# Patient Record
Sex: MALE | Race: WHITE | NOT HISPANIC OR LATINO | Employment: OTHER | URBAN - METROPOLITAN AREA
[De-identification: names, ages, dates, MRNs, and addresses within clinical notes are randomized per-mention and may not be internally consistent; named-entity substitution may affect disease eponyms.]

---

## 2017-02-14 ENCOUNTER — GENERIC CONVERSION - ENCOUNTER (OUTPATIENT)
Dept: OTHER | Facility: OTHER | Age: 62
End: 2017-02-14

## 2017-03-07 ENCOUNTER — GENERIC CONVERSION - ENCOUNTER (OUTPATIENT)
Dept: OTHER | Facility: OTHER | Age: 62
End: 2017-03-07

## 2017-04-07 ENCOUNTER — GENERIC CONVERSION - ENCOUNTER (OUTPATIENT)
Dept: OTHER | Facility: OTHER | Age: 62
End: 2017-04-07

## 2017-05-05 ENCOUNTER — GENERIC CONVERSION - ENCOUNTER (OUTPATIENT)
Dept: OTHER | Facility: OTHER | Age: 62
End: 2017-05-05

## 2017-06-20 ENCOUNTER — ALLSCRIPTS OFFICE VISIT (OUTPATIENT)
Dept: OTHER | Facility: OTHER | Age: 62
End: 2017-06-20

## 2017-06-30 ENCOUNTER — GENERIC CONVERSION - ENCOUNTER (OUTPATIENT)
Dept: OTHER | Facility: OTHER | Age: 62
End: 2017-06-30

## 2017-07-03 ENCOUNTER — GENERIC CONVERSION - ENCOUNTER (OUTPATIENT)
Dept: OTHER | Facility: OTHER | Age: 62
End: 2017-07-03

## 2017-08-21 ENCOUNTER — ALLSCRIPTS OFFICE VISIT (OUTPATIENT)
Dept: OTHER | Facility: OTHER | Age: 62
End: 2017-08-21

## 2017-08-21 ENCOUNTER — LAB REQUISITION (OUTPATIENT)
Dept: LAB | Facility: HOSPITAL | Age: 62
End: 2017-08-21
Payer: MEDICARE

## 2017-08-21 DIAGNOSIS — M79.10 MYALGIA: ICD-10-CM

## 2017-08-21 DIAGNOSIS — Z12.5 ENCOUNTER FOR SCREENING FOR MALIGNANT NEOPLASM OF PROSTATE: ICD-10-CM

## 2017-08-21 DIAGNOSIS — M54.81 OCCIPITAL NEURALGIA: ICD-10-CM

## 2017-08-21 DIAGNOSIS — E03.9 HYPOTHYROIDISM: ICD-10-CM

## 2017-08-21 DIAGNOSIS — M48.02 SPINAL STENOSIS OF CERVICAL REGION: ICD-10-CM

## 2017-08-21 DIAGNOSIS — Z00.00 ENCOUNTER FOR GENERAL ADULT MEDICAL EXAMINATION WITHOUT ABNORMAL FINDINGS: ICD-10-CM

## 2017-08-21 DIAGNOSIS — J30.9 ALLERGIC RHINITIS: ICD-10-CM

## 2017-08-21 LAB
25(OH)D3 SERPL-MCNC: 33.8 NG/ML (ref 30–100)
ALBUMIN SERPL BCP-MCNC: 4 G/DL (ref 3.5–5)
ALP SERPL-CCNC: 52 U/L (ref 46–116)
ALT SERPL W P-5'-P-CCNC: 27 U/L (ref 12–78)
ANION GAP SERPL CALCULATED.3IONS-SCNC: 5 MMOL/L (ref 4–13)
AST SERPL W P-5'-P-CCNC: 22 U/L (ref 5–45)
BASOPHILS # BLD AUTO: 0.02 THOUSANDS/ΜL (ref 0–0.1)
BASOPHILS NFR BLD AUTO: 0 % (ref 0–1)
BILIRUB SERPL-MCNC: 0.38 MG/DL (ref 0.2–1)
BILIRUB UR QL STRIP: NEGATIVE
BUN SERPL-MCNC: 18 MG/DL (ref 5–25)
CALCIUM SERPL-MCNC: 9.1 MG/DL (ref 8.3–10.1)
CHLORIDE SERPL-SCNC: 106 MMOL/L (ref 100–108)
CHOLEST SERPL-MCNC: 181 MG/DL (ref 50–200)
CLARITY UR: CLEAR
CO2 SERPL-SCNC: 28 MMOL/L (ref 21–32)
COLOR UR: YELLOW
CREAT SERPL-MCNC: 0.6 MG/DL (ref 0.6–1.3)
EOSINOPHIL # BLD AUTO: 0.16 THOUSAND/ΜL (ref 0–0.61)
EOSINOPHIL NFR BLD AUTO: 2 % (ref 0–6)
ERYTHROCYTE [DISTWIDTH] IN BLOOD BY AUTOMATED COUNT: 14 % (ref 11.6–15.1)
GFR SERPL CREATININE-BSD FRML MDRD: 107 ML/MIN/1.73SQ M
GLUCOSE SERPL-MCNC: 97 MG/DL (ref 65–140)
GLUCOSE UR STRIP-MCNC: NEGATIVE MG/DL
HCT VFR BLD AUTO: 42.2 % (ref 36.5–49.3)
HDLC SERPL-MCNC: 50 MG/DL (ref 40–60)
HGB BLD-MCNC: 14.2 G/DL (ref 12–17)
HGB UR QL STRIP.AUTO: NEGATIVE
KETONES UR STRIP-MCNC: NEGATIVE MG/DL
LDLC SERPL CALC-MCNC: 117 MG/DL (ref 0–100)
LEUKOCYTE ESTERASE UR QL STRIP: NEGATIVE
LYMPHOCYTES # BLD AUTO: 2.27 THOUSANDS/ΜL (ref 0.6–4.47)
LYMPHOCYTES NFR BLD AUTO: 28 % (ref 14–44)
MCH RBC QN AUTO: 32.6 PG (ref 26.8–34.3)
MCHC RBC AUTO-ENTMCNC: 33.6 G/DL (ref 31.4–37.4)
MCV RBC AUTO: 97 FL (ref 82–98)
MONOCYTES # BLD AUTO: 0.86 THOUSAND/ΜL (ref 0.17–1.22)
MONOCYTES NFR BLD AUTO: 11 % (ref 4–12)
NEUTROPHILS # BLD AUTO: 4.75 THOUSANDS/ΜL (ref 1.85–7.62)
NEUTS SEG NFR BLD AUTO: 59 % (ref 43–75)
NITRITE UR QL STRIP: NEGATIVE
NRBC BLD AUTO-RTO: 0 /100 WBCS
PH UR STRIP.AUTO: 5.5 [PH] (ref 4.5–8)
PLATELET # BLD AUTO: 183 THOUSANDS/UL (ref 149–390)
PMV BLD AUTO: 10.8 FL (ref 8.9–12.7)
POTASSIUM SERPL-SCNC: 4.3 MMOL/L (ref 3.5–5.3)
PROT SERPL-MCNC: 7.2 G/DL (ref 6.4–8.2)
PROT UR STRIP-MCNC: NEGATIVE MG/DL
PSA SERPL-MCNC: 1.2 NG/ML (ref 0–4)
RBC # BLD AUTO: 4.35 MILLION/UL (ref 3.88–5.62)
SODIUM SERPL-SCNC: 139 MMOL/L (ref 136–145)
SP GR UR STRIP.AUTO: 1.02 (ref 1–1.03)
TRIGL SERPL-MCNC: 70 MG/DL
TSH SERPL DL<=0.05 MIU/L-ACNC: 1.06 UIU/ML (ref 0.36–3.74)
UROBILINOGEN UR QL STRIP.AUTO: 0.2 E.U./DL
WBC # BLD AUTO: 8.08 THOUSAND/UL (ref 4.31–10.16)

## 2017-08-21 PROCEDURE — 82306 VITAMIN D 25 HYDROXY: CPT | Performed by: FAMILY MEDICINE

## 2017-08-21 PROCEDURE — 80053 COMPREHEN METABOLIC PANEL: CPT | Performed by: FAMILY MEDICINE

## 2017-08-21 PROCEDURE — 81003 URINALYSIS AUTO W/O SCOPE: CPT | Performed by: FAMILY MEDICINE

## 2017-08-21 PROCEDURE — 85025 COMPLETE CBC W/AUTO DIFF WBC: CPT | Performed by: FAMILY MEDICINE

## 2017-08-21 PROCEDURE — G0103 PSA SCREENING: HCPCS | Performed by: FAMILY MEDICINE

## 2017-08-21 PROCEDURE — 80061 LIPID PANEL: CPT | Performed by: FAMILY MEDICINE

## 2017-08-21 PROCEDURE — 84443 ASSAY THYROID STIM HORMONE: CPT | Performed by: FAMILY MEDICINE

## 2017-08-22 ENCOUNTER — GENERIC CONVERSION - ENCOUNTER (OUTPATIENT)
Dept: OTHER | Facility: OTHER | Age: 62
End: 2017-08-22

## 2017-10-31 ENCOUNTER — GENERIC CONVERSION - ENCOUNTER (OUTPATIENT)
Dept: OTHER | Facility: OTHER | Age: 62
End: 2017-10-31

## 2017-12-11 ENCOUNTER — GENERIC CONVERSION - ENCOUNTER (OUTPATIENT)
Dept: FAMILY MEDICINE CLINIC | Facility: CLINIC | Age: 62
End: 2017-12-11

## 2018-01-08 ENCOUNTER — GENERIC CONVERSION - ENCOUNTER (OUTPATIENT)
Dept: FAMILY MEDICINE CLINIC | Facility: CLINIC | Age: 63
End: 2018-01-08

## 2018-01-10 NOTE — RESULT NOTES
Discussion/Summary   Labs okay  Continue present treatment  Verified Results  (1) CBC/PLT/DIFF 16Hiv1599 10:30AM Zaire Muro Order Number: [id number]     Test Name Result Flag Reference   WBC COUNT 8 08 Thousand/uL  4 31-10 16   RBC COUNT 4 35 Million/uL  3 88-5 62   HEMOGLOBIN 14 2 g/dL  12 0-17 0   HEMATOCRIT 42 2 %  36 5-49 3   MCV 97 fL  82-98   MCH 32 6 pg  26 8-34 3   MCHC 33 6 g/dL  31 4-37 4   RDW 14 0 %  11 6-15 1   MPV 10 8 fL  8 9-12 7   PLATELET COUNT 725 Thousands/uL  149-390   nRBC AUTOMATED 0 /100 WBCs     NEUTROPHILS RELATIVE PERCENT 59 %  43-75   LYMPHOCYTES RELATIVE PERCENT 28 %  14-44   MONOCYTES RELATIVE PERCENT 11 %  4-12   EOSINOPHILS RELATIVE PERCENT 2 %  0-6   BASOPHILS RELATIVE PERCENT 0 %  0-1   NEUTROPHILS ABSOLUTE COUNT 4 75 Thousands/? ??L  1 85-7 62   LYMPHOCYTES ABSOLUTE COUNT 2 27 Thousands/? ??L  0 60-4 47   MONOCYTES ABSOLUTE COUNT 0 86 Thousand/? ??L  0 17-1 22   EOSINOPHILS ABSOLUTE COUNT 0 16 Thousand/? ??L  0 00-0 61   BASOPHILS ABSOLUTE COUNT 0 02 Thousands/? ??L  0 00-0 10     (1) COMPREHENSIVE METABOLIC PANEL 22LMX7165 30:06IF Zaire Muro Order Number: [id number]     Test Name Result Flag Reference   GLUCOSE,RANDM 97 mg/dL     If the patient is fasting, the ADA then defines impaired fasting glucose as > 100 mg/dL and diabetes as > or equal to 123 mg/dL  Specimen collection should occur prior to Sulfasalazine administration due to the potential for falsely depressed results  Specimen collection should occur prior to Sulfapyridine administration due to the potential for falsely elevated results     SODIUM 139 mmol/L  136-145   POTASSIUM 4 3 mmol/L  3 5-5 3   CHLORIDE 106 mmol/L  100-108   CARBON DIOXIDE 28 mmol/L  21-32   ANION GAP (CALC) 5 mmol/L  4-13   BLOOD UREA NITROGEN 18 mg/dL  5-25   CREATININE 0 60 mg/dL  0 60-1 30   Standardized to IDMS reference method   CALCIUM 9 1 mg/dL  8 3-10 1   BILI, TOTAL 0 38 mg/dL  0 20-1 00 ALK PHOSPHATAS 52 U/L     ALT (SGPT) 27 U/L  12-78   Specimen collection should occur prior to Sulfasalazine and/or Sulfapyridine administration due to the potential for falsely depressed results  AST(SGOT) 22 U/L  5-45   Specimen collection should occur prior to Sulfasalazine administration due to the potential for falsely depressed results  ALBUMIN 4 0 g/dL  3 5-5 0   TOTAL PROTEIN 7 2 g/dL  6 4-8 2   eGFR 107 ml/min/1 73sq [facility] Disease Education Program recommendations are as follows:  GFR calculation is accurate only with a steady state creatinine  Chronic Kidney disease less than 60 ml/min/1 73 sq  meters  Kidney failure less than 15 ml/min/1 73 sq  meters  (1) LIPID PANEL, FASTING 49Xng1213 10:30AM [facility] Order Number: [id number]     Test Name Result Flag Reference   CHOLESTEROL 181 mg/dL     HDL,DIRECT 50 mg/dL  40-60   Specimen collection should occur prior to Metamizole administration due to the potential for falsley depressed results  LDL CHOLESTEROL CALCULATED 117 mg/dL H 0-100   Triglyceride:        Normal ??? ??? ??? ??? ??? ??? ??? <150 mg/dl   ??? ??? ???Borderline High ??? ??? 150-199 mg/dl   ??? ??? ? ?? High ??? ??? ??? ??? ??? ??? ??? 200-499 mg/dl   ??? ??? ? ??Very High ??? ??? ??? ??? ??? >499 mg/dl      Cholesterol:       Desirable ??? ??? ??? ??? <200 mg/dl   ??? ??? Borderline High ??? 200-239 mg/dl   ??? ??? High ??? ??? ??? ??? ??? ??? >239 mg/dl      HDL Cholesterol:       High ??? ???>59 mg/dL   ??? ??? Low ??? ??? <41 mg/dL      This screening LDL is a calculated result  It does not have the accuracy of the Direct Measured LDL in the monitoring of patients with hyperlipidemia and/or statin therapy  Direct Measure LDL (BZG213) must be ordered separately in these patients     TRIGLYCERIDES 70 mg/dL  <=150   Specimen collection should occur prior to N-Acetylcysteine or Metamizole administration due to the potential for falsely depressed results  (1) TSH WITH FT4 REFLEX 21Aug2017 10:30AM Enma Caruso Order Number: [id number]     Test Name Result Flag Reference   TSH 1 060 uIU/mL  0 358-3 740   Patients undergoing fluorescein dye angiography may retain small amounts of fluorescein in the body for 48-72 hours post procedure  Samples containing fluorescein can produce falsely depressed TSH values  If the patient had this procedure,a specimen should be resubmitted post fluorescein clearance  (1) VITAMIN D 25-HYDROXY 21Aug2017 10:30AM Enma Caruso Order Number: [id number]     Test Name Result Flag Reference   VIT D 25-HYDROX 33 8 ng/mL  30 0-100 0   This assay is a certified procedure of the CDC Vitamin D Standardization Certification Program (VDSCP)     Deficiency <20ng/ml   Insufficiency 20-30ng/ml   Sufficient  ng/ml     *Patients undergoing fluorescein dye angiography may retain small amounts of fluorescein in the body for 48-72 hours post procedure  Samples containing fluorescein can produce falsely elevated Vitamin D values  If the patient had this procedure, a specimen should be resubmitted post fluorescein clearance       (1) URINALYSIS w URINE C/S REFLEX (will reflex a microscopy if leukocytes, occult blood, or nitrites are not within normal limits) 21Aug2017 10:30AM Enma Caruso Order Number: [id number]     Test Name Result Flag Reference   COLOR Yellow     CLARITY Clear     PH UA 5 5  4 5-8 0   LEUKOCYTE ESTERASE UA Negative  Negative   NITRITE UA Negative  Negative   PROTEIN UA Negative mg/dl  Negative   GLUCOSE UA Negative mg/dl  Negative   KETONES UA Negative mg/dl  Negative   UROBILINOGEN UA 0 2 E U /dl  0 2, 1 0 E U /dl   BILIRUBIN UA Negative  Negative   BLOOD UA Negative  Negative   SPECIFIC GRAVITY UA 1 018  1 003-1 030     (1) PSA (SCREEN) (Dx V76 44 Screen for Prostate Cancer) 21Aug2017 10:30AM Ross SORENSEN Order Number: [id number]     Test Name Result Flag Reference   PROSTATE SPECIFIC ANTIGEN 1 2 ng/mL  0 0-4 0   American Urological Association Guidelines define biochemical recurrence of prostate cancer as a detectable or rising PSA value post-radical prostatectomy that is greater than or equal to 0 2 ng/mL with a second confirmatory level of greater than or equal to 0 2 ng/mL

## 2018-01-10 NOTE — MISCELLANEOUS
Message   Recorded as Task   Date: 09/22/2016 11:41 AM, Created By: Petar Moreno   Task Name: Follow Up   Assigned To: Daryn manjarrez,Team   Regarding Patient: Hao Ernst, Status: Active   Comment:    Kala Ramirez - 22 Sep 2016 11:41 AM     TASK CREATED  Caller: Self; General Medical Question; [phone] (Home)  Pt lmom stating that he was seen by Chacorta Garrido and was started on amrix  Pt was also told that a new compounding cream with a muscle relaxant was going to be ordered at [facility]  Pt called the pharmacy and their is nothing on file  Alphonso Wyman - 22 Sep 2016 12:24 PM     TASK REPLIED TO: Previously Assigned To CLAUDIA manjarrez,Team  please call in the [facility] compounded medication   i had sent electronically, but I guess it didn't go through   Petar Moreno - 22 Sep 2016 1:06 PM     TASK EDITED    Called in as ordered  Pt aware  Active Problems    1  Acute bronchitis (466 0) (J20 9)   2  Acute sinusitis (461 9) (J01 90)   3  Acute upper respiratory infection (465 9) (J06 9)   4  Aftercare following surgery of the musculoskeletal system (V58 78) (Z47 89)   5  Allergic rhinitis (477 9) (J30 9)   6  Blood tests for routine general physical examination (V72 62) (Z00 00)   7  C2 cervical fracture (805 02) (S12 100A)   8  Cervical disc herniation (722 0) (M50 20)   9  Cervical spinal stenosis (723 0) (M48 02)   10  Cervical spondylosis (721 0) (M47 812)   11  Cervicalgia (723 1) (M54 2)   12  Disc disorder of cervical region (722 91) (M50 90)   13  Encounter for prostate cancer screening (V76 44) (Z12 5)   14  Fracture, cervical vertebra (805 00) (S12 9XXA)   15  Hypothyroidism (244 9) (E03 9)   16  Left arm pain (729 5) (M79 602)   17  Muscle pain, myofacial (729 1) (M79 1)   18  Neck pain (723 1) (M54 2)   19  Neuralgia (729 2) (M79 2)   20  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)   21   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)   22  Occipital neuralgia (723 8) (M54 81)   23  Postoperative examination (V67 00) (Z09)   24  Post-operative state (V45 89) (Z98 89)   25  Post-traumatic osteoarthritis of one knee (715 26) (M17 30)   26  Preoperative examination (V72 84) (Z01 818)   27  Pruritus (698 9) (L29 9)   28  Seizures (780 39) (R56 9)   29  Unequal leg length (acquired) (736 81) (M21 70)   30  Venous insufficiency (459 81) (I87 2)    Current Meds   1  Amrix 15 MG Oral Capsule Extended Release 24 Hour (Cyclobenzaprine HCl); TAKE 1   CAPSULE after dinner around 7 pm;   Therapy: 89Kuy1136 to (Evaluate:13Nov2016)  Requested for: 36Dfv6909; Last   Rx:44Nzt9271 Ordered   2  Aspirin 81 MG TABS; TAKE 1 TABLET DAILY; Therapy: (Recorded:05Gdz3434) to Recorded   3  Calcium 600 + D 600-200 MG-UNIT Oral Tablet; TAKE 1 TABLET DAILY; Therapy: (Recorded:23Spd0329) to Recorded   4  Fish Oil CAPS; take 1 capsule daily; Therapy: (Recorded:78Dxf3243) to Recorded   5  Fluticasone Propionate 50 MCG/ACT Nasal Suspension; USE 2 SPRAYS IN EACH   NOSTRIL ONCE DAILY; Therapy: 85QXY6678 to (Last Rx:43Wwb6374)  Requested for: 00Srx3877 Ordered   6  Multivitamins CAPS; TAKE 1 CAPSULE DAILY; Therapy: (Recorded:49Wfo6100) to Recorded   7  Proventil  (90 Base) MCG/ACT Inhalation Aerosol Solution; INHALE 2 PUFFS   EVERY 4-6 HOURS AS NEEDED; Therapy: 79CNE1165 to (Last Rx:71Khb6655)  Requested for: 77Drs8701 Ordered   8  [employer] Compound Medication; gabapentin 10%, cyclobenzaprine 2%, diclofenac 3% QID   PRN; Therapy: 66DZB5291 to (Evaluate:14Oct2016); Last Rx:17Goo8936 Ordered    Allergies    1   Augmentin TABS    Signatures   Electronically signed by : Susie Montgomery RN; Sep 22 2016  1:06PM EST                       (Author)

## 2018-01-11 NOTE — MISCELLANEOUS
Message   Recorded as Task   Date: 01/11/2016 01:28 PM, Created By: Josie Morales   Task Name: Follow Up   Assigned To: Alannah Amato   Regarding Patient: Linnea Bass, Status: Active   Comment:    Alannah Amato - 11 Jan 2016 1:28 PM     TASK CREATED  Caller: Self; Other; [phone] (Home)  Pt left vm on 1/11/16 at 1240 that he was calling to give authorization for his wife to  a RX tomorrow that is here for him  S/W pt and confirmed his message was received, explained to pt that we have a paper that his wife will be asked to sign tomorrow when she comes to p/u his RX  Pt appreciated the c/b and confirmed our office is open until 4pm         Active Problems    1  Acute bronchitis (466 0) (J20 9)   2  Acute sinusitis (461 9) (J01 90)   3  Acute upper respiratory infection (465 9) (J06 9)   4  Aftercare following surgery of the musculoskeletal system (V58 78) (Z47 89)   5  Allergic rhinitis (477 9) (J30 9)   6  C2 cervical fracture (805 02) (S12 100A)   7  Cervical disc herniation (722 0) (M50 20)   8  Cervical spinal stenosis (723 0) (M48 02)   9  Cervical spondylosis (721 0) (M47 812)   10  Cervicalgia (723 1) (M54 2)   11  Disc disorder of cervical region (722 91) (M50 90)   12  Fracture, cervical vertebra (805 00) (S12 9XXA)   13  Hypothyroidism (244 9) (E03 9)   14  Left arm pain (729 5) (M79 602)   15  Muscle pain, myofacial (729 1) (M79 7)   16  Neck pain (723 1) (M54 2)   17  Neuralgia (729 2) (M79 2)   18  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)   19  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)   20  Occipital neuralgia (723 8) (M54 81)   21  Postoperative examination (V67 00) (Z09)   22  Post-operative state (V45 89) (Z98 89)   23  Post-traumatic osteoarthritis of one knee (715 26) (M17 30)   24  Preoperative examination (V72 84) (Z01 818)   25  Pruritus (698 9) (L29 9)   26  Seizures (780 39) (R56 9)   27  Unequal leg length (acquired) (736 81) (M21 70)   28   Venous insufficiency (459 81) (I87 2)    Current Meds   1  Aspirin 81 MG Oral Tablet; TAKE 1 TABLET DAILY; Therapy: (Recorded:02Oct2012) to Recorded   2  Calcium 600 + D 600-200 MG-UNIT Oral Tablet; TAKE 1 TABLET DAILY; Therapy: (Recorded:34Jfu6401) to Recorded   3  Fish Oil CAPS; take 1 capsule daily; Therapy: (Recorded:51Wnp7883) to Recorded   4  Levothyroxine Sodium 100 MCG Oral Tablet; TAKE 1 TABLET DAILY  Requested for:   80TZZ3790; Last FR:11SEB6669 Ordered   5  Multivitamins CAPS; TAKE 1 CAPSULE DAILY; Therapy: (Recorded:82Hnj4314) to Recorded   6  Nucynta 75 MG Oral Tablet; TAKE 1 TABLET 3 TIMES DAILY AS NEEDED FOR PAIN;   Therapy: 18ASE4724 to (Evaluate:15Jan2016); Last BL:28WMK4129 Ordered   7  Proventil  (90 Base) MCG/ACT Inhalation Aerosol Solution; INHALE 2 PUFFS   EVERY 4-6 HOURS AS NEEDED; Therapy: 19KIL3518 to (Last Rx:52Adm9537)  Requested for: 32Uci1430 Ordered   8  Proventil  (90 Base) MCG/ACT Inhalation Aerosol Solution; INHALE 2 PUFFS   EVERY 4-6 HOURS AS NEEDED; Therapy: 27ZNR7719 to (Last Rx:55Wwl3406)  Requested for: 55Xxg8663 Ordered   9  Triamcinolone Acetonide 0 1 % External Cream; APPLY 2-3 TIMES DAILY TO   AFFECTED AREA(S); Therapy: 86OQB8559 to (Last Rx:85Ikr0745)  Requested for: 68Sle2115 Ordered   10  Zithromax Z-Nicolás 250 MG Oral Tablet (Azithromycin); TAKE 2 TABLETS ON DAY 1 THEN    TAKE 1 TABLET A DAY FOR 4 DAYS; Therapy: 16ODV8902 to (Last Rx:30Oct2015)  Requested for: 30Oct2015 Ordered    Allergies    1   Augmentin TABS    Signatures   Electronically signed by : Patric Alarcon, ; Jan 11 2016  1:29PM EST                       (Author)

## 2018-01-12 NOTE — MISCELLANEOUS
Message   Recorded as Task   Date: 04/04/2016 11:43 AM, Created By: Josi Oneal   Task Name: Follow Up   Assigned To: Holli Suraez   Regarding Patient: Boubacar Drew, Status: Active   CommentSjoshua Guthrie - 04 Apr 2016 11:43 AM     TASK CREATED  please let pt know that i checked with dr Evan Cheatham and the wires in his neck are titanium (same as the screws) and that since he's not having an allergic reaction to the screws, unlikely to have it to the wires   Marychuy Whitney - 04 Apr 2016 1:16 PM     TASK EDITED  S/w pt  today and explained previous task  He is wondering if he could be reacting to the titanium florencio that is in his leg, since you made the statement about his watch  He is just throwing it out there to you  He wants to see what you think  He is also inquirying as to see if we received the [facility] codes that he faxed on friday? FQ to advise  Thanks   Alannah Amato - 04 Apr 2016 1:17 PM     TASK EDITED  Left detailed vm of the same on pt's cell  Pt to c/b if he has any further questions  Alannah Amato - 04 Apr 2016 1:17 PM     TASK REASSIGNED: Previously Assigned To Venkata Lynne - 04 Apr 2016 1:17 PM     TASK IN PROGRESS   Marychuy Whitney - 04 Apr 2016 2:13 PM     TASK EDITED  Please read my task  Thanks   Tonya Veliz - 07 Apr 2016 7:45 AM     TASK EDITED  ***FYI*** Compunding cream has been denied stating the compounded meds are not FDA approved   Josi Oneal - 07 Apr 2016 9:37 AM     TASK REPLIED TO: Previously Assigned To Josi Oneal md aware   then what cream has he been using? Alannah Amato - 07 Apr 2016 4:16 PM     TASK EDITED   Amy Chandra - 08 Apr 2016 1:55 PM     TASK EDITED  Pt informed that the cpd cream was denied even after Debbie Patel provided the [facility] codes  Pt said he wasn't using any other cream   Pt said to say thank you to everyone at our office for trying to get it approved     Latrell Johnson - 08 Apr 2016 2:55 PM     TASK REPLIED TO: Previously Assigned To Sulma Bach md aware        Active Problems    1  Acute bronchitis (466 0) (J20 9)   2  Acute sinusitis (461 9) (J01 90)   3  Acute upper respiratory infection (465 9) (J06 9)   4  Aftercare following surgery of the musculoskeletal system (V58 78) (Z47 89)   5  Allergic rhinitis (477 9) (J30 9)   6  C2 cervical fracture (805 02) (S12 100A)   7  Cervical disc herniation (722 0) (M50 20)   8  Cervical spinal stenosis (723 0) (M48 02)   9  Cervical spondylosis (721 0) (M47 812)   10  Cervicalgia (723 1) (M54 2)   11  Disc disorder of cervical region (722 91) (M50 90)   12  Fracture, cervical vertebra (805 00) (S12 9XXA)   13  Hypothyroidism (244 9) (E03 9)   14  Left arm pain (729 5) (M79 602)   15  Muscle pain, myofacial (729 1) (M79 1)   16  Neck pain (723 1) (M54 2)   17  Neuralgia (729 2) (M79 2)   18  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)   19  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)   20  Occipital neuralgia (723 8) (M54 81)   21  Postoperative examination (V67 00) (Z09)   22  Post-operative state (V45 89) (Z98 89)   23  Post-traumatic osteoarthritis of one knee (715 26) (M17 30)   24  Preoperative examination (V72 84) (Z01 818)   25  Pruritus (698 9) (L29 9)   26  Seizures (780 39) (R56 9)   27  Unequal leg length (acquired) (736 81) (M21 70)   28  Venous insufficiency (459 81) (I87 2)    Current Meds   1  Aspirin 81 MG Oral Tablet; TAKE 1 TABLET DAILY; Therapy: (Recorded:02Oct2012) to Recorded   2  Calcium 600 + D 600-200 MG-UNIT Oral Tablet; TAKE 1 TABLET DAILY; Therapy: (Recorded:00Fuq3460) to Recorded   3  Fish Oil CAPS; take 1 capsule daily; Therapy: (Recorded:88Xlo6027) to Recorded   4  HydrOXYzine HCl - 25 MG Oral Tablet; TAKE 1 TABLET 3 TO 4 TIMES DAILY AS   NEEDED FOR ITCHING; Therapy: 68UMH0501 to (Evaluate:27Mar2016)  Requested for: 47CNO3392; Last   Rx:09Doc5533 Ordered   5   Levothyroxine Sodium 100 MCG Oral Tablet; TAKE 1 TABLET DAILY Requested for:   03Jun2015; Last IW:13OHT3961 Ordered   6  Multivitamins CAPS; TAKE 1 CAPSULE DAILY; Therapy: (Recorded:87Uym0868) to Recorded   7  Proventil  (90 Base) MCG/ACT Inhalation Aerosol Solution; INHALE 2 PUFFS   EVERY 4-6 HOURS AS NEEDED; Therapy: 72RUT7490 to (Last Rx:37Htn1030)  Requested for: 26Soe6219 Ordered   8  [facility] Compound Medication; 5% amitriptyline, 5% gabapentin, 10%lidocaine, 5%   Phenytoin, lipoderm-60 grams  apply 2-3 times a day to   back of neck as needed for pain; Therapy: 44HKI5121 to (Evaluate:21Jan2016); Last Rx:18Jan2016 Ordered   9  Triamcinolone Acetonide 0 1 % External Cream; APPLY 2-3 TIMES DAILY TO   AFFECTED AREA(S); Therapy: 84WBF5334 to (Last Rx:31Akk6650)  Requested for: 51Zbz0732 Ordered    Allergies    1  Augmentin TABS    Signatures   Electronically signed by :  Ysabel Oliver, ; Apr 8 2016  4:06PM EST                       (Author)

## 2018-01-12 NOTE — MISCELLANEOUS
Message     Recorded as Task   Date: 05/05/2017 08:44 AM, Created By: Shayne Nath   Task Name: Follow Up   Assigned To: Ronaldo Mireles   Regarding Patient: Liz Terry, Status: Active   CommentLormaria teresa Crowell - 05 May 2017 8:44 AM     TASK CREATED  Form sent for doctor to fill out regarding pt's SSDI  The hearing is in a few weeks  Please call [phone] for status  Alannah Amato - 05 May 2017 8:51 AM     TASK EDITED  Please advise  Saurabh Pickard - 05 May 2017 10:24 AM     TASK REPLIED TO: Previously Assigned To Saurabh Neeraj  we don't complete disability paperwork   Alannah Amato - 05 May 2017 10:59 AM     TASK EDITED  S/W pt and informed of the same  Advised pt he may want to d/w his PCP about completing form  Pt stated "OK "        Active Problems    1  Acute bronchitis (466 0) (J20 9)   2  Acute sinusitis (461 9) (J01 90)   3  Acute upper respiratory infection (465 9) (J06 9)   4  Aftercare following surgery of the musculoskeletal system (V58 78) (Z47 89)   5  Allergic rhinitis (477 9) (J30 9)   6  Blood tests for routine general physical examination (V72 62) (Z00 00)   7  C2 cervical fracture (805 02) (S12 100A)   8  Cervical disc herniation (722 0) (M50 20)   9  Cervical spinal stenosis (723 0) (M48 02)   10  Cervical spondylosis (721 0) (M47 812)   11  Cervicalgia (723 1) (M54 2)   12  Disc disorder of cervical region (722 91) (M50 90)   13  Encounter for prostate cancer screening (V76 44) (Z12 5)   14  Flu vaccine need (V04 81) (Z23)   15  Fracture, cervical vertebra (805 00) (S12 9XXA)   16  Hypothyroidism (244 9) (E03 9)   17  Left arm pain (729 5) (M79 602)   18  Muscle pain, myofacial (729 1) (M79 1)   19  Neck pain (723 1) (M54 2)   20  Neuralgia (729 2) (M79 2)   21  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)   22  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)   23  Occipital neuralgia (723 8) (M54 81)   24  Postoperative examination (V67 00) (Z09)   25  Post-operative state (V45 89) (Z98 890)   26  Post-traumatic osteoarthritis of one knee (715 26) (M17 30)   27  Preoperative examination (V72 84) (Z01 818)   28  Pruritus (698 9) (L29 9)   29  Seizures (780 39) (R56 9)   30  Unequal leg length (acquired) (736 81) (M21 70)   31  Venous insufficiency (459 81) (I87 2)    Current Meds   1  Aspirin 81 MG TABS; TAKE 1 TABLET DAILY; Therapy: (Recorded:02Oct2012) to Recorded   2  Azithromycin 250 MG Oral Tablet; TAKE 2 TABLETS ON DAY 1 THEN TAKE 1 TABLET A   DAY FOR 4 DAYS; Therapy: 37GZQ5175 to (Last Rx:21Nov2016)  Requested for: 21Nov2016 Ordered   3  Calcium 600 + D 600-200 MG-UNIT Oral Tablet; TAKE 1 TABLET DAILY; Therapy: (Recorded:68Dbe3538) to Recorded   4  Fish Oil CAPS; take 1 capsule daily; Therapy: (Recorded:20Edg6122) to Recorded   5  Fluticasone Propionate 50 MCG/ACT Nasal Suspension; 2 sprays to each nostril daily; Therapy: 64HJZ1254 to (Evaluate:17Gmy5183)  Requested for: 72UXJ4957; Last   Rx:77Lnu5715 Ordered   6  Levothyroxine Sodium 100 MCG Oral Tablet; Therapy: (Recorded:21Nov2016) to Recorded   7  Multivitamins CAPS; TAKE 1 CAPSULE DAILY; Therapy: (Recorded:18Kds3978) to Recorded   8  Proventil  (90 Base) MCG/ACT Inhalation Aerosol Solution; INHALE 2 PUFFS   EVERY 4-6 HOURS AS NEEDED; Therapy: 56PWJ6592 to (Last Rx:19Tmz2152)  Requested for: 45Mnc5542 Ordered   9  [employer] Compound Medication; gabapentin 10%, cyclobenzaprine 2%, diclofenac 3% QID   PRN; Therapy: 46ZDD0778 to (Evaluate:14Oct2016); Last Rx:84Bal9827 Ordered    Allergies    1   Augmentin TABS    Signatures   Electronically signed by : Tony Hernandez, ; May  5 2017 11:00AM EST                       (Author)

## 2018-01-12 NOTE — PROGRESS NOTES
Assessment    1  Occipital neuralgia (723 8) (B19 03)    Plan  Occipital neuralgia    · [facility] Compound Medication; 5% amitriptyline, 5% gabapentin, 10%lidocaine, 5%  Phenytoin, lipoderm-60 grams  apply 2-3 times a day to back  of neck as needed for pain   Rx By: Coral Tabares; Dispense: 0 Days ; #:60; Refill: 0; For: Occipital neuralgia; NEY = N; Call Rx; Msg to Pharmacy: called into [facility] [phone]    Discussion/Summary    Patient is a 41-year-old male with a history of C2 fracture s/p C1-C2 fusion in February 2015, and occipital neuralgia  Unfortunately, the patient had no relief from the bilateral occipital nerve blocks or from medications Hydromorphone ER or Nucynta 75 mg  He has used a compounding cream in the past, which was very helpful  I will prescribe the cream for the patient  A prescription for a compounding cream that contains 5% Amitriptyline, 5% gabapentin, 10%lidocaine, 5%phenytoin, and lipoderm was called into [address] [place] [address] today  He will follow up after his appointment with Dr Mar Loud  Possible side effects of new medications were reviewed with the patient/guardian today  The treatment plan was reviewed with the patient/guardian  The patient/guardian understands and agrees with the treatment plan      Chief Complaint    1  Neck Pain    History of Present Illness  Patient is a 41-year-old male with a history of C2 fracture s/p C1-C2 fusion in February 2015, and occipital neuralgia  He was last seen in the office on December 18, 2015 in which he was started on hydromorphone ER  He presents today for a followup appointment  At this time, the patient continues to experience numbness, pins and needles, and an itching sensation over the occiput area  The pain is constant, and he rates it a 7/10 on the visual analog scale  He had bilateral occipital nerve blocks in November, which provided no relief       Since the last office visit, the patient contacted the office stating the hydromorphone ER was causing neck rigidity  He was then placed on Nucynta 75 mg 3 times a day  The patient states that he took 2 doses of this medication and it provided no relief of his itching sensation  He is questioning if he has a allergy to the hardware that was used for the cervical fusion  He has an CT scan scheduled and an upcoming appointment with Dr Denisa Cavanaugh to discuss further  He used a friends compounding cream, which helped with the itching sensation  He is requesting we prescribe this for him  Lucas Lama presents with complaints of gradual onset of moderate bilateral posterior, bilateral anterior and bilateral lateral neck pain, non-radiating  On a scale of 1 to 10, the patient rates the pain as 7  Symptoms are unchanged  Review of Systems    Constitutional: no fever, no recent weight gain and no recent weight loss  Eyes: no double vision and no blurry vision  Cardiovascular: no chest pain, no palpitations and no lower extremity edema  Respiratory: no complaints of shortness of breath and no wheezing  Musculoskeletal: no difficulty walking, no muscle weakness, no joint stiffness, no joint swelling, no limb swelling`, no pain in extremity and no decreased range of motion  Neurological: no dizziness, no difficulty swallowing, no memory loss, no loss of consciousness and no seizures  Gastrointestinal: no nausea, no vomiting, no constipation and no diarrhea  Genitourinary: no difficulty initiating urine stream, no genital pain and no frequent urination  Integumentary: no complaints of skin rash  Psychiatric: no depression  Endocrine: no excessive thirst, no adrenal disease, no hypothyroidism and no hyperthyroidism  Hematologic/Lymphatic: no tendency for easy bruising and no tendency for easy bleeding  Active Problems    1  Acute bronchitis (466 0) (J20 9)   2  Acute sinusitis (461 9) (J01 90)   3  Acute upper respiratory infection (465 9) (J06 9)   4  Aftercare following surgery of the musculoskeletal system (V58 78) (Z47 89)   5  Allergic rhinitis (477 9) (J30 9)   6  C2 cervical fracture (805 02) (S12 100A)   7  Cervical disc herniation (722 0) (M50 20)   8  Cervical spinal stenosis (723 0) (M48 02)   9  Cervical spondylosis (721 0) (M47 812)   10  Cervicalgia (723 1) (M54 2)   11  Disc disorder of cervical region (722 91) (M50 90)   12  Fracture, cervical vertebra (805 00) (S12 9XXA)   13  Hypothyroidism (244 9) (E03 9)   14  Left arm pain (729 5) (M79 602)   15  Muscle pain, myofacial (729 1) (M79 7)   16  Neck pain (723 1) (M54 2)   17  Neuralgia (729 2) (M79 2)   18  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)   19  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)   20  Occipital neuralgia (723 8) (M54 81)   21  Postoperative examination (V67 00) (Z09)   22  Post-operative state (V45 89) (Z98 89)   23  Post-traumatic osteoarthritis of one knee (715 26) (M17 30)   24  Preoperative examination (V72 84) (Z01 818)   25  Pruritus (698 9) (L29 9)   26  Seizures (780 39) (R56 9)   27  Unequal leg length (acquired) (736 81) (M21 70)   28  Venous insufficiency (459 81) (I87 2)    Past Medical History    1  History of Anxiety (300 00) (F41 9)   2  History of Arthritis (716 90) (M19 90)   3  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)   4  History of Broken bones (829 0) (T14 8)   5  History of Cataract (366 9) (H26 9)   6  History of Epilepsy (345 90) (G40 909)   7  History of thyroid disease (V12 29) (Z86 39)   8  History of Joint pain (719 40) (M25 50)   9  History of Joint swelling (719 00) (M25 40)   10  History of Osteomyelitis (730 20) (M86 9)   11  History of Skin cancer (173 90) (C44 90)   12  History of Squamous carcinoma (199 1) (C80 1)   13  History of Thyroid condition (246 9) (E07 9)   14  History of Thyroid disease (246 9) (E07 9)    The active problems and past medical history were reviewed and updated today  Surgical History    1  History of Arthrodesis Cervical To C2   2  History of Leg Repair   3  History of Neck Surgery   4  History of Thyroid Surgery Sourav-Thyroidectomy    The surgical history was reviewed and updated today  Family History    1  Family history of Myeloma    2  Family history of Dementia    3  Family history of malignant neoplasm (V16 9) (Z80 9)    The family history was reviewed and updated today  Social History    · Being A Social Drinker   · Currently sexually active   · Daily Coffee Consumption (3  Cups/Day)   · Former smoker (V15 82) (Y41 228)   ·    · No drug use   · No known STD risk factors   · Retired  The social history was reviewed and updated today  The social history was reviewed and is unchanged  Current Meds   1  Aspirin 81 MG Oral Tablet; TAKE 1 TABLET DAILY; Therapy: (Recorded:02Oct2012) to Recorded   2  Calcium 600 + D 600-200 MG-UNIT Oral Tablet; TAKE 1 TABLET DAILY; Therapy: (Recorded:17Vjh7493) to Recorded   3  Fish Oil CAPS; take 1 capsule daily; Therapy: (Recorded:94Vll9526) to Recorded   4  Levothyroxine Sodium 100 MCG Oral Tablet; TAKE 1 TABLET DAILY  Requested for:   11DAS6846; Last UY:20UFX3767 Ordered   5  Multivitamins CAPS; TAKE 1 CAPSULE DAILY; Therapy: (Recorded:03Xmx3594) to Recorded   6  Nucynta 75 MG Oral Tablet; TAKE 1 TABLET 3 TIMES DAILY AS NEEDED FOR PAIN;   Therapy: 69PCX3575 to (Evaluate:15Jan2016); Last UH:26UZH7265 Ordered   7  Proventil  (90 Base) MCG/ACT Inhalation Aerosol Solution; INHALE 2 PUFFS   EVERY 4-6 HOURS AS NEEDED; Therapy: 91MVV7139 to (Last Rx:87Gmr1216)  Requested for: 90Tpv6967 Ordered   8  Proventil  (90 Base) MCG/ACT Inhalation Aerosol Solution; INHALE 2 PUFFS   EVERY 4-6 HOURS AS NEEDED; Therapy: 92OEA3100 to (Last Rx:46Uzs4683)  Requested for: 52Bux5061 Ordered   9  Triamcinolone Acetonide 0 1 % External Cream; APPLY 2-3 TIMES DAILY TO AFFECTED   AREA(S);    Therapy: 38JAB0590 to (Last Rx:23Xfz8088)  Requested for: 27Iut5873 Ordered   10  Zithromax Z-Nicolás 250 MG Oral Tablet; TAKE 2 TABLETS ON DAY 1 THEN TAKE 1 TABLET    A DAY FOR 4 DAYS; Therapy: 26NTA5458 to (Last Rx:30Oct2015)  Requested for: 30Oct2015 Ordered    The medication list was reviewed and updated today  Allergies    1  Augmentin TABS    Vitals  Vital Signs [Data Includes: Current Encounter]    Recorded: 22UNL4105 10:31AM   Heart Rate 68   Systolic 150   Diastolic 66   Height 6 ft 1 in   Weight 192 lb    BMI Calculated 25 33   BSA Calculated 2 11   Pain Scale 7     Physical Exam    Constitutional   General appearance: Well developed, well nourished, alert, in no distress, non-toxic and no overt pain behavior  Eyes   Sclera: anicteric   HEENT   Hearing grossly intact  Neck   Neck: Supple, symmetric, trachea midline, no masses  Pulmonary   Respiratory effort: Even and unlabored  Cardiovascular   Examination of extremities: No edema or pitting edema present  Skin   Skin and subcutaneous tissue: Normal without rashes or lesions, well hydrated  Psychiatric   Mood and affect: Mood and affect appropriate  Neurologic   Cranial nerves: Cranial nerves II-XII grossly intact  Musculoskeletal   Gait and station: Normal     Cervical Spine examination demonstrates Cervical Spine:   Appearance: Normal normal lordosis  Tenderness: None  no tenderness over the occiput  Flexion was restricted and was painless  Extension was restricted and was painless  Rotation to the left was restricted and was painless  Rotation to the right was restricted and was painless    hand strength was normal bilaterally  wrist strength was normal bilaterally  elbow strength was normal bilaterally  shoulder strength was normal bilaterally  Results/Data  Results Free Text Form [facility]:   Results     CT Scan  7/9/15 Cervical CT: OSSEOUS STRUCTURES- Posterior fusion changes C1-2 are stable  Nonunion  of the base of dens fracture   There is improved alignment of the dens  relative to the base of C2 following surgery  DEGENERATIVE CHANGES-    Multilevel degenerative changes again identified  Mild stenosis of the  spinal canal  Multilevel foraminal narrowing  UPPER THORACIC DISC SPACES- No significant degenerative change  PREVERTEBRAL AND PARASPINAL SOFT TISSUES- Normal     LUNG APICES- Clear  Post right thyroidectomy changes and small left lobe nodules again  noted  IMPRESSION-     There is improved alignment of the dens relative to the base of C2  following surgery  Otherwise stable post post C1-2 fusion  Other  Pt reports last dose of Nucynta was 1/17/16  Attending Note  Collaborating Physician: I discussed the case with the Advanced Practitioner and reviewed the note and I agree with the Advanced Practitioner note  Future Appointments    Date/Time Provider Specialty Site   02/11/2016 10:00 AM JOAN Das   Neurosurgery [facility] NEUROSURGICAL   02/26/2016 01:00 PM Thai Don MD Pain Management [address]     Signatures   Electronically signed by : Bo Calderón [facility]; Jan 18 2016  3:10PM EST                       (Author)    Electronically signed by : Ila Rose MD; Jan 18 2016  9:39PM EST                       (Author)

## 2018-01-12 NOTE — PROGRESS NOTES
Assessment    1  Hypothyroidism (244 9) (E03 9)   2  Blood tests for routine general physical examination (V72 62) (Z00 00)   3  Encounter for preventive health examination (V70 0) (Z00 00)    Plan  Allergic rhinitis, Blood tests for routine general physical examination, Cervical spinal  stenosis, Health Maintenance, Encounter for prostate cancer screening, Hypothyroidism,  Occipital neuralgia    · (1) PSA (SCREEN) (Dx V76 44 Screen for Prostate Cancer); Status:Hold For - Exact  Date; Requested for: In [employer]; Allergic rhinitis, Blood tests for routine general physical examination, Cervical spinal  stenosis, Health Maintenance, Hypothyroidism, Muscle pain, myofacial, Occipital  neuralgia    · (1) VITAMIN D 25-HYDROXY; Status:Hold For - Exact Date; Requested for: In [employer]; Allergic rhinitis, Blood tests for routine general physical examination, Cervical spinal  stenosis, Health Maintenance, Hypothyroidism, Occipital neuralgia    · (1) CBC/PLT/DIFF; Status:Hold For - Exact Date; Requested for: In Office Collection;    · (1) COMPREHENSIVE METABOLIC PANEL; Status:Hold For - Exact Date; Requested  for: In Office Collection;    · (1) LIPID PANEL, FASTING; Status:Hold For - Exact Date; Requested for: In Office  Collection;    · (1) TSH WITH FT4 REFLEX; Status:Hold For - Exact Date; Requested for: In Office  Collection;    · (1) URINALYSIS w URINE C/S REFLEX (will reflex a microscopy if leukocytes, occult  blood, or nitrites are not within normal limits); Status:Hold For - Exact Date; Requested  for: In [employer]; Health Maintenance    · Proventil  (90 Base) MCG/ACT Inhalation Aerosol Solution; INHALE 2  PUFFS EVERY 4-6 HOURS AS NEEDED    Discussion/Summary  health maintenance visit Impression: healthy adult male  eats an adequate diet Currently, he has an adequate exercise regimen   Prostate cancer screening: the risks and benefits of prostate cancer screening were discussed, prostate cancer screening is current and PSA was ordered  Colorectal cancer screening: the risks and benefits of colorectal cancer screening were discussed, colonoscopy has been ordered and colorectal cancer screening is current  Screening lab work includes glucose, 25-hydroxyvitamin D, urinalysis and lipid profile  The risks and benefits of immunizations were discussed and immunizations are up to date  Advice and education were given regarding nutrition and aerobic exercise  Fasting labs drawn as above  Patient to continue present treatment  Patient to continue regular exercise walking 5 days a week for 30 minutes  Patient follow-up with specialist as scheduled and return to the office in 1 year  Possible side effects of new medications were reviewed with the patient/guardian today  The treatment plan was reviewed with the patient/guardian  The patient/guardian understands and agrees with the treatment plan      Chief Complaint  Physical      History of Present Illness  HM, Adult Male: The patient is being seen for a health maintenance evaluation  The last health maintenance visit was 1 year(s) ago  General Health: The patient's health since the last visit is described as good  He has regular dental visits  He denies vision problems  He denies hearing loss  Immunizations status: up to date  Lifestyle:  He consumes a diverse and healthy diet  He does not have any weight concerns  He exercises regularly  He exercises 3 or more times per week for 30 or more minutes per session  Exercise includes walking  He does not use tobacco  He consumes alcohol  He reports occasional alcohol use  He denies drug use  Screening: cancer screening reviewed and current  metabolic screening reviewed and updated  HPI: 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 and requests fasting labs  Patient has been feeling fairly well overall except for ongoing chronic posterior neck discomfort   Patient has been exercising walking regularly  Patient states he is due for follow-up colonoscopy with Dr Dotty Garrido, colorectal surgeon and plans to schedule  Hypothyroidism (Follow-Up): The patient reports doing well  He has had no significant interval events  Interval symptoms:  denies weight gain, denies cold intolerance, denies fatigue, denies weakness, denies constipation, denies dyspnea on exertion, denies trouble concentrating, denies hair loss and denies dry skin  Associated symptoms: arthralgias, but no myalgias, no paresthesias, no depression, no leg swelling and no weight loss    The patient presents with complaints of occasional episodes of palpitations  Medications:  the patient is adherent to his medication regimen, but he denies medication side effects  Disease management:  the patient is doing well with his goals  Due for: thyroid stimulating hormone  Active Problems    1  Acute bronchitis (466 0) (J20 9)   2  Acute sinusitis (461 9) (J01 90)   3  Acute upper respiratory infection (465 9) (J06 9)   4  Aftercare following surgery of the musculoskeletal system (V58 78) (Z47 89)   5  Allergic rhinitis (477 9) (J30 9)   6  Blood tests for routine general physical examination (V72 62) (Z00 00)   7  C2 cervical fracture (805 02) (S12 100A)   8  Cervical disc herniation (722 0) (M50 20)   9  Cervical spinal stenosis (723 0) (M48 02)   10  Cervical spondylosis (721 0) (M47 812)   11  Cervicalgia (723 1) (M54 2)   12  Disc disorder of cervical region (722 91) (M50 90)   13  Encounter for prostate cancer screening (V76 44) (Z12 5)   14  Flu vaccine need (V04 81) (Z23)   15  Fracture, cervical vertebra (805 00) (S12 9XXA)   16  Hypothyroidism (244 9) (E03 9)   17  Left arm pain (729 5) (M79 602)   18  Muscle pain, myofacial (729 1) (M79 1)   19  Neck pain (723 1) (M54 2)   20  Neuralgia (729 2) (M79 2)   21  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)   22   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)   23  Occipital neuralgia (723 8) (M54 81)   24  Postoperative examination (V67 00) (Z09)   25  Post-operative state (V45 89) (Z98 890)   26  Post-traumatic osteoarthritis of one knee (715 26) (M17 30)   27  Preoperative examination (V72 84) (Z01 818)   28  Pruritus (698 9) (L29 9)   29  Seizures (780 39) (R56 9)   30  Unequal leg length (acquired) (736 81) (M21 70)   31  Venous insufficiency (459 81) (I87 2)    Past Medical History    · History of Anxiety (300 00) (F41 9)   · History of Arthritis (716 90) (M19 90)   · 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)   · History of Broken bones (829 0) (T14 8)   · History of Cataract (366 9) (H26 9)   · History of Epilepsy (345 90) (G40 909)   · History of thyroid disease (V12 29) (Z86 39)   · History of Joint pain (719 40) (M25 50)   · History of Joint swelling (719 00) (M25 40)   · History of Osteomyelitis (730 20) (M86 9)   · History of Skin cancer (173 90) (C44 90)   · History of Squamous carcinoma (173 92) (C44 92)   · History of Thyroid condition (246 9) (E07 9)   · History of Thyroid disease (246 9) (E07 9)    Surgical History    · History of Arthrodesis Cervical To C2   · History of Leg Repair   · History of Neck Surgery   · History of Thyroid Surgery Sourav-Thyroidectomy    Family History  Mother    · Family history of Myeloma  Sibling    · Family history of Dementia  Other    · Family history of malignant neoplasm (V16 9) (Z80 9)    Social History    · Being A Social Drinker   · Currently sexually active   · Daily Coffee Consumption (3  Cups/Day)   · Former smoker (V15 82) (A78 236)   ·    · No drug use   · No known STD risk factors   · Retired    Current Meds   1  Aspirin 81 MG TABS; TAKE 1 TABLET DAILY; Therapy: (Recorded:02Oct2012) to Recorded   2  Calcium 600 + D 600-200 MG-UNIT Oral Tablet; TAKE 1 TABLET DAILY; Therapy: (Recorded:79Jug7234) to Recorded   3  Fish Oil CAPS; take 1 capsule daily;    Therapy: (Recorded:73Hqc2454) to Recorded   4  Fluticasone Propionate 50 MCG/ACT Nasal Suspension; 2 sprays to each nostril daily; Therapy: 20XJX3092 to (Evaluate:99Oit7655)  Requested for: 91VLE0393; Last   Rx:09Phn3732 Ordered   5  Levothyroxine Sodium 100 MCG Oral Tablet; Therapy: (Recorded:21Nov2016) to Recorded   6  Multivitamins CAPS; TAKE 1 CAPSULE DAILY; Therapy: (Recorded:48Pth9268) to Recorded   7  Proventil  (90 Base) MCG/ACT Inhalation Aerosol Solution; INHALE 2 PUFFS   EVERY 4-6 HOURS AS NEEDED; Therapy: 96JPH1848 to (Last Rx:84Bfs9222)  Requested for: 20Jun2017 Ordered   8  SLPG Compound Medication; AMIT5%GABA5%LIDO10%PHENYTOIN5% LIDODERM; Therapy: (Recorded:13Khy5767) to Recorded    Allergies    1  Augmentin TABS    Vitals   Recorded: 21Aug2017 10:08AM Recorded: 21Aug2017 09:46AM   Temperature  97 7 F   Heart Rate 56    Respiration 16    Systolic 884    Diastolic 72    Height  6 ft    Weight  197 lb    BMI Calculated  26 72   BSA Calculated  2 12     Physical Exam    Constitutional   General appearance: No acute distress, well appearing and well nourished  Eyes   Conjunctiva and lids: No swelling, erythema, or discharge  Ears, Nose, Mouth, and Throat   External inspection of ears and nose: Normal     Otoscopic examination: Tympanic membrance translucent with normal light reflex  Canals patent without erythema  Oropharynx: Normal with no erythema, edema, exudate or lesions  Pulmonary   Respiratory effort: No increased work of breathing or signs of respiratory distress  Auscultation of lungs: Clear to auscultation  Cardiovascular   Auscultation of heart: Normal rate and rhythm, normal S1 and S2, without murmurs  Examination of extremities for edema and/or varicosities: Normal     Abdomen   Abdomen: Non-tender, no masses  Lymphatic   Palpation of lymph nodes in neck: No lymphadenopathy      Musculoskeletal   Gait and station: Normal     Inspection/palpation of joints, bones, and muscles: Abnormal  Neck reveals restricted range of motion  Skin   Skin and subcutaneous tissue: Normal without rashes or lesions  Psychiatric   Orientation to person, place and time: Normal     Mood and affect: Normal        Results/Data  PHQ-2 Adult Depression Screening 93Lwn0384 09:52AM User, Ahs     Test Name Result Flag Reference   PHQ-2 Adult Depression Score 2     Over the last two weeks, how often have you been bothered by any of the following problems? Little interest or pleasure in doing things: Several days - 1  Feeling down, depressed, or hopeless: Several days - 1   PHQ-2 Adult Depression Screening Negative         Health Management  Health Maintenance   COLONOSCOPY; every 3 years; Last 52FAU3355; Next Due: 37FSF8481;  Active    Signatures   Electronically signed by : Leighton Garnica DO; Aug 21 2017 10:21AM EST                       (Author)

## 2018-01-12 NOTE — MISCELLANEOUS
Message   Recorded as Task   Date: 09/20/2016 10:12 AM, Created By: Babita Vazquez   Task Name: Financial Authorization   Assigned To: [facility] sched,Team   Regarding Patient: Rimma Roa, Status: Active   Comment:    Tonya Veliz - 20 Sep 2016 10:12 AM     TASK CREATED  PA for Amrix has been approved and is valid 7/16/16 through 12/31/2099  Active Problems    1  Acute bronchitis (466 0) (J20 9)   2  Acute sinusitis (461 9) (J01 90)   3  Acute upper respiratory infection (465 9) (J06 9)   4  Aftercare following surgery of the musculoskeletal system (V58 78) (Z47 89)   5  Allergic rhinitis (477 9) (J30 9)   6  Blood tests for routine general physical examination (V72 62) (Z00 00)   7  C2 cervical fracture (805 02) (S12 100A)   8  Cervical disc herniation (722 0) (M50 20)   9  Cervical spinal stenosis (723 0) (M48 02)   10  Cervical spondylosis (721 0) (M47 812)   11  Cervicalgia (723 1) (M54 2)   12  Disc disorder of cervical region (722 91) (M50 90)   13  Encounter for prostate cancer screening (V76 44) (Z12 5)   14  Fracture, cervical vertebra (805 00) (S12 9XXA)   15  Hypothyroidism (244 9) (E03 9)   16  Left arm pain (729 5) (M79 602)   17  Muscle pain, myofacial (729 1) (M79 1)   18  Neck pain (723 1) (M54 2)   19  Neuralgia (729 2) (M79 2)   20  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)   21  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)   22  Occipital neuralgia (723 8) (M54 81)   23  Postoperative examination (V67 00) (Z09)   24  Post-operative state (V45 89) (Z98 89)   25  Post-traumatic osteoarthritis of one knee (715 26) (M17 30)   26  Preoperative examination (V72 84) (Z01 818)   27  Pruritus (698 9) (L29 9)   28  Seizures (780 39) (R56 9)   29  Unequal leg length (acquired) (736 81) (M21 70)   30  Venous insufficiency (376 81) (I87 2)    Current Meds   1   Amrix 15 MG Oral Capsule Extended Release 24 Hour (Cyclobenzaprine HCl); TAKE 1   CAPSULE after dinner around 7 pm; Therapy: 18Ggc4558 to (Evaluate:13Nov2016)  Requested for: 75Koq0112; Last   Rx:67Igp0950 Ordered   2  Aspirin 81 MG TABS; TAKE 1 TABLET DAILY; Therapy: (Recorded:02Oct2012) to Recorded   3  Calcium 600 + D 600-200 MG-UNIT Oral Tablet; TAKE 1 TABLET DAILY; Therapy: (Recorded:14Zyt5974) to Recorded   4  Fish Oil CAPS; take 1 capsule daily; Therapy: (Recorded:80Xhm0136) to Recorded   5  Fluticasone Propionate 50 MCG/ACT Nasal Suspension; USE 2 SPRAYS IN EACH   NOSTRIL ONCE DAILY; Therapy: 58WXH9232 to (Last Rx:27Ags5817)  Requested for: 51Ptp8962 Ordered   6  Multivitamins CAPS; TAKE 1 CAPSULE DAILY; Therapy: (Recorded:79Cko4279) to Recorded   7  Proventil  (90 Base) MCG/ACT Inhalation Aerosol Solution; INHALE 2 PUFFS   EVERY 4-6 HOURS AS NEEDED; Therapy: 44AHN1998 to (Last Rx:89Ydz6239)  Requested for: 65Edb3047 Ordered   8  SLPG Compound Medication; gabapentin 10%, cyclobenzaprine 2%, diclofenac 3% QID   PRN; Therapy: 03TXM0469 to (Evaluate:14Oct2016); Last Rx:88Dld1942 Ordered    Allergies    1   Augmentin TABS    Signatures   Electronically signed by : Naveed Singh, ; Sep 20 2016 10:12AM EST                       (Author)

## 2018-01-13 VITALS
SYSTOLIC BLOOD PRESSURE: 110 MMHG | TEMPERATURE: 97.7 F | HEART RATE: 56 BPM | DIASTOLIC BLOOD PRESSURE: 72 MMHG | HEIGHT: 72 IN | RESPIRATION RATE: 16 BRPM | BODY MASS INDEX: 26.68 KG/M2 | WEIGHT: 197 LBS

## 2018-01-14 VITALS
WEIGHT: 194.25 LBS | BODY MASS INDEX: 25.74 KG/M2 | HEIGHT: 73 IN | DIASTOLIC BLOOD PRESSURE: 68 MMHG | RESPIRATION RATE: 16 BRPM | HEART RATE: 63 BPM | SYSTOLIC BLOOD PRESSURE: 147 MMHG

## 2018-01-14 NOTE — MISCELLANEOUS
Message   Recorded as Task   Date: 03/14/2016 12:04 PM, Created By: Keshawn Brush   Task Name: Follow Up   Assigned To: [facility],Team   Regarding Patient: Andreia Albright, Status: In Progress   Comment:    Dafne Andrew - 14 Mar 2016 12:04 PM     TASK CREATED  Caller: Self; General Medical Question; [phone] (Home)    Pt LM on [place] [facility] triage line today at [phone], states he was seen by Dr Love Mello and put on atarax, and has developed restlessness and mood changes, thinking it is not agreeing with him  Also he was wondering if Dr Love Mello was able to speak with Dr Dea Abbott about poss the wires in the back of his head, pt is allergic to? Please advise  ************   Marlyn Edmund - 17 Mar 2016 8:43 AM     TASK REPLIED TO: Previously Assigned To [facility],Team  still waiting to hear from Dr Dea Abbott, but ok with stopping the atarax   Marychuy Whitney - 17 Mar 2016 1:33 PM     TASK EDITED  S/w pt  and he is aware but he is also inquirying to see if we can try again to get the priorauth done on the compounding cream that works really well for him  He states he gave [facility] a script with the names of the compounding ingrediants and the rejection letter stated they needed the ingrediants and ndc codes  Tonya Veliz - 17 Mar 2016 3:26 PM     TASK REASSIGNED: Previously Assigned To [address] surgery sched,Team  Gissell Jamison, Did you appeal letter have this info? Liat Carpio - 17 Mar 2016 4:14 PM     TASK REASSIGNED: Previously Assigned To Liat Carpio  have you heard anything back regarding this appeal? I have not seen anything   Tonya Veliz - 18 Mar 2016 7:48 AM     TASK EDITED  Its scanned under medications   denied  Liat Carpio - 21 Mar 2016 7:38 AM     TASK REPLIED TO: Previously Assigned To CLAUDIA manjarrez,Team  please see task below     Kala Ramirez - 21 Mar 2016 10:37 AM     TASK EDITED  Spoke to the pt, he stated that it was denied due to the medication list & NDC# not being on the authorization request  Pt is requesting for that information to be sent to the insurance company  Tonya Veliz - 22 Mar 2016 8:56 AM     TASK REASSIGNED: Previously Assigned To Micah Barbosa, Not sure what to do with this? iLat Carpio - 24 Mar 2016 7:46 AM     TASK REPLIED TO: Previously Assigned To CLAUDIA manjarrez,Team  read task below: im not sure what patients wants either, as I included all the meds in medical necessity letter  Can you please find out what he is looking for us to do? Alannah Amato - 24 Mar 2016 9:55 AM     TASK EDITED  Left vm on home and cell for pt to c/b and s/w the nurse  Alannah Amato - 24 Mar 2016 9:55 AM     TASK IN PROGRESS   Marychuy Whitney - 24 Mar 2016 12:04 PM     TASK EDITED  Received a Suni Chappell from pt  at 1106 returning Jocelyne's call  Alannah Amato - 25 Mar 2016 1:16 PM     TASK EDITED  Pt left vm at 12;18 on 3/25 that he was returning our call from thurs about cpd cream     *He will be out from 12:30-2:30p today, requesting c/b today after 2:30 or Monday 3/28/16  *   Alannah Amato - 25 Mar 2016 3:41 PM     TASK EDITED  S/W pt and informed that Helen Gomez isn't sure further he wants her to do as she included all the meds in the letter of medical necessity  Pt asked about the NDC# not being included  I asked pt what that stood for and he stated every medication is assigned a National Drug Control #  He believes those number could be gotten from [address] [place] [address]  I asked pt to assist with this process by contacting [address] [place] [address] and obtaining the NDC# for each of the meds in the cpd cream and calling our office back on Monday with that info and then maybe Helen Gomez could do an addendum to the letter of medical necessity and resubmit it  Pt asked if I knew if a formulation exception was done yet, he said Cachorro Patel was familar with it  S/W Cachorro Patel who believes we've done PA, formulary exception and letter of medical necessity    Pt said if obtaining the [facility] # and submitting as part of the Letter of medical necessity doesn't work then he might be asking us to provide him with a copy of the PA, formulary exception and letter of medical necessity for him to provide to [address] [place] [address] so he can advocate further for himself  Pt will contact [address] [place] [address] and c/b next wk with update  Liat Carpio - 28 Mar 2016 8:01 AM     TASK REPLIED TO: Previously Assigned To Liat Carpio  I do not thinkg adding the [facility] # will make a difference but I would add it to appeal/med necess letter if he wants   Alannah Amato - 28 Mar 2016 8:24 AM     TASK EDITED  *Await pt's c/b with NDC #'s for the meds in the cpd cream, then forward to Kristine Barrera  *   Alannah Amato - 28 Mar 2016 8:24 AM     TASK IN PROGRESS   Marychuy Whitney - 31 Mar 2016 8:52 AM     TASK EDITED  S/w pt  this am and his wife is going to fax a script over with the name and [facility] codes on it  Thanks   Liat Carpio - 31 Mar 2016 4:36 PM     TASK EDITED  nothing received by fax today  will continue to look for and will resend to insurance once i obtain that info   Liat Carpio - 04 Apr 2016 7:54 AM     TASK REPLIED TO: Previously Assigned To Liat Carpio  refaxed letter of medical necess along with universal clain form for compound medication, which includes NDC numbers on it  Active Problems    1  Acute bronchitis (466 0) (J20 9)   2  Acute sinusitis (461 9) (J01 90)   3  Acute upper respiratory infection (465 9) (J06 9)   4  Aftercare following surgery of the musculoskeletal system (V58 78) (Z47 89)   5  Allergic rhinitis (477 9) (J30 9)   6  C2 cervical fracture (805 02) (S12 100A)   7  Cervical disc herniation (722 0) (M50 20)   8  Cervical spinal stenosis (723 0) (M48 02)   9  Cervical spondylosis (721 0) (M47 812)   10  Cervicalgia (723 1) (M54 2)   11  Disc disorder of cervical region (722 91) (M50 90)   12  Fracture, cervical vertebra (805 00) (S12 9XXA)   13   Hypothyroidism (244  9) (E03 9)   14  Left arm pain (729 5) (M79 602)   15  Muscle pain, myofacial (729 1) (M79 1)   16  Neck pain (723 1) (M54 2)   17  Neuralgia (729 2) (M79 2)   18  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)   19  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)   20  Occipital neuralgia (723 8) (M54 81)   21  Postoperative examination (V67 00) (Z09)   22  Post-operative state (V45 89) (Z98 89)   23  Post-traumatic osteoarthritis of one knee (715 26) (M17 30)   24  Preoperative examination (V72 84) (Z01 818)   25  Pruritus (698 9) (L29 9)   26  Seizures (780 39) (R56 9)   27  Unequal leg length (acquired) (736 81) (M21 70)   28  Venous insufficiency (459 81) (I87 2)    Current Meds   1  Aspirin 81 MG Oral Tablet; TAKE 1 TABLET DAILY; Therapy: (Recorded:02Oct2012) to Recorded   2  Calcium 600 + D 600-200 MG-UNIT Oral Tablet; TAKE 1 TABLET DAILY; Therapy: (Recorded:36Rji6037) to Recorded   3  Fish Oil CAPS; take 1 capsule daily; Therapy: (Recorded:21Orw3610) to Recorded   4  HydrOXYzine HCl - 25 MG Oral Tablet; TAKE 1 TABLET 3 TO 4 TIMES DAILY AS   NEEDED FOR ITCHING; Therapy: 45WQZ7285 to (Evaluate:27Mar2016)  Requested for: 43DEP9014; Last   Rx:08Hmo5397 Ordered   5  Levothyroxine Sodium 100 MCG Oral Tablet; TAKE 1 TABLET DAILY  Requested for:   21KHD7573; Last UW:62UCE9017 Ordered   6  Multivitamins CAPS; TAKE 1 CAPSULE DAILY; Therapy: (Recorded:82Utp0023) to Recorded   7  Proventil  (90 Base) MCG/ACT Inhalation Aerosol Solution; INHALE 2 PUFFS   EVERY 4-6 HOURS AS NEEDED; Therapy: 67EDE4006 to (Last Rx:56Qmt6978)  Requested for: 98Zlp3258 Ordered   8  SLPG Compound Medication; 5% amitriptyline, 5% gabapentin, 10%lidocaine, 5%   Phenytoin, lipoderm-60 grams  apply 2-3 times a day to   back of neck as needed for pain; Therapy: 55IMZ8420 to (Evaluate:21Jan2016); Last Rx:18Jan2016 Ordered   9   Triamcinolone Acetonide 0 1 % External Cream; APPLY 2-3 TIMES DAILY TO   AFFECTED AREA(S); Therapy: 62NKV7103 to (Last Rx:78Ety8608)  Requested for: 70Ozm6433 Ordered    Allergies    1   Augmentin TABS    Signatures   Electronically signed by : Lyndsay Simental, ; Apr 4 2016 10:43AM EST                       (Author)

## 2018-01-14 NOTE — PROGRESS NOTES
Assessment    1  Hypothyroidism (244 9) (E03 9)   2  Encounter for preventive health examination (V70 0) (Z00 00)   3  Blood tests for routine general physical examination (V72 62) (Z00 00)    Plan  Acute sinusitis, Allergic rhinitis    · Fluticasone Propionate 50 MCG/ACT Nasal Suspension; USE 2 SPRAYS IN  EACH NOSTRIL ONCE DAILY  Blood tests for routine general physical examination, Health Maintenance, Encounter for  prostate cancer screening, Hypothyroidism    · (1) PSA (SCREEN) (Dx V76 44 Screen for Prostate Cancer); Status:Hold For - Exact  Date; Requested for: In Office Collection;   Blood tests for routine general physical examination, Health Maintenance,  Hypothyroidism    · (1) CBC/PLT/DIFF; Status:Hold For - Exact Date; Requested for: In Office Collection;    · (1) COMPREHENSIVE METABOLIC PANEL; Status:Hold For - Exact Date; Requested  for: In Office Collection;    · (1) LIPID PANEL, FASTING; Status:Hold For - Exact Date; Requested for: In Office  Collection;    · (1) TSH WITH FT4 REFLEX; Status:Hold For - Exact Date; Requested for: In Office  Collection;    · (1) URINALYSIS (will reflex a microscopy if leukocytes, occult blood, protein or nitrites  are not within normal limits); Status:Hold For - Exact Date; Requested for: In Office  Collection;   Blood tests for routine general physical examination, Health Maintenance,  Hypothyroidism, Muscle pain, myofacial    · (1) VITAMIN D 25-HYDROXY; Status:Hold For - Exact Date; Requested for: In Office  Collection;   Cervicalgia    · Cyclobenzaprine HCl - 10 MG Oral Tablet; TAKE 1 TABLET 3 TIMES DAILY AS  NEEDED    Discussion/Summary  Impression: health maintenance visit, healthy adult male  Currently, he eats a healthy diet and has an adequate exercise regimen  Prostate cancer screening: the risks and benefits of prostate cancer screening were discussed, prostate cancer screening is current and PSA was ordered   Colorectal cancer screening: the risks and benefits of colorectal cancer screening were discussed and colorectal cancer screening is current  Screening lab work includes glucose, lipid profile, 25-hydroxyvitamin D and urinalysis  The risks and benefits of immunizations were discussed and immunizations are up to date  Advice and education were given regarding nutrition, aerobic exercise, weight bearing exercise, calcium supplements, vitamin D supplements, sunscreen use, self skin examination and fall risk reduction  Fasting labs drawn for CBC, CMP, lipid profile, TSH with reflex to free T4, PSA, vitamin D and UA  Patient to continue present treatment and will try Flexeril when necessary  Recommend patient follow a low fat diet and get regular exercise walking 4-5 times a week for 30 minutes  Patient follow-up with the specialist as scheduled and return to the office in 1 year  Possible side effects of new medications were reviewed with the patient/guardian today  Chief Complaint  annual PE, fasting      History of Present Illness  , Adult Male: The patient is being seen for a health maintenance evaluation  The last health maintenance visit was 3 year(s) ago  General Health: The patient's health since the last visit is described as fair  He has regular dental visits  He denies vision problems  He denies hearing loss  Immunizations status: up to date  Lifestyle:  He consumes a diverse and healthy diet  He does not have any weight concerns  He does not exercise regularly  He does not use tobacco  He consumes alcohol  He reports occasional alcohol use  He denies drug use  Screening: cancer screening reviewed and current  metabolic screening reviewed and updated  HPI: Patient is here for yearly physical wellness exam and fasting labs  Patient admits to chronic neck pain status post cervical fusion and has been seeing pain management without significant improvement  Patient requests prescription for Flexeril   Patient has been trying to remain active walking 2-3 times a week  Patient recently saw Dr Mami Brunson, urologist for routine exam and he requests PSA testing  Patient is up-to-date on colonoscopy  Hypothyroidism (Follow-Up): The patient reports doing well  He has had no significant interval events  Interval symptoms:  denies weight gain, denies cold intolerance, stable fatigue, denies weakness, denies constipation, denies dyspnea on exertion, stable trouble concentrating, denies hair loss and denies dry skin  Associated symptoms: depression, but no paresthesias, no leg swelling, no weight loss and no palpitations    The patient presents with complaints of bilateral neck myalgias  The patient presents with complaints of neck arthralgias  Medications:  the patient is adherent to his medication regimen, but he denies medication side effects  Disease management:  the patient is doing well with his goals  Due for: thyroid stimulating hormone and free T4  Active Problems    1  Acute bronchitis (466 0) (J20 9)   2  Acute sinusitis (461 9) (J01 90)   3  Acute upper respiratory infection (465 9) (J06 9)   4  Aftercare following surgery of the musculoskeletal system (V58 78) (Z47 89)   5  Allergic rhinitis (477 9) (J30 9)   6  C2 cervical fracture (805 02) (S12 100A)   7  Cervical disc herniation (722 0) (M50 20)   8  Cervical spinal stenosis (723 0) (M48 02)   9  Cervical spondylosis (721 0) (M47 812)   10  Cervicalgia (723 1) (M54 2)   11  Disc disorder of cervical region (722 91) (M50 90)   12  Fracture, cervical vertebra (805 00) (S12 9XXA)   13  Hypothyroidism (244 9) (E03 9)   14  Left arm pain (729 5) (M79 602)   15  Muscle pain, myofacial (729 1) (M79 1)   16  Neck pain (723 1) (M54 2)   17  Neuralgia (729 2) (M79 2)   18  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)   19  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)   20  Occipital neuralgia (723 8) (M54 81)   21  Postoperative examination (V67 00) (Z09)   22   Post-operative state (V45 89) (Z98 89)   23  Post-traumatic osteoarthritis of one knee (715 26) (M17 30)   24  Preoperative examination (V72 84) (Z01 818)   25  Pruritus (698 9) (L29 9)   26  Seizures (780 39) (R56 9)   27  Unequal leg length (acquired) (736 81) (M21 70)   28  Venous insufficiency (459 81) (I87 2)    Past Medical History    · History of Anxiety (300 00) (F41 9)   · History of Arthritis (716 90) (M19 90)   · 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)   · History of Broken bones (829 0) (T14 8)   · History of Cataract (366 9) (H26 9)   · History of Epilepsy (345 90) (G40 909)   · History of thyroid disease (V12 29) (Z86 39)   · History of Joint pain (719 40) (M25 50)   · History of Joint swelling (719 00) (M25 40)   · History of Osteomyelitis (730 20) (M86 9)   · History of Skin cancer (173 90) (C44 90)   · History of Squamous carcinoma (199 1) (C80 1)   · History of Thyroid condition (246 9) (E07 9)   · History of Thyroid disease (246 9) (E07 9)    Surgical History    · History of Arthrodesis Cervical To C2   · History of Leg Repair   · History of Neck Surgery   · History of Thyroid Surgery Sourav-Thyroidectomy    Family History  Mother    · Family history of Myeloma  Sibling    · Family history of Dementia  Other    · Family history of malignant neoplasm (V16 9) (Z80 9)    Social History    · Being A Social Drinker   · Currently sexually active   · Daily Coffee Consumption (3  Cups/Day)   · Former smoker (V15 82) (U36 476)   ·    · No drug use   · No known STD risk factors   · Retired    Current Meds   1  Aspirin 81 MG TABS; TAKE 1 TABLET DAILY; Therapy: (Recorded:02Oct2012) to Recorded   2  Calcium 600 + D 600-200 MG-UNIT Oral Tablet; TAKE 1 TABLET DAILY; Therapy: (Recorded:73Fza0307) to Recorded   3  Fish Oil CAPS; take 1 capsule daily; Therapy: (Recorded:75Nrt3089) to Recorded   4  Levothyroxine Sodium 100 MCG Oral Tablet; Take 1 tablet daily;    Therapy: 56BRR2980 to (Last Rx:06Jun2016)  Requested for: 14XRQ6902 Ordered   5  Multivitamins CAPS; TAKE 1 CAPSULE DAILY; Therapy: (Recorded:64Eta3659) to Recorded   6  Proventil  (90 Base) MCG/ACT Inhalation Aerosol Solution; INHALE 2 PUFFS   EVERY 4-6 HOURS AS NEEDED; Therapy: 89WBK1828 to (Last Rx:42Lqq2318)  Requested for: 69Fnu1006 Ordered   7  SLPG Compound Medication; 5% amitriptyline, 5% gabapentin, 10%lidocaine, 5%   Phenytoin, lipoderm-60 grams  apply 2-3 times a day to   back of neck as needed for pain; Therapy: 12UHU8613 to (Last Rx:02Jun2016) Ordered    Allergies    1  Augmentin TABS    Vitals   Recorded: 68Wqk9688 09:30AM Recorded: 44DPL8944 34:89TC   Systolic 090    Diastolic 70    Heart Rate 72    Respiration 16    Temperature  98 1 F   Height  6 ft 0 5 in   Weight  193 lb    BMI Calculated  25 82   BSA Calculated  2 11     Physical Exam    Constitutional   General appearance: No acute distress, well appearing and well nourished  Eyes   Conjunctiva and lids: No swelling, erythema, or discharge  Ears, Nose, Mouth, and Throat   External inspection of ears and nose: Normal     Otoscopic examination: Tympanic membrance translucent with normal light reflex  Canals patent without erythema  Oropharynx: Normal with no erythema, edema, exudate or lesions  Pulmonary   Respiratory effort: No increased work of breathing or signs of respiratory distress  Auscultation of lungs: Clear to auscultation  Cardiovascular   Auscultation of heart: Normal rate and rhythm, normal S1 and S2, without murmurs  Examination of extremities for edema and/or varicosities: Normal     Abdomen   Abdomen: Non-tender, no masses  Lymphatic   Palpation of lymph nodes in neck: No lymphadenopathy  Musculoskeletal   Gait and station: Normal     Inspection/palpation of joints, bones, and muscles: Abnormal   Neck reveals restricted range of motion and tenderness  Skin   Skin and subcutaneous tissue: Normal without rashes or lesions      Psychiatric Orientation to person, place and time: Normal     Mood and affect: Normal        Health Management  Health Maintenance   COLONOSCOPY; every 3 years; Last 94WQM2257; Next Due: 19UUN6367;  Active    Future Appointments    Date/Time Provider Specialty Site   09/14/2016 01:00 PM Romel Michael MD Pain Management [place] [address]   Electronically signed by : Sowmya Perkins DO; Aug 19 2016  9:38AM EST                       (Author)

## 2018-01-15 NOTE — MISCELLANEOUS
Message   Recorded as Task   Date: 03/03/2016 12:57 PM, Created By: Kaela Fuchs   Task Name: Care Coordination   Assigned To: SPA stim,Team   Regarding Patient: Alan Montenegro, Status: Active   CommentRegenia Deer - 06 Mar 2016 12:57 PM     TASK CREATED  Appeal letter written by Kat Glass was mailed to [facility] to appeal decision to not covered compounding cream         Active Problems    1  Acute bronchitis (466 0) (J20 9)   2  Acute sinusitis (461 9) (J01 90)   3  Acute upper respiratory infection (465 9) (J06 9)   4  Aftercare following surgery of the musculoskeletal system (V58 78) (Z47 89)   5  Allergic rhinitis (477 9) (J30 9)   6  C2 cervical fracture (805 02) (S12 100A)   7  Cervical disc herniation (722 0) (M50 20)   8  Cervical spinal stenosis (723 0) (M48 02)   9  Cervical spondylosis (721 0) (M47 812)   10  Cervicalgia (723 1) (M54 2)   11  Disc disorder of cervical region (722 91) (M50 90)   12  Fracture, cervical vertebra (805 00) (S12 9XXA)   13  Hypothyroidism (244 9) (E03 9)   14  Left arm pain (729 5) (M79 602)   15  Muscle pain, myofacial (729 1) (M79 1)   16  Neck pain (723 1) (M54 2)   17  Neuralgia (729 2) (M79 2)   18  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)   19  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)   20  Occipital neuralgia (723 8) (M54 81)   21  Postoperative examination (V67 00) (Z09)   22  Post-operative state (V45 89) (Z98 89)   23  Post-traumatic osteoarthritis of one knee (715 26) (M17 30)   24  Preoperative examination (V72 84) (Z01 818)   25  Pruritus (698 9) (L29 9)   26  Seizures (780 39) (R56 9)   27  Unequal leg length (acquired) (736 81) (M21 70)   28  Venous insufficiency (459 81) (I87 2)    Current Meds   1  Aspirin 81 MG Oral Tablet; TAKE 1 TABLET DAILY; Therapy: (Recorded:02Oct2012) to Recorded   2  Calcium 600 + D 600-200 MG-UNIT Oral Tablet; TAKE 1 TABLET DAILY; Therapy: (Recorded:14Ejc5339) to Recorded   3   Fish Oil CAPS; take 1 capsule daily; Therapy: (Recorded:05Gik7888) to Recorded   4  HydrOXYzine HCl - 25 MG Oral Tablet; TAKE 1 TABLET 3 TO 4 TIMES DAILY AS   NEEDED FOR ITCHING; Therapy: 33FYJ8993 to (Evaluate:27Mar2016)  Requested for: 88LZU6679; Last   Rx:77Kwh7614 Ordered   5  Levothyroxine Sodium 100 MCG Oral Tablet; TAKE 1 TABLET DAILY  Requested for:   76HNS4027; Last HS:18WYP4437 Ordered   6  Multivitamins CAPS; TAKE 1 CAPSULE DAILY; Therapy: (Recorded:64Ykq3357) to Recorded   7  Proventil  (90 Base) MCG/ACT Inhalation Aerosol Solution; INHALE 2 PUFFS   EVERY 4-6 HOURS AS NEEDED; Therapy: 55UPJ2075 to (Last Rx:51Rtr3154)  Requested for: 30Gty8184 Ordered   8  SLPG Compound Medication; 5% amitriptyline, 5% gabapentin, 10%lidocaine, 5%   Phenytoin, lipoderm-60 grams  apply 2-3 times a day to   back of neck as needed for pain; Therapy: 92GXX5792 to (Evaluate:21Jan2016); Last Rx:18Jan2016 Ordered   9  Triamcinolone Acetonide 0 1 % External Cream; APPLY 2-3 TIMES DAILY TO   AFFECTED AREA(S); Therapy: 25KTT1898 to (Last Rx:84Ffe8365)  Requested for: 97Vvi1993 Ordered    Allergies    1   Augmentin TABS    Signatures   Electronically signed by : Douglas Diallo, ; Mar  3 2016 12:57PM EST                       (Author)

## 2018-01-16 NOTE — MISCELLANEOUS
Message     Recorded as Task   Date: 12/23/2016 03:30 PM, Created By: Kanchan Rosado   Task Name: Med Renewal Request   Assigned To: Riaz Felder clinical,Team   Regarding Patient: Jaquan Marshall, Status: Active   Comment:    Kanchan Rosado - 23 Dec 2016 3:30 PM     TASK CREATED  Renew Medication  Per Dr Jaz Bass was called with 5 refills for the following (2) cpd creams:  ALICE 5%/ROEBRTA 5%/LIDO 10%/Phenytoin 5% LIPODERM, 60GM tube  CYCLOBENZ 2%/DICLOFEN 3%/GABAPENTIN 10% LIPODERM, 60 GM tube  [id number] [facility] # [phone]  Kassandra Katheryn - 23 Dec 2016 3:43 PM     TASK REPLIED TO: Previously Assigned To SPA eunice clinical,Team  thanks! ! Active Problems    1  Acute bronchitis (466 0) (J20 9)   2  Acute sinusitis (461 9) (J01 90)   3  Acute upper respiratory infection (465 9) (J06 9)   4  Aftercare following surgery of the musculoskeletal system (V58 78) (Z47 89)   5  Allergic rhinitis (477 9) (J30 9)   6  Blood tests for routine general physical examination (V72 62) (Z00 00)   7  C2 cervical fracture (805 02) (S12 100A)   8  Cervical disc herniation (722 0) (M50 20)   9  Cervical spinal stenosis (723 0) (M48 02)   10  Cervical spondylosis (721 0) (M47 812)   11  Cervicalgia (723 1) (M54 2)   12  Disc disorder of cervical region (722 91) (M50 90)   13  Encounter for prostate cancer screening (V76 44) (Z12 5)   14  Flu vaccine need (V04 81) (Z23)   15  Fracture, cervical vertebra (805 00) (S12 9XXA)   16  Hypothyroidism (244 9) (E03 9)   17  Left arm pain (729 5) (M79 602)   18  Muscle pain, myofacial (729 1) (M79 1)   19  Neck pain (723 1) (M54 2)   20  Neuralgia (729 2) (M79 2)   21  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)   22  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)   23  Occipital neuralgia (723 8) (M54 81)   24  Postoperative examination (V67 00) (Z09)   25  Post-operative state (V45 89) (Z98 890)   26   Post-traumatic osteoarthritis of one knee (715 26) (M17 30)   27  Preoperative examination (V72 84) (Z01 818)   28  Pruritus (698 9) (L29 9)   29  Seizures (780 39) (R56 9)   30  Unequal leg length (acquired) (736 81) (M21 70)   31  Venous insufficiency (459 81) (I87 2)    Current Meds   1  Aspirin 81 MG TABS; TAKE 1 TABLET DAILY; Therapy: (Recorded:02Oct2012) to Recorded   2  Azithromycin 250 MG Oral Tablet; TAKE 2 TABLETS ON DAY 1 THEN TAKE 1 TABLET A   DAY FOR 4 DAYS; Therapy: 08KLA6535 to (Last Rx:21Nov2016)  Requested for: 21Nov2016 Ordered   3  Calcium 600 + D 600-200 MG-UNIT Oral Tablet; TAKE 1 TABLET DAILY; Therapy: (Recorded:45Zve7252) to Recorded   4  Fish Oil CAPS; take 1 capsule daily; Therapy: (Recorded:77Ghf6317) to Recorded   5  Fluticasone Propionate 50 MCG/ACT Nasal Suspension; 2 sprays to each nostril daily; Therapy: 47GMF0857 to (Evaluate:06Ztp8618)  Requested for: 25UHA7585; Last   Rx:06Qys2570 Ordered   6  Levothyroxine Sodium 100 MCG Oral Tablet; Therapy: (Recorded:21Nov2016) to Recorded   7  Multivitamins CAPS; TAKE 1 CAPSULE DAILY; Therapy: (Recorded:46Rxg1473) to Recorded   8  Proventil  (90 Base) MCG/ACT Inhalation Aerosol Solution; INHALE 2 PUFFS   EVERY 4-6 HOURS AS NEEDED; Therapy: 85TJC4033 to (Last Rx:50Gwp1355)  Requested for: 72Mhn1257 Ordered   9  SLPG Compound Medication; gabapentin 10%, cyclobenzaprine 2%, diclofenac 3% QID   PRN; Therapy: 31BMR8061 to (Evaluate:14Oct2016); Last Rx:70Hcn7677 Ordered    Allergies    1   Augmentin TABS    Signatures   Electronically signed by : Isatu Seymour, ; Dec 23 2016  3:46PM EST                       (Author)

## 2018-01-17 ENCOUNTER — GENERIC CONVERSION - ENCOUNTER (OUTPATIENT)
Dept: OTHER | Facility: OTHER | Age: 63
End: 2018-01-17

## 2018-01-17 ENCOUNTER — GENERIC CONVERSION - ENCOUNTER (OUTPATIENT)
Dept: FAMILY MEDICINE CLINIC | Facility: CLINIC | Age: 63
End: 2018-01-17

## 2018-01-17 NOTE — MISCELLANEOUS
Message   Recorded as Task   Date: 07/13/2016 04:00 PM, Created By: Opal Preston   Task Name: Miscellaneous   Assigned To: Daryn Pruett clinical,Team   Regarding Patient: Hao Ernst, Status: In Progress   Comment:    DevonteMarychuy - 13 Jul 2016 4:00 PM     TASK CREATED  Caller: Self; General Medical Question; [phone] (Home); [phone] (Work)  S/w the pt  today in response to a vmlom from  [id number]  Pt  states he just wanted to make sure you wrote that "third script" for the creme with the muscle relaxant in it  He just wants to know if he has it in his chart just in case this one does not work  FQ to advise  thanks   Alphonso Wyman - 14 Jul 2016 10:59 AM     TASK REPLIED TO: Previously Assigned To SPA eunice clinical,Team  yes noted   he should call back with update in 2 weeks and if not better will prescribe another cream with muscle relaxant in it   Dafne Andrew - 14 Jul 2016 12:20 PM     TASK EDITED    I spoke with pt, advised above  Pt thankful and will call with update  **********        Active Problems    1  Acute bronchitis (466 0) (J20 9)   2  Acute sinusitis (461 9) (J01 90)   3  Acute upper respiratory infection (465 9) (J06 9)   4  Aftercare following surgery of the musculoskeletal system (V58 78) (Z47 89)   5  Allergic rhinitis (477 9) (J30 9)   6  C2 cervical fracture (805 02) (S12 100A)   7  Cervical disc herniation (722 0) (M50 20)   8  Cervical spinal stenosis (723 0) (M48 02)   9  Cervical spondylosis (721 0) (M47 812)   10  Cervicalgia (723 1) (M54 2)   11  Disc disorder of cervical region (722 91) (M50 90)   12  Fracture, cervical vertebra (805 00) (S12 9XXA)   13  Hypothyroidism (244 9) (E03 9)   14  Left arm pain (729 5) (M79 602)   15  Muscle pain, myofacial (729 1) (M79 1)   16  Neck pain (723 1) (M54 2)   17  Neuralgia (729 2) (M79 2)   18  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)   19  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)   20   Occipital neuralgia (723 8) (M54 81)   21  Postoperative examination (V67 00) (Z09)   22  Post-operative state (V45 89) (Z98 89)   23  Post-traumatic osteoarthritis of one knee (715 26) (M17 30)   24  Preoperative examination (V72 84) (Z01 818)   25  Pruritus (698 9) (L29 9)   26  Seizures (780 39) (R56 9)   27  Unequal leg length (acquired) (736 81) (M21 70)   28  Venous insufficiency (459 81) (I87 2)    Current Meds   1  Aspirin 81 MG TABS; TAKE 1 TABLET DAILY; Therapy: (Recorded:02Oct2012) to Recorded   2  Calcium 600 + D 600-200 MG-UNIT Oral Tablet; TAKE 1 TABLET DAILY; Therapy: (Recorded:81Dsd4652) to Recorded   3  Diclofenac Sodium 3 % Transdermal Gel; APPLY TO THE AFFECTED AREA(S)  FOUR   TIMES DAILY A NEEDED; Therapy: 98DMB2865 to (Evaluate:39Gxd8420); Last Rx:50Lst5564 Ordered   4  Fish Oil CAPS; take 1 capsule daily; Therapy: (Recorded:91Oxn1461) to Recorded   5  HydrOXYzine HCl - 25 MG Oral Tablet; TAKE 1 TABLET 3 TO 4 TIMES DAILY AS   NEEDED FOR ITCHING; Therapy: 18AWY0067 to (Evaluate:27Mar2016)  Requested for: 23OHA6594; Last   Rx:18Ncs1476 Ordered   6  Levothyroxine Sodium 100 MCG Oral Tablet; Take 1 tablet daily; Therapy: 99FBX8149 to (Last Rx:06Jun2016)  Requested for: 06Jun2016 Ordered   7  Multivitamins CAPS; TAKE 1 CAPSULE DAILY; Therapy: (Recorded:88Ptj1246) to Recorded   8  Proventil  (90 Base) MCG/ACT Inhalation Aerosol Solution; INHALE 2 PUFFS   EVERY 4-6 HOURS AS NEEDED; Therapy: 32DYP3100 to (Last Rx:54Kjz2045)  Requested for: 28Lzk8768 Ordered   9  SLPG Compound Medication; 5% amitriptyline, 5% gabapentin, 10%lidocaine, 5%   Phenytoin, lipoderm-60 grams  apply 2-3 times a day to   back of neck as needed for pain; Therapy: 99LYD2441 to (Last Rx:02Jun2016) Ordered   10  Triamcinolone Acetonide 0 1 % External Cream; APPLY 2-3 TIMES DAILY TO    AFFECTED AREA(S); Therapy: 26HRZ0772 to (Last Rx:62Msk4540)  Requested for: 60Rcq2014 Ordered    Allergies    1   Augmentin TABS    Signatures   Electronically signed by :  Celia Penn, ; Jul 14 2016 12:21PM EST                       (Author)

## 2018-01-18 NOTE — RESULT NOTES
Verified Results  (1) CBC/PLT/DIFF 07BIO1067 09:47AM Tea Gregorio Order Number: [id number]     Test Name Result Flag Reference   WBC COUNT 9 11 Thousand/uL  4 31-10 16   RBC COUNT 4 40 Million/uL  3 88-5 62   HEMOGLOBIN 14 0 g/dL  12 0-17 0   HEMATOCRIT 43 7 %  36 5-49 3   MCV 99 fL H 82-98   MCH 31 8 pg  26 8-34 3   MCHC 32 0 g/dL  31 4-37 4   RDW 13 7 %  11 6-15 1   MPV 11 4 fL  8 9-12 7   PLATELET COUNT 870 Thousands/uL  149-390   nRBC AUTOMATED 0 /100 WBCs     NEUTROPHILS RELATIVE PERCENT 66 %  43-75   LYMPHOCYTES RELATIVE PERCENT 26 %  14-44   MONOCYTES RELATIVE PERCENT 6 %  4-12   EOSINOPHILS RELATIVE PERCENT 2 %  0-6   BASOPHILS RELATIVE PERCENT 0 %  0-1   NEUTROPHILS ABSOLUTE COUNT 6 03 Thousands/?L  1 85-7 62   LYMPHOCYTES ABSOLUTE COUNT 2 34 Thousands/?L  0 60-4 47   MONOCYTES ABSOLUTE COUNT 0 53 Thousand/?L  0 17-1 22   EOSINOPHILS ABSOLUTE COUNT 0 15 Thousand/?L  0 00-0 61   BASOPHILS ABSOLUTE COUNT 0 04 Thousands/?L  0 00-0 10   - Patient Instructions: This bloodwork is non-fasting  Please drink two glasses of water morning of bloodwork  (1) COMPREHENSIVE METABOLIC PANEL 17UWN3238 27:99PG Tea Gregorio Order Number: [id number]     Test Name Result Flag Reference   GLUCOSE,RANDM 99 mg/dL     If the patient is fasting, the ADA then defines impaired fasting glucose as > 100 mg/dL and diabetes as > or equal to 123 mg/dL     SODIUM 141 mmol/L  136-145   POTASSIUM 4 0 mmol/L  3 5-5 3   CHLORIDE 108 mmol/L  100-108   CARBON DIOXIDE 29 mmol/L  21-32   ANION GAP (CALC) 4 mmol/L  4-13   BLOOD UREA NITROGEN 22 mg/dL  5-25   CREATININE 0 68 mg/dL  0 60-1 30   Standardized to IDMS reference method   CALCIUM 9 2 mg/dL  8 3-10 1   BILI, TOTAL 0 47 mg/dL  0 20-1 00   ALK PHOSPHATAS 50 U/L     ALT (SGPT) 27 U/L  12-78   AST(SGOT) 21 U/L  5-45   ALBUMIN 4 2 g/dL  3 5-5 0   TOTAL PROTEIN 7 0 g/dL  6 4-8 2   eGFR Non-African American      >60 0 ml/min/1 73sq m National Kidney Disease Education Program recommendations are as follows:  GFR calculation is accurate only with a steady state creatinine  Chronic Kidney disease less than 60 ml/min/1 73 sq  meters  Kidney failure less than 15 ml/min/1 73 sq  meters  (1) LIPID PANEL, FASTING 38Jzc8285 09:47AM Memory Bathe Order Number: [id number]     Test Name Result Flag Reference   CHOLESTEROL 201 mg/dL H    HDL,DIRECT 47 mg/dL  40-60   Specimen collection should occur prior to Metamizole administration due to the potential for falsely depressed results  LDL CHOLESTEROL CALCULATED 134 mg/dL H 0-100   - Patient Instructions: This is a fasting blood test  Water,black tea or black  coffee only after 9:00pm the night before test   Drink 2 glasses of water the morning of test       Triglyceride:         Normal              <150 mg/dl       Borderline High    150-199 mg/dl       High               200-499 mg/dl       Very High          >499 mg/dl  Cholesterol:         Desirable        <200 mg/dl      Borderline High  200-239 mg/dl      High             >239 mg/dl  HDL Cholesterol:        High    >59 mg/dL      Low     <41 mg/dL  LDL CALCULATED:    This screening LDL is a calculated result  It does not have the accuracy of the Direct Measured LDL in the monitoring of patients with hyperlipidemia and/or statin therapy  Direct Measure LDL (ICR338) must be ordered separately in these patients  TRIGLYCERIDES 100 mg/dL  <=150   Specimen collection should occur prior to N-Acetylcysteine or Metamizole administration due to the potential for falsely depressed results  (1) TSH WITH FT4 REFLEX 17ODL8575 09:47AM Memory Bathe Order Number: [id number]     Test Name Result Flag Reference   TSH 1 250 uIU/mL  0 358-3 740   Patients undergoing fluorescein dye angiography may retain small amounts of fluorescein in the body for 48-72 hours post procedure   Samples containing fluorescein can produce falsely depressed TSH values  If the patient had this procedure,a specimen should be resubmitted post fluorescein clearance  (1) VITAMIN D 25-HYDROXY 85PJJ6947 09:47AM [facility] Order Number: [id number]     Test Name Result Flag Reference   VIT D 25-HYDROX 35 5 ng/mL  30 0-100 0   This assay is a certified procedure of the CDC Vitamin D Standardization Certification Program (VDSCP)     Deficiency <20ng/ml   Insufficiency 20-30ng/ml   Sufficient  ng/ml     *Patients undergoing fluorescein dye angiography may retain small amounts of fluorescein in the body for 48-72 hours post procedure  Samples containing fluorescein can produce falsely elevated Vitamin D values  If the patient had this procedure, a specimen should be resubmitted post fluorescein clearance  (1) PSA (SCREEN) (Dx V76 44 Screen for Prostate Cancer) 36Rvz4775 09:47AM [facility] Order Number: [id number]     Test Name Result Flag Reference   PROSTATE SPECIFIC ANTIGEN 1 8 ng/mL  0 0-4 0   - Patient Instructions: This test is non-fasting  Please drink two glasses of water morning of bloodwork  Discussion/Summary   Labs ok, cont present Tx

## 2018-04-23 ENCOUNTER — OFFICE VISIT (OUTPATIENT)
Dept: UROLOGY | Facility: MEDICAL CENTER | Age: 63
End: 2018-04-23
Payer: COMMERCIAL

## 2018-04-23 VITALS
HEIGHT: 73 IN | BODY MASS INDEX: 26.08 KG/M2 | DIASTOLIC BLOOD PRESSURE: 64 MMHG | SYSTOLIC BLOOD PRESSURE: 106 MMHG | WEIGHT: 196.8 LBS

## 2018-04-23 DIAGNOSIS — N40.0 BPH WITHOUT OBSTRUCTION/LOWER URINARY TRACT SYMPTOMS: Primary | ICD-10-CM

## 2018-04-23 LAB
SL AMB  POCT GLUCOSE, UA: NEGATIVE
SL AMB LEUKOCYTE ESTERASE,UA: NEGATIVE
SL AMB POCT BILIRUBIN,UA: NEGATIVE
SL AMB POCT BLOOD,UA: NEGATIVE
SL AMB POCT CLARITY,UA: CLEAR
SL AMB POCT COLOR,UA: YELLOW
SL AMB POCT KETONES,UA: NEGATIVE
SL AMB POCT NITRITE,UA: NEGATIVE
SL AMB POCT PH,UA: 6.5
SL AMB POCT SPECIFIC GRAVITY,UA: 1.01
SL AMB POCT URINE PROTEIN: NEGATIVE
SL AMB POCT UROBILINOGEN: 0.2

## 2018-04-23 PROCEDURE — 99214 OFFICE O/P EST MOD 30 MIN: CPT | Performed by: UROLOGY

## 2018-04-23 PROCEDURE — 81003 URINALYSIS AUTO W/O SCOPE: CPT | Performed by: UROLOGY

## 2018-04-23 RX ORDER — LEVOTHYROXINE SODIUM 0.1 MG/1
TABLET ORAL DAILY
COMMUNITY
Start: 2018-02-09

## 2018-04-23 RX ORDER — ACETAMINOPHEN 500 MG
1000 TABLET ORAL AS NEEDED
COMMUNITY

## 2018-04-23 RX ORDER — FLUTICASONE PROPIONATE 50 MCG
2 SPRAY, SUSPENSION (ML) NASAL AS NEEDED
COMMUNITY
Start: 2016-01-14 | End: 2019-02-14 | Stop reason: SDUPTHER

## 2018-04-23 NOTE — PROGRESS NOTES
Assessment/Plan:    Assessment:  1  Benign prostatic hyperplasia without obstruction    Plan:    1  Return in 1 year    No problem-specific Assessment & Plan notes found for this encounter  Diagnoses and all orders for this visit:    BPH without obstruction/lower urinary tract symptoms  -     POCT urine dip auto non-scope    Other orders  -     levothyroxine 100 mcg tablet; daily  -     acetaminophen (TYLENOL) 500 mg tablet; Take 1,000 mg by mouth as needed  -     ASPIRIN 81 PO; Take 1 tablet by mouth as needed  -     Calcium Carbonate-Vit D-Min (CALCIUM 1200 PO); Take 1 tablet by mouth daily  -     Omega-3 Fatty Acids (FISH OIL) 645 MG CAPS; Take 1 capsule by mouth daily  -     Multiple Vitamin (MULTIVITAMINS PO); Take 1 capsule by mouth daily  -     fluticasone (FLONASE) 50 mcg/act nasal spray; 2 sprays into each nostril as needed          Subjective:      Patient ID: Xi Hdz is a 58 y o  male  HPI    BPH:  He notes urinary frequency, urinary urgency, nocturia x 1  He denies other significant urinary symptoms  He denies gross hematuria, urinary tract infections or incontinence  He is taking no meds or supplements for his symptoms  Always makes sure to empty  The following portions of the patient's history were reviewed and updated as appropriate: allergies, current medications, past family history, past medical history, past social history, past surgical history and problem list     Review of Systems   Constitutional: Negative for activity change and fatigue  Respiratory: Negative for shortness of breath and wheezing  Cardiovascular: Negative for chest pain  Gastrointestinal: Negative for abdominal pain  Genitourinary: Negative for difficulty urinating, dysuria, frequency, hematuria and urgency  Musculoskeletal: Negative for back pain and gait problem  Skin: Negative  Allergic/Immunologic: Negative  Neurological: Negative  Psychiatric/Behavioral: Negative  Objective:      /64 (BP Location: Left arm, Patient Position: Sitting, Cuff Size: Standard)   Ht 6' 1" (1 854 m)   Wt 89 3 kg (196 lb 12 8 oz)   BMI 25 96 kg/m²          Physical Exam   Constitutional: He is oriented to person, place, and time  He appears well-developed and well-nourished  HENT:   Head: Normocephalic and atraumatic  Pulmonary/Chest: Effort normal    Abdominal: Soft  Bowel sounds are normal    Genitourinary:   Genitourinary Comments: Prostate is approximately 40 g, firm, smooth and nontender  Anal sphincter tone is normal    Musculoskeletal: Normal range of motion  Neurological: He is alert and oriented to person, place, and time  Skin: Skin is warm and dry  Psychiatric: He has a normal mood and affect   His behavior is normal  Judgment and thought content normal

## 2018-04-23 NOTE — LETTER
April 23, 2018     Tracie Chavez, [address] [facility],[address]  [address] [place] [address]    Patient: Marysol Worthy   YOB: 1955   Date of Visit: 4/23/2018       Dear Dr Nima Strange: Thank you for referring Javi Juan to me for evaluation  Below are my notes for this consultation  If you have questions, please do not hesitate to call me  I look forward to following your patient along with you  Sincerely,        Clyde Butt MD        CC: No Recipients  Clyde Butt MD  4/23/2018  4:39 PM  Sign at close encounter  Assessment/Plan:    Assessment:  1  Benign prostatic hyperplasia without obstruction    Plan:    1  Return in 1 year    No problem-specific Assessment & Plan notes found for this encounter  Diagnoses and all orders for this visit:    BPH without obstruction/lower urinary tract symptoms  -     POCT urine dip auto non-scope    Other orders  -     levothyroxine 100 mcg tablet; daily  -     acetaminophen (TYLENOL) 500 mg tablet; Take 1,000 mg by mouth as needed  -     ASPIRIN 81 PO; Take 1 tablet by mouth as needed  -     Calcium Carbonate-Vit D-Min (CALCIUM 1200 PO); Take 1 tablet by mouth daily  -     Omega-3 Fatty Acids (FISH OIL) 645 MG CAPS; Take 1 capsule by mouth daily  -     Multiple Vitamin (MULTIVITAMINS PO); Take 1 capsule by mouth daily  -     fluticasone (FLONASE) 50 mcg/act nasal spray; 2 sprays into each nostril as needed          Subjective:      Patient ID: Marysol Worthy is a 58 y o  male  HPI    BPH:  He notes urinary frequency, urinary urgency, nocturia x 1  He denies other significant urinary symptoms  He denies gross hematuria, urinary tract infections or incontinence  He is taking no meds or supplements for his symptoms  Always makes sure to empty          The following portions of the patient's history were reviewed and updated as appropriate: allergies, current medications, past family history, past medical history, past social history, past surgical history and problem list     Review of Systems   Constitutional: Negative for activity change and fatigue  Respiratory: Negative for shortness of breath and wheezing  Cardiovascular: Negative for chest pain  Gastrointestinal: Negative for abdominal pain  Genitourinary: Negative for difficulty urinating, dysuria, frequency, hematuria and urgency  Musculoskeletal: Negative for back pain and gait problem  Skin: Negative  Allergic/Immunologic: Negative  Neurological: Negative  Psychiatric/Behavioral: Negative  Objective:      /64 (BP Location: Left arm, Patient Position: Sitting, Cuff Size: Standard)   Ht 6' 1" (1 854 m)   Wt 89 3 kg (196 lb 12 8 oz)   BMI 25 96 kg/m²           Physical Exam   Constitutional: He is oriented to person, place, and time  He appears well-developed and well-nourished  HENT:   Head: Normocephalic and atraumatic  Pulmonary/Chest: Effort normal    Abdominal: Soft  Bowel sounds are normal    Genitourinary:   Genitourinary Comments: Prostate is approximately 40 g, firm, smooth and nontender  Anal sphincter tone is normal    Musculoskeletal: Normal range of motion  Neurological: He is alert and oriented to person, place, and time  Skin: Skin is warm and dry  Psychiatric: He has a normal mood and affect   His behavior is normal  Judgment and thought content normal

## 2018-04-30 ENCOUNTER — TELEPHONE (OUTPATIENT)
Dept: FAMILY MEDICINE CLINIC | Facility: CLINIC | Age: 63
End: 2018-04-30

## 2018-04-30 DIAGNOSIS — I87.2 VENOUS INSUFFICIENCY: Primary | ICD-10-CM

## 2018-07-11 ENCOUNTER — TELEPHONE (OUTPATIENT)
Dept: FAMILY MEDICINE CLINIC | Facility: CLINIC | Age: 63
End: 2018-07-11

## 2018-07-11 NOTE — TELEPHONE ENCOUNTER
Pt is requesting a compound cream that he states you took over prescribing Last August from [facility]  He states its a compound of Amitriptyline, gabapentin, lidocaine and phenytoin  I tried creating it in Epic however the correct %'s were not showing  Per [employer], it's listed as "Amit5%Gaba5%Lido10%Phenytoin5%" applying to back of neck 2-3 times a day PRN for pain      [facility]   [phone]

## 2018-08-19 PROBLEM — M17.11 PRIMARY OSTEOARTHRITIS OF RIGHT KNEE: Status: ACTIVE | Noted: 2017-12-12

## 2018-08-19 PROBLEM — M23.41 LOOSE BODY IN KNEE, RIGHT: Status: ACTIVE | Noted: 2017-12-12

## 2018-08-24 ENCOUNTER — OFFICE VISIT (OUTPATIENT)
Dept: FAMILY MEDICINE CLINIC | Facility: CLINIC | Age: 63
End: 2018-08-24
Payer: COMMERCIAL

## 2018-08-24 VITALS
RESPIRATION RATE: 16 BRPM | TEMPERATURE: 98.5 F | SYSTOLIC BLOOD PRESSURE: 122 MMHG | WEIGHT: 191 LBS | HEART RATE: 68 BPM | DIASTOLIC BLOOD PRESSURE: 74 MMHG | BODY MASS INDEX: 24.51 KG/M2 | HEIGHT: 74 IN

## 2018-08-24 DIAGNOSIS — Z00.00 ANNUAL PHYSICAL EXAM: Primary | ICD-10-CM

## 2018-08-24 DIAGNOSIS — Z12.5 SCREENING PSA (PROSTATE SPECIFIC ANTIGEN): ICD-10-CM

## 2018-08-24 DIAGNOSIS — E03.9 HYPOTHYROIDISM, UNSPECIFIED TYPE: ICD-10-CM

## 2018-08-24 PROBLEM — M15.9 GENERALIZED OSTEOARTHRITIS: Status: ACTIVE | Noted: 2018-08-24

## 2018-08-24 PROBLEM — M19.039 LOCALIZED PRIMARY OSTEOARTHRITIS OF WRIST: Status: ACTIVE | Noted: 2018-08-24

## 2018-08-24 PROBLEM — M25.569 KNEE PAIN: Status: ACTIVE | Noted: 2018-08-24

## 2018-08-24 PROBLEM — M17.9 OSTEOARTHRITIS OF KNEE: Status: ACTIVE | Noted: 2017-12-12

## 2018-08-24 PROBLEM — M25.469 KNEE JOINT EFFUSION: Status: ACTIVE | Noted: 2018-08-24

## 2018-08-24 PROBLEM — M19.91 LOCALIZED, PRIMARY OSTEOARTHRITIS: Status: ACTIVE | Noted: 2018-08-24

## 2018-08-24 PROBLEM — M22.40 CHONDROMALACIA PATELLAE: Status: ACTIVE | Noted: 2018-08-24

## 2018-08-24 PROBLEM — M86.669 CHRONIC OSTEOMYELITIS OF LOWER LEG (HCC): Status: ACTIVE | Noted: 2018-08-24

## 2018-08-24 PROBLEM — M17.10 OSTEOARTHRITIS OF KNEE: Status: ACTIVE | Noted: 2017-12-12

## 2018-08-24 PROBLEM — M65.30 ACQUIRED TRIGGER FINGER: Status: ACTIVE | Noted: 2018-08-24

## 2018-08-24 PROBLEM — R26.9 ABNORMAL GAIT: Status: ACTIVE | Noted: 2018-08-24

## 2018-08-24 LAB
25(OH)D3 SERPL-MCNC: 29.6 NG/ML (ref 30–100)
ALBUMIN SERPL BCP-MCNC: 4 G/DL (ref 3.5–5)
ALP SERPL-CCNC: 47 U/L (ref 46–116)
ALT SERPL W P-5'-P-CCNC: 26 U/L (ref 12–78)
ANION GAP SERPL CALCULATED.3IONS-SCNC: 5 MMOL/L (ref 4–13)
AST SERPL W P-5'-P-CCNC: 18 U/L (ref 5–45)
BILIRUB SERPL-MCNC: 0.5 MG/DL (ref 0.2–1)
BUN SERPL-MCNC: 17 MG/DL (ref 5–25)
CALCIUM SERPL-MCNC: 9.2 MG/DL (ref 8.3–10.1)
CHLORIDE SERPL-SCNC: 108 MMOL/L (ref 100–108)
CHOLEST SERPL-MCNC: 168 MG/DL (ref 50–200)
CO2 SERPL-SCNC: 28 MMOL/L (ref 21–32)
CREAT SERPL-MCNC: 0.58 MG/DL (ref 0.6–1.3)
ERYTHROCYTE [DISTWIDTH] IN BLOOD BY AUTOMATED COUNT: 13.7 % (ref 11.6–15.1)
GFR SERPL CREATININE-BSD FRML MDRD: 108 ML/MIN/1.73SQ M
GLUCOSE P FAST SERPL-MCNC: 102 MG/DL (ref 65–99)
HCT VFR BLD AUTO: 43.9 % (ref 36.5–49.3)
HDLC SERPL-MCNC: 47 MG/DL (ref 40–60)
HGB BLD-MCNC: 13.7 G/DL (ref 12–17)
LDLC SERPL CALC-MCNC: 104 MG/DL (ref 0–100)
MCH RBC QN AUTO: 31.4 PG (ref 26.8–34.3)
MCHC RBC AUTO-ENTMCNC: 31.2 G/DL (ref 31.4–37.4)
MCV RBC AUTO: 101 FL (ref 82–98)
NONHDLC SERPL-MCNC: 121 MG/DL
PLATELET # BLD AUTO: 190 THOUSANDS/UL (ref 149–390)
PMV BLD AUTO: 11.1 FL (ref 8.9–12.7)
POTASSIUM SERPL-SCNC: 3.9 MMOL/L (ref 3.5–5.3)
PROT SERPL-MCNC: 6.9 G/DL (ref 6.4–8.2)
PSA SERPL-MCNC: 1.6 NG/ML (ref 0–4)
RBC # BLD AUTO: 4.37 MILLION/UL (ref 3.88–5.62)
SODIUM SERPL-SCNC: 141 MMOL/L (ref 136–145)
TRIGL SERPL-MCNC: 84 MG/DL
TSH SERPL DL<=0.05 MIU/L-ACNC: 1.14 UIU/ML (ref 0.36–3.74)
WBC # BLD AUTO: 7.47 THOUSAND/UL (ref 4.31–10.16)

## 2018-08-24 PROCEDURE — 80053 COMPREHEN METABOLIC PANEL: CPT | Performed by: FAMILY MEDICINE

## 2018-08-24 PROCEDURE — 36415 COLL VENOUS BLD VENIPUNCTURE: CPT | Performed by: FAMILY MEDICINE

## 2018-08-24 PROCEDURE — 80061 LIPID PANEL: CPT | Performed by: FAMILY MEDICINE

## 2018-08-24 PROCEDURE — 82306 VITAMIN D 25 HYDROXY: CPT | Performed by: FAMILY MEDICINE

## 2018-08-24 PROCEDURE — G0103 PSA SCREENING: HCPCS | Performed by: FAMILY MEDICINE

## 2018-08-24 PROCEDURE — 85027 COMPLETE CBC AUTOMATED: CPT | Performed by: FAMILY MEDICINE

## 2018-08-24 PROCEDURE — 99396 PREV VISIT EST AGE 40-64: CPT | Performed by: FAMILY MEDICINE

## 2018-08-24 PROCEDURE — 84443 ASSAY THYROID STIM HORMONE: CPT | Performed by: FAMILY MEDICINE

## 2018-08-24 RX ORDER — ALBUTEROL SULFATE 90 UG/1
AEROSOL, METERED RESPIRATORY (INHALATION)
COMMUNITY
Start: 2015-07-31 | End: 2021-04-20 | Stop reason: ALTCHOICE

## 2018-08-24 NOTE — PROGRESS NOTES
Assessment/Plan:    Fasting labs drawn as below  Patient to continue present treatment  Recommend regular aerobic exercise walking 150 minutes per week  Diagnoses and all orders for this visit:    Annual physical exam  -     CBC and Platelet  -     Comprehensive metabolic panel  -     Lipid panel  -     TSH, 3rd generation with Free T4 reflex  -     Vitamin D 25 hydroxy  -     UA (URINE) with reflex to Microscopic    Hypothyroidism, unspecified type  -     TSH, 3rd generation with Free T4 reflex    Screening PSA (prostate specific antigen)  -     PSA, Total Screen    Other orders  -     albuterol (PROVENTIL HFA) 90 mcg/act inhaler;           Subjective:      Patient ID: Jose Francisco Rivera is a 58 y o  male  Patient is a 28-year-old male who is retired  who is here for an annual physical exam and requests full fasting labs  Patient has been feeling fairly well overall although admits to chronic neck pain  No regular exercise program although patient remains active working around the house  Patient is up-to-date on colonoscopy  Thyroid Problem   Presents for follow-up visit  Symptoms include anxiety and fatigue  Patient reports no cold intolerance, constipation, depressed mood, diaphoresis, diarrhea, dry skin, hair loss, heat intolerance, hoarse voice, leg swelling, palpitations, tremors, visual change, weight gain or weight loss  The symptoms have been stable  The following portions of the patient's history were reviewed and updated as appropriate: allergies, current medications, past family history, past medical history, past social history, past surgical history and problem list     Review of Systems   Constitutional: Positive for fatigue  Negative for diaphoresis, weight gain and weight loss  HENT: Negative for hoarse voice  Cardiovascular: Negative for palpitations  Gastrointestinal: Negative for constipation and diarrhea     Endocrine: Negative for cold intolerance and heat intolerance  Neurological: Negative for tremors  Psychiatric/Behavioral: The patient is nervous/anxious  Objective:      /74 (BP Location: Left arm, Patient Position: Sitting, Cuff Size: Adult)   Pulse 68   Temp 98 5 °F (36 9 °C) (Tympanic)   Resp 16   Ht 6' 1 5" (1 867 m)   Wt 86 6 kg (191 lb)   BMI 24 86 kg/m²          Physical Exam   Constitutional: He is oriented to person, place, and time  He appears well-developed and well-nourished  HENT:   Head: Normocephalic  Right Ear: External ear normal    Left Ear: External ear normal    Nose: Nose normal    Mouth/Throat: Oropharynx is clear and moist    Eyes: Conjunctivae are normal  No scleral icterus  Neck: Neck supple  No thyromegaly present  Cardiovascular: Normal rate and regular rhythm  Pulmonary/Chest: Effort normal and breath sounds normal    Abdominal: Soft  There is no tenderness  Musculoskeletal: He exhibits no edema  Lymphadenopathy:     He has no cervical adenopathy  Neurological: He is alert and oriented to person, place, and time  Skin: Skin is warm and dry  Psychiatric: He has a normal mood and affect

## 2018-11-23 ENCOUNTER — CLINICAL SUPPORT (OUTPATIENT)
Dept: FAMILY MEDICINE CLINIC | Facility: CLINIC | Age: 63
End: 2018-11-23
Payer: COMMERCIAL

## 2018-11-23 DIAGNOSIS — Z23 NEED FOR INFLUENZA VACCINATION: Primary | ICD-10-CM

## 2018-11-23 PROCEDURE — 90471 IMMUNIZATION ADMIN: CPT

## 2018-11-23 PROCEDURE — 90682 RIV4 VACC RECOMBINANT DNA IM: CPT

## 2018-12-18 ENCOUNTER — OFFICE VISIT (OUTPATIENT)
Dept: FAMILY MEDICINE CLINIC | Facility: CLINIC | Age: 63
End: 2018-12-18
Payer: COMMERCIAL

## 2018-12-18 VITALS
RESPIRATION RATE: 16 BRPM | HEIGHT: 74 IN | TEMPERATURE: 97 F | DIASTOLIC BLOOD PRESSURE: 78 MMHG | BODY MASS INDEX: 24.13 KG/M2 | HEART RATE: 68 BPM | SYSTOLIC BLOOD PRESSURE: 134 MMHG | WEIGHT: 188 LBS

## 2018-12-18 DIAGNOSIS — F32.9 REACTIVE DEPRESSION: ICD-10-CM

## 2018-12-18 DIAGNOSIS — F41.9 ANXIETY: Primary | ICD-10-CM

## 2018-12-18 PROCEDURE — 99214 OFFICE O/P EST MOD 30 MIN: CPT | Performed by: FAMILY MEDICINE

## 2018-12-18 PROCEDURE — 3008F BODY MASS INDEX DOCD: CPT | Performed by: FAMILY MEDICINE

## 2018-12-18 PROCEDURE — 1036F TOBACCO NON-USER: CPT | Performed by: FAMILY MEDICINE

## 2018-12-18 RX ORDER — BUSPIRONE HYDROCHLORIDE 5 MG/1
5 TABLET ORAL 2 TIMES DAILY
Qty: 30 TABLET | Refills: 2 | Status: SHIPPED | OUTPATIENT
Start: 2018-12-18 | End: 2019-01-30

## 2018-12-18 NOTE — PROGRESS NOTES
Assessment/Plan:  Discussed treatment options with patient  Patient previously was on Valium, Klonopin and antidepressants in the past and does not want to go on any of those medications at this time  Therefore will try BuSpar 5 mg 1 b i d , caution regarding lightheadedness and discussed potential side effects  Recommend patient continue seeing a psychologist for counseling  Return to the office in 3-4 weeks or sooner kip Bernstein Diagnoses and all orders for this visit:    Anxiety  -     busPIRone (BUSPAR) 5 mg tablet; Take 1 tablet (5 mg total) by mouth 2 (two) times a day    Reactive depression  -     busPIRone (BUSPAR) 5 mg tablet; Take 1 tablet (5 mg total) by mouth 2 (two) times a day    Other orders  -     RESTASIS MULTIDOSE 0 05 % ophthalmic emulsion; INSTILL 1 DROP INTO BOTH EYES TWICE A DAY          Subjective:      Patient ID: Maxine Officer is a 58 y o  male  Patient complains of worsening anxiety recently  Patient's wife left him on 11/30/2018  They had been in marriage counseling for the past year  Patient has been  for 17 years and has no children  Patient has been seeing a psychologist regularly and they recommended patient be seen here  Appetite is fair and patient is sleeping fairly well overall  He admits to crying episodes and feeling depressed  He admits to occasional panic attacks which he controls with deep breathing  Patient is concerned about his blood pressure being elevated recently in the range of 140/80  Anxiety   Presents for initial visit  Onset was 1 to 4 weeks ago  The problem has been rapidly worsening  Symptoms include decreased concentration, depressed mood, excessive worry, hyperventilation, muscle tension, nervous/anxious behavior, obsessions, palpitations, panic and restlessness  Patient reports no chest pain, confusion, insomnia, irritability, nausea, shortness of breath or suicidal ideas  Symptoms occur constantly   The severity of symptoms is interfering with daily activities and causing significant distress  The symptoms are aggravated by family issues  The quality of sleep is fair  Nighttime awakenings: occasional      Risk factors include marital problems  Past treatments include counseling (CBT)  The treatment provided mild relief  Compliance with prior treatments has been good  The following portions of the patient's history were reviewed and updated as appropriate: allergies, current medications, past family history, past medical history, past social history, past surgical history and problem list     Review of Systems   Constitutional: Negative for irritability  Respiratory: Negative for shortness of breath  Cardiovascular: Positive for palpitations  Negative for chest pain  Gastrointestinal: Negative for nausea  Psychiatric/Behavioral: Positive for decreased concentration  Negative for confusion and suicidal ideas  The patient is nervous/anxious  The patient does not have insomnia  Objective:      /78   Pulse 68   Temp (!) 97 °F (36 1 °C) (Tympanic)   Resp 16   Ht 6' 1 5" (1 867 m)   Wt 85 3 kg (188 lb)   BMI 24 47 kg/m²          Physical Exam   Constitutional: He is oriented to person, place, and time  He appears well-developed and well-nourished  He appears distressed  HENT:   Head: Normocephalic  Mouth/Throat: Oropharynx is clear and moist    Eyes: Conjunctivae are normal  No scleral icterus  Neck: Neck supple  No thyromegaly present  Cardiovascular: Normal rate and regular rhythm  Pulmonary/Chest: Effort normal and breath sounds normal    Abdominal: Soft  There is no tenderness  Musculoskeletal: He exhibits no edema  Lymphadenopathy:     He has no cervical adenopathy  Neurological: He is alert and oriented to person, place, and time  Skin: Skin is warm and dry  Psychiatric:   Patient appears anxious and depressed and tearful

## 2019-01-30 ENCOUNTER — OFFICE VISIT (OUTPATIENT)
Dept: FAMILY MEDICINE CLINIC | Facility: CLINIC | Age: 64
End: 2019-01-30
Payer: MEDICARE

## 2019-01-30 ENCOUNTER — TELEPHONE (OUTPATIENT)
Dept: NEUROSURGERY | Facility: CLINIC | Age: 64
End: 2019-01-30

## 2019-01-30 VITALS
SYSTOLIC BLOOD PRESSURE: 132 MMHG | WEIGHT: 190 LBS | TEMPERATURE: 98.3 F | BODY MASS INDEX: 24.38 KG/M2 | RESPIRATION RATE: 16 BRPM | HEIGHT: 74 IN | DIASTOLIC BLOOD PRESSURE: 80 MMHG | HEART RATE: 60 BPM

## 2019-01-30 DIAGNOSIS — R42 VERTIGO: ICD-10-CM

## 2019-01-30 DIAGNOSIS — M48.02 CERVICAL SPINAL STENOSIS: ICD-10-CM

## 2019-01-30 DIAGNOSIS — M54.2 NECK PAIN: Primary | ICD-10-CM

## 2019-01-30 LAB
ALBUMIN SERPL BCP-MCNC: 4.4 G/DL (ref 3.5–5)
ALP SERPL-CCNC: 57 U/L (ref 46–116)
ALT SERPL W P-5'-P-CCNC: 30 U/L (ref 12–78)
ANION GAP SERPL CALCULATED.3IONS-SCNC: 6 MMOL/L (ref 4–13)
AST SERPL W P-5'-P-CCNC: 20 U/L (ref 5–45)
BILIRUB SERPL-MCNC: 0.35 MG/DL (ref 0.2–1)
BUN SERPL-MCNC: 15 MG/DL (ref 5–25)
CALCIUM SERPL-MCNC: 9.4 MG/DL (ref 8.3–10.1)
CHLORIDE SERPL-SCNC: 105 MMOL/L (ref 100–108)
CO2 SERPL-SCNC: 31 MMOL/L (ref 21–32)
CREAT SERPL-MCNC: 0.64 MG/DL (ref 0.6–1.3)
ERYTHROCYTE [DISTWIDTH] IN BLOOD BY AUTOMATED COUNT: 13.3 % (ref 11.6–15.1)
GFR SERPL CREATININE-BSD FRML MDRD: 103 ML/MIN/1.73SQ M
GLUCOSE SERPL-MCNC: 104 MG/DL (ref 65–140)
HCT VFR BLD AUTO: 44.1 % (ref 36.5–49.3)
HGB BLD-MCNC: 14.2 G/DL (ref 12–17)
MCH RBC QN AUTO: 32.6 PG (ref 26.8–34.3)
MCHC RBC AUTO-ENTMCNC: 32.2 G/DL (ref 31.4–37.4)
MCV RBC AUTO: 101 FL (ref 82–98)
PLATELET # BLD AUTO: 196 THOUSANDS/UL (ref 149–390)
PMV BLD AUTO: 10.6 FL (ref 8.9–12.7)
POTASSIUM SERPL-SCNC: 4.1 MMOL/L (ref 3.5–5.3)
PROT SERPL-MCNC: 7.3 G/DL (ref 6.4–8.2)
RBC # BLD AUTO: 4.35 MILLION/UL (ref 3.88–5.62)
SODIUM SERPL-SCNC: 142 MMOL/L (ref 136–145)
TSH SERPL DL<=0.05 MIU/L-ACNC: 1.35 UIU/ML (ref 0.36–3.74)
WBC # BLD AUTO: 10.28 THOUSAND/UL (ref 4.31–10.16)

## 2019-01-30 PROCEDURE — 80053 COMPREHEN METABOLIC PANEL: CPT | Performed by: FAMILY MEDICINE

## 2019-01-30 PROCEDURE — 99214 OFFICE O/P EST MOD 30 MIN: CPT | Performed by: FAMILY MEDICINE

## 2019-01-30 PROCEDURE — 85027 COMPLETE CBC AUTOMATED: CPT | Performed by: FAMILY MEDICINE

## 2019-01-30 PROCEDURE — 36415 COLL VENOUS BLD VENIPUNCTURE: CPT | Performed by: FAMILY MEDICINE

## 2019-01-30 PROCEDURE — 93000 ELECTROCARDIOGRAM COMPLETE: CPT | Performed by: FAMILY MEDICINE

## 2019-01-30 PROCEDURE — 84443 ASSAY THYROID STIM HORMONE: CPT | Performed by: FAMILY MEDICINE

## 2019-01-30 RX ORDER — MECLIZINE HYDROCHLORIDE 25 MG/1
25 TABLET ORAL EVERY 8 HOURS PRN
Qty: 30 TABLET | Refills: 0 | Status: SHIPPED | OUTPATIENT
Start: 2019-01-30 | End: 2019-06-18

## 2019-01-30 RX ORDER — TRETINOIN 0.05 G/100G
GEL TOPICAL
Refills: 1 | COMMUNITY
Start: 2019-01-17

## 2019-01-30 NOTE — PROGRESS NOTES
Assessment/Plan:  Discussed diagnostic and treatment options with patient  EKG is normal   Labs drawn for CBC, CMP and TSH  Patient is being sent for x-ray of cervical spine  Will heed results  Patient be started on meclizine 25 mg 1 q 8 hours p r n , caution regarding drowsiness  Patient may take Tylenol or Motrin p r n  He is encouraged to drink plenty of fluids and rest   Return to the office in 1 week or sooner p r n  Or report to the emergency room for worsening symptoms  Diagnoses and all orders for this visit:    Neck pain  Comments:  X-ray cervical spine  Tylenol or Motrin p r n   Ice alternating with heat for 20 minutes each p r n     Rest   Orders:  -     XR spine cervical complete 4 or 5 vw non injury; Future  -     CBC and Platelet  -     TSH, 3rd generation with Free T4 reflex  -     Comprehensive metabolic panel    Vertigo  Comments:  EKG normal   Labs for CBC, CMP and TSH  Meclizine 25 mg 1 q 8 hours p r n   Increase fluids and rest   Orders:  -     POCT ECG  -     Cancel: CBC and Platelet; Future  -     Cancel: Comprehensive metabolic panel; Future  -     Cancel: TSH, 3rd generation with Free T4 reflex; Future  -     Cancel: CBC and Platelet  -     Cancel: Comprehensive metabolic panel  -     Cancel: TSH, 3rd generation with Free T4 reflex  -     meclizine (ANTIVERT) 25 mg tablet; Take 1 tablet (25 mg total) by mouth every 8 (eight) hours as needed for dizziness    Cervical spinal stenosis  -     XR spine cervical complete 4 or 5 vw non injury; Future    Other orders  -     tretinoin (ALTRALIN) 0 05 %; APPLY TO FACE AT NIGHT AS DIRECTED          Subjective:      Patient ID: Ilan Rodriguez is a 71 y o  male  Patient complains of increased neck pain over the past 4 days  He denies any specific injury or fall recently  Patient admits to walking 1 mi daily for 3 days prior to the onset of his symptoms and had not been exercising for the past couple years    Patient admits to pain radiating up into his head  He denies any pain, numbness, tingling or weakness into his arms  Past 4 days patient also complains of dizziness and feeling off balance  Patient states he had similar symptoms years ago when he originally injured his neck  He admits to spinning sensation with changing positions for example getting up out of bed or standing from a seated position  He admits to nausea but denies vomiting  Patient denies chest pain or heart palpitations  Appetite remains good  Patient has treated this with Tylenol, ibuprofen and ice with some relief  Patient called his neurosurgeon earlier today and they referred him here  Neck Pain    This is a chronic problem  The current episode started more than 1 year ago  The problem has been gradually worsening  The pain is associated with a remote injury  The pain is present in the midline  The quality of the pain is described as aching  The pain is same all the time  Stiffness is present all day and at night  Associated symptoms include headaches, numbness and tingling  Pertinent negatives include no chest pain, fever, photophobia, syncope, visual change or weakness  He has tried acetaminophen, NSAIDs and ice for the symptoms  The treatment provided mild relief  Dizziness   This is a recurrent problem  The current episode started in the past 7 days  The problem occurs intermittently  The problem has been waxing and waning  Associated symptoms include fatigue, headaches, nausea, neck pain, numbness and vertigo  Pertinent negatives include no abdominal pain, anorexia, change in bowel habit, chest pain, chills, congestion, coughing, diaphoresis, fever, rash, sore throat, visual change, vomiting or weakness  The symptoms are aggravated by exertion, standing and bending  He has tried rest for the symptoms  The treatment provided mild relief         The following portions of the patient's history were reviewed and updated as appropriate: allergies, current medications, past family history, past medical history, past social history, past surgical history and problem list     Review of Systems   Constitutional: Positive for fatigue  Negative for chills, diaphoresis and fever  HENT: Negative for congestion and sore throat  Eyes: Negative for photophobia  Respiratory: Negative for cough  Cardiovascular: Negative for chest pain and syncope  Gastrointestinal: Positive for nausea  Negative for abdominal pain, anorexia, change in bowel habit and vomiting  Musculoskeletal: Positive for neck pain  Skin: Negative for rash  Neurological: Positive for dizziness, vertigo, tingling, numbness and headaches  Negative for weakness  Objective:      /80   Pulse 60   Temp 98 3 °F (36 8 °C) (Tympanic)   Resp 16   Ht 6' 1 5" (1 867 m)   Wt 86 2 kg (190 lb)   BMI 24 73 kg/m²          Physical Exam   Constitutional: He is oriented to person, place, and time  He appears well-developed and well-nourished  No distress  HENT:   Head: Normocephalic  Right Ear: External ear normal    Left Ear: External ear normal    Nose: Nose normal    Mouth/Throat: Oropharynx is clear and moist    Eyes: Pupils are equal, round, and reactive to light  Conjunctivae and EOM are normal  No scleral icterus  Neck: Neck supple  No thyromegaly present  Cardiovascular: Normal rate and regular rhythm  Pulmonary/Chest: Effort normal and breath sounds normal    Abdominal: Soft  There is no tenderness  Musculoskeletal: He exhibits no edema  Lymphadenopathy:     He has no cervical adenopathy  Neurological: He is alert and oriented to person, place, and time  He has normal reflexes  No cranial nerve deficit  Skin: Skin is warm and dry  Psychiatric: He has a normal mood and affect

## 2019-01-30 NOTE — TELEPHONE ENCOUNTER
Pt calls reporting he had an increase in exercise on Sunday  He laid down and lt nausea and dizzyiess when rising  He reports similar symptoms today  He questions if an MRI should be ordered to check his cervical surgery  It was discussed that he has not been seen for a while and an MRI could not just be ordered, and that these symptoms should be addressed by his PCP  He was in agreement

## 2019-01-31 ENCOUNTER — APPOINTMENT (OUTPATIENT)
Dept: RADIOLOGY | Facility: MEDICAL CENTER | Age: 64
End: 2019-01-31
Payer: MEDICARE

## 2019-01-31 DIAGNOSIS — M54.2 NECK PAIN: ICD-10-CM

## 2019-01-31 DIAGNOSIS — M48.02 CERVICAL SPINAL STENOSIS: ICD-10-CM

## 2019-01-31 PROCEDURE — 72050 X-RAY EXAM NECK SPINE 4/5VWS: CPT

## 2019-02-02 ENCOUNTER — TELEPHONE (OUTPATIENT)
Dept: FAMILY MEDICINE CLINIC | Facility: CLINIC | Age: 64
End: 2019-02-02

## 2019-02-14 DIAGNOSIS — J30.1 ALLERGIC RHINITIS DUE TO POLLEN, UNSPECIFIED SEASONALITY: Primary | ICD-10-CM

## 2019-02-15 RX ORDER — FLUTICASONE PROPIONATE 50 MCG
2 SPRAY, SUSPENSION (ML) NASAL DAILY
Qty: 1 BOTTLE | Refills: 2 | Status: SHIPPED | OUTPATIENT
Start: 2019-02-15 | End: 2020-04-15 | Stop reason: SDUPTHER

## 2019-02-18 ENCOUNTER — TELEPHONE (OUTPATIENT)
Dept: FAMILY MEDICINE CLINIC | Facility: CLINIC | Age: 64
End: 2019-02-18

## 2019-02-18 NOTE — TELEPHONE ENCOUNTER
Pt called stating that he wanted the fluticasone nasal spray sent to [facility]  I called [facility] on [address] and cancelled that script and phoned it in to [address]

## 2019-05-03 ENCOUNTER — OFFICE VISIT (OUTPATIENT)
Dept: UROLOGY | Facility: MEDICAL CENTER | Age: 64
End: 2019-05-03
Payer: MEDICARE

## 2019-05-03 VITALS
SYSTOLIC BLOOD PRESSURE: 110 MMHG | HEART RATE: 60 BPM | WEIGHT: 188 LBS | DIASTOLIC BLOOD PRESSURE: 60 MMHG | BODY MASS INDEX: 24.47 KG/M2

## 2019-05-03 DIAGNOSIS — N40.0 BPH WITHOUT OBSTRUCTION/LOWER URINARY TRACT SYMPTOMS: Primary | ICD-10-CM

## 2019-05-03 PROCEDURE — 99214 OFFICE O/P EST MOD 30 MIN: CPT | Performed by: UROLOGY

## 2019-05-03 PROCEDURE — 81003 URINALYSIS AUTO W/O SCOPE: CPT | Performed by: UROLOGY

## 2019-06-18 ENCOUNTER — OFFICE VISIT (OUTPATIENT)
Dept: FAMILY MEDICINE CLINIC | Facility: CLINIC | Age: 64
End: 2019-06-18
Payer: MEDICARE

## 2019-06-18 VITALS
SYSTOLIC BLOOD PRESSURE: 122 MMHG | WEIGHT: 194 LBS | BODY MASS INDEX: 25.25 KG/M2 | TEMPERATURE: 98.5 F | HEART RATE: 72 BPM | DIASTOLIC BLOOD PRESSURE: 76 MMHG | RESPIRATION RATE: 16 BRPM

## 2019-06-18 DIAGNOSIS — M54.2 NECK PAIN: ICD-10-CM

## 2019-06-18 DIAGNOSIS — M47.812 SPONDYLOSIS OF CERVICAL REGION WITHOUT MYELOPATHY OR RADICULOPATHY: Primary | ICD-10-CM

## 2019-06-18 PROCEDURE — 99213 OFFICE O/P EST LOW 20 MIN: CPT | Performed by: FAMILY MEDICINE

## 2019-06-24 ENCOUNTER — OFFICE VISIT (OUTPATIENT)
Dept: FAMILY MEDICINE CLINIC | Facility: CLINIC | Age: 64
End: 2019-06-24
Payer: MEDICARE

## 2019-06-24 VITALS
HEIGHT: 73 IN | WEIGHT: 192 LBS | HEART RATE: 60 BPM | DIASTOLIC BLOOD PRESSURE: 82 MMHG | BODY MASS INDEX: 25.45 KG/M2 | TEMPERATURE: 98.8 F | RESPIRATION RATE: 16 BRPM | SYSTOLIC BLOOD PRESSURE: 130 MMHG

## 2019-06-24 DIAGNOSIS — M54.2 NECK PAIN: ICD-10-CM

## 2019-06-24 DIAGNOSIS — R42 VERTIGO: Primary | ICD-10-CM

## 2019-06-24 DIAGNOSIS — M47.812 SPONDYLOSIS OF CERVICAL REGION WITHOUT MYELOPATHY OR RADICULOPATHY: ICD-10-CM

## 2019-06-24 PROCEDURE — 99214 OFFICE O/P EST MOD 30 MIN: CPT | Performed by: FAMILY MEDICINE

## 2019-07-11 ENCOUNTER — OFFICE VISIT (OUTPATIENT)
Dept: PAIN MEDICINE | Facility: MEDICAL CENTER | Age: 64
End: 2019-07-11
Payer: MEDICARE

## 2019-07-11 VITALS
HEIGHT: 73 IN | RESPIRATION RATE: 16 BRPM | WEIGHT: 189.4 LBS | HEART RATE: 65 BPM | DIASTOLIC BLOOD PRESSURE: 74 MMHG | BODY MASS INDEX: 25.1 KG/M2 | SYSTOLIC BLOOD PRESSURE: 155 MMHG

## 2019-07-11 DIAGNOSIS — Z98.1 HISTORY OF FUSION OF CERVICAL SPINE: ICD-10-CM

## 2019-07-11 DIAGNOSIS — F43.10 POST TRAUMATIC STRESS DISORDER (PTSD): Primary | ICD-10-CM

## 2019-07-11 PROCEDURE — 99204 OFFICE O/P NEW MOD 45 MIN: CPT | Performed by: PHYSICAL MEDICINE & REHABILITATION

## 2019-07-11 NOTE — PROGRESS NOTES
Assessment  1  Post traumatic stress disorder (PTSD)    2  History of fusion of cervical spine        Plan  1  I discussed the option of a stellate ganglion block with the patient and he is very interested in pursuing this with the hope for potentially improving some of his symptoms related to posttraumatic stress disorder  I reviewed with him that I cannot guarantee relief but there has been some literature to support this treatment  Complete risks and benefits including bleeding, infection, tissue reaction, allergic reaction were discussed  Verbal consent obtained  We will start with the right side and if he would like to have the left side done in the future we will perform that at a separate time  My impressions and treatment recommendations were discussed in detail with the patient who verbalized understanding and had no further questions  Discharge instructions were provided  I personally saw and examined the patient and I agree with the above discussed plan of care  Orders Placed This Encounter   Procedures    FL spine and pain procedure     Please give me 30 minutes for this procedure     Standing Status:   Future     Standing Expiration Date:   7/11/2020     Order Specific Question:   Reason for Exam:     Answer:   (R) stellate ganglion block with fluoro and ultrasound     Order Specific Question:   Anticoagulant hold needed? Answer:   no     No orders of the defined types were placed in this encounter  History of Present Illness    Martin Bell is a 71 y o  male who is seen in consultation to review the option of a stellate ganglion block for his posttraumatic stress disorder related to traumatic event that occurred about 20 years ago  He did undergo C1-2 fusion in 2015 with Dr Darrius Montano in the past and has some limitation in cervical range of motion as would be expected      He rates his pain as a 5/10 but is more concerned with PTSD symptoms and understands he will likely never be completely pain-free  He has tried occipital nerve blocks, physical therapy, exercise, acupuncture, and TENS unit all without relief  Currently uses Tylenol and ibuprofen as needed to assist with pain complaints  I have personally reviewed and/or updated the patient's past medical history, past surgical history, family history, social history, current medications, allergies, and vital signs today  Review of Systems   Constitutional: Negative for fever and unexpected weight change  HENT: Negative for trouble swallowing  Eyes: Positive for visual disturbance  Respiratory: Negative for shortness of breath and wheezing  Cardiovascular: Negative for chest pain and palpitations  Gastrointestinal: Positive for nausea  Negative for constipation, diarrhea and vomiting  Endocrine: Negative for cold intolerance, heat intolerance and polydipsia  Genitourinary: Negative for difficulty urinating and frequency  Musculoskeletal: Negative for arthralgias, gait problem, joint swelling and myalgias  Skin: Negative for rash  Neurological: Positive for dizziness and headaches  Negative for seizures, syncope and weakness  Hematological: Does not bruise/bleed easily  Psychiatric/Behavioral: Negative for dysphoric mood  All other systems reviewed and are negative        Patient Active Problem List   Diagnosis    Allergic rhinitis    Odontoid fracture (HCC)    Cervical disc herniation    Cervical spinal stenosis    Cervical spondylosis    Neck pain    Degeneration of intervertebral disc of cervical region    Hypothyroidism    Left arm pain    Loose body in knee, right    Muscle pain, myofacial    Neuralgia    Neuropathy    Nontoxic multinodular goiter    Nonunion of fracture    Occipital neuralgia    Post-traumatic osteoarthritis of one knee    Osteoarthritis of knee    Pruritus    Seizures (HCC)    Spider veins of both lower extremities    Acquired unequal leg length    Venous insufficiency    Abnormal gait    Acquired trigger finger    Chondromalacia patellae    Chronic osteomyelitis of lower leg (HCC)    Generalized osteoarthritis    Knee joint effusion    Knee pain    Localized, primary osteoarthritis    Localized primary osteoarthritis of wrist    Nontoxic single thyroid nodule    Anxiety    Reactive depression    BPH without obstruction/lower urinary tract symptoms    Hypothyroidism due to acquired atrophy of thyroid       Past Medical History:   Diagnosis Date    Anxiety     Arthritis     Bleeding tendency (HCC)     BPH (benign prostatic hyperplasia)     Broken bones     left leg    Cataract     Chronic neck pain     Epilepsy ([facility] [address] )     Hypothyroidism     Neoplasm of unspecified behavior of bone, soft tissue, and skin     Osteomyelitis ([facility] [address] )     Skin cancer     Squamous carcinoma 2014    Thyroid condition     Thyroid disease        Past Surgical History:   Procedure Laterality Date    ARTHRODESIS      H/o Arthrodesis Cervical to C2;  last assessed 2015    KNEE SURGERY Right 2017    LEG SURGERY Left     Hardware placed in lower leg bone(s)    LEG SURGERY Left 2006, 2012-Left leg vascular    NECK SURGERY  2013    C1-2 fusion    THYROID SURGERY Right        Family History   Problem Relation Age of Onset    Bone cancer Mother     Other Father         Accident    Dementia Other     Cancer Other        Social History     Occupational History    Occupation: Teacher    Occupation: retired   Tobacco Use    Smoking status: Former Smoker     Types: Cigarettes     Last attempt to quit:      Years since quittin 5    Smokeless tobacco: Never Used   Substance and Sexual Activity    Alcohol use: No     Comment: being a social drinker    Drug use: No    Sexual activity: Yes     Comment: no known std risk factors       Current Outpatient Medications on File Prior to Visit   Medication Sig  acetaminophen (TYLENOL) 500 mg tablet Take 1,000 mg by mouth as needed    albuterol (PROVENTIL HFA) 90 mcg/act inhaler     ASPIRIN 81 PO Take 1 tablet by mouth as needed    Calcium Carbonate-Vit D-Min (CALCIUM 1200 PO) Take 1 tablet by mouth daily    fluticasone (FLONASE) 50 mcg/act nasal spray 2 sprays into each nostril daily (Patient taking differently: 2 sprays into each nostril as needed )    levothyroxine 100 mcg tablet daily    Multiple Vitamin (MULTIVITAMINS PO) Take 1 capsule by mouth daily    Omega-3 Fatty Acids (FISH OIL) 645 MG CAPS Take 1 capsule by mouth daily    RESTASIS MULTIDOSE 0 05 % ophthalmic emulsion INSTILL 1 DROP INTO BOTH EYES TWICE A DAY    tretinoin (ALTRALIN) 0 05 % APPLY TO FACE AT NIGHT AS DIRECTED     No current facility-administered medications on file prior to visit  Allergies   Allergen Reactions    Augmentin  [Amoxicillin-Pot Clavulanate] Rash, Vomiting and Nausea Only       Physical Exam    /74   Pulse 65   Resp 16   Ht 6' 1" (1 854 m)   Wt 85 9 kg (189 lb 6 4 oz)   BMI 24 99 kg/m²     Constitutional: normal, well developed, well nourished, alert, in no distress and non-toxic and no overt pain behavior  Eyes: anicteric  HEENT: grossly intact  Neck: supple, symmetric, trachea midline and no masses   Pulmonary:even and unlabored  Cardiovascular:No edema or pitting edema present  Skin:Normal without rashes or lesions and well hydrated  Psychiatric:Mood and affect appropriate  Neurologic:Cranial Nerves II-XII grossly intact  Musculoskeletal:normal, except for limitation in cervical range of motion especially rotation to both sides    Imaging  Study Result     CERVICAL SPINE     INDICATION:   M54 2: Cervicalgia  M48 02: Spinal stenosis, cervical region      COMPARISON:  Cervical spine plain films from 4/4/2015      VIEWS:  XR SPINE CERVICAL COMPLETE 4 OR 5 VW NON INJURY         FINDINGS:     No evidence of fracture    Again seen is prior posterior fusion of C1 and C2  Hardware is intact      Mild anterolisthesis of C7 on T1      Moderate degenerative disc space narrowing at C5-C6 and C6-C7       Moderate right C3-C4, C4-C5, and C5-C6 neural foramen narrowing    Moderate left C3-C4, C4-C5, C5-C6, and C6-C7 neural foramen narrowing      The prevertebral soft tissues are within normal limits        The lung apices are clear      IMPRESSION:     No acute osseous abnormality      Degenerative changes as above

## 2019-07-19 ENCOUNTER — TELEPHONE (OUTPATIENT)
Dept: FAMILY MEDICINE CLINIC | Facility: CLINIC | Age: 64
End: 2019-07-19

## 2019-07-19 DIAGNOSIS — R42 VERTIGO: Primary | ICD-10-CM

## 2019-07-19 NOTE — TELEPHONE ENCOUNTER
Tauna Friendly called and stated he went to [facility] this morning for dizziness, nausea and when turning over in bed he became extremely dizzy and nauseated  He would like to know if possible, if you can proceed with the next step, possibly an MRI or whatever you would suggest  Ran Hirsch states he can not go on like this as he was very frightened this morning with the symptoms he was having  [facility] told him everything was alright    Please advise  Thank you

## 2019-07-19 NOTE — TELEPHONE ENCOUNTER
Patient is aware of order and is scheduling his Brain MRI   He is scheduled to follow up with Neurology

## 2019-07-19 NOTE — TELEPHONE ENCOUNTER
Order placed for MRI of the brain with and without contrast   Please notify patient and schedule MRI  Also recommend patient follow up with Neurology ASAP

## 2019-07-23 ENCOUNTER — OFFICE VISIT (OUTPATIENT)
Dept: FAMILY MEDICINE CLINIC | Facility: CLINIC | Age: 64
End: 2019-07-23
Payer: MEDICARE

## 2019-07-23 VITALS
RESPIRATION RATE: 16 BRPM | WEIGHT: 192 LBS | SYSTOLIC BLOOD PRESSURE: 138 MMHG | HEIGHT: 73 IN | DIASTOLIC BLOOD PRESSURE: 80 MMHG | TEMPERATURE: 98 F | HEART RATE: 64 BPM | BODY MASS INDEX: 25.45 KG/M2

## 2019-07-23 DIAGNOSIS — R42 VERTIGO: Primary | ICD-10-CM

## 2019-07-23 DIAGNOSIS — S12.100G CLOSED ODONTOID FRACTURE WITH DELAYED HEALING, SUBSEQUENT ENCOUNTER: ICD-10-CM

## 2019-07-23 DIAGNOSIS — M47.812 SPONDYLOSIS OF CERVICAL REGION WITHOUT MYELOPATHY OR RADICULOPATHY: ICD-10-CM

## 2019-07-23 DIAGNOSIS — M54.2 NECK PAIN: ICD-10-CM

## 2019-07-23 PROCEDURE — 99213 OFFICE O/P EST LOW 20 MIN: CPT | Performed by: FAMILY MEDICINE

## 2019-07-23 NOTE — PROGRESS NOTES
Assessment/Plan:  Discussed diagnostic and treatment options with patient  Patient is scheduled for MRI of the brain on 07/27/2019 and will be scheduled for CT of the cervical spine without contrast   Will heed results  Patient to follow up with [facility] as scheduled and recommend follow up with [facility]  Return the office p r n Georgette Ormond Diagnoses and all orders for this visit:    Vertigo    Closed odontoid fracture with delayed healing, subsequent encounter  -     CT spine cervical wo contrast; Future    Spondylosis of cervical region without myelopathy or radiculopathy  -     CT spine cervical wo contrast; Future    Neck pain          Subjective:      Patient ID: Gene Galvez is a 71 y o  male  Patient is being seen in follow-up for vertigo and discuss further testing  He is scheduled for MRI of the brain on 07/27/2019 and he is requesting further testing of his cervical spine  He admits to chronic neck pain and headaches  Patient has a follow-up appoint with Dr Rigo Garrido at [facility] on 08/30/2019 he does not have any follow-up appointments with Dr James nam at [facility]  Patient had x-ray of the cervical spine on 01/31/2019 which revealed no acute osseous changes  Dizziness   This is a recurrent problem  The current episode started more than 1 month ago  The problem occurs intermittently  The problem has been gradually worsening  Associated symptoms include diaphoresis, fatigue, headaches, nausea, neck pain, vertigo and a visual change  Pertinent negatives include no anorexia, change in bowel habit, chest pain, chills, fever, rash, vomiting or weakness  Associated symptoms comments: LH    The symptoms are aggravated by bending and standing  He has tried ice, acetaminophen, NSAIDs, rest and lying down (meclizine) for the symptoms  The treatment provided mild relief         The following portions of the patient's history were reviewed and updated as appropriate: allergies, current medications, past family history, past medical history, past social history, past surgical history and problem list     Review of Systems   Constitutional: Positive for diaphoresis and fatigue  Negative for chills and fever  Cardiovascular: Negative for chest pain  Gastrointestinal: Positive for nausea  Negative for anorexia, change in bowel habit and vomiting  Musculoskeletal: Positive for neck pain  Skin: Negative for rash  Neurological: Positive for dizziness, vertigo and headaches  Negative for weakness  Objective:      /80   Pulse 64   Temp 98 °F (36 7 °C) (Tympanic)   Resp 16   Ht 6' 1" (1 854 m)   Wt 87 1 kg (192 lb)   BMI 25 33 kg/m²          Physical Exam   Constitutional: He is oriented to person, place, and time  He appears well-developed and well-nourished  No distress  HENT:   Head: Normocephalic  Right Ear: External ear normal    Left Ear: External ear normal    Nose: Nose normal    Mouth/Throat: Oropharynx is clear and moist    Eyes: Pupils are equal, round, and reactive to light  Conjunctivae and EOM are normal  No scleral icterus  Neck: Neck supple  No thyromegaly present  Cardiovascular: Normal rate and regular rhythm  Pulmonary/Chest: Effort normal and breath sounds normal    Abdominal: Soft  There is no tenderness  Musculoskeletal: He exhibits no edema  Lymphadenopathy:     He has no cervical adenopathy  Neurological: He is alert and oriented to person, place, and time  No cranial nerve deficit  Skin: Skin is warm and dry  Psychiatric:   Patient appears quite anxious

## 2019-07-25 ENCOUNTER — HOSPITAL ENCOUNTER (OUTPATIENT)
Dept: CT IMAGING | Facility: HOSPITAL | Age: 64
Discharge: HOME | End: 2019-07-25
Payer: MEDICARE

## 2019-07-25 DIAGNOSIS — S12.100G CLOSED ODONTOID FRACTURE WITH DELAYED HEALING, SUBSEQUENT ENCOUNTER: ICD-10-CM

## 2019-07-25 DIAGNOSIS — M47.812 SPONDYLOSIS OF CERVICAL REGION WITHOUT MYELOPATHY OR RADICULOPATHY: ICD-10-CM

## 2019-07-25 PROCEDURE — 72125 CT NECK SPINE W/O DYE: CPT

## 2019-07-27 ENCOUNTER — HOSPITAL ENCOUNTER (OUTPATIENT)
Dept: MRI IMAGING | Facility: HOSPITAL | Age: 64
Discharge: HOME | End: 2019-07-27
Payer: MEDICARE

## 2019-07-27 DIAGNOSIS — R42 VERTIGO: ICD-10-CM

## 2019-07-27 PROCEDURE — 70553 MRI BRAIN STEM W/O & W/DYE: CPT

## 2019-07-27 PROCEDURE — A9585 GADOBUTROL INJECTION: HCPCS | Performed by: FAMILY MEDICINE

## 2019-07-27 RX ADMIN — GADOBUTROL 9 ML: 604.72 INJECTION INTRAVENOUS at 15:09

## 2019-07-29 DIAGNOSIS — S12.100G CLOSED ODONTOID FRACTURE WITH DELAYED HEALING, SUBSEQUENT ENCOUNTER: Primary | ICD-10-CM

## 2019-07-30 ENCOUNTER — TELEPHONE (OUTPATIENT)
Dept: FAMILY MEDICINE CLINIC | Facility: CLINIC | Age: 64
End: 2019-07-30

## 2019-07-30 ENCOUNTER — TELEPHONE (OUTPATIENT)
Dept: NEUROSURGERY | Facility: CLINIC | Age: 64
End: 2019-07-30

## 2019-07-30 NOTE — TELEPHONE ENCOUNTER
Pt called reporting he had a CT cervical completed at [facility] and would like it reviewed  This was ordered by PCP for c/o chronic neck pain without radiation into arms  Sid Ashley reviewed study and pt was informed study was stable  Before being able to offer other suggestions pt was insistent this is not correct and wanted an appt  Appt with PA offered and accepted

## 2019-07-30 NOTE — TELEPHONE ENCOUNTER
Just an FYI from Efrem Wilcox:    Efrem Wilcox called and stated he was very upset that his neuro surgeons office stated to him that the report was reviewed by a provider and have decided there is nothing wrong with him  He wanted this to go on record  He insisted on an appointment and will be going on 8/12/2019 with a PA and not a physician

## 2019-07-31 ENCOUNTER — OFFICE VISIT (OUTPATIENT)
Dept: FAMILY MEDICINE CLINIC | Facility: CLINIC | Age: 64
End: 2019-07-31
Payer: MEDICARE

## 2019-07-31 ENCOUNTER — HOSPITAL ENCOUNTER (OUTPATIENT)
Dept: RADIOLOGY | Facility: MEDICAL CENTER | Age: 64
Discharge: HOME | End: 2019-07-31
Admitting: PHYSICAL MEDICINE & REHABILITATION
Payer: MEDICARE

## 2019-07-31 VITALS
DIASTOLIC BLOOD PRESSURE: 84 MMHG | TEMPERATURE: 98.6 F | SYSTOLIC BLOOD PRESSURE: 128 MMHG | HEART RATE: 62 BPM | OXYGEN SATURATION: 96 % | RESPIRATION RATE: 20 BRPM

## 2019-07-31 VITALS
RESPIRATION RATE: 16 BRPM | TEMPERATURE: 99.4 F | HEART RATE: 76 BPM | BODY MASS INDEX: 25.31 KG/M2 | DIASTOLIC BLOOD PRESSURE: 76 MMHG | HEIGHT: 73 IN | WEIGHT: 191 LBS | SYSTOLIC BLOOD PRESSURE: 138 MMHG

## 2019-07-31 DIAGNOSIS — S12.100G CLOSED ODONTOID FRACTURE WITH DELAYED HEALING, SUBSEQUENT ENCOUNTER: Primary | ICD-10-CM

## 2019-07-31 DIAGNOSIS — M54.2 NECK PAIN: ICD-10-CM

## 2019-07-31 DIAGNOSIS — F43.10 POST TRAUMATIC STRESS DISORDER (PTSD): ICD-10-CM

## 2019-07-31 PROCEDURE — 64510 N BLOCK STELLATE GANGLION: CPT | Performed by: PHYSICAL MEDICINE & REHABILITATION

## 2019-07-31 PROCEDURE — 77003 FLUOROGUIDE FOR SPINE INJECT: CPT | Performed by: PHYSICAL MEDICINE & REHABILITATION

## 2019-07-31 PROCEDURE — 99213 OFFICE O/P EST LOW 20 MIN: CPT | Performed by: FAMILY MEDICINE

## 2019-07-31 PROCEDURE — 77003 FLUOROGUIDE FOR SPINE INJECT: CPT

## 2019-07-31 RX ORDER — LIDOCAINE HYDROCHLORIDE 10 MG/ML
5 INJECTION, SOLUTION EPIDURAL; INFILTRATION; INTRACAUDAL; PERINEURAL ONCE
Status: COMPLETED | OUTPATIENT
Start: 2019-07-31 | End: 2019-07-31

## 2019-07-31 RX ADMIN — LIDOCAINE HYDROCHLORIDE 1 ML: 10 INJECTION, SOLUTION EPIDURAL; INFILTRATION; INTRACAUDAL; PERINEURAL at 11:34

## 2019-07-31 RX ADMIN — IOHEXOL 1 ML: 300 INJECTION, SOLUTION INTRAVENOUS at 11:38

## 2019-07-31 RX ADMIN — Medication 4 ML: at 11:38
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
## 2019-07-31 NOTE — H&P
History of Present Illness:  The patient is a 71 y o  male who presents with complaints of post traumatic stress disorder    Patient Active Problem List   Diagnosis    Allergic rhinitis    [facility] fracture ([facility] [address] [facility]    Cervical disc herniation    Cervical spinal stenosis    Cervical spondylosis    Neck pain    Degeneration of intervertebral disc of cervical region    Hypothyroidism    Left arm pain    Loose body in knee, right    Muscle pain, myofacial    Neuralgia    Neuropathy    Nontoxic multinodular goiter    Nonunion of fracture    Occipital neuralgia    Post-traumatic osteoarthritis of one knee    Osteoarthritis of knee    Pruritus    Seizures (HCC)    Spider veins of both lower extremities    Acquired unequal leg length    Venous insufficiency    Abnormal gait    Acquired trigger finger    Chondromalacia patellae    Chronic osteomyelitis of lower leg (HCC)    Generalized osteoarthritis    Knee joint effusion    Knee pain    Localized, primary osteoarthritis    Localized primary osteoarthritis of wrist    Nontoxic single thyroid nodule    Anxiety    Reactive depression    BPH without obstruction/lower urinary tract symptoms    Hypothyroidism due to acquired atrophy of thyroid       Past Medical History:   Diagnosis Date    Anxiety     Arthritis     Bleeding tendency ([facility] [address] [facility]     BPH (benign prostatic hyperplasia)     Broken bones     left leg    Cataract     Chronic neck pain     Epilepsy ([facility] [address] )     Hypothyroidism     Neoplasm of unspecified behavior of bone, soft tissue, and skin     Osteomyelitis ([facility] [address] )     Skin cancer     Squamous carcinoma 12/01/2014 12/2014    Thyroid condition     Thyroid disease        Past Surgical History:   Procedure Laterality Date    ARTHRODESIS      H/o Arthrodesis Cervical to C2;  last assessed 13may2015    KNEE SURGERY Right 12/22/2017    LEG SURGERY Left 1999    Hardware placed in lower leg bone(s)    LEG SURGERY Left 01/01/2006 2006, 2012-Left leg vascular    NECK SURGERY  2013    C1-2 fusion    THYROID SURGERY Right 2013         Current Outpatient Medications:     acetaminophen (TYLENOL) 500 mg tablet, Take 1,000 mg by mouth as needed, Disp: , Rfl:     albuterol (PROVENTIL HFA) 90 mcg/act inhaler, , Disp: , Rfl:     ASPIRIN 81 PO, Take 1 tablet by mouth as needed, Disp: , Rfl:     Calcium Carbonate-Vit D-Min (CALCIUM 1200 PO), Take 1 tablet by mouth daily, Disp: , Rfl:     fluticasone (FLONASE) 50 mcg/act nasal spray, 2 sprays into each nostril daily (Patient taking differently: 2 sprays into each nostril as needed ), Disp: 1 Bottle, Rfl: 2    levothyroxine 100 mcg tablet, daily, Disp: , Rfl:     Multiple Vitamin (MULTIVITAMINS PO), Take 1 capsule by mouth daily, Disp: , Rfl:     Omega-3 Fatty Acids (FISH OIL) 645 MG CAPS, Take 1 capsule by mouth daily, Disp: , Rfl:     RESTASIS MULTIDOSE 0 05 % ophthalmic emulsion, INSTILL 1 DROP INTO BOTH EYES TWICE A DAY, Disp: , Rfl: 3    tretinoin (ALTRALIN) 0 05 %, APPLY TO FACE AT NIGHT AS DIRECTED, Disp: , Rfl: 1    Current Facility-Administered Medications:     iohexol (OMNIPAQUE) 300 mg/mL injection 50 mL, 50 mL, Perineural, Once, Janas Inks, DO    lidocaine (PF) (XYLOCAINE-MPF) 1 % injection 5 mL, 5 mL, Infiltration, Once, Janas Inks, DO    lidocaine (PF) (XYLOCAINE-MPF) 2 % injection 4 mL, 4 mL, Perineural, Once, Janas Inks, DO    Allergies   Allergen Reactions    Augmentin  [Amoxicillin-Pot Clavulanate] Rash, Vomiting and Nausea Only       Physical Exam:   Vitals:    07/31/19 1102   BP: 120/78   Pulse: 56   Resp: 20   Temp: 98 6 °F (37 °C)   SpO2: 96%     General: Awake, Alert, Oriented x 3, Mood and affect appropriate  Respiratory: Respirations even and unlabored  Cardiovascular: Peripheral pulses intact; no edema  Musculoskeletal Exam: wnl    ASA Score: 2    Patient/Chart Verification  Patient ID Verified: Verbal  Consents Confirmed:  To be obtained in the Pre-Procedure area  H&P( within 30 days) Verified: To be obtained in the Pre-Procedure area  Interval H&P(within 24 hr) Complete (required for Outpatients and Surgery Admit only): To be obtained in the Pre-Procedure area  Allergies Reviewed: Yes  Anticoag/NSAID held?: No(takes aspirin 81 mg)  Currently on antibiotics?: No    Assessment:   1   Post traumatic stress disorder (PTSD)        Plan: (R) stellate ganglion block with fluoro and ultrasound

## 2019-07-31 NOTE — DISCHARGE INSTRUCTIONS
1  Do not apply heat to any area that is numb  If you have discomfort or soreness at the injection site, you may apply ice today, 20 minutes on and 20 minutes off  Tomorrow you may use ice or warm, moist heat  Do not apply ice or heat directly to the skin  2  If you experience severe shortness of breath, go to the Emergency Room  3  You may have numbness for several hours from the local anesthetic  Please use caution and common sense, especially with weight-bearing activities  4  You may have an increase or change in the discomfort for 36-48 hours after your treatment  Apply ice and continue with any pain medicine you have been prescribed  5  Do not do anything strenuous today  You may shower, but no tub baths or hot tubs today  You may resume your normal activities tomorrow, but do not overdo it  Resume normal activities slowly when you are feeling better  6  If you experience redness, drainage or swelling at the injection site, or if you develop a fever above 100 degrees, please call [facility] at [phone] or go to the Emergency Room  7  Continue to take all routine medicines prescribed by your primary care physician unless otherwise instructed by our staff  Most blood thinners should be started again according to your regularly scheduled dosing  If you have any questions, please give our office a call  If you have a problem specifically related to your procedure, please call our office at [phone]  Problems not related to your procedure should be directed to your primary care physician

## 2019-08-07 ENCOUNTER — TELEPHONE (OUTPATIENT)
Dept: PAIN MEDICINE | Facility: CLINIC | Age: 64
End: 2019-08-07

## 2019-08-07 NOTE — TELEPHONE ENCOUNTER
Pt reports improvement post inj   He said he is unable to tell how much at this time  Pain level 3-4/10

## 2019-08-08 ENCOUNTER — TELEPHONE (OUTPATIENT)
Dept: FAMILY MEDICINE CLINIC | Facility: CLINIC | Age: 64
End: 2019-08-08

## 2019-08-08 NOTE — TELEPHONE ENCOUNTER
Took call from patient requesting a script for an MRI of Cervical Spine  Patient needs MRI prior to getting a new patient appt with [facility]  Patient does not want to wait until Monday for Rx would like to set up MRI appt as soon as possible  Dr Marilyn Echeverria please advise      CC  Dr Juan J Felix

## 2019-08-09 DIAGNOSIS — M54.81 OCCIPITAL NEURALGIA, UNSPECIFIED LATERALITY: ICD-10-CM

## 2019-08-09 DIAGNOSIS — S12.100G CLOSED ODONTOID FRACTURE WITH DELAYED HEALING, SUBSEQUENT ENCOUNTER: Primary | ICD-10-CM

## 2019-08-09 DIAGNOSIS — M50.30 DEGENERATION OF INTERVERTEBRAL DISC OF CERVICAL REGION: ICD-10-CM

## 2019-08-09 DIAGNOSIS — M54.2 NECK PAIN: ICD-10-CM

## 2019-08-09 DIAGNOSIS — M47.812 SPONDYLOSIS OF CERVICAL REGION WITHOUT MYELOPATHY OR RADICULOPATHY: ICD-10-CM

## 2019-08-09 DIAGNOSIS — M48.02 CERVICAL SPINAL STENOSIS: ICD-10-CM

## 2019-08-12 ENCOUNTER — TELEPHONE (OUTPATIENT)
Dept: FAMILY MEDICINE CLINIC | Facility: CLINIC | Age: 64
End: 2019-08-12

## 2019-08-12 ENCOUNTER — OFFICE VISIT (OUTPATIENT)
Dept: NEUROSURGERY | Facility: CLINIC | Age: 64
End: 2019-08-12
Payer: MEDICARE

## 2019-08-12 VITALS
BODY MASS INDEX: 25.18 KG/M2 | HEIGHT: 73 IN | HEART RATE: 68 BPM | WEIGHT: 190 LBS | RESPIRATION RATE: 16 BRPM | TEMPERATURE: 98.9 F | DIASTOLIC BLOOD PRESSURE: 76 MMHG | SYSTOLIC BLOOD PRESSURE: 154 MMHG

## 2019-08-12 DIAGNOSIS — M50.30 DEGENERATION OF INTERVERTEBRAL DISC OF CERVICAL REGION: ICD-10-CM

## 2019-08-12 DIAGNOSIS — H81.10 BENIGN PAROXYSMAL POSITIONAL VERTIGO, UNSPECIFIED LATERALITY: ICD-10-CM

## 2019-08-12 DIAGNOSIS — Z98.1 HISTORY OF FUSION OF CERVICAL SPINE: ICD-10-CM

## 2019-08-12 DIAGNOSIS — S12.100G CLOSED ODONTOID FRACTURE WITH DELAYED HEALING, SUBSEQUENT ENCOUNTER: ICD-10-CM

## 2019-08-12 DIAGNOSIS — Z48.89 AFTERCARE FOLLOWING SURGERY FOR INJURY AND TRAUMA: Primary | ICD-10-CM

## 2019-08-12 PROCEDURE — 99213 OFFICE O/P EST LOW 20 MIN: CPT | Performed by: PHYSICIAN ASSISTANT
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
## 2019-08-12 NOTE — PATIENT INSTRUCTIONS
Return after MRI cervical spine with without contrast for follow-up visit with Dr Perry Anthony  Note MRI is already scheduled by his primary care provider  May consider consultation with ENT relative to position dizziness/vertigo symptoms
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
## 2019-08-12 NOTE — TELEPHONE ENCOUNTER
Patient called stating that he followed up with his Neurosurgeon today and he recommend that his upcoming MRI should be "with dye" since he had previous surgery done  Please advise if you could switch this order or if this is something that his Neurosurgeon should order?

## 2019-08-14 NOTE — TELEPHONE ENCOUNTER
Call pt and inform him I am willing to order MRI with contrast if that is what Neurosurgery is requesting

## 2019-08-14 NOTE — TELEPHONE ENCOUNTER
Patient called back stating he respects Dr Prachi Urbano judgement and would like to just proceed with MRI wo contrast if that is what is originally ordered since it already scheduled and set in stone  Will start prior-authorization process with insurance company

## 2019-08-14 NOTE — TELEPHONE ENCOUNTER
Spoke with Kelly Tolentino with [employer] - pt has Medicare as primary insurance so no authorization is needed

## 2019-08-14 NOTE — TELEPHONE ENCOUNTER
LMOM for Jadon New to make him aware  I did however speak with someone at central scheduling and they did state that to switch it to MRI with contrast it will most likely be a different day and time and last time I spoke with patient he wanted to avoid that

## 2019-08-14 NOTE — TELEPHONE ENCOUNTER
Spoke with [employer] who stated that prior-auth was needed  Started process with Fidel   Case number [id number]

## 2019-08-14 NOTE — TELEPHONE ENCOUNTER
Patient made aware of recommendation and began to argue with me stating that both the neurosurgeon and MRI departments state that the ordering physician just need to change it  This test is scheduled for tomorrow, it also needs prior-authorization which can not be done until it is determined if it is to be done with or without contrast   He requested I ask of you to make the change again

## 2019-08-15 ENCOUNTER — HOSPITAL ENCOUNTER (OUTPATIENT)
Dept: MRI IMAGING | Facility: HOSPITAL | Age: 64
Discharge: HOME | End: 2019-08-15
Payer: MEDICARE

## 2019-08-15 DIAGNOSIS — S12.100G CLOSED ODONTOID FRACTURE WITH DELAYED HEALING, SUBSEQUENT ENCOUNTER: ICD-10-CM

## 2019-08-15 DIAGNOSIS — M54.2 NECK PAIN: ICD-10-CM

## 2019-08-15 DIAGNOSIS — M47.812 SPONDYLOSIS OF CERVICAL REGION WITHOUT MYELOPATHY OR RADICULOPATHY: ICD-10-CM

## 2019-08-15 DIAGNOSIS — M48.02 CERVICAL SPINAL STENOSIS: ICD-10-CM

## 2019-08-15 DIAGNOSIS — M50.30 DEGENERATION OF INTERVERTEBRAL DISC OF CERVICAL REGION: ICD-10-CM

## 2019-08-15 DIAGNOSIS — M54.81 OCCIPITAL NEURALGIA, UNSPECIFIED LATERALITY: ICD-10-CM

## 2019-08-15 PROCEDURE — 72141 MRI NECK SPINE W/O DYE: CPT

## 2019-08-16 ENCOUNTER — OFFICE VISIT (OUTPATIENT)
Dept: PAIN MEDICINE | Facility: MEDICAL CENTER | Age: 64
End: 2019-08-16
Payer: MEDICARE

## 2019-08-16 VITALS
HEIGHT: 73 IN | BODY MASS INDEX: 25.18 KG/M2 | SYSTOLIC BLOOD PRESSURE: 122 MMHG | WEIGHT: 190 LBS | DIASTOLIC BLOOD PRESSURE: 70 MMHG

## 2019-08-16 DIAGNOSIS — Z98.1 HISTORY OF FUSION OF CERVICAL SPINE: ICD-10-CM

## 2019-08-16 DIAGNOSIS — F43.10 POST TRAUMATIC STRESS DISORDER (PTSD): Primary | ICD-10-CM

## 2019-08-16 PROCEDURE — 99213 OFFICE O/P EST LOW 20 MIN: CPT | Performed by: NURSE PRACTITIONER

## 2019-08-16 NOTE — PROGRESS NOTES
Assessment:  1  Post traumatic stress disorder (PTSD)    2  History of fusion of cervical spine        Plan:  Patient is status post right stellate ganglion nerve block for posttraumatic stress disorder that the patient has been experiencing ever since his cervical fusion  He has noticed a significant decrease in his chronic neck pain from this procedure because it has adequately decreased his PTSD symptoms which he states usually increases his pain in his neck  We can repeat this injection as needed, but for now he will follow up on an as needed basis  M*Modal software was used to dictate this note  It may contain errors with dictating incorrect words or incorrect spelling  Please contact the provider directly with any questions  History of Present Illness: The patient is a 71 y o  male last seen on 4/15/19 who presents for a follow up office visit after receiving a stellate ganglion nerve block for posttraumatic stress disorder related to his cervical fusion in 2015 by Dr Galilea Guerrero  The patient states that since his fusion, he is unable to turn his head to assess his surroundings and especially loud noises will trigger his PTSD and cause pain  He did undergo a right stellate ganglion nerve block with Dr Rafael Munoz on July 31, 2019 and has noticed a significant improvement in his symptoms from this procedure  He currently rates his pain a 2/10 on the numeric pain rating scale  States his pain is constant nature and follows no particular pattern throughout the day  He characterizes the pain as sharp, throbbing and pressure-like  I have personally reviewed and/or updated the patient's past medical history, past surgical history, family history, social history, current medications, allergies, and vital signs today  Review of Systems:    Review of Systems   Respiratory: Negative for shortness of breath  Cardiovascular: Negative for chest pain     Gastrointestinal: Negative for constipation, diarrhea, nausea and vomiting  Musculoskeletal: Negative for arthralgias, gait problem, joint swelling and myalgias  Skin: Negative for rash  Neurological: Negative for dizziness, seizures and weakness  All other systems reviewed and are negative          Past Medical History:   Diagnosis Date    Anxiety     Arthritis     Bleeding tendency ([facility] [address] [facility]     BPH (benign prostatic hyperplasia)     Broken bones     left leg    Cataract     Chronic neck pain     Epilepsy ([facility] [address] [facility]     Hypothyroidism     Neoplasm of unspecified behavior of bone, soft tissue, and skin     Osteomyelitis ([facility] [address] [facility]     Skin cancer     Squamous carcinoma 2014    Thyroid condition     Thyroid disease        Past Surgical History:   Procedure Laterality Date    ARTHRODESIS      H/o Arthrodesis Cervical to C2;  last assessed [id number]    KNEE SURGERY Right 2017    LEG SURGERY Left     Hardware placed in lower leg bone(s)    LEG SURGERY Left 2006, -Left leg vascular    NECK SURGERY  2013    C1-2 fusion    THYROID SURGERY Right        Family History   Problem Relation Age of Onset    Bone cancer Mother     Other Father         Accident    Dementia Other     Cancer Other        Social History     Occupational History    Occupation: Teacher    Occupation: retired   Tobacco Use    Smoking status: Former Smoker     Types: Cigarettes     Last attempt to quit:      Years since quittin 6    Smokeless tobacco: Never Used   Substance and Sexual Activity    Alcohol use: No     Comment: being a social drinker    Drug use: No    Sexual activity: Yes     Comment: no known std risk factors         Current Outpatient Medications:     acetaminophen (TYLENOL) 500 mg tablet, Take 1,000 mg by mouth as needed, Disp: , Rfl:     albuterol (PROVENTIL HFA) 90 mcg/act inhaler, , Disp: , Rfl:     ASPIRIN 81 PO, Take 1 tablet by mouth as needed, Disp: , Rfl:     Calcium Carbonate-Vit D-Min (CALCIUM 1200 PO), Take 1 tablet by mouth daily, Disp: , Rfl:     fluticasone (FLONASE) 50 mcg/act nasal spray, 2 sprays into each nostril daily (Patient taking differently: 2 sprays into each nostril as needed ), Disp: 1 Bottle, Rfl: 2    levothyroxine 100 mcg tablet, daily, Disp: , Rfl:     Multiple Vitamin (MULTIVITAMINS PO), Take 1 capsule by mouth daily, Disp: , Rfl:     Omega-3 Fatty Acids (FISH OIL) 645 MG CAPS, Take 1 capsule by mouth daily, Disp: , Rfl:     RESTASIS MULTIDOSE 0 05 % ophthalmic emulsion, INSTILL 1 DROP INTO BOTH EYES TWICE A DAY, Disp: , Rfl: 3    tretinoin (ALTRALIN) 0 05 %, APPLY TO FACE AT NIGHT AS DIRECTED, Disp: , Rfl: 1    Allergies   Allergen Reactions    Clavulanic Acid     Augmentin  [Amoxicillin-Pot Clavulanate] Rash, Vomiting and Nausea Only       Physical Exam:    /70   Ht 6' 1" (1 854 m)   Wt 86 2 kg (190 lb)   BMI 25 07 kg/m²     Constitutional:normal, well developed, well nourished, alert, in no distress and non-toxic and no overt pain behavior  Eyes:anicteric  HEENT:grossly intact  Neck:supple, symmetric, trachea midline and no masses   Pulmonary:even and unlabored  Cardiovascular:No edema or pitting edema present  Skin:Normal without rashes or lesions and well hydrated  Psychiatric:Mood and affect appropriate  Neurologic:Cranial Nerves II-XII grossly intact  Musculoskeletal:normal gait  Decreased range of motion with bilateral rotation of the cervical spine      Imaging  No orders to display     CT CERVICAL SPINE - WITHOUT CONTRAST     INDICATION:   S12 100G: Unspecified displaced fracture of second cervical vertebra, subsequent encounter for fracture with delayed healing  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, cervical region      COMPARISON:  CT 3/20/2016     TECHNIQUE:  CT examination of the cervical spine was performed without intravenous contrast   Contiguous axial images were obtained    Sagittal and coronal reconstructions were performed        Radiation dose length product (DLP) for this visit:  869 mGy-cm   This examination, like all CT scans performed in the [facility], was performed utilizing techniques to minimize radiation dose exposure, including the use of iterative   reconstruction and automated exposure control        IMAGE QUALITY:  Diagnostic      FINDINGS:     ALIGNMENT:  Minor straightening of cervical lordosis  C1-2 is stable, appearance  Fused fracture reidentified  Solid bony fusion has not occurred in the 3 year interval since prior study  Lateral mass screws at both levels are reidentified  These are anchored to a short independent rods  C1 screws   extend the anterior of the anterior arch of C1  Lucency surrounding the C2 screws bilaterally suggesting a relatively loosening, this is new in the interval since prior study  Bilateral wire loops over the lamina at C1 and C2 appear intact  Alignment   is normal      Bilateral multilevel facet arthrosis, this is most significant at C3-C4, C4-C5 and to lesser degree left greater than right C5-C6  Bilateral C6-7 facet arthrosis, minor C7-T1 degenerative anterolisthesis  Ankylosis of the left C2-3 facet joint      VERTEBRAL BODIES:  No acute fracture      DEGENERATIVE CHANGES:  No significant cervical degenerative changes are noted      PREVERTEBRAL AND PARASPINAL SOFT TISSUES:  Unremarkable      THORACIC INLET:  Normal      IMPRESSION:     No cervical spine fracture or traumatic malalignment      Posterior fusion C1-C2 accomplished with bilateral laminar wires and lateral mass screws  No bony fusion of the type II odontoid fracture  Normal alignment  C2 screws have loosened bilaterally        No orders of the defined types were placed in this encounter

## 2019-08-20 ENCOUNTER — OFFICE VISIT (OUTPATIENT)
Dept: NEUROSURGERY | Facility: CLINIC | Age: 64
End: 2019-08-20
Payer: MEDICARE

## 2019-08-20 VITALS
HEART RATE: 57 BPM | WEIGHT: 190 LBS | HEIGHT: 73 IN | RESPIRATION RATE: 16 BRPM | TEMPERATURE: 98.5 F | DIASTOLIC BLOOD PRESSURE: 82 MMHG | SYSTOLIC BLOOD PRESSURE: 128 MMHG | BODY MASS INDEX: 25.18 KG/M2

## 2019-08-20 DIAGNOSIS — Z98.890 HISTORY OF CERVICAL SPINAL SURGERY: ICD-10-CM

## 2019-08-20 DIAGNOSIS — Z48.89 AFTERCARE FOLLOWING SURGERY FOR INJURY AND TRAUMA: Primary | ICD-10-CM

## 2019-08-20 DIAGNOSIS — S12.100K CLOSED ODONTOID FRACTURE WITH NONUNION, SUBSEQUENT ENCOUNTER: ICD-10-CM

## 2019-08-20 DIAGNOSIS — M54.2 CERVICALGIA: ICD-10-CM

## 2019-08-20 PROCEDURE — 99213 OFFICE O/P EST LOW 20 MIN: CPT | Performed by: PHYSICIAN ASSISTANT

## 2019-08-20 NOTE — PROGRESS NOTES
Patient ID: Amy Dailey is a 71 y o  male  Diagnoses and all orders for this visit:    Aftercare following surgery for injury and trauma    Cervicalgia    Closed odontoid fracture with nonunion, subsequent encounter    History of cervical spinal surgery          Assessment/Plan:    Very pleasant 56-year-old male, returns for follow-up to review CT cervical spine and MRI cervical spine  He has prior surgical history:  "C1-C2 lateral mass fixation and sublaminar Gallie cable fixation with demineralized bone matrix and cadaveric bone arthrodesis"  Performed by Dr Caryl Eric, 2/20/15  Patient has concerns about the CT report, specifically that the odontoid fracture remains with nonunion, and that there is some reported loosening of the C2 screws (lucency on CT)  The CT cervical spine 7/25/19 was carefully reviewed in detail by Dr Caryl Eric, the internal fixators remain ideally placed  There is some lucency about the C2 screws but there is evidence of purchase at interstices, superiorly indicating stability  Study was reviewed in detail by Dr Caryl Eric with the patient using models as well as the CT scan  Dr Caryl Eric reassured the patient that his C1-C2 fusion is stable  He advises he may continue with all usual activities without restriction  The patient does report stiffness of his neck towards the end the day, Dr Caryl Eric opines this is most likely related to core muscle imbalance in the cervical spine and advises Pilates and or yoga to attempt to improve his muscle balance in the cervical spine  MRI cervical spine 8/15/19 was also carefully reviewed in detail by Dr Caryl Eric and reveals chronic degenerative change at multiple levels there is some foraminal narrowing noted at C4-C5 and C5-C6 on the left, the patient reports no radicular symptoms in his upper arm      Relative to this complaint of dizzy of certain position changes Dr Caryl Eric reports this is in no way related to his cervical issues and does advocate proceed with vestibular therapy, and Epley maneuver  His examination remains unchanged today  Further follow-up with Neurosurgery will be on an as-needed basis  As indicated Dr Perry Anthony advises an exercise regimen including Pilates and or yoga  The patient has no specific repeat restrictions from a neurosurgical perspective  These findings, impressions and recommendations are reviewed in great detail with the patient, he expressed understanding and agreement, his questions were answered completely and to his satisfaction  Return if symptoms worsen or fail to improve  Chief Complaint  Follow-up to review CT cervical spine and MRI cervical spine, had complaints of abdominal pain, nausea, diaphoresis and vertigo  HPI       The following portions of the patient's history were reviewed and updated as appropriate: allergies, current medications, past family history, past medical history, past social history and past surgical history  Review of Systems   Constitutional: Negative  HENT: Negative  Eyes: Negative  Respiratory: Negative  Cardiovascular: Negative  Gastrointestinal: Negative  Endocrine: Negative  Genitourinary: Negative  Musculoskeletal: Positive for neck pain  Skin: Negative  Allergic/Immunologic: Negative  Neurological: Positive for dizziness, light-headedness and headaches  Hematological: Negative  Psychiatric/Behavioral: Positive for sleep disturbance (due to, pain)  All other systems reviewed and are negative  Objective:    Physical Exam   Constitutional: He is oriented to person, place, and time  He appears well-developed and well-nourished  HENT:   Head: Normocephalic and atraumatic  Eyes: Pupils are equal, round, and reactive to light  EOM are normal    Cardiovascular: Normal rate     Pulmonary/Chest: Effort normal and breath sounds normal    Neurological: He is alert and oriented to person, place, and time  Skin: Skin is warm and dry  Psychiatric: He has a normal mood and affect  Vitals reviewed  Neurologic Exam     Mental Status   Oriented to person, place, and time  Cranial Nerves     CN III, IV, VI   Pupils are equal, round, and reactive to light  Extraocular motions are normal             CT CERVICAL SPINE - WITHOUT CONTRAST   7/25/19     INDICATION:   S12 100G: Unspecified displaced fracture of second cervical vertebra, subsequent encounter for fracture with delayed healing  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, cervical region      COMPARISON:  CT 3/20/2016     TECHNIQUE:  CT examination of the cervical spine was performed without intravenous contrast   Contiguous axial images were obtained  Sagittal and coronal reconstructions were performed        Radiation dose length product (DLP) for this visit:  708 mGy-cm   This examination, like all CT scans performed in the [facility], was performed utilizing techniques to minimize radiation dose exposure, including the use of iterative   reconstruction and automated exposure control        IMAGE QUALITY:  Diagnostic      FINDINGS:     ALIGNMENT:  Minor straightening of cervical lordosis  C1-2 is stable, appearance  Fused fracture reidentified  Solid bony fusion has not occurred in the 3 year interval since prior study  Lateral mass screws at both levels are reidentified  These are anchored to a short independent rods  C1 screws   extend the anterior of the anterior arch of C1  Lucency surrounding the C2 screws bilaterally suggesting a relatively loosening, this is new in the interval since prior study  Bilateral wire loops over the lamina at C1 and C2 appear intact  Alignment   is normal      Bilateral multilevel facet arthrosis, this is most significant at C3-C4, C4-C5 and to lesser degree left greater than right C5-C6    Bilateral C6-7 facet arthrosis, minor C7-T1 degenerative anterolisthesis  Ankylosis of the left C2-3 facet joint      VERTEBRAL BODIES:  No acute fracture      DEGENERATIVE CHANGES:  No significant cervical degenerative changes are noted      PREVERTEBRAL AND PARASPINAL SOFT TISSUES:  Unremarkable      THORACIC INLET:  Normal      IMPRESSION:     No cervical spine fracture or traumatic malalignment      Posterior fusion C1-C2 accomplished with bilateral laminar wires and lateral mass screws  No bony fusion of the type II odontoid fracture  Normal alignment  C2 screws have loosened bilaterally  MRI CERVICAL SPINE WITHOUT CONTRAST   8/15/19     INDICATION: S12 100G: Unspecified displaced fracture of second cervical vertebra, subsequent encounter for fracture with delayed healing  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, cervical region  M50 30: Other cervical disc degeneration, unspecified cervical region  M48 02: Spinal stenosis, cervical region  M54 2: Cervicalgia  M54 81: Occipital neuralgia      COMPARISON:  MR 11/22/2014, CT 7/25/2019     TECHNIQUE:  Sagittal T1, sagittal T2, sagittal inversion recovery, axial T2, axial  2D merge     IMAGE QUALITY:  Diagnostic     FINDINGS:     ALIGNMENT:  Real alignment of the base of the odontoid fracture following posterior C1-C2 intralaminar wire fixation, in short independent rods and bilateral lateral mass screws  Minor degenerative anterolisthesis of C7 on T1, and T1 on T2, similar to   prior studies      MARROW SIGNAL:  Normal marrow signal is identified within the visualized bony structures    No discrete marrow lesion      CERVICAL AND VISUALIZED THORACIC CORD:  Normal signal within the visualized cord      PREVERTEBRAL AND PARASPINAL SOFT TISSUES:  Normal      VISUALIZED POSTERIOR FOSSA:  The visualized posterior fossa demonstrates no abnormal signal      CERVICAL DISC SPACES:     C2-C3:  Mild facet arthrosis     C3-C4:  Minor bulge, moderate bilateral facet arthrosis      C4-C5:  Bilateral facet arthrosis asymmetric to the left with the left uncinate arthrosis potentially significant foraminal stenosis, correlate for left C5 radiculitis      C5-C6:  Circumferential bulging of the disc, small marginal osteophytes, bilateral facet and uncinate arthrosis with left greater than right foraminal stenosis, correlate for left C6 radiculitis      C6-C7:  Circumferential bulge with small marginal osteophyte asymmetric to the left, no critical stenosis      C7-T1:  Degenerative grade 1 anterolisthesis without critical stenosis      UPPER THORACIC DISC SPACES:  Minor, noncompressive degenerative changes      IMPRESSION:     Interval stabilization and realignment of the type II odontoid fracture, accomplished with intralaminar wires and lateral mass screws and short independent rods      Stable, moderate asymmetric narrowing of the left foramen at C4-C5 and C5-C6  Correlate for left C5 and left C6 radiculitis respectively

## 2019-08-20 NOTE — LETTER
August 20, 2019     Cathy Justin, [address]  [address] [place] [address]    Patient: Salome Saldivar   YOB: 1955   Date of Visit: 8/20/2019       Dear Dr Roseanna Moe: Thank you for referring Sharona Doss to me for evaluation  Below are my notes for this consultation  If you have questions, please do not hesitate to call me  I look forward to following your patient along with you  Sincerely,        Tawanna Hernandes MD        CC: No Recipients  Saleem Oneal PA-C  8/20/2019 11:59 AM  Sign at close encounter  Patient ID: Salome Saldivar is a 71 y o  male  Diagnoses and all orders for this visit:    Aftercare following surgery for injury and trauma    Cervicalgia    Closed odontoid fracture with nonunion, subsequent encounter    History of cervical spinal surgery          Assessment/Plan:    Very pleasant 41-year-old male, returns for follow-up to review CT cervical spine and MRI cervical spine  He has prior surgical history:  "C1-C2 lateral mass fixation and sublaminar Gallie cable fixation with demineralized bone matrix and cadaveric bone arthrodesis"  Performed by Dr Ramirez Joiner, 2/20/15  Patient has concerns about the CT report, specifically that the odontoid fracture remains with nonunion, and that there is some reported loosening of the C2 screws (lucency on CT)  The CT cervical spine 7/25/19 was carefully reviewed in detail by Dr Ramirez Joiner, the internal fixators remain ideally placed  There is some lucency about the C2 screws but there is evidence of purchase at interstices, superiorly indicating stability  Study was reviewed in detail by Dr Ramirez Joiner with the patient using models as well as the CT scan  Dr Ramirez Joiner reassured the patient that his C1-C2 fusion is stable  He advises he may continue with all usual activities without restriction      The patient does report stiffness of his neck towards the end the day, Dr Ramirez Joiner opines this is most likely related to core muscle imbalance in the cervical spine and advises Pilates and or yoga to attempt to improve his muscle balance in the cervical spine  MRI cervical spine 8/15/19 was also carefully reviewed in detail by Dr Macario Granda and reveals chronic degenerative change at multiple levels there is some foraminal narrowing noted at C4-C5 and C5-C6 on the left, the patient reports no radicular symptoms in his upper arm  Relative to this complaint of dizzy of certain position changes Dr Macario Granda reports this is in no way related to his cervical issues and does advocate proceed with vestibular therapy, and Epley maneuver  His examination remains unchanged today  Further follow-up with Neurosurgery will be on an as-needed basis  As indicated Dr Macario Granda advises an exercise regimen including Pilates and or yoga  The patient has no specific repeat restrictions from a neurosurgical perspective  These findings, impressions and recommendations are reviewed in great detail with the patient, he expressed understanding and agreement, his questions were answered completely and to his satisfaction  Return if symptoms worsen or fail to improve  Chief Complaint  Follow-up to review CT cervical spine and MRI cervical spine, had complaints of abdominal pain, nausea, diaphoresis and vertigo  HPI       The following portions of the patient's history were reviewed and updated as appropriate: allergies, current medications, past family history, past medical history, past social history and past surgical history  Review of Systems   Constitutional: Negative  HENT: Negative  Eyes: Negative  Respiratory: Negative  Cardiovascular: Negative  Gastrointestinal: Negative  Endocrine: Negative  Genitourinary: Negative  Musculoskeletal: Positive for neck pain  Skin: Negative  Allergic/Immunologic: Negative  Neurological: Positive for dizziness, light-headedness and headaches  Hematological: Negative  Psychiatric/Behavioral: Positive for sleep disturbance (due to, pain)  All other systems reviewed and are negative  Objective:    Physical Exam   Constitutional: He is oriented to person, place, and time  He appears well-developed and well-nourished  HENT:   Head: Normocephalic and atraumatic  Eyes: Pupils are equal, round, and reactive to light  EOM are normal    Cardiovascular: Normal rate  Pulmonary/Chest: Effort normal and breath sounds normal    Neurological: He is alert and oriented to person, place, and time  Skin: Skin is warm and dry  Psychiatric: He has a normal mood and affect  Vitals reviewed  Neurologic Exam     Mental Status   Oriented to person, place, and time  Cranial Nerves     CN III, IV, VI   Pupils are equal, round, and reactive to light  Extraocular motions are normal             CT CERVICAL SPINE - WITHOUT CONTRAST   7/25/19     INDICATION:   S12 100G: Unspecified displaced fracture of second cervical vertebra, subsequent encounter for fracture with delayed healing  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, cervical region      COMPARISON:  CT 3/20/2016     TECHNIQUE:  CT examination of the cervical spine was performed without intravenous contrast   Contiguous axial images were obtained  Sagittal and coronal reconstructions were performed        Radiation dose length product (DLP) for this visit:  432 mGy-cm   This examination, like all CT scans performed in the [facility], was performed utilizing techniques to minimize radiation dose exposure, including the use of iterative   reconstruction and automated exposure control        IMAGE QUALITY:  Diagnostic      FINDINGS:     ALIGNMENT:  Minor straightening of cervical lordosis  C1-2 is stable, appearance  Fused fracture reidentified  Solid bony fusion has not occurred in the 3 year interval since prior study    Lateral mass screws at both levels are reidentified  These are anchored to a short independent rods  C1 screws   extend the anterior of the anterior arch of C1  Lucency surrounding the C2 screws bilaterally suggesting a relatively loosening, this is new in the interval since prior study  Bilateral wire loops over the lamina at C1 and C2 appear intact  Alignment   is normal      Bilateral multilevel facet arthrosis, this is most significant at C3-C4, C4-C5 and to lesser degree left greater than right C5-C6  Bilateral C6-7 facet arthrosis, minor C7-T1 degenerative anterolisthesis  Ankylosis of the left C2-3 facet joint      VERTEBRAL BODIES:  No acute fracture      DEGENERATIVE CHANGES:  No significant cervical degenerative changes are noted      PREVERTEBRAL AND PARASPINAL SOFT TISSUES:  Unremarkable      THORACIC INLET:  Normal      IMPRESSION:     No cervical spine fracture or traumatic malalignment      Posterior fusion C1-C2 accomplished with bilateral laminar wires and lateral mass screws  No bony fusion of the type II odontoid fracture  Normal alignment  C2 screws have loosened bilaterally  MRI CERVICAL SPINE WITHOUT CONTRAST   8/15/19     INDICATION: S12 100G: Unspecified displaced fracture of second cervical vertebra, subsequent encounter for fracture with delayed healing  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, cervical region  M50 30: Other cervical disc degeneration, unspecified cervical region  M48 02: Spinal stenosis, cervical region  M54 2: Cervicalgia  M54 81: Occipital neuralgia      COMPARISON:  MR 11/22/2014, CT 7/25/2019     TECHNIQUE:  Sagittal T1, sagittal T2, sagittal inversion recovery, axial T2, axial  2D merge     IMAGE QUALITY:  Diagnostic     FINDINGS:     ALIGNMENT:  Real alignment of the base of the odontoid fracture following posterior C1-C2 intralaminar wire fixation, in short independent rods and bilateral lateral mass screws    Minor degenerative anterolisthesis of C7 on T1, and T1 on T2, similar to   prior studies      MARROW SIGNAL:  Normal marrow signal is identified within the visualized bony structures  No discrete marrow lesion      CERVICAL AND VISUALIZED THORACIC CORD:  Normal signal within the visualized cord      PREVERTEBRAL AND PARASPINAL SOFT TISSUES:  Normal      VISUALIZED POSTERIOR FOSSA:  The visualized posterior fossa demonstrates no abnormal signal      CERVICAL DISC SPACES:     C2-C3:  Mild facet arthrosis     C3-C4:  Minor bulge, moderate bilateral facet arthrosis      C4-C5:  Bilateral facet arthrosis asymmetric to the left with the left uncinate arthrosis potentially significant foraminal stenosis, correlate for left C5 radiculitis      C5-C6:  Circumferential bulging of the disc, small marginal osteophytes, bilateral facet and uncinate arthrosis with left greater than right foraminal stenosis, correlate for left C6 radiculitis      C6-C7:  Circumferential bulge with small marginal osteophyte asymmetric to the left, no critical stenosis      C7-T1:  Degenerative grade 1 anterolisthesis without critical stenosis      UPPER THORACIC DISC SPACES:  Minor, noncompressive degenerative changes      IMPRESSION:     Interval stabilization and realignment of the type II odontoid fracture, accomplished with intralaminar wires and lateral mass screws and short independent rods      Stable, moderate asymmetric narrowing of the left foramen at C4-C5 and C5-C6  Correlate for left C5 and left C6 radiculitis respectively

## 2019-08-20 NOTE — LETTER
August 20, 2019     Rolando Soliz, [address] [place] [address]    Patient: Fide Navas   YOB: 1955   Date of Visit: 8/20/2019       Dear Dr Srinivas Carter: Thank you for referring Shelley Jeronimo to me for evaluation  Below are my notes for this consultation  If you have questions, please do not hesitate to call me  I look forward to following your patient along with you  Sincerely,        Dieudonne Vega MD        CC: No Recipients  Roberto Kathleen PA-C  8/20/2019 11:58 AM  Sign at close encounter  Patient ID: Fide Navas is a 71 y o  male  Diagnoses and all orders for this visit:    Aftercare following surgery for injury and trauma    Cervicalgia    Closed odontoid fracture with nonunion, subsequent encounter    History of cervical spinal surgery          Assessment/Plan:    Very pleasant 54-year-old male, returns for follow-up to review CT cervical spine and MRI cervical spine  He has prior surgical history:  "C1-C2 lateral mass fixation and sublaminar Gallie cable fixation with demineralized bone matrix and cadaveric bone arthrodesis"  Performed by Dr Pamela Mistry, 2/20/15  Patient has concerns about the CT report, specifically that the odontoid fracture remains with nonunion, and that there is some reported loosening of the C2 screws (lucency on CT)  The CT cervical spine 7/25/19 was carefully reviewed in detail by Dr Pamela Mistry, the internal fixators remain ideally placed  There is some lucency about the C2 screws but there is evidence of purchase at interstices, superiorly indicating stability  Study was reviewed in detail by Dr Pamela Mistry with the patient using models as well as the CT scan  Dr Pamela Mistry reassured the patient that his C1-C2 fusion is stable  He advises he may continue with all usual activities without restriction      The patient does report stiffness of his neck towards the end the day, Dr Pamela Mistry opines this is most likely related to core muscle imbalance in the cervical spine and advises Pilates and or yoga to attempt to improve his muscle balance in the cervical spine  MRI cervical spine 8/15/19 was also carefully reviewed in detail by Dr Eleno Galindo and reveals chronic degenerative change at multiple levels there is some foraminal narrowing noted at C4-C5 and C5-C6 on the left, the patient reports no radicular symptoms in his upper arm  Relative to this complaint of dizzy of certain position changes Dr Eleno Galindo reports this is in no way related to his cervical issues and does advocate proceed with vestibular therapy, and Epley maneuver  His examination remains unchanged today  Further follow-up with Neurosurgery will be on an as-needed basis  As indicated Dr Eleno Galindo advises an exercise regimen including Pilates and or yoga  The patient has no specific repeat restrictions from a neurosurgical perspective  These findings, impressions and recommendations are reviewed in great detail with the patient, he expressed understanding and agreement, his questions were answered completely and to his satisfaction  Return if symptoms worsen or fail to improve  Chief Complaint  Follow-up to review CT cervical spine and MRI cervical spine, had complaints of abdominal pain, nausea, diaphoresis and vertigo  HPI       The following portions of the patient's history were reviewed and updated as appropriate: {history reviewed:20406::"allergies","current medications","past family history","past medical history","past social history","past surgical history","problem list"}  Review of Systems   Constitutional: Negative  HENT: Negative  Eyes: Negative  Respiratory: Negative  Cardiovascular: Negative  Gastrointestinal: Negative  Endocrine: Negative  Genitourinary: Negative  Musculoskeletal: Positive for neck pain  Skin: Negative  Allergic/Immunologic: Negative      Neurological: Positive for dizziness, light-headedness and headaches  Hematological: Negative  Psychiatric/Behavioral: Positive for sleep disturbance (due to, pain)  All other systems reviewed and are negative  Objective:    Physical Exam   Constitutional: He is oriented to person, place, and time  He appears well-developed and well-nourished  HENT:   Head: Normocephalic and atraumatic  Eyes: Pupils are equal, round, and reactive to light  EOM are normal    Cardiovascular: Normal rate  Pulmonary/Chest: Effort normal and breath sounds normal    Neurological: He is alert and oriented to person, place, and time  Skin: Skin is warm and dry  Psychiatric: He has a normal mood and affect  Vitals reviewed  Neurologic Exam     Mental Status   Oriented to person, place, and time  Cranial Nerves     CN III, IV, VI   Pupils are equal, round, and reactive to light  Extraocular motions are normal             CT CERVICAL SPINE - WITHOUT CONTRAST   7/25/19     INDICATION:   S12 100G: Unspecified displaced fracture of second cervical vertebra, subsequent encounter for fracture with delayed healing  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, cervical region      COMPARISON:  CT 3/20/2016     TECHNIQUE:  CT examination of the cervical spine was performed without intravenous contrast   Contiguous axial images were obtained  Sagittal and coronal reconstructions were performed        Radiation dose length product (DLP) for this visit:  174 mGy-cm   This examination, like all CT scans performed in the [facility], was performed utilizing techniques to minimize radiation dose exposure, including the use of iterative   reconstruction and automated exposure control        IMAGE QUALITY:  Diagnostic      FINDINGS:     ALIGNMENT:  Minor straightening of cervical lordosis  C1-2 is stable, appearance  Fused fracture reidentified    Solid bony fusion has not occurred in the 3 year interval since prior study  Lateral mass screws at both levels are reidentified  These are anchored to a short independent rods  C1 screws   extend the anterior of the anterior arch of C1  Lucency surrounding the C2 screws bilaterally suggesting a relatively loosening, this is new in the interval since prior study  Bilateral wire loops over the lamina at C1 and C2 appear intact  Alignment   is normal      Bilateral multilevel facet arthrosis, this is most significant at C3-C4, C4-C5 and to lesser degree left greater than right C5-C6  Bilateral C6-7 facet arthrosis, minor C7-T1 degenerative anterolisthesis  Ankylosis of the left C2-3 facet joint      VERTEBRAL BODIES:  No acute fracture      DEGENERATIVE CHANGES:  No significant cervical degenerative changes are noted      PREVERTEBRAL AND PARASPINAL SOFT TISSUES:  Unremarkable      THORACIC INLET:  Normal      IMPRESSION:     No cervical spine fracture or traumatic malalignment      Posterior fusion C1-C2 accomplished with bilateral laminar wires and lateral mass screws  No bony fusion of the type II odontoid fracture  Normal alignment  C2 screws have loosened bilaterally  MRI CERVICAL SPINE WITHOUT CONTRAST   8/15/19     INDICATION: S12 100G: Unspecified displaced fracture of second cervical vertebra, subsequent encounter for fracture with delayed healing  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, cervical region  M50 30: Other cervical disc degeneration, unspecified cervical region  M48 02: Spinal stenosis, cervical region  M54 2: Cervicalgia  M54 81: Occipital neuralgia      COMPARISON:  MR 11/22/2014, CT 7/25/2019     TECHNIQUE:  Sagittal T1, sagittal T2, sagittal inversion recovery, axial T2, axial  2D merge     IMAGE QUALITY:  Diagnostic     FINDINGS:     ALIGNMENT:  Real alignment of the base of the odontoid fracture following posterior C1-C2 intralaminar wire fixation, in short independent rods and bilateral lateral mass screws    Minor degenerative anterolisthesis of C7 on T1, and T1 on T2, similar to   prior studies      MARROW SIGNAL:  Normal marrow signal is identified within the visualized bony structures  No discrete marrow lesion      CERVICAL AND VISUALIZED THORACIC CORD:  Normal signal within the visualized cord      PREVERTEBRAL AND PARASPINAL SOFT TISSUES:  Normal      VISUALIZED POSTERIOR FOSSA:  The visualized posterior fossa demonstrates no abnormal signal      CERVICAL DISC SPACES:     C2-C3:  Mild facet arthrosis     C3-C4:  Minor bulge, moderate bilateral facet arthrosis      C4-C5:  Bilateral facet arthrosis asymmetric to the left with the left uncinate arthrosis potentially significant foraminal stenosis, correlate for left C5 radiculitis      C5-C6:  Circumferential bulging of the disc, small marginal osteophytes, bilateral facet and uncinate arthrosis with left greater than right foraminal stenosis, correlate for left C6 radiculitis      C6-C7:  Circumferential bulge with small marginal osteophyte asymmetric to the left, no critical stenosis      C7-T1:  Degenerative grade 1 anterolisthesis without critical stenosis      UPPER THORACIC DISC SPACES:  Minor, noncompressive degenerative changes      IMPRESSION:     Interval stabilization and realignment of the type II odontoid fracture, accomplished with intralaminar wires and lateral mass screws and short independent rods      Stable, moderate asymmetric narrowing of the left foramen at C4-C5 and C5-C6  Correlate for left C5 and left C6 radiculitis respectively

## 2019-08-20 NOTE — PATIENT INSTRUCTIONS
As discussed by Dr Dea Abbott had Pilates and/or yoga to your exercise regimen  Further follow-up with Neurosurgery will be on an as needed basis

## 2019-08-26 ENCOUNTER — OFFICE VISIT (OUTPATIENT)
Dept: FAMILY MEDICINE CLINIC | Facility: CLINIC | Age: 64
End: 2019-08-26
Payer: MEDICARE

## 2019-08-26 VITALS
DIASTOLIC BLOOD PRESSURE: 74 MMHG | HEART RATE: 60 BPM | RESPIRATION RATE: 16 BRPM | WEIGHT: 191 LBS | TEMPERATURE: 98.2 F | HEIGHT: 72 IN | SYSTOLIC BLOOD PRESSURE: 110 MMHG | BODY MASS INDEX: 25.87 KG/M2

## 2019-08-26 DIAGNOSIS — M54.81 OCCIPITAL NEURALGIA, UNSPECIFIED LATERALITY: ICD-10-CM

## 2019-08-26 DIAGNOSIS — Z12.5 SCREENING PSA (PROSTATE SPECIFIC ANTIGEN): ICD-10-CM

## 2019-08-26 DIAGNOSIS — E03.9 HYPOTHYROIDISM, UNSPECIFIED TYPE: Primary | ICD-10-CM

## 2019-08-26 DIAGNOSIS — J30.9 ALLERGIC RHINITIS, UNSPECIFIED SEASONALITY, UNSPECIFIED TRIGGER: ICD-10-CM

## 2019-08-26 DIAGNOSIS — M48.02 CERVICAL SPINAL STENOSIS: ICD-10-CM

## 2019-08-26 DIAGNOSIS — R42 VERTIGO: ICD-10-CM

## 2019-08-26 LAB
25(OH)D3 SERPL-MCNC: 28.6 NG/ML (ref 30–100)
ALBUMIN SERPL BCP-MCNC: 4.3 G/DL (ref 3.5–5)
ALP SERPL-CCNC: 48 U/L (ref 46–116)
ALT SERPL W P-5'-P-CCNC: 24 U/L (ref 12–78)
ANION GAP SERPL CALCULATED.3IONS-SCNC: 8 MMOL/L (ref 4–13)
AST SERPL W P-5'-P-CCNC: 18 U/L (ref 5–45)
BILIRUB SERPL-MCNC: 0.56 MG/DL (ref 0.2–1)
BUN SERPL-MCNC: 20 MG/DL (ref 5–25)
CALCIUM SERPL-MCNC: 9.4 MG/DL (ref 8.3–10.1)
CHLORIDE SERPL-SCNC: 108 MMOL/L (ref 100–108)
CHOLEST SERPL-MCNC: 187 MG/DL (ref 50–200)
CO2 SERPL-SCNC: 27 MMOL/L (ref 21–32)
CREAT SERPL-MCNC: 0.63 MG/DL (ref 0.6–1.3)
ERYTHROCYTE [DISTWIDTH] IN BLOOD BY AUTOMATED COUNT: 13.6 % (ref 11.6–15.1)
GFR SERPL CREATININE-BSD FRML MDRD: 103 ML/MIN/1.73SQ M
GLUCOSE P FAST SERPL-MCNC: 98 MG/DL (ref 65–99)
HCT VFR BLD AUTO: 44.2 % (ref 36.5–49.3)
HDLC SERPL-MCNC: 46 MG/DL (ref 40–60)
HGB BLD-MCNC: 14.2 G/DL (ref 12–17)
LDLC SERPL CALC-MCNC: 125 MG/DL (ref 0–100)
MCH RBC QN AUTO: 32.4 PG (ref 26.8–34.3)
MCHC RBC AUTO-ENTMCNC: 32.1 G/DL (ref 31.4–37.4)
MCV RBC AUTO: 101 FL (ref 82–98)
NONHDLC SERPL-MCNC: 141 MG/DL
PLATELET # BLD AUTO: 181 THOUSANDS/UL (ref 149–390)
PMV BLD AUTO: 11.1 FL (ref 8.9–12.7)
POTASSIUM SERPL-SCNC: 4 MMOL/L (ref 3.5–5.3)
PROT SERPL-MCNC: 7.1 G/DL (ref 6.4–8.2)
PSA SERPL-MCNC: 1.6 NG/ML (ref 0–4)
RBC # BLD AUTO: 4.38 MILLION/UL (ref 3.88–5.62)
SODIUM SERPL-SCNC: 143 MMOL/L (ref 136–145)
TRIGL SERPL-MCNC: 80 MG/DL
TSH SERPL DL<=0.05 MIU/L-ACNC: 1.18 UIU/ML (ref 0.36–3.74)
WBC # BLD AUTO: 6.9 THOUSAND/UL (ref 4.31–10.16)

## 2019-08-26 PROCEDURE — G0103 PSA SCREENING: HCPCS | Performed by: FAMILY MEDICINE

## 2019-08-26 PROCEDURE — 85027 COMPLETE CBC AUTOMATED: CPT | Performed by: FAMILY MEDICINE

## 2019-08-26 PROCEDURE — 80061 LIPID PANEL: CPT | Performed by: FAMILY MEDICINE

## 2019-08-26 PROCEDURE — 80053 COMPREHEN METABOLIC PANEL: CPT | Performed by: FAMILY MEDICINE

## 2019-08-26 PROCEDURE — 36415 COLL VENOUS BLD VENIPUNCTURE: CPT | Performed by: FAMILY MEDICINE

## 2019-08-26 PROCEDURE — 82306 VITAMIN D 25 HYDROXY: CPT | Performed by: FAMILY MEDICINE

## 2019-08-26 PROCEDURE — 99214 OFFICE O/P EST MOD 30 MIN: CPT | Performed by: FAMILY MEDICINE

## 2019-08-26 PROCEDURE — 84443 ASSAY THYROID STIM HORMONE: CPT | Performed by: FAMILY MEDICINE

## 2019-08-26 NOTE — PROGRESS NOTES
Assessment/Plan:  Fasting labs drawn as below  Patient to continue present treatment  Patient to follow a low-fat diet and get regular aerobic exercise 150 minutes per week  Follow up with specialists as scheduled and return to the office kip Bryant Diagnoses and all orders for this visit:    Hypothyroidism, unspecified type  -     CBC and Platelet  -     Comprehensive metabolic panel  -     Lipid panel  -     TSH, 3rd generation with Free T4 reflex  -     Vitamin D 25 hydroxy  -     UA (URINE) with reflex to Microscopic    Allergic rhinitis, unspecified seasonality, unspecified trigger    Vertigo    Cervical spinal stenosis    Occipital neuralgia, unspecified laterality    Screening PSA (prostate specific antigen)  -     PSA, Total Screen          Subjective:      Patient ID: Jose Francisco Rivera is a 71 y o  male  Patient is here for follow-up for chronic conditions, physical exam and requests full fasting labs  Patient continues with chronic neck pain and dizziness intermittently  He has been following with Neurosurgery and ENT and is scheduled for SABRINA testing  Patient remains physically active walking and doing yd work  He is up-to-date on colonoscopy  Thyroid Problem   Presents for follow-up visit  Patient reports no anxiety, cold intolerance, constipation, depressed mood, diaphoresis, diarrhea, dry skin, fatigue, hair loss, heat intolerance, hoarse voice, leg swelling, palpitations, tremors, visual change, weight gain or weight loss  The symptoms have been stable  Dizziness   This is a new problem  The current episode started more than 1 month ago  The problem occurs intermittently  The problem has been waxing and waning  Associated symptoms include arthralgias, headaches, nausea and neck pain  Pertinent negatives include no abdominal pain, chest pain, chills, coughing, diaphoresis, fatigue, fever, joint swelling, myalgias, visual change, vomiting or weakness         The following portions of the patient's history were reviewed and updated as appropriate: allergies, current medications, past family history, past medical history, past social history, past surgical history and problem list     Review of Systems   Constitutional: Negative for activity change, chills, diaphoresis, fatigue, fever, unexpected weight change, weight gain and weight loss  HENT: Negative  Negative for hoarse voice  Eyes: Negative  Respiratory: Negative for cough, chest tightness, shortness of breath and wheezing  Cardiovascular: Negative for chest pain, palpitations and leg swelling  Gastrointestinal: Positive for nausea  Negative for abdominal pain, blood in stool, constipation, diarrhea and vomiting  Endocrine: Negative for cold intolerance and heat intolerance  Genitourinary: Negative for difficulty urinating, dysuria, frequency, hematuria and urgency  Musculoskeletal: Positive for arthralgias, neck pain and neck stiffness  Negative for back pain, gait problem, joint swelling and myalgias  Skin: Negative  Neurological: Positive for dizziness, light-headedness and headaches  Negative for tremors, syncope and weakness  Hematological: Negative for adenopathy  Does not bruise/bleed easily  Psychiatric/Behavioral: Positive for sleep disturbance  Negative for dysphoric mood  The patient is not nervous/anxious  Objective:      /74   Pulse 60   Temp 98 2 °F (36 8 °C) (Tympanic)   Resp 16   Ht 6' (1 829 m)   Wt 86 6 kg (191 lb)   BMI 25 90 kg/m²          Physical Exam   Constitutional: He is oriented to person, place, and time  He appears well-developed and well-nourished  No distress  HENT:   Head: Normocephalic  Right Ear: External ear normal    Left Ear: External ear normal    Nose: Nose normal    Mouth/Throat: Oropharynx is clear and moist    Eyes: Conjunctivae are normal  No scleral icterus  Neck: Neck supple  No thyromegaly present     Cardiovascular: Normal rate and regular rhythm  Pulmonary/Chest: Effort normal and breath sounds normal    Abdominal: Soft  There is no tenderness  Musculoskeletal: He exhibits no edema  Lymphadenopathy:     He has no cervical adenopathy  Neurological: He is alert and oriented to person, place, and time  Skin: Skin is warm and dry  Psychiatric: He has a normal mood and affect

## 2019-08-27 PROBLEM — E55.9 VITAMIN D DEFICIENCY: Status: ACTIVE | Noted: 2019-08-27

## 2019-08-30 ENCOUNTER — OFFICE VISIT (OUTPATIENT)
Dept: NEUROLOGY | Facility: CLINIC | Age: 64
End: 2019-08-30
Payer: MEDICARE

## 2019-08-30 VITALS
BODY MASS INDEX: 25.18 KG/M2 | HEART RATE: 60 BPM | WEIGHT: 190 LBS | DIASTOLIC BLOOD PRESSURE: 78 MMHG | SYSTOLIC BLOOD PRESSURE: 140 MMHG | HEIGHT: 73 IN

## 2019-08-30 DIAGNOSIS — M54.2 NECK PAIN: ICD-10-CM

## 2019-08-30 DIAGNOSIS — R42 VERTIGO: ICD-10-CM

## 2019-08-30 DIAGNOSIS — M47.812 SPONDYLOSIS OF CERVICAL REGION WITHOUT MYELOPATHY OR RADICULOPATHY: ICD-10-CM

## 2019-08-30 PROCEDURE — 99214 OFFICE O/P EST MOD 30 MIN: CPT | Performed by: PSYCHIATRY & NEUROLOGY
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
## 2019-08-30 NOTE — PATIENT INSTRUCTIONS
-- Continue to do conservative measures for your neck like alternating heat and cold, range of motion exercises, and intermittent rest      -- if the neck stiffness would become more problematic, we could consider adding a low dose of tizanidine (Zanaflex) to help relax the muscles in your neck

## 2019-09-04 NOTE — ASSESSMENT & PLAN NOTE
He does have some ongoing stiffness of his neck which likely is related to the destruction of his muscles from his prior surgery  Overall, this may be causing some cervicogenic headaches and he does have difficulty with stiffness of his neck later on in the afternoons  We discussed that it is best to manage this with conservative measures like alternating heat/ice and gradual range of motion exercises  He previously had been instructed to do things like yoga, which would definitely be beneficial     --we did discuss possibility of adding medication to try to help relax his muscles  1 option would be to use tizanidine 1 mg nightly, and possibly increase to 2 mg nightly after a few weeks  He was not interested in any medications at this time, but if his symptoms worsen, this would be a possibility        --he will continue to manage conservatively as above

## 2019-09-04 NOTE — ASSESSMENT & PLAN NOTE
Although he does report some episodic dizziness, this is only occurred about 3 times over the last 8-9 months  He has worked with physical therapy and had an MRI of his brain recently which did not show any cause of his symptoms  His certainly possible that he may have a peripheral vestibulopathy, but with his limited neck range of motion, [place] is not possible today  He is currently not having symptoms  At this point, I do not think any additional evaluation would be necessary  His episodes of dizziness are very intermittent have not been frequent, so unless they would worsen, I will not order any other tests right now

## 2019-10-08 ENCOUNTER — OFFICE VISIT (OUTPATIENT)
Dept: FAMILY MEDICINE CLINIC | Facility: CLINIC | Age: 64
End: 2019-10-08
Payer: MEDICARE

## 2019-10-08 VITALS
DIASTOLIC BLOOD PRESSURE: 74 MMHG | SYSTOLIC BLOOD PRESSURE: 124 MMHG | BODY MASS INDEX: 26.01 KG/M2 | WEIGHT: 192 LBS | RESPIRATION RATE: 16 BRPM | HEIGHT: 72 IN | TEMPERATURE: 97.7 F | HEART RATE: 64 BPM

## 2019-10-08 DIAGNOSIS — Z23 FLU VACCINE NEED: ICD-10-CM

## 2019-10-08 DIAGNOSIS — Z23 NEED FOR PNEUMOCOCCAL VACCINE: ICD-10-CM

## 2019-10-08 DIAGNOSIS — Z00.00 MEDICARE ANNUAL WELLNESS VISIT, INITIAL: Primary | ICD-10-CM

## 2019-10-08 DIAGNOSIS — M54.2 NECK PAIN: ICD-10-CM

## 2019-10-08 LAB
BACTERIA UR QL AUTO: NORMAL /HPF
BILIRUB UR QL STRIP: NEGATIVE
CLARITY UR: NORMAL
COLOR UR: YELLOW
GLUCOSE UR STRIP-MCNC: NEGATIVE MG/DL
HCV AB SER QL: NORMAL
HGB UR QL STRIP.AUTO: NEGATIVE
HYALINE CASTS #/AREA URNS LPF: NORMAL /LPF
KETONES UR STRIP-MCNC: NEGATIVE MG/DL
LEUKOCYTE ESTERASE UR QL STRIP: NEGATIVE
NITRITE UR QL STRIP: NEGATIVE
NON-SQ EPI CELLS URNS QL MICRO: NORMAL /HPF
PH UR STRIP.AUTO: 8 [PH]
PROT UR STRIP-MCNC: NEGATIVE MG/DL
RBC #/AREA URNS AUTO: NORMAL /HPF
SP GR UR STRIP.AUTO: 1.02 (ref 1–1.03)
UROBILINOGEN UR QL STRIP.AUTO: 0.2 E.U./DL
WBC #/AREA URNS AUTO: NORMAL /HPF

## 2019-10-08 PROCEDURE — 86803 HEPATITIS C AB TEST: CPT | Performed by: FAMILY MEDICINE

## 2019-10-08 PROCEDURE — 36415 COLL VENOUS BLD VENIPUNCTURE: CPT | Performed by: FAMILY MEDICINE

## 2019-10-08 PROCEDURE — 87389 HIV-1 AG W/HIV-1&-2 AB AG IA: CPT | Performed by: FAMILY MEDICINE

## 2019-10-08 PROCEDURE — 90662 IIV NO PRSV INCREASED AG IM: CPT | Performed by: FAMILY MEDICINE

## 2019-10-08 PROCEDURE — G0438 PPPS, INITIAL VISIT: HCPCS | Performed by: FAMILY MEDICINE

## 2019-10-08 PROCEDURE — 90670 PCV13 VACCINE IM: CPT | Performed by: FAMILY MEDICINE

## 2019-10-08 PROCEDURE — G0008 ADMIN INFLUENZA VIRUS VAC: HCPCS | Performed by: FAMILY MEDICINE

## 2019-10-08 PROCEDURE — G0009 ADMIN PNEUMOCOCCAL VACCINE: HCPCS | Performed by: FAMILY MEDICINE

## 2019-10-08 PROCEDURE — 81001 URINALYSIS AUTO W/SCOPE: CPT | Performed by: FAMILY MEDICINE

## 2019-10-08 RX ORDER — TIZANIDINE 2 MG/1
2 TABLET ORAL
Qty: 30 TABLET | Refills: 2 | Status: SHIPPED | OUTPATIENT
Start: 2019-10-08 | End: 2020-02-03 | Stop reason: ALTCHOICE

## 2019-10-08 NOTE — PATIENT INSTRUCTIONS

## 2019-10-08 NOTE — PROGRESS NOTES
Assessment and Plan:   Patient received high-dose flu vaccine and Prevnar 13 vaccine today  Labs drawn for hepatitis-C and HIV at patient's request   Problem List Items Addressed This Visit        Other    Neck pain    Relevant Medications    tiZANidine (ZANAFLEX) 2 mg tablet      Other Visit Diagnoses     Medicare annual wellness visit, initial    -  Primary    Relevant Orders    Hepatitis C antibody    HIV 1/2 AG-AB combo    Flu vaccine need        Relevant Orders    influenza vaccine, [id number], high-dose, PF 0 5 mL (FLUZONE HIGH-DOSE)    Need for pneumococcal vaccine        Relevant Orders    PNEUMOCOCCAL CONJUGATE VACCINE 13-VALENT GREATER THAN 6 MONTHS        BMI Counseling: Body mass index is 26 04 kg/m²  The BMI is above normal  Nutrition recommendations include decreasing portion sizes, encouraging healthy choices of fruits and vegetables, decreasing fast food intake, consuming healthier snacks, limiting drinks that contain sugar, moderation in carbohydrate intake and reducing intake of saturated and trans fat  Exercise recommendations include moderate physical activity 150 minutes/week  No pharmacotherapy was ordered  Preventive health issues were discussed with patient, and age appropriate screening tests were ordered as noted in patient's After Visit Summary  Personalized health advice and appropriate referrals for health education or preventive services given if needed, as noted in patient's After Visit Summary       History of Present Illness:     Patient presents for Medicare Annual Wellness visit    Patient Care Team:  Dillon Grant DO as PCP - General  BOWEN Boyd MD Carlin Bel, PA-C Minor Ee, PA-C Esteban Loyal, DO Murl Lambing, MD Marijo Franco, MD Madonna Crutch, DO     Problem List:     Patient Active Problem List   Diagnosis    Allergic rhinitis    [facility]ontoid fracture ([facility] [address] )    Cervical disc herniation    Cervical spinal stenosis    Cervical spondylosis    Neck pain    Degeneration of intervertebral disc of cervical region    Hypothyroidism    Left arm pain    Loose body in knee, right    Muscle pain, myofacial    Neuralgia    Neuropathy    Nontoxic multinodular goiter    Nonunion of fracture    Occipital neuralgia    Post-traumatic osteoarthritis of one knee    Osteoarthritis of knee    Pruritus    Seizures (HCC)    Spider veins of both lower extremities    Acquired unequal leg length    Venous insufficiency    Abnormal gait    Acquired trigger finger    Chondromalacia patellae    Chronic osteomyelitis of lower leg (HCC)    Generalized osteoarthritis    Knee joint effusion    Knee pain    Localized, primary osteoarthritis    Localized primary osteoarthritis of wrist    Nontoxic single thyroid nodule    Anxiety    Reactive depression    BPH without obstruction/lower urinary tract symptoms    Hypothyroidism due to acquired atrophy of thyroid    Post traumatic stress disorder (PTSD)    Vertigo    Vitamin D deficiency      Past Medical and Surgical History:     Past Medical History:   Diagnosis Date    Anxiety     Arthritis     Bleeding tendency (HCC)     BPH (benign prostatic hyperplasia)     Broken bones     left leg    Cataract     Chronic neck pain     Epilepsy ([address] )     Hypothyroidism     Neoplasm of unspecified behavior of bone, soft tissue, and skin     Osteomyelitis ([facility] [address] )     Skin cancer     Squamous carcinoma 12/01/2014 12/2014    Thyroid condition     Thyroid disease      Past Surgical History:   Procedure Laterality Date    ARTHRODESIS      H/o Arthrodesis Cervical to C2;  last assessed [id number]    KNEE SURGERY Right 12/22/2017    LEG SURGERY Left 1999    Hardware placed in lower leg bone(s)    LEG SURGERY Left 01/01/2006 2006, 2012-Left leg vascular    NECK SURGERY  2013    C1-2 fusion    THYROID SURGERY Right 2013      Family History:     Family History   Problem Relation Age of Onset    Bone cancer Mother     Other Father         Accident    Dementia Other     Cancer Other       Social History:     Social History     Socioeconomic History    Marital status: /Civil Union     Spouse name: None    Number of children: None    Years of education: None    Highest education level: None   Occupational History    Occupation: Teacher    Occupation: retired   Social Needs    Financial resource strain: None    Food insecurity:     Worry: None     Inability: None    Transportation needs:     Medical: None     Non-medical: None   Tobacco Use    Smoking status: Former Smoker     Types: Cigarettes     Last attempt to quit:      Years since quittin 7    Smokeless tobacco: Never Used   Substance and Sexual Activity    Alcohol use: No     Comment: being a social drinker    Drug use: No    Sexual activity: Yes     Comment: no known std risk factors   Lifestyle    Physical activity:     Days per week: None     Minutes per session: None    Stress: None   Relationships    Social connections:     Talks on phone: None     Gets together: None     Attends Evangelical service: None     Active member of club or organization: None     Attends meetings of clubs or organizations: None     Relationship status: None    Intimate partner violence:     Fear of current or ex partner: None     Emotionally abused: None     Physically abused: None     Forced sexual activity: None   Other Topics Concern    None   Social History Narrative    Daily coffee consumption (3 cups/day)       Medications and Allergies:     Current Outpatient Medications   Medication Sig Dispense Refill    acetaminophen (TYLENOL) 500 mg tablet Take 1,000 mg by mouth as needed      albuterol (PROVENTIL HFA) 90 mcg/act inhaler       ASPIRIN 81 PO Take 1 tablet by mouth as needed      Calcium Carbonate-Vit D-Min (CALCIUM 1200 PO) Take 1 tablet by mouth daily      fluticasone (FLONASE) 50 mcg/act nasal spray 2 sprays into each nostril daily (Patient taking differently: 2 sprays into each nostril as needed ) 1 Bottle 2    levothyroxine 100 mcg tablet daily      Multiple Vitamin (MULTIVITAMINS PO) Take 1 capsule by mouth daily      Omega-3 Fatty Acids (FISH OIL) 645 MG CAPS Take 1 capsule by mouth daily      RESTASIS MULTIDOSE 0 05 % ophthalmic emulsion INSTILL 1 DROP INTO BOTH EYES TWICE A DAY  3    tretinoin (ALTRALIN) 0 05 % APPLY TO FACE AT NIGHT AS DIRECTED  1    tiZANidine (ZANAFLEX) 2 mg tablet Take 1 tablet (2 mg total) by mouth daily at bedtime 30 tablet 2     No current facility-administered medications for this visit  Allergies   Allergen Reactions    Clavulanic Acid     Augmentin  [Amoxicillin-Pot Clavulanate] Rash, Vomiting and Nausea Only      Immunizations:     Immunization History   Administered Date(s) Administered    INFLUENZA 10/10/2007, 10/15/2008, 10/05/2009, 09/17/2010, 10/10/2011, 10/29/2014, 10/30/2015, 10/30/2015, 11/02/2016, 10/22/2017, 10/22/2017    Influenza Quadrivalent, 6-35 Months IM 11/02/2016, 10/02/2017    Influenza, recombinant, quadrivalent,injectable, preservative free 11/23/2018    Pneumococcal Polysaccharide PPV23 11/18/2016      Health Maintenance:         Topic Date Due    Hepatitis C Screening  1955    CRC Screening: Colonoscopy  03/09/2021         Topic Date Due    DTaP,Tdap,and Td Vaccines (1 - Tdap) 01/20/1975    Pneumococcal Vaccine: 65+ Years (1 of 2 - PCV13) 01/20/2019    INFLUENZA VACCINE  07/01/2019      Medicare Health Risk Assessment:     /74   Pulse 64   Temp 97 7 °F (36 5 °C) (Tympanic)   Resp 16   Ht 6' (1 829 m)   Wt 87 1 kg (192 lb)   BMI 26 04 kg/m²      Avel Camarena is here for his Initial Wellness visit  Health Risk Assessment:   Patient rates overall health as very good  Patient feels that their physical health rating is same  Eyesight was rated as same  Hearing was rated as same   Patient feels that their emotional and mental health rating is same  Pain experienced in the last 7 days has been some  Patient's pain rating has been 5/10  Patient states that he has experienced no weight loss or gain in last 6 months  Depression Screening:   PHQ-2 Score: 0  PHQ-9 Score: 0      Fall Risk Screening: In the past year, patient has experienced: no history of falling in past year      Home Safety:  Patient does not have trouble with stairs inside or outside of their home  Patient has working smoke alarms and has working carbon monoxide detector  Home safety hazards include: none  Nutrition:   Current diet is Regular, No Added Salt, Low Saturated Fat and Limited junk food  Medications:   Patient is currently taking over-the-counter supplements  OTC medications include: see medication list  Patient is able to manage medications  Activities of Daily Living (ADLs)/Instrumental Activities of Daily Living (IADLs):   Walk and transfer into and out of bed and chair?: Yes  Dress and groom yourself?: Yes    Bathe or shower yourself?: Yes    Feed yourself? Yes  Do your laundry/housekeeping?: Yes  Manage your money, pay your bills and track your expenses?: Yes  Make your own meals?: Yes    Do your own shopping?: Yes    Previous Hospitalizations:   Any hospitalizations or ED visits within the last 12 months?: No      Advance Care Planning:   Living will: Yes    Durable POA for healthcare:  Yes    Advanced directive: Yes      Cognitive Screening:   Provider or family/friend/caregiver concerned regarding cognition?: No    PREVENTIVE SCREENINGS      Cardiovascular Screening:    General: Screening Current      Diabetes Screening:     General: Screening Current      Colorectal Cancer Screening:     General: Screening Current    Due for: Colonoscopy - High Risk      Prostate Cancer Screening:    General: Screening Current      Osteoporosis Screening:    General: Risks and Benefits Discussed and Screening Not Indicated      Abdominal Aortic Aneurysm (AAA) Screening:    Risk factors include: age between 73-69 yo and tobacco use        General: Risks and Benefits Discussed and Screening Not Indicated      Lung Cancer Screening:     General: Screening Not Indicated and Risks and Benefits Discussed      Hepatitis C Screening:    General: Risks and Benefits Discussed    Hep C Screening Accepted: Yes      Other Counseling Topics:   Alcohol use counseling, car/seat belt/driving safety, skin self-exam, sunscreen and calcium and vitamin D intake and regular weightbearing exercise         Keshawn Yung DO

## 2019-10-09 LAB — HIV 1+2 AB+HIV1 P24 AG SERPL QL IA: NORMAL

## 2019-10-17 ENCOUNTER — TELEPHONE (OUTPATIENT)
Dept: FAMILY MEDICINE CLINIC | Facility: CLINIC | Age: 64
End: 2019-10-17

## 2019-10-17 NOTE — TELEPHONE ENCOUNTER
Patient is requesting a copy of his CT and MRI, he said he contacted the radiology where he had it done and they told him to call our office  ibuprofen will call out to radiology for request

## 2020-02-03 ENCOUNTER — OFFICE VISIT (OUTPATIENT)
Dept: FAMILY MEDICINE CLINIC | Facility: CLINIC | Age: 65
End: 2020-02-03
Payer: MEDICARE

## 2020-02-03 VITALS
TEMPERATURE: 98.8 F | HEIGHT: 72 IN | HEART RATE: 64 BPM | BODY MASS INDEX: 26.01 KG/M2 | OXYGEN SATURATION: 94 % | SYSTOLIC BLOOD PRESSURE: 134 MMHG | RESPIRATION RATE: 16 BRPM | DIASTOLIC BLOOD PRESSURE: 72 MMHG | WEIGHT: 192 LBS

## 2020-02-03 DIAGNOSIS — M54.2 NECK PAIN: ICD-10-CM

## 2020-02-03 DIAGNOSIS — G89.4 CHRONIC PAIN SYNDROME: Primary | ICD-10-CM

## 2020-02-03 DIAGNOSIS — M79.18 MUSCLE PAIN, MYOFASCIAL: ICD-10-CM

## 2020-02-03 PROCEDURE — 1160F RVW MEDS BY RX/DR IN RCRD: CPT | Performed by: FAMILY MEDICINE

## 2020-02-03 PROCEDURE — 3008F BODY MASS INDEX DOCD: CPT | Performed by: FAMILY MEDICINE

## 2020-02-03 PROCEDURE — 1036F TOBACCO NON-USER: CPT | Performed by: FAMILY MEDICINE

## 2020-02-03 PROCEDURE — 4040F PNEUMOC VAC/ADMIN/RCVD: CPT | Performed by: FAMILY MEDICINE

## 2020-02-03 PROCEDURE — 99213 OFFICE O/P EST LOW 20 MIN: CPT | Performed by: FAMILY MEDICINE

## 2020-02-03 RX ORDER — IBUPROFEN 200 MG
200 TABLET ORAL EVERY 4 HOURS PRN
COMMUNITY
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
## 2020-03-06 ENCOUNTER — OFFICE VISIT (OUTPATIENT)
Dept: NEUROLOGY | Facility: CLINIC | Age: 65
End: 2020-03-06
Payer: MEDICARE

## 2020-03-06 VITALS
DIASTOLIC BLOOD PRESSURE: 68 MMHG | HEIGHT: 72 IN | BODY MASS INDEX: 25.92 KG/M2 | WEIGHT: 191.4 LBS | HEART RATE: 58 BPM | SYSTOLIC BLOOD PRESSURE: 118 MMHG

## 2020-03-06 DIAGNOSIS — R42 EPISODIC LIGHTHEADEDNESS: Primary | ICD-10-CM

## 2020-03-06 PROCEDURE — 4040F PNEUMOC VAC/ADMIN/RCVD: CPT | Performed by: PSYCHIATRY & NEUROLOGY

## 2020-03-06 PROCEDURE — 99214 OFFICE O/P EST MOD 30 MIN: CPT | Performed by: PSYCHIATRY & NEUROLOGY

## 2020-03-06 PROCEDURE — 1036F TOBACCO NON-USER: CPT | Performed by: PSYCHIATRY & NEUROLOGY

## 2020-03-06 PROCEDURE — 1160F RVW MEDS BY RX/DR IN RCRD: CPT | Performed by: PSYCHIATRY & NEUROLOGY
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
## 2020-03-06 NOTE — PATIENT INSTRUCTIONS
-- I will make some suggestions to Dr Chuck Mosley about other medications that could potentially help with your symptoms  -- continue to manage things conservatively for now

## 2020-03-09 NOTE — ASSESSMENT & PLAN NOTE
He continues to have episodes of lightheadedness and odd sensation in the back of his neck  Again discussed these episodes do not represent epileptic seizures  We have looked for concerning causes of his symptoms, and no clear cause has been found  Although this is frustrating, would suggest that the symptoms are not more concerning in etiology  Will be important to focus on more symptomatic management at this point  It is certainly possible that he may have some different sensations in the back of his neck from his prior cervical fusion, but this is not anything to be concerned about, and is not a sign that his symptoms or situations getting worse  --we discussed medication options, but he continues to prefer to avoid any additional medicines at this time  I will not start anything today, but if he would continue to have symptoms, he may benefit from starting medication such as venlafaxine or duloxetine, which could also improve his symptoms  I am a little concerned that there may be a component of anxiety that may be correlating with his symptoms  These medications could also improve his mood

## 2020-03-12 ENCOUNTER — TELEPHONE (OUTPATIENT)
Dept: FAMILY MEDICINE CLINIC | Facility: CLINIC | Age: 65
End: 2020-03-12

## 2020-04-15 DIAGNOSIS — J30.1 ALLERGIC RHINITIS DUE TO POLLEN, UNSPECIFIED SEASONALITY: ICD-10-CM

## 2020-04-15 RX ORDER — FLUTICASONE PROPIONATE 50 MCG
2 SPRAY, SUSPENSION (ML) NASAL DAILY
Qty: 1 BOTTLE | Refills: 2 | Status: SHIPPED | OUTPATIENT
Start: 2020-04-15 | End: 2020-04-27 | Stop reason: SDUPTHER

## 2020-04-27 DIAGNOSIS — J30.1 ALLERGIC RHINITIS DUE TO POLLEN, UNSPECIFIED SEASONALITY: ICD-10-CM

## 2020-04-27 RX ORDER — FLUTICASONE PROPIONATE 50 MCG
2 SPRAY, SUSPENSION (ML) NASAL DAILY
Qty: 3 BOTTLE | Refills: 3 | Status: SHIPPED | OUTPATIENT
Start: 2020-04-27 | End: 2021-05-18 | Stop reason: SDUPTHER

## 2020-05-08 ENCOUNTER — TELEMEDICINE (OUTPATIENT)
Dept: UROLOGY | Facility: MEDICAL CENTER | Age: 65
End: 2020-05-08
Payer: MEDICARE

## 2020-05-08 DIAGNOSIS — N40.0 BPH WITHOUT OBSTRUCTION/LOWER URINARY TRACT SYMPTOMS: Primary | ICD-10-CM

## 2020-05-08 PROCEDURE — 99442 PR PHYS/QHP TELEPHONE EVALUATION 11-20 MIN: CPT | Performed by: UROLOGY

## 2020-10-09 ENCOUNTER — OFFICE VISIT (OUTPATIENT)
Dept: FAMILY MEDICINE CLINIC | Facility: CLINIC | Age: 65
End: 2020-10-09
Payer: MEDICARE

## 2020-10-09 VITALS
SYSTOLIC BLOOD PRESSURE: 122 MMHG | TEMPERATURE: 97.4 F | HEART RATE: 62 BPM | BODY MASS INDEX: 25.6 KG/M2 | WEIGHT: 189 LBS | HEIGHT: 72 IN | DIASTOLIC BLOOD PRESSURE: 78 MMHG | OXYGEN SATURATION: 97 % | RESPIRATION RATE: 16 BRPM

## 2020-10-09 DIAGNOSIS — M15.9 GENERALIZED OSTEOARTHRITIS: ICD-10-CM

## 2020-10-09 DIAGNOSIS — E55.9 VITAMIN D DEFICIENCY: ICD-10-CM

## 2020-10-09 DIAGNOSIS — Z00.00 MEDICARE ANNUAL WELLNESS VISIT, SUBSEQUENT: Primary | ICD-10-CM

## 2020-10-09 DIAGNOSIS — Z12.5 SCREENING PSA (PROSTATE SPECIFIC ANTIGEN): ICD-10-CM

## 2020-10-09 DIAGNOSIS — G89.4 CHRONIC PAIN SYNDROME: ICD-10-CM

## 2020-10-09 DIAGNOSIS — E03.9 HYPOTHYROIDISM, UNSPECIFIED TYPE: ICD-10-CM

## 2020-10-09 DIAGNOSIS — Z23 FLU VACCINE NEED: ICD-10-CM

## 2020-10-09 DIAGNOSIS — Z11.4 ENCOUNTER FOR SCREENING FOR HIV: ICD-10-CM

## 2020-10-09 DIAGNOSIS — J30.9 ALLERGIC RHINITIS, UNSPECIFIED SEASONALITY, UNSPECIFIED TRIGGER: ICD-10-CM

## 2020-10-09 DIAGNOSIS — N40.0 BPH WITHOUT OBSTRUCTION/LOWER URINARY TRACT SYMPTOMS: ICD-10-CM

## 2020-10-09 PROBLEM — I83.893 VARICOSE VEINS OF BILATERAL LOWER EXTREMITIES WITH OTHER COMPLICATIONS: Status: ACTIVE | Noted: 2020-06-11

## 2020-10-09 LAB
25(OH)D3 SERPL-MCNC: 43.1 NG/ML (ref 30–100)
ALBUMIN SERPL BCP-MCNC: 4.3 G/DL (ref 3.5–5)
ALP SERPL-CCNC: 60 U/L (ref 46–116)
ALT SERPL W P-5'-P-CCNC: 33 U/L (ref 12–78)
ANION GAP SERPL CALCULATED.3IONS-SCNC: 2 MMOL/L (ref 4–13)
AST SERPL W P-5'-P-CCNC: 20 U/L (ref 5–45)
BILIRUB SERPL-MCNC: 0.46 MG/DL (ref 0.2–1)
BUN SERPL-MCNC: 18 MG/DL (ref 5–25)
CALCIUM SERPL-MCNC: 9.5 MG/DL (ref 8.3–10.1)
CHLORIDE SERPL-SCNC: 110 MMOL/L (ref 100–108)
CHOLEST SERPL-MCNC: 191 MG/DL (ref 50–200)
CO2 SERPL-SCNC: 30 MMOL/L (ref 21–32)
CREAT SERPL-MCNC: 0.6 MG/DL (ref 0.6–1.3)
ERYTHROCYTE [DISTWIDTH] IN BLOOD BY AUTOMATED COUNT: 13.9 % (ref 11.6–15.1)
GFR SERPL CREATININE-BSD FRML MDRD: 105 ML/MIN/1.73SQ M
GLUCOSE P FAST SERPL-MCNC: 106 MG/DL (ref 65–99)
HCT VFR BLD AUTO: 46.2 % (ref 36.5–49.3)
HDLC SERPL-MCNC: 57 MG/DL
HGB BLD-MCNC: 15 G/DL (ref 12–17)
LDLC SERPL CALC-MCNC: 120 MG/DL (ref 0–100)
MCH RBC QN AUTO: 33.3 PG (ref 26.8–34.3)
MCHC RBC AUTO-ENTMCNC: 32.5 G/DL (ref 31.4–37.4)
MCV RBC AUTO: 103 FL (ref 82–98)
NONHDLC SERPL-MCNC: 134 MG/DL
PLATELET # BLD AUTO: 186 THOUSANDS/UL (ref 149–390)
PMV BLD AUTO: 11.4 FL (ref 8.9–12.7)
POTASSIUM SERPL-SCNC: 4.1 MMOL/L (ref 3.5–5.3)
PROT SERPL-MCNC: 7.5 G/DL (ref 6.4–8.2)
PSA SERPL-MCNC: 1.8 NG/ML (ref 0–4)
RBC # BLD AUTO: 4.5 MILLION/UL (ref 3.88–5.62)
SODIUM SERPL-SCNC: 142 MMOL/L (ref 136–145)
TRIGL SERPL-MCNC: 70 MG/DL
TSH SERPL DL<=0.05 MIU/L-ACNC: 1.1 UIU/ML (ref 0.36–3.74)
WBC # BLD AUTO: 10.48 THOUSAND/UL (ref 4.31–10.16)

## 2020-10-09 PROCEDURE — 84443 ASSAY THYROID STIM HORMONE: CPT | Performed by: FAMILY MEDICINE

## 2020-10-09 PROCEDURE — 85027 COMPLETE CBC AUTOMATED: CPT | Performed by: FAMILY MEDICINE

## 2020-10-09 PROCEDURE — 99214 OFFICE O/P EST MOD 30 MIN: CPT | Performed by: FAMILY MEDICINE

## 2020-10-09 PROCEDURE — 90662 IIV NO PRSV INCREASED AG IM: CPT

## 2020-10-09 PROCEDURE — G0439 PPPS, SUBSEQ VISIT: HCPCS | Performed by: FAMILY MEDICINE

## 2020-10-09 PROCEDURE — 87389 HIV-1 AG W/HIV-1&-2 AB AG IA: CPT | Performed by: FAMILY MEDICINE

## 2020-10-09 PROCEDURE — 80061 LIPID PANEL: CPT | Performed by: FAMILY MEDICINE

## 2020-10-09 PROCEDURE — 82306 VITAMIN D 25 HYDROXY: CPT | Performed by: FAMILY MEDICINE

## 2020-10-09 PROCEDURE — 1123F ACP DISCUSS/DSCN MKR DOCD: CPT | Performed by: FAMILY MEDICINE

## 2020-10-09 PROCEDURE — 36415 COLL VENOUS BLD VENIPUNCTURE: CPT | Performed by: FAMILY MEDICINE

## 2020-10-09 PROCEDURE — G0103 PSA SCREENING: HCPCS | Performed by: FAMILY MEDICINE

## 2020-10-09 PROCEDURE — G0008 ADMIN INFLUENZA VIRUS VAC: HCPCS

## 2020-10-09 PROCEDURE — 80053 COMPREHEN METABOLIC PANEL: CPT | Performed by: FAMILY MEDICINE

## 2020-10-09 RX ORDER — METRONIDAZOLE 7.5 MG/G
GEL TOPICAL
COMMUNITY
Start: 2020-08-06

## 2020-10-09 RX ORDER — DOXYCYCLINE 50 MG/1
TABLET ORAL
COMMUNITY
Start: 2020-09-21

## 2020-10-12 LAB — HIV 1+2 AB+HIV1 P24 AG SERPL QL IA: NORMAL

## 2020-12-07 ENCOUNTER — TELEPHONE (OUTPATIENT)
Dept: FAMILY MEDICINE CLINIC | Facility: CLINIC | Age: 65
End: 2020-12-07

## 2020-12-07 DIAGNOSIS — M54.2 NECK PAIN: Primary | ICD-10-CM

## 2020-12-07 RX ORDER — TIZANIDINE 2 MG/1
2 TABLET ORAL EVERY 8 HOURS PRN
Qty: 30 TABLET | Refills: 0 | Status: SHIPPED | OUTPATIENT
Start: 2020-12-07 | End: 2021-01-14 | Stop reason: SDUPTHER

## 2021-01-14 DIAGNOSIS — M54.2 NECK PAIN: ICD-10-CM

## 2021-01-14 RX ORDER — TIZANIDINE 2 MG/1
2 TABLET ORAL EVERY 8 HOURS PRN
Qty: 30 TABLET | Refills: 0 | Status: SHIPPED | OUTPATIENT
Start: 2021-01-14 | End: 2021-03-12 | Stop reason: SDUPTHER

## 2021-01-21 ENCOUNTER — ANESTHESIA EVENT (OUTPATIENT)
Dept: GASTROENTEROLOGY | Facility: HOSPITAL | Age: 66
End: 2021-01-21

## 2021-01-22 ENCOUNTER — HOSPITAL ENCOUNTER (OUTPATIENT)
Dept: GASTROENTEROLOGY | Facility: HOSPITAL | Age: 66
Setting detail: OUTPATIENT SURGERY
Discharge: HOME | End: 2021-01-22
Attending: COLON & RECTAL SURGERY
Payer: MEDICARE

## 2021-01-22 ENCOUNTER — ANESTHESIA (OUTPATIENT)
Dept: GASTROENTEROLOGY | Facility: HOSPITAL | Age: 66
End: 2021-01-22

## 2021-01-22 VITALS
HEIGHT: 72 IN | HEART RATE: 68 BPM | BODY MASS INDEX: 25.6 KG/M2 | WEIGHT: 189 LBS | SYSTOLIC BLOOD PRESSURE: 106 MMHG | RESPIRATION RATE: 20 BRPM | TEMPERATURE: 97.2 F | DIASTOLIC BLOOD PRESSURE: 60 MMHG | OXYGEN SATURATION: 96 %

## 2021-01-22 VITALS — HEART RATE: 52 BPM

## 2021-01-22 DIAGNOSIS — Z12.11 ENCOUNTER FOR SCREENING FOR MALIGNANT NEOPLASM OF COLON: ICD-10-CM

## 2021-01-22 DIAGNOSIS — Z86.010 PERSONAL HISTORY OF COLONIC POLYPS: ICD-10-CM

## 2021-01-22 RX ORDER — PROPOFOL 10 MG/ML
INJECTION, EMULSION INTRAVENOUS AS NEEDED
Status: DISCONTINUED | OUTPATIENT
Start: 2021-01-22 | End: 2021-01-22

## 2021-01-22 RX ORDER — SODIUM CHLORIDE 9 MG/ML
50 INJECTION, SOLUTION INTRAVENOUS CONTINUOUS
Status: DISCONTINUED | OUTPATIENT
Start: 2021-01-22 | End: 2021-01-26 | Stop reason: HOSPADM

## 2021-01-22 RX ORDER — LIDOCAINE HYDROCHLORIDE 10 MG/ML
INJECTION, SOLUTION EPIDURAL; INFILTRATION; INTRACAUDAL; PERINEURAL AS NEEDED
Status: DISCONTINUED | OUTPATIENT
Start: 2021-01-22 | End: 2021-01-22

## 2021-01-22 RX ADMIN — PROPOFOL 50 MG: 10 INJECTION, EMULSION INTRAVENOUS at 10:02

## 2021-01-22 RX ADMIN — SODIUM CHLORIDE 50 ML/HR: 0.9 INJECTION, SOLUTION INTRAVENOUS at 08:10

## 2021-01-22 RX ADMIN — PROPOFOL 50 MG: 10 INJECTION, EMULSION INTRAVENOUS at 09:58

## 2021-01-22 RX ADMIN — PROPOFOL 150 MG: 10 INJECTION, EMULSION INTRAVENOUS at 09:54

## 2021-01-22 RX ADMIN — SODIUM CHLORIDE: 0.9 INJECTION, SOLUTION INTRAVENOUS at 09:49

## 2021-01-22 RX ADMIN — LIDOCAINE HYDROCHLORIDE 50 MG: 10 INJECTION, SOLUTION EPIDURAL; INFILTRATION; INTRACAUDAL; PERINEURAL at 09:54

## 2021-01-22 RX ADMIN — PROPOFOL 50 MG: 10 INJECTION, EMULSION INTRAVENOUS at 09:56

## 2021-01-22 RX ADMIN — PROPOFOL 50 MG: 10 INJECTION, EMULSION INTRAVENOUS at 10:03

## 2021-01-22 RX ADMIN — PROPOFOL 50 MG: 10 INJECTION, EMULSION INTRAVENOUS at 09:57

## 2021-01-22 RX ADMIN — PROPOFOL 50 MG: 10 INJECTION, EMULSION INTRAVENOUS at 10:01

## 2021-01-22 NOTE — ANESTHESIA PREPROCEDURE EVALUATION
Procedure:  COLONOSCOPY    Relevant Problems   ENDO   (+) Hypothyroidism   (+) Hypothyroidism due to acquired atrophy of thyroid      /RENAL   (+) BPH without obstruction/lower urinary tract symptoms      MUSCULOSKELETAL   (+) Cervical spondylosis   (+) Chondromalacia patellae   (+) Degeneration of intervertebral disc of cervical region   (+) Generalized osteoarthritis   (+) Localized primary osteoarthritis of wrist   (+) Localized, primary osteoarthritis   (+) Osteoarthritis of knee      NEURO/PSYCH   (+) Anxiety   (+) Chronic pain syndrome   (+) Post traumatic stress disorder (PTSD)   (+) Reactive depression      Other   (+) Chronic osteomyelitis of lower leg (HCC)        Physical Exam    Airway  Comment: Neck ROM good  Extreme flexion and extension and side movement somewhat limited  Neck C1 C2 fusion   Mallampati score: II  TM Distance: >3 FB  Neck ROM: full     Dental       Cardiovascular  Cardiovascular exam normal    Pulmonary  Pulmonary exam normal     Other Findings        Anesthesia Plan  ASA Score- 2     Anesthesia Type- IV sedation with anesthesia with ASA Monitors  Additional Monitors:   Airway Plan:           Plan Factors-Exercise tolerance (METS): >4 METS  Chart reviewed  Existing labs reviewed  Patient is not a current smoker  Patient not instructed to abstain from smoking on day of procedure  Induction-     Postoperative Plan-     Informed Consent- Anesthetic plan and risks discussed with patient  I personally reviewed this patient with the CRNA  Discussed and agreed on the Anesthesia Plan with the CRNA  Brian Shelton           No results found for: HGBA1C    Lab Results   Component Value Date     07/31/2015    K 4 1 10/09/2020     (H) 10/09/2020    CO2 30 10/09/2020    ANIONGAP 7 07/31/2015    BUN 18 10/09/2020    CREATININE 0 60 10/09/2020    GLUCOSE 106 07/31/2015    GLUF 106 (H) 10/09/2020    CALCIUM 9 5 10/09/2020    AST 20 10/09/2020    ALT 33 10/09/2020    ALKPHOS 60 10/09/2020    PROT 7 2 07/31/2015    BILITOT 0 60 07/31/2015    EGFR 105 10/09/2020       Lab Results   Component Value Date    WBC 10 48 (H) 10/09/2020    HGB 15 0 10/09/2020    HCT 46 2 10/09/2020     (H) 10/09/2020     10/09/2020    EKG reveals normal sinus rhythm with normal axis   No acute ST-T wave changes

## 2021-01-22 NOTE — INTERVAL H&P NOTE
H&P reviewed  After examining the patient I find no changes in the patients condition since the H&P had been written      Vitals:    01/22/21 0755   BP: 109/78   Pulse: 74   Resp: 20   Temp: (!) 97 2 °F (36 2 °C)   SpO2: 96%

## 2021-01-22 NOTE — ANESTHESIA POSTPROCEDURE EVALUATION
Post-Op Assessment Note    CV Status:  Stable    Pain management: adequate     Mental Status:  Alert and awake   Hydration Status:  Euvolemic   PONV Controlled:  Controlled   Airway Patency:  Patent      Post Op Vitals Reviewed: Yes      Staff: CRNA         No complications documented      BP   91/54   Temp     Pulse 53   Resp 16   SpO2 96

## 2021-01-22 NOTE — DISCHARGE INSTRUCTIONS
[facility] [address], [address] [place] [address] [id number]  Phone: [phone]    DISCHARGE INSTRUCTIONS:    1   _x__ Complete Exam - Normal    2   ___ Exam normal, but entire colon not seen  We will discuss this with you  3   ___ Polyp(s) removed by "burning" - NO pathology report will follow    4   ___ Polyp(s) removed by excision  Pathology report will be available in 4-5 days   Someone from our office will call you with results  5   ___ Exam prompted biopsies  Pathology report will be available in 4-5 days   Someone from our office will call you with results  6   ___ Exam demonstrated findings that need treatment  Prescriptions will be   Given to you  Return to our office in ____ weeks  Please call for appt  7   ___ Original office visit or colonoscopy findings necessitate an office visit  Please call to set up a new appointment    8   ___ Medication  __________________________________________        [address]:    - Go straight home and rest today    - No driving or drinking alcohol for 24 hours    - Resume regular diet and medications unless otherwise instructed  Coumadin and Plavix are blood thinners  You can resume these medications on __________      IF YOU ARE HAVING ANY FEVER, BLEEDING OR PERSISTENT PAIN IN THE ABDOMEN, PLEASE CALL OUR OFFICE ANY DAY OR TIME  [phone]

## 2021-03-08 ENCOUNTER — OFFICE VISIT (OUTPATIENT)
Dept: FAMILY MEDICINE CLINIC | Facility: CLINIC | Age: 66
End: 2021-03-08
Payer: MEDICARE

## 2021-03-08 VITALS
RESPIRATION RATE: 16 BRPM | BODY MASS INDEX: 25.73 KG/M2 | OXYGEN SATURATION: 97 % | SYSTOLIC BLOOD PRESSURE: 124 MMHG | HEART RATE: 64 BPM | TEMPERATURE: 98.2 F | WEIGHT: 190 LBS | DIASTOLIC BLOOD PRESSURE: 80 MMHG | HEIGHT: 72 IN

## 2021-03-08 DIAGNOSIS — R42 VERTIGO: Primary | ICD-10-CM

## 2021-03-08 PROCEDURE — 99214 OFFICE O/P EST MOD 30 MIN: CPT | Performed by: FAMILY MEDICINE

## 2021-03-08 RX ORDER — MECLIZINE HYDROCHLORIDE 25 MG/1
25 TABLET ORAL EVERY 8 HOURS PRN
Qty: 30 TABLET | Refills: 0 | Status: SHIPPED | OUTPATIENT
Start: 2021-03-08 | End: 2021-03-08 | Stop reason: SDUPTHER

## 2021-03-08 RX ORDER — MECLIZINE HYDROCHLORIDE 25 MG/1
25 TABLET ORAL EVERY 8 HOURS PRN
Qty: 30 TABLET | Refills: 0 | Status: SHIPPED | OUTPATIENT
Start: 2021-03-08 | End: 2021-04-20

## 2021-03-08 RX ORDER — FLUOROMETHOLONE ACETATE 1 MG/ML
SUSPENSION/ DROPS OPHTHALMIC
COMMUNITY
Start: 2021-03-02 | End: 2022-07-22 | Stop reason: ALTCHOICE

## 2021-03-08 NOTE — PROGRESS NOTES
Assessment/Plan:    Discussed diagnostic and treatment options with patient  Patient will try meclizine 25 mg 1 q 8 hours p r n , caution regarding drowsiness  Recommend increase fluids and remain well hydrated and change positions slowly and carefully  Return to the office in 1 week or call sooner p r n   If symptoms persist discussed referral to ENT and or physical therapy  Diagnoses and all orders for this visit:    Vertigo  -     Discontinue: meclizine (ANTIVERT) 25 mg tablet; Take 1 tablet (25 mg total) by mouth every 8 (eight) hours as needed for dizziness  -     meclizine (ANTIVERT) 25 mg tablet; Take 1 tablet (25 mg total) by mouth every 8 (eight) hours as needed for dizziness    Other orders  -     Flarex 0 1 % ophthalmic suspension          Subjective:      Patient ID: Sean Butt is a 78 y o  male  Patient complains of recurrent vertigo over the past 1 week  Patient last had a bout of vertigo 2 years ago  Patient admits to nausea and diaphoresis with the dizziness but denies any vomiting  Patient admits to vertigo associated with changing positions such as sitting up from a lying position or rolling over from side to side in bed  He admits to chronic neck pain and headaches  Patient denies any vision changes  He denies any chest pain, shortness of breath or palpitations  No treatment by patient  Dizziness  This is a recurrent problem  The current episode started in the past 7 days  The problem occurs intermittently  The problem has been gradually worsening  Associated symptoms include diaphoresis, headaches, nausea, neck pain and vertigo  Pertinent negatives include no abdominal pain, change in bowel habit, chest pain, chills, fatigue, fever, numbness, visual change, vomiting or weakness  The symptoms are aggravated by bending and standing  He has tried rest and drinking for the symptoms  The treatment provided mild relief         The following portions of the patient's history were reviewed and updated as appropriate: allergies, current medications, past family history, past medical history, past social history, past surgical history and problem list     Review of Systems   Constitutional: Positive for diaphoresis  Negative for chills, fatigue and fever  Cardiovascular: Negative for chest pain  Gastrointestinal: Positive for nausea  Negative for abdominal pain, change in bowel habit and vomiting  Musculoskeletal: Positive for neck pain  Neurological: Positive for dizziness, vertigo and headaches  Negative for weakness and numbness  Objective:      /80   Pulse 64   Temp 98 2 °F (36 8 °C) (Tympanic)   Resp 16   Ht 6' (1 829 m)   Wt 86 2 kg (190 lb)   SpO2 97%   BMI 25 77 kg/m²          Physical Exam  Constitutional:       General: He is not in acute distress  Appearance: Normal appearance  He is not toxic-appearing or diaphoretic  HENT:      Head: Normocephalic  Mouth/Throat:      Mouth: Mucous membranes are moist    Eyes:      General: No scleral icterus  Conjunctiva/sclera: Conjunctivae normal    Neck:      Musculoskeletal: Neck supple  Muscular tenderness present  Vascular: No carotid bruit  Cardiovascular:      Rate and Rhythm: Normal rate and regular rhythm  Pulmonary:      Effort: Pulmonary effort is normal       Breath sounds: Normal breath sounds  Abdominal:      Palpations: Abdomen is soft  Tenderness: There is no abdominal tenderness  Musculoskeletal:      Right lower leg: No edema  Left lower leg: No edema  Lymphadenopathy:      Cervical: No cervical adenopathy  Skin:     General: Skin is warm and dry  Neurological:      Mental Status: He is alert and oriented to person, place, and time  Comments: Positive reproducible vertigo with changing positions  Psychiatric:         Mood and Affect: Mood normal          BMI Counseling: Body mass index is 25 77 kg/m²   The BMI is above normal  Nutrition recommendations include 3-5 servings of fruits/vegetables daily and consuming healthier snacks  Exercise recommendations include moderate aerobic physical activity for 150 minutes/week

## 2021-03-12 DIAGNOSIS — M54.2 NECK PAIN: ICD-10-CM

## 2021-03-12 RX ORDER — TIZANIDINE 2 MG/1
2 TABLET ORAL EVERY 8 HOURS PRN
Qty: 30 TABLET | Refills: 0 | Status: SHIPPED | OUTPATIENT
Start: 2021-03-12 | End: 2021-04-20

## 2021-03-12 NOTE — TELEPHONE ENCOUNTER
Pt stated at last visit he was not taking this medication  Pt today called in for a refill       Please review  Thank you

## 2021-03-30 DIAGNOSIS — Z23 ENCOUNTER FOR IMMUNIZATION: ICD-10-CM

## 2021-04-01 ENCOUNTER — IMMUNIZATIONS (OUTPATIENT)
Dept: FAMILY MEDICINE CLINIC | Facility: HOSPITAL | Age: 66
End: 2021-04-01

## 2021-04-01 DIAGNOSIS — Z23 ENCOUNTER FOR IMMUNIZATION: Primary | ICD-10-CM

## 2021-04-01 PROCEDURE — 91301 SARS-COV-2 / COVID-19 MRNA VACCINE (MODERNA) 100 MCG: CPT

## 2021-04-01 PROCEDURE — 0011A SARS-COV-2 / COVID-19 MRNA VACCINE (MODERNA) 100 MCG: CPT

## 2021-04-20 ENCOUNTER — OFFICE VISIT (OUTPATIENT)
Dept: FAMILY MEDICINE CLINIC | Facility: CLINIC | Age: 66
End: 2021-04-20
Payer: MEDICARE

## 2021-04-20 VITALS
HEIGHT: 72 IN | OXYGEN SATURATION: 94 % | BODY MASS INDEX: 25.81 KG/M2 | DIASTOLIC BLOOD PRESSURE: 78 MMHG | TEMPERATURE: 98.3 F | SYSTOLIC BLOOD PRESSURE: 129 MMHG | HEART RATE: 63 BPM | WEIGHT: 190.6 LBS

## 2021-04-20 DIAGNOSIS — R03.0 ELEVATED BLOOD PRESSURE READING WITHOUT DIAGNOSIS OF HYPERTENSION: ICD-10-CM

## 2021-04-20 DIAGNOSIS — Z79.1 NSAID LONG-TERM USE: ICD-10-CM

## 2021-04-20 DIAGNOSIS — E03.9 HYPOTHYROIDISM, UNSPECIFIED TYPE: ICD-10-CM

## 2021-04-20 DIAGNOSIS — R42 VERTIGO: ICD-10-CM

## 2021-04-20 DIAGNOSIS — G89.4 CHRONIC PAIN SYNDROME: ICD-10-CM

## 2021-04-20 DIAGNOSIS — R53.83 FATIGUE, UNSPECIFIED TYPE: ICD-10-CM

## 2021-04-20 DIAGNOSIS — F41.9 ANXIETY: ICD-10-CM

## 2021-04-20 DIAGNOSIS — M54.2 CHRONIC NECK PAIN: Primary | ICD-10-CM

## 2021-04-20 DIAGNOSIS — R00.2 PALPITATION: ICD-10-CM

## 2021-04-20 DIAGNOSIS — G89.29 CHRONIC NECK PAIN: Primary | ICD-10-CM

## 2021-04-20 DIAGNOSIS — E78.00 ELEVATED LDL CHOLESTEROL LEVEL: ICD-10-CM

## 2021-04-20 DIAGNOSIS — L71.9 ROSACEA: ICD-10-CM

## 2021-04-20 PROCEDURE — 99215 OFFICE O/P EST HI 40 MIN: CPT | Performed by: FAMILY MEDICINE

## 2021-04-20 RX ORDER — MECLIZINE HYDROCHLORIDE 25 MG/1
TABLET ORAL
Qty: 30 TABLET | Refills: 0 | Status: SHIPPED | OUTPATIENT
Start: 2021-04-20 | End: 2021-04-20 | Stop reason: SDUPTHER

## 2021-04-20 RX ORDER — MECLIZINE HYDROCHLORIDE 25 MG/1
TABLET ORAL
Qty: 135 TABLET | Refills: 0 | Status: SHIPPED | OUTPATIENT
Start: 2021-04-20 | End: 2021-10-13 | Stop reason: ALTCHOICE
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
## 2021-04-20 NOTE — TELEPHONE ENCOUNTER
[facility] this is a mail order pharmacy and it requires a 90 supply please sign and resend   thanks

## 2021-04-27 ENCOUNTER — LAB (OUTPATIENT)
Dept: LAB | Facility: HOSPITAL | Age: 66
End: 2021-04-27
Attending: FAMILY MEDICINE
Payer: MEDICARE

## 2021-04-27 ENCOUNTER — HOSPITAL ENCOUNTER (OUTPATIENT)
Dept: NON INVASIVE DIAGNOSTICS | Facility: HOSPITAL | Age: 66
Discharge: HOME | End: 2021-04-27
Attending: FAMILY MEDICINE
Payer: MEDICARE

## 2021-04-27 DIAGNOSIS — E03.9 HYPOTHYROIDISM, UNSPECIFIED TYPE: ICD-10-CM

## 2021-04-27 DIAGNOSIS — E78.00 ELEVATED LDL CHOLESTEROL LEVEL: ICD-10-CM

## 2021-04-27 DIAGNOSIS — R53.83 FATIGUE, UNSPECIFIED TYPE: ICD-10-CM

## 2021-04-27 DIAGNOSIS — Z79.1 NSAID LONG-TERM USE: ICD-10-CM

## 2021-04-27 DIAGNOSIS — R00.2 PALPITATION: ICD-10-CM

## 2021-04-27 LAB
ALBUMIN SERPL BCP-MCNC: 3.8 G/DL (ref 3.5–5)
ALP SERPL-CCNC: 53 U/L (ref 46–116)
ALT SERPL W P-5'-P-CCNC: 29 U/L (ref 12–78)
ANION GAP SERPL CALCULATED.3IONS-SCNC: 5 MMOL/L (ref 4–13)
AST SERPL W P-5'-P-CCNC: 24 U/L (ref 5–45)
ATRIAL RATE: 51 BPM
BASOPHILS # BLD AUTO: 0.05 THOUSANDS/ΜL (ref 0–0.1)
BASOPHILS NFR BLD AUTO: 1 % (ref 0–1)
BILIRUB SERPL-MCNC: 0.38 MG/DL (ref 0.2–1)
BILIRUB UR QL STRIP: NEGATIVE
BUN SERPL-MCNC: 17 MG/DL (ref 5–25)
CALCIUM SERPL-MCNC: 9.1 MG/DL (ref 8.3–10.1)
CHLORIDE SERPL-SCNC: 108 MMOL/L (ref 100–108)
CHOLEST SERPL-MCNC: 189 MG/DL (ref 50–200)
CLARITY UR: CLEAR
CO2 SERPL-SCNC: 31 MMOL/L (ref 21–32)
COLOR UR: YELLOW
CREAT SERPL-MCNC: 0.62 MG/DL (ref 0.6–1.3)
CRP SERPL QL: <3 MG/L
EOSINOPHIL # BLD AUTO: 0.14 THOUSAND/ΜL (ref 0–0.61)
EOSINOPHIL NFR BLD AUTO: 2 % (ref 0–6)
ERYTHROCYTE [DISTWIDTH] IN BLOOD BY AUTOMATED COUNT: 13.8 % (ref 11.6–15.1)
GFR SERPL CREATININE-BSD FRML MDRD: 103 ML/MIN/1.73SQ M
GLUCOSE P FAST SERPL-MCNC: 105 MG/DL (ref 65–99)
GLUCOSE UR STRIP-MCNC: NEGATIVE MG/DL
HCT VFR BLD AUTO: 44.7 % (ref 36.5–49.3)
HDLC SERPL-MCNC: 52 MG/DL
HGB BLD-MCNC: 14.2 G/DL (ref 12–17)
HGB UR QL STRIP.AUTO: NEGATIVE
IMM GRANULOCYTES # BLD AUTO: 0.02 THOUSAND/UL (ref 0–0.2)
IMM GRANULOCYTES NFR BLD AUTO: 0 % (ref 0–2)
KETONES UR STRIP-MCNC: NEGATIVE MG/DL
LDLC SERPL CALC-MCNC: 124 MG/DL (ref 0–100)
LEUKOCYTE ESTERASE UR QL STRIP: NEGATIVE
LYMPHOCYTES # BLD AUTO: 2.25 THOUSANDS/ΜL (ref 0.6–4.47)
LYMPHOCYTES NFR BLD AUTO: 29 % (ref 14–44)
MCH RBC QN AUTO: 32.8 PG (ref 26.8–34.3)
MCHC RBC AUTO-ENTMCNC: 31.8 G/DL (ref 31.4–37.4)
MCV RBC AUTO: 103 FL (ref 82–98)
MONOCYTES # BLD AUTO: 0.59 THOUSAND/ΜL (ref 0.17–1.22)
MONOCYTES NFR BLD AUTO: 8 % (ref 4–12)
NEUTROPHILS # BLD AUTO: 4.85 THOUSANDS/ΜL (ref 1.85–7.62)
NEUTS SEG NFR BLD AUTO: 60 % (ref 43–75)
NITRITE UR QL STRIP: NEGATIVE
NRBC BLD AUTO-RTO: 0 /100 WBCS
P AXIS: 71 DEGREES
PH UR STRIP.AUTO: 6.5 [PH]
PLATELET # BLD AUTO: 178 THOUSANDS/UL (ref 149–390)
PMV BLD AUTO: 10.6 FL (ref 8.9–12.7)
POTASSIUM SERPL-SCNC: 4.4 MMOL/L (ref 3.5–5.3)
PR INTERVAL: 182 MS
PROT SERPL-MCNC: 7 G/DL (ref 6.4–8.2)
PROT UR STRIP-MCNC: NEGATIVE MG/DL
QRS AXIS: 29 DEGREES
QRSD INTERVAL: 88 MS
QT INTERVAL: 422 MS
QTC INTERVAL: 388 MS
RBC # BLD AUTO: 4.33 MILLION/UL (ref 3.88–5.62)
SODIUM SERPL-SCNC: 144 MMOL/L (ref 136–145)
SP GR UR STRIP.AUTO: <=1.005 (ref 1–1.03)
T WAVE AXIS: 61 DEGREES
TRIGL SERPL-MCNC: 63 MG/DL
TSH SERPL DL<=0.05 MIU/L-ACNC: 0.75 UIU/ML (ref 0.36–3.74)
UROBILINOGEN UR QL STRIP.AUTO: 0.2 E.U./DL
VENTRICULAR RATE: 51 BPM
WBC # BLD AUTO: 7.9 THOUSAND/UL (ref 4.31–10.16)

## 2021-04-27 PROCEDURE — 86618 LYME DISEASE ANTIBODY: CPT

## 2021-04-27 PROCEDURE — 86663 EPSTEIN-BARR ANTIBODY: CPT

## 2021-04-27 PROCEDURE — 86140 C-REACTIVE PROTEIN: CPT

## 2021-04-27 PROCEDURE — 93005 ELECTROCARDIOGRAM TRACING: CPT

## 2021-04-27 PROCEDURE — 85025 COMPLETE CBC W/AUTO DIFF WBC: CPT

## 2021-04-27 PROCEDURE — 86664 EPSTEIN-BARR NUCLEAR ANTIGEN: CPT

## 2021-04-27 PROCEDURE — 81003 URINALYSIS AUTO W/O SCOPE: CPT | Performed by: FAMILY MEDICINE

## 2021-04-27 PROCEDURE — 93010 ELECTROCARDIOGRAM REPORT: CPT | Performed by: INTERNAL MEDICINE

## 2021-04-27 PROCEDURE — 86665 EPSTEIN-BARR CAPSID VCA: CPT

## 2021-04-27 PROCEDURE — 80061 LIPID PANEL: CPT

## 2021-04-27 PROCEDURE — 36415 COLL VENOUS BLD VENIPUNCTURE: CPT

## 2021-04-27 PROCEDURE — 80053 COMPREHEN METABOLIC PANEL: CPT

## 2021-04-27 PROCEDURE — 86038 ANTINUCLEAR ANTIBODIES: CPT

## 2021-04-27 PROCEDURE — 84443 ASSAY THYROID STIM HORMONE: CPT

## 2021-04-28 LAB
B BURGDOR IGG+IGM SER-ACNC: 14
EBV NA IGG SER IA-ACNC: 117 U/ML (ref 0–17.9)
EBV VCA IGG SER IA-ACNC: 167 U/ML (ref 0–17.9)
EBV VCA IGM SER IA-ACNC: <36 U/ML (ref 0–35.9)
INTERPRETATION: ABNORMAL
RYE IGE QN: NEGATIVE

## 2021-04-29 ENCOUNTER — IMMUNIZATIONS (OUTPATIENT)
Dept: FAMILY MEDICINE CLINIC | Facility: HOSPITAL | Age: 66
End: 2021-04-29

## 2021-04-29 DIAGNOSIS — Z23 ENCOUNTER FOR IMMUNIZATION: Primary | ICD-10-CM

## 2021-04-29 PROCEDURE — 91301 SARS-COV-2 / COVID-19 MRNA VACCINE (MODERNA) 100 MCG: CPT

## 2021-04-29 PROCEDURE — 0012A SARS-COV-2 / COVID-19 MRNA VACCINE (MODERNA) 100 MCG: CPT

## 2021-05-03 ENCOUNTER — EVALUATION (OUTPATIENT)
Dept: PHYSICAL THERAPY | Facility: CLINIC | Age: 66
End: 2021-05-03
Payer: MEDICARE

## 2021-05-03 DIAGNOSIS — R42 DIZZINESS: Primary | ICD-10-CM

## 2021-05-03 PROCEDURE — 97110 THERAPEUTIC EXERCISES: CPT | Performed by: PHYSICAL THERAPIST

## 2021-05-03 PROCEDURE — 97162 PT EVAL MOD COMPLEX 30 MIN: CPT | Performed by: PHYSICAL THERAPIST

## 2021-05-03 NOTE — PROGRESS NOTES
PT Evaluation     Today's date: 5/3/2021  Patient name: Brendon Garner  : 1955  MRN: [id number]  Referring provider: Caprice Hinson PA-C  Dx:   Encounter Diagnosis     ICD-10-CM    1  Dizziness  R42 Ambulatory referral to Physical Therapy                  Assessment  Assessment details: Brendon Garner is a pleasant 78 y o  male who presents with signs and symptoms correlating with referring diagnosis  No further referral appears necessary at this time based upon examination results  The patient's greatest concerns are decreasing dizziness and improving other symptoms related to the cervical spine  He presents with a movement impairment diagnosis of hypomobile cervical retraction ROM  This seems to be increasing his sensitivity to headaches, dizziness, and limitations to ROM  He does not present with any central or vestibular components at the time of the exam, leading to cervicogenic involvement  He was educated that at this time he would benefit from improving available cervical ROM and working on tolerance to daily activities  Negative prognostic indicators: life stress and cervical fusion  Positive prognostic indicators: good motivation and goals  Please contact me if you have any further questions or recommendations  Thank you very much for the kind referral       Impairments: abnormal or restricted ROM, abnormal movement, activity intolerance, impaired balance, impaired physical strength, pain with function and poor posture     Symptom irritability: moderateUnderstanding of Dx/Px/POC: good   Prognosis: good    Goals  STGs  1  Decrease dizziness by 20% in 2-4 weeks  2  Improve cervical ROM by 25% in 2-4 weeks  LTGs  1  Decrease headache frequency by 60% in 6-8 weeks  2  Decrease likelihood of dizziness to 1x/day in 6-8 weeks  3  Perform ADLs without fear of dizziness in 6-8 weeks          Plan  Plan details: Pt will trial PT at this location, however, due to difficulty driving may benefit from transferring to our clinic closer to his house  If this is the case, I will reach out to the treating therapist there to inform that it is more cervicogenic related changes compared to vestibular  Patient would benefit from: skilled physical therapy  Referral necessary: No  Planned modality interventions: cryotherapy, TENS and thermotherapy: hydrocollator packs  Planned therapy interventions: manual therapy, therapeutic training, stretching, strengthening, therapeutic activities, therapeutic exercise, patient education, activity modification, neuromuscular re-education and home exercise program  Frequency: 2x week  Duration in weeks: 8  Treatment plan discussed with: patient        Subjective Evaluation    History of Present Illness  Mechanism of injury: Pt is a 78 y o  male presenting w/ dizziness starting in 2019  He states that when he experiences the dizziness the symptoms last for a few minutes, causing the room to spin, nauseousness, and light headedness  He requires to sit down to ensure that the symptoms don't worsen or he doesn't fall over  The dizziness is variable and does not always occur during the day or with any particular movement  However, has noticed rolling in bed, sitting up from supine, or turning his throughout the day  He had a previous cervical C1-2 fusion (2015) and believes some of the symptoms are related to this  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  Red Flags: none   PMH: h/o C1-2 fusion, L knee pain, L vascular surgery           Not a recurrent problem   Quality of life: good    Patient Goals  Patient goals for therapy: increased strength, decreased pain, increased motion and independence with ADLs/IADLs          Objective     Concurrent Complaints  Positive for headaches (Cervical/ tension), nausea/motion sickness and tinnitus (not associated )   Negative for visual change, hearing loss, memory loss, aural fullness, poor concentration and peripheral neuropathy    Active Range of Motion   Cervical/Thoracic Spine       Cervical  Subcranial protraction:  WFL   Subcranial retraction:   Restriction level: maximal  Flexion:  Restriction level: minimal  Extension:  Restriction level: moderate  Left lateral flexion:  Restriction level: maximal  Right lateral flexion:  Restriction level moderate  Left rotation:  Restriction level: maximal  Right rotation:  Restriction level: maximal    Additional Active Range of Motion Details  Posture: good shoulder placement, forward head with ability to correct  Neuro Exam:     Dizziness  Positive for disequilibrium, vertigo, motion sickness, light-headedness and floating or swimming  Negative for oscillopsia, rocking or swaying and diplopia  Exacerbating factors  Positive for bending over, rolling in bed, turning head and supine to/from sitting  Negative for looking up, walking, optokinetic movement and walking in busy environment  Symptoms   Duration: 2-3x/day   Frequency: couple minutes  Intensity at best: 0/10  Intensity at worst: 9/10    Headaches   Patient reports headaches: Yes (Cervical/ tension)       Oculomotor exam   Oculomotor ROM: WNL  Resting nystagmus: not present   Gaze holding nystagmus: not present left  and not present right  Smooth pursuits: within normal limits  Vertical saccades: normal  Horizontal saccades: normal  Convergence: normal    Positional testing   Nash-Hallpike   Left posterior canal: WNL  Right posterior canal: WNL  Roll test   Left horizontal canal: WNL  Right horizontal canal: WNL  Positional testing comment: Head shake nystagmus test: - lightheadedness   Supine to sit: increased lightheaded momentarily       Flowsheet Rows      Most Recent Value   PT/OT G-Codes   Current Score  93   Projected Score  85             Precautions: C1-2 fusion    Daily Treatment Diary    Date 5/3       Visit Number 1       FOTO IE       Re-Eval IE          Manuals    Trial distraction        STM to cervical spine                        Neuro Re-Ed     kota row/ext  HEP       No monies         DNF                                         Ther Ex    UBE BW         UT/Levator stretch  10"x3       Cervical retractions 10x         Cervical retraction with OP hold 10x5"        Thoracic ext                                 Ther Activity                                    Modalities    MHP PRN

## 2021-05-06 ENCOUNTER — HOSPITAL ENCOUNTER (OUTPATIENT)
Dept: NON INVASIVE DIAGNOSTICS | Facility: HOSPITAL | Age: 66
Discharge: HOME | End: 2021-05-06
Attending: FAMILY MEDICINE
Payer: MEDICARE

## 2021-05-06 DIAGNOSIS — R00.2 PALPITATION: ICD-10-CM

## 2021-05-06 PROCEDURE — 93225 XTRNL ECG REC<48 HRS REC: CPT

## 2021-05-06 PROCEDURE — 93226 XTRNL ECG REC<48 HR SCAN A/R: CPT

## 2021-05-10 ENCOUNTER — OFFICE VISIT (OUTPATIENT)
Dept: PHYSICAL THERAPY | Facility: CLINIC | Age: 66
End: 2021-05-10
Payer: MEDICARE

## 2021-05-10 DIAGNOSIS — R42 DIZZINESS: Primary | ICD-10-CM

## 2021-05-10 PROCEDURE — 97140 MANUAL THERAPY 1/> REGIONS: CPT | Performed by: PHYSICAL THERAPIST

## 2021-05-10 PROCEDURE — 97535 SELF CARE MNGMENT TRAINING: CPT | Performed by: PHYSICAL THERAPIST

## 2021-05-10 PROCEDURE — 97110 THERAPEUTIC EXERCISES: CPT | Performed by: PHYSICAL THERAPIST

## 2021-05-10 NOTE — PROGRESS NOTES
Daily Note     Today's date: 5/10/2021  Patient name: Jana Abrams  : 1955  MRN: [id number]  Referring provider: Miguel Isidro PA-C  Dx:   Encounter Diagnosis     ICD-10-CM    1  Dizziness  R42                   Subjective: Pt reports the dizziness at this time is only present with head motions into end range flexion and extension cervical ROM  He states that he is still hesitant to complete all interventions at home due to the fusion  He is currently more worried about activities and how much motion he can achieve  Objective: See treatment diary below      Assessment: Tolerated treatment well  Patient was educated throughout the entire session on how the cervical spine can move more than he is currently capable of  He required plenty of educated regarding the cervical spine motion and where the kinematics are coming from  He was grateful for the information, however, still hesitant to his current situation, ensuring he would not make this worse  Plan: Continue per plan of care        Precautions: C1-2 fusion    Daily Treatment Diary    Date 5/3 5/10      Visit Number 1       FOTO IE       Re-Eval IE          Manuals    Trial distraction        STM to cervical spine   [address]                      Neuro Re-Ed     kota row/ext  HEP 14R 2x10       No monies   GTB 10x5"       DNF                                         Ther Ex    UBE BW   NV      UT/Levator stretch  10"x3 10"x10       Cervical retractions 10x   10x       Cervical retraction with OP hold 10x5"  NV       Thoracic ext   10x5"                              Ther Activity                                    Modalities    MHP PRN

## 2021-05-11 PROCEDURE — 93227 XTRNL ECG REC<48 HR R&I: CPT | Performed by: INTERNAL MEDICINE

## 2021-05-12 ENCOUNTER — TELEPHONE (OUTPATIENT)
Dept: UROLOGY | Facility: MEDICAL CENTER | Age: 66
End: 2021-05-12

## 2021-05-12 DIAGNOSIS — N40.0 BPH WITHOUT OBSTRUCTION/LOWER URINARY TRACT SYMPTOMS: Primary | ICD-10-CM

## 2021-05-12 NOTE — TELEPHONE ENCOUNTER
LMOM for patient to remind him of his apt with Shashi James tomorrow 5/14/21 and to have his PSA done prior  New PSA order placed in patients chart

## 2021-05-14 ENCOUNTER — OFFICE VISIT (OUTPATIENT)
Dept: UROLOGY | Facility: MEDICAL CENTER | Age: 66
End: 2021-05-14
Payer: MEDICARE

## 2021-05-14 VITALS
DIASTOLIC BLOOD PRESSURE: 64 MMHG | BODY MASS INDEX: 25.19 KG/M2 | SYSTOLIC BLOOD PRESSURE: 120 MMHG | HEIGHT: 72 IN | HEART RATE: 51 BPM | WEIGHT: 186 LBS

## 2021-05-14 DIAGNOSIS — N40.0 BPH WITHOUT OBSTRUCTION/LOWER URINARY TRACT SYMPTOMS: ICD-10-CM

## 2021-05-14 DIAGNOSIS — Z12.5 SPECIAL SCREENING FOR MALIGNANT NEOPLASM OF PROSTATE: Primary | ICD-10-CM

## 2021-05-14 PROCEDURE — 99214 OFFICE O/P EST MOD 30 MIN: CPT | Performed by: UROLOGY

## 2021-05-14 NOTE — PROGRESS NOTES
Assessment/Plan:    BPH without obstruction/lower urinary tract symptoms  Minimally symptomatic  Await PSA  Return in 1 year  Diagnoses and all orders for this visit:    Special screening for malignant neoplasm of prostate  -     PSA, Total Screen; Future    BPH without obstruction/lower urinary tract symptoms          Subjective:      Patient ID: Caitlin Fuller is a 78 y o  male  HPI  BPH:  He notes urinary frequency  He denies other significant urinary symptoms  He denies gross hematuria, urinary tract infections or incontinence  He is taking neither medications nor supplements for his symptoms  Notes slower stream in the morning  PSA:  The patient is due for his 2021 PSA     0   Lab Value Date/Time    PSA 3 1 05/19/2021 1230    PSA 1 8 10/09/2020 0930    PSA 1 6 08/26/2019 0939    PSA 1 6 08/24/2018 1037    PSA 1 2 08/21/2017 1030    PSA 1 8 08/19/2016 0947   ]    AUA SYMPTOM SCORE      Most Recent Value   AUA SYMPTOM SCORE   How often have you had a sensation of not emptying your bladder completely after you finished urinating? 0   How often have you had to urinate again less than two hours after you finished urinating? 3   How often have you found you stopped and started again several times when you urinate?  0   How often have you found it difficult to postpone urination? 0   How often have you had a weak urinary stream?  1   How often have you had to push or strain to begin urination? 0   How many times did you most typically get up to urinate from the time you went to bed at night until the time you got up in the morning? 1   Quality of Life: If you were to spend the rest of your life with your urinary condition just the way it is now, how would you feel about that?  2   AUA SYMPTOM SCORE  5        No urine sample today  U/A from April 27, 2021 was nl         The following portions of the patient's history were reviewed and updated as appropriate: allergies, current medications, past family history, past medical history, past social history, past surgical history and problem list     Review of Systems    Constitutional: Negative for activity change and fatigue  Respiratory: Negative for shortness of breath and wheezing  Cardiovascular: Negative for chest pain  Gastrointestinal: Negative for abdominal pain  Endocrine:        Hypothyroidism  Genitourinary: Negative for difficulty urinating, dysuria, frequency, hematuria and urgency  Musculoskeletal: Negative for back pain and gait problem  Skin: Negative  Allergic/Immunologic: Negative  Neurological: Negative  C 1-2 spinal fusion  Psychiatric/Behavioral: Negative  Objective:      /64   Pulse (!) 51   Ht 6' (1 829 m)   Wt 84 4 kg (186 lb)   BMI 25 23 kg/m²          Physical Exam  Constitutional:       Appearance: He is well-developed  HENT:      Head: Normocephalic and atraumatic  Neck:      Musculoskeletal: Normal range of motion and neck supple  Pulmonary:      Effort: Pulmonary effort is normal    Genitourinary:     Rectum: Normal       Comments: The prostate is 40 grams, firm, smooth and non-tender  Right lat margin near apex is a bit firm  Landmarks intact  Musculoskeletal: Normal range of motion  Skin:     General: Skin is warm and dry  Neurological:      Mental Status: He is alert and oriented to person, place, and time  Psychiatric:         Behavior: Behavior normal          Thought Content:  Thought content normal          Judgment: Judgment normal

## 2021-05-18 ENCOUNTER — OFFICE VISIT (OUTPATIENT)
Dept: FAMILY MEDICINE CLINIC | Facility: CLINIC | Age: 66
End: 2021-05-18
Payer: MEDICARE

## 2021-05-18 ENCOUNTER — TELEPHONE (OUTPATIENT)
Dept: UROLOGY | Facility: AMBULATORY SURGERY CENTER | Age: 66
End: 2021-05-18

## 2021-05-18 VITALS
WEIGHT: 188.8 LBS | DIASTOLIC BLOOD PRESSURE: 74 MMHG | BODY MASS INDEX: 25.57 KG/M2 | TEMPERATURE: 97.7 F | SYSTOLIC BLOOD PRESSURE: 122 MMHG | RESPIRATION RATE: 16 BRPM | HEIGHT: 72 IN | OXYGEN SATURATION: 95 % | HEART RATE: 71 BPM

## 2021-05-18 DIAGNOSIS — I65.23 BILATERAL CAROTID ARTERY STENOSIS: ICD-10-CM

## 2021-05-18 DIAGNOSIS — R76.0 ELEVATED EBV ANTIBODY TITER: ICD-10-CM

## 2021-05-18 DIAGNOSIS — R79.9 ABNORMAL BLOOD CHEMISTRY: ICD-10-CM

## 2021-05-18 DIAGNOSIS — M54.2 CHRONIC NECK PAIN: ICD-10-CM

## 2021-05-18 DIAGNOSIS — D75.89 MACROCYTOSIS WITHOUT ANEMIA: ICD-10-CM

## 2021-05-18 DIAGNOSIS — R53.83 FATIGUE, UNSPECIFIED TYPE: ICD-10-CM

## 2021-05-18 DIAGNOSIS — R97.20 ELEVATED PSA: Primary | ICD-10-CM

## 2021-05-18 DIAGNOSIS — J30.1 ALLERGIC RHINITIS DUE TO POLLEN, UNSPECIFIED SEASONALITY: ICD-10-CM

## 2021-05-18 DIAGNOSIS — I49.3 PVC (PREMATURE VENTRICULAR CONTRACTION): ICD-10-CM

## 2021-05-18 DIAGNOSIS — R03.0 ELEVATED BLOOD PRESSURE READING WITHOUT DIAGNOSIS OF HYPERTENSION: ICD-10-CM

## 2021-05-18 DIAGNOSIS — R73.09 SUGAR BLOOD LEVEL INCREASED: ICD-10-CM

## 2021-05-18 DIAGNOSIS — E78.00 ELEVATED LDL CHOLESTEROL LEVEL: ICD-10-CM

## 2021-05-18 DIAGNOSIS — N40.0 BPH WITHOUT OBSTRUCTION/LOWER URINARY TRACT SYMPTOMS: ICD-10-CM

## 2021-05-18 DIAGNOSIS — F41.9 ANXIETY: ICD-10-CM

## 2021-05-18 DIAGNOSIS — I49.1 PAC (PREMATURE ATRIAL CONTRACTION): ICD-10-CM

## 2021-05-18 DIAGNOSIS — E03.9 HYPOTHYROIDISM, UNSPECIFIED TYPE: ICD-10-CM

## 2021-05-18 DIAGNOSIS — R00.2 PALPITATION: ICD-10-CM

## 2021-05-18 DIAGNOSIS — R42 VERTIGO: Primary | ICD-10-CM

## 2021-05-18 DIAGNOSIS — D72.829 LEUKOCYTOSIS, UNSPECIFIED TYPE: ICD-10-CM

## 2021-05-18 DIAGNOSIS — M81.0 OSTEOPOROSIS WITHOUT CURRENT PATHOLOGICAL FRACTURE, UNSPECIFIED OSTEOPOROSIS TYPE: ICD-10-CM

## 2021-05-18 DIAGNOSIS — G89.29 CHRONIC NECK PAIN: ICD-10-CM

## 2021-05-18 PROCEDURE — 99214 OFFICE O/P EST MOD 30 MIN: CPT | Performed by: FAMILY MEDICINE

## 2021-05-18 NOTE — TELEPHONE ENCOUNTER
Pt's PSA has doubled since October 2020  Last seen for OV on 5/14/21 by Dr Mami Brunson  PSA results from 5/13  3  15  Please advise follow up  Should test be repeated? PSA 1 8 10/09/2020 0930      PSA 1 6 08/26/2019 0939     PSA 1 6 08/24/2018 1037     PSA 1 2 08/21/2017 1030     PSA 1 8 08/19/2016 0947   ]    Forwarding to Dr Mami Brunson for review

## 2021-05-18 NOTE — PROGRESS NOTES
Assessment/Plan:       No problem-specific Assessment & Plan notes found for this encounter  Diagnoses and all orders for this visit:    Vertigo  Comments: To follow with ENT  ENT office visit on April 23, 2021 noted    Hypothyroidism, unspecified type  Comments:  Compensated  Continue levothyroxine same dose    Anxiety  Comments: To follow with his consoler  Patient does not like to take medication    Chronic neck pain  Comments: To follow with Dr Alexsandra Wilson    Palpitation  Comments:  to call if any further palpitation    BPH without obstruction/lower urinary tract symptoms  Comments:  Urology office visit on May 14, 2021 noted    Abnormal blood chemistry  Comments:  CHANGE IN PSA ( increased ) Advised patient to call Urology and discuss it with him    Elevated LDL cholesterol level  Comments: To follow with low-fat diet    Macrocytosis without anemia  Comments:  check B12/folic acid but is not covered under his insurance, PT will look into the cost and let me know if he would like to have it   recommend referral to Hem    Leukocytosis, unspecified type  Comments:  resolved    Elevated blood pressure reading without diagnosis of hypertension  Comments:   most likely pt with HTN secondary to white coat symdrom recheck BP is normal await echo  PVC (premature ventricular contraction)  -     Echo complete with contrast if indicated; Future    PAC (premature atrial contraction)  -     Echo complete with contrast if indicated; Future    Bilateral carotid artery stenosis  -     VAS carotid complete study; Future    Osteoporosis without current pathological fracture, unspecified osteoporosis type  -     DXA bone density spine hip and pelvis; Future    Sugar blood level increased  Comments:  to watch sweet and carbohydrate intake  Orders:  -     Hemoglobin A1C; Future    Fatigue, unspecified type  Comments:  ? secondary to anxiety   advise pt if persist to consider further evaluation    Elevated EBV antibody titer    Allergic rhinitis due to pollen, unspecified seasonality  Comments:  controlled  Orders:  -     fluticasone (FLONASE) 50 mcg/act nasal spray; 2 sprays into each nostril daily      Pt to follow up with test results  There are no Patient Instructions on file for this visit  Orders Placed This Encounter   Procedures    DXA bone density spine hip and pelvis     Standing Status:   Future     Standing Expiration Date:   5/18/2025     Scheduling Instructions:      Please wear comfortable clothing with no metal buttons, zippers, snaps or an underwire bra  Do not take any calcium supplements 24 hours prior to your test  Please bring your insurance cards, a form of photo ID and a list of your medications with you  Arrive 5-10 minutes prior to your appointment time in order to register  Your study cannot be performed if you take your calcium supplement 24 hours before the scheduled Dexa scan examination  To schedule this appointment, please contact Central Scheduling at [phone]   Hemoglobin A1C     Standing Status:   Future     Standing Expiration Date:   5/18/2022    Echo complete with contrast if indicated     Standing Status:   Future     Standing Expiration Date:   5/18/2025         Subjective:     Patient ID: Emi Le is a 78 y o  male      HPI  Vertigo  Not taking antivert , he said he has minimum symptoms  Saw ENT on April 23, 2021, was referred to physical therapy saw PT  Palpitation  Patient stated he does not really feel palpitation and let us if he has stressed  Denied chest pain  Denied shortness of breath  Fatigue, patient stated his fatigue has been better  He has good days and sometimes he feel little tired , contributed his fatigue to being stressed out  Anxiety  Has been following with a counselor  He does meet with him 3 times a week  He said it is under control  He does not like to take medication    Enlarged prostate , saw urology recently  On May 14, 2021  Elevated BP  Pt stated he feels nervous when  He come to Dr Ana Jones  Denied headache, flushing   Neck pain  Chronic  Did not follow with Dr Jorge Dave yet  Dar Mejia 2016 , SHOWS  MILD TO MODERATE CAROTID VAISHALI AND OSTEOPOROSIS    Test results   holter monitor   labs done on 4-27-21 and  5-13-21  Discussed result with patient    Review of Systems   Constitutional: Negative for activity change, appetite change, chills, fever and unexpected weight change  HENT: Negative for congestion, ear discharge, ear pain, hearing loss, nosebleeds, rhinorrhea, sinus pressure, sore throat, tinnitus, trouble swallowing and voice change  Eyes: Negative for photophobia, pain and visual disturbance  Respiratory: Negative for cough, chest tightness, shortness of breath and wheezing  Cardiovascular: Negative for chest pain, palpitations and leg swelling  Gastrointestinal: Negative for abdominal pain, anal bleeding, blood in stool, constipation, diarrhea, nausea and vomiting  Endocrine: Negative for cold intolerance, heat intolerance, polydipsia and polyuria  Genitourinary: Negative for dysuria, frequency, hematuria and urgency  Musculoskeletal: Positive for neck pain  Negative for arthralgias, back pain, gait problem, joint swelling and myalgias  Skin: Negative for rash  Neurological: Positive for dizziness  Negative for tremors, seizures, syncope, speech difficulty, weakness and light-headedness  Hematological: Negative for adenopathy  Does not bruise/bleed easily  Psychiatric/Behavioral: Negative for agitation, behavioral problems, confusion, dysphoric mood, hallucinations and sleep disturbance  The patient is nervous/anxious  Objective:     Physical Exam  Constitutional:       General: He is not in acute distress  Appearance: Normal appearance  He is well-developed  He is not ill-appearing or diaphoretic  HENT:      Head: Normocephalic     Eyes: General: No scleral icterus  Right eye: No discharge  Left eye: No discharge  Extraocular Movements: Extraocular movements intact  Pupils: Pupils are equal, round, and reactive to light  Neck:      Musculoskeletal: Neck supple  Thyroid: No thyromegaly  Vascular: No carotid bruit or JVD  Cardiovascular:      Rate and Rhythm: Normal rate and regular rhythm  Heart sounds: Normal heart sounds  No murmur  No gallop  Pulmonary:      Effort: Pulmonary effort is normal       Breath sounds: Normal breath sounds  Abdominal:      General: Bowel sounds are normal  There is no distension  Palpations: Abdomen is soft  There is no mass  Tenderness: There is no abdominal tenderness  There is no guarding or rebound  Musculoskeletal: Normal range of motion  General: No swelling or tenderness  Right lower leg: No edema  Left lower leg: No edema  Lymphadenopathy:      Cervical: No cervical adenopathy  Skin:     Findings: No rash  Neurological:      General: No focal deficit present  Mental Status: He is alert and oriented to person, place, and time  Cranial Nerves: No cranial nerve deficit  Sensory: No sensory deficit  Motor: No weakness or abnormal muscle tone  Coordination: Coordination normal       Gait: Gait normal    Psychiatric:         Mood and Affect: Mood normal          Behavior: Behavior normal          Thought Content:  Thought content normal

## 2021-05-18 NOTE — TELEPHONE ENCOUNTER
Patient managed by Sara Alcantar is calling to say his psa doubled and would like to have psa repeated  His pcp suggested to go to [facility] lab  Order in chart says its for yearly

## 2021-05-19 ENCOUNTER — APPOINTMENT (OUTPATIENT)
Dept: LAB | Facility: CLINIC | Age: 66
End: 2021-05-19
Payer: MEDICARE

## 2021-05-19 DIAGNOSIS — R73.09 SUGAR BLOOD LEVEL INCREASED: ICD-10-CM

## 2021-05-19 DIAGNOSIS — Z12.5 SPECIAL SCREENING FOR MALIGNANT NEOPLASM OF PROSTATE: ICD-10-CM

## 2021-05-19 PROBLEM — R76.0 ELEVATED EBV ANTIBODY TITER: Status: ACTIVE | Noted: 2021-05-19

## 2021-05-19 PROBLEM — R73.9 SUGAR BLOOD LEVEL INCREASED: Status: ACTIVE | Noted: 2021-05-19

## 2021-05-19 PROBLEM — D75.89 MACROCYTOSIS WITHOUT ANEMIA: Status: ACTIVE | Noted: 2021-05-19

## 2021-05-19 PROBLEM — R53.83 FATIGUE: Status: ACTIVE | Noted: 2021-05-19

## 2021-05-19 PROBLEM — E78.00 ELEVATED LDL CHOLESTEROL LEVEL: Status: ACTIVE | Noted: 2021-05-19

## 2021-05-19 PROBLEM — M54.2 CHRONIC NECK PAIN: Status: ACTIVE | Noted: 2021-05-19

## 2021-05-19 PROBLEM — I49.1 PAC (PREMATURE ATRIAL CONTRACTION): Status: ACTIVE | Noted: 2021-05-19

## 2021-05-19 PROBLEM — R00.2 PALPITATION: Status: ACTIVE | Noted: 2021-05-19

## 2021-05-19 PROBLEM — I65.23 BILATERAL CAROTID ARTERY STENOSIS: Status: ACTIVE | Noted: 2021-05-19

## 2021-05-19 PROBLEM — G89.29 CHRONIC NECK PAIN: Status: ACTIVE | Noted: 2021-05-19

## 2021-05-19 PROBLEM — D72.829 LEUKOCYTOSIS: Status: ACTIVE | Noted: 2021-05-19

## 2021-05-19 PROBLEM — R79.9 ABNORMAL BLOOD CHEMISTRY: Status: ACTIVE | Noted: 2021-05-19

## 2021-05-19 PROBLEM — M81.0 OSTEOPOROSIS WITHOUT CURRENT PATHOLOGICAL FRACTURE: Status: ACTIVE | Noted: 2021-05-19

## 2021-05-19 PROBLEM — R03.0 ELEVATED BLOOD PRESSURE READING WITHOUT DIAGNOSIS OF HYPERTENSION: Status: ACTIVE | Noted: 2021-05-19

## 2021-05-19 PROBLEM — I49.3 PVC (PREMATURE VENTRICULAR CONTRACTION): Status: ACTIVE | Noted: 2021-05-19

## 2021-05-19 LAB
EST. AVERAGE GLUCOSE BLD GHB EST-MCNC: 114 MG/DL
HBA1C MFR BLD: 5.6 %
PSA SERPL-MCNC: 3.1 NG/ML (ref 0–4)

## 2021-05-19 PROCEDURE — G0103 PSA SCREENING: HCPCS

## 2021-05-19 PROCEDURE — 36415 COLL VENOUS BLD VENIPUNCTURE: CPT

## 2021-05-19 PROCEDURE — 83036 HEMOGLOBIN GLYCOSYLATED A1C: CPT

## 2021-05-19 RX ORDER — FLUTICASONE PROPIONATE 50 MCG
2 SPRAY, SUSPENSION (ML) NASAL DAILY
Qty: 1 G | Refills: 2 | Status: SHIPPED | OUTPATIENT
Start: 2021-05-19 | End: 2021-06-22 | Stop reason: SDUPTHER

## 2021-05-19 NOTE — TELEPHONE ENCOUNTER
Patient called in stating he took his second psa test today and asked if Marylin Hardin can look out for the results   Patient can be reached at [phone]

## 2021-05-20 ENCOUNTER — TELEPHONE (OUTPATIENT)
Dept: UROLOGY | Facility: MEDICAL CENTER | Age: 66
End: 2021-05-20

## 2021-05-20 ENCOUNTER — OFFICE VISIT (OUTPATIENT)
Dept: PHYSICAL THERAPY | Facility: CLINIC | Age: 66
End: 2021-05-20
Payer: MEDICARE

## 2021-05-20 DIAGNOSIS — R42 DIZZINESS: Primary | ICD-10-CM

## 2021-05-20 PROCEDURE — 97112 NEUROMUSCULAR REEDUCATION: CPT | Performed by: PHYSICAL THERAPIST

## 2021-05-20 PROCEDURE — 97140 MANUAL THERAPY 1/> REGIONS: CPT | Performed by: PHYSICAL THERAPIST

## 2021-05-20 PROCEDURE — 97110 THERAPEUTIC EXERCISES: CPT | Performed by: PHYSICAL THERAPIST

## 2021-05-20 NOTE — TELEPHONE ENCOUNTER
Patient managed by Dr Arroyo Res calling to see if resent PSA reviewed by    Reported recent elevated PSA  Please use cell # [phone] to contact Patient

## 2021-05-20 NOTE — PROGRESS NOTES
Daily Note     Today's date: 2021  Patient name: Basim Spicer  : 1955  MRN: [id number]  Referring provider: Brooks Dailey PA-C  Dx:   Encounter Diagnosis     ICD-10-CM    1  Dizziness  R42                   Subjective: Pt repots overall improvement in his activity level  He states that he has not had any dizziness since last visit, but would like to continue to work on this cervical region to ensure it does not return  Objective: See treatment diary below, flex: 50, Ext 45, Rot L 42 R 40, lat flex R 20 L 22       Assessment: Tolerated treatment well  Patient is doing well today, and was able to progress his current interventions today and learned to improve his cervical retraction ROM  He will continue to work on [employer] sat home and in the clinic  General TTP around the cervical paraspinals and upper trapezius muscles, no suboccipital tenderness was noted  Plan: Continue per plan of care        Precautions: C1-2 fusion    Daily Treatment Diary    Date 5/3 5/10 5/20     Visit Number 1       FOTO IE       Re-Eval IE          Manuals    Trial distraction        STM to cervical spine   [address]                     Neuro Re-Ed     kota row/ext  HEP 14R 2x10  14R 2x15     No monies   GTB 10x5"       DNF         Supine cervical retractions   10x 2"                              Ther Ex    UBE BW   NV 4' lvl 3 5      UT/Levator stretch  10"x3 10"x10       Cervical retractions 10x   10x  10x      Cervical retraction with OP hold 10x5"  NV  10x5"      Thoracic ext   10x5" NV                             Ther Activity                                    Modalities    MHP PRN

## 2021-05-20 NOTE — TELEPHONE ENCOUNTER
Jonna Lynn MD   5/20/2021  2:15 PM EDT      I tried to call the patient with the following information   In answering machine identified itself only by phone number and I could not recognize the patient's voice   Because this represents confident of medical information, I did not leave a voicemail since I could not be certain of the recipient          The patient had a PSA at [facility] labs on May 13, 2021 which was 3  15   A recheck at [facility] laboratory on May 19, 2021 was 3  1   Although these numbers are nominally within the normal range, they represent a doubling of his baseline PSA over the past year    The patient's baseline PSA is approximately 1 6      The patient should have a prostate MRI   I have placed the orders   Please inform the patient  Tarsha Archibald you

## 2021-05-21 NOTE — TELEPHONE ENCOUNTER
Patient managed by Sean Bravo is returning call  He was sorry he missed call stated he was in therapy at the time  He would like a call back to discuss results and follow up

## 2021-05-21 NOTE — TELEPHONE ENCOUNTER
Call placed to patient and spoke with him  Informed him directly of the recommendations of Dr Nelia Hurt and that MRI has been ordered to further address his rising PSA  Pt understands and number for central scheduling was given to the patient to schedule testing

## 2021-05-28 ENCOUNTER — APPOINTMENT (OUTPATIENT)
Dept: PHYSICAL THERAPY | Facility: CLINIC | Age: 66
End: 2021-05-28
Payer: MEDICARE

## 2021-06-02 ENCOUNTER — HOSPITAL ENCOUNTER (OUTPATIENT)
Dept: NON INVASIVE DIAGNOSTICS | Facility: HOSPITAL | Age: 66
Discharge: HOME | End: 2021-06-02
Attending: FAMILY MEDICINE
Payer: MEDICARE

## 2021-06-02 DIAGNOSIS — I49.3 PVC (PREMATURE VENTRICULAR CONTRACTION): ICD-10-CM

## 2021-06-02 DIAGNOSIS — I49.1 PAC (PREMATURE ATRIAL CONTRACTION): ICD-10-CM

## 2021-06-02 PROCEDURE — 93306 TTE W/DOPPLER COMPLETE: CPT

## 2021-06-03 PROCEDURE — 93306 TTE W/DOPPLER COMPLETE: CPT | Performed by: INTERNAL MEDICINE

## 2021-06-07 ENCOUNTER — TELEPHONE (OUTPATIENT)
Dept: FAMILY MEDICINE CLINIC | Facility: CLINIC | Age: 66
End: 2021-06-07

## 2021-06-07 DIAGNOSIS — I77.810 DILATED AORTIC ROOT (HCC): Primary | ICD-10-CM

## 2021-06-07 NOTE — TELEPHONE ENCOUNTER
----- Message from Les Peace MD sent at 6/3/2021  3:20 PM EDT -----  Echocardiogram   Normal function  Mild bilateral atrial dilatation  Mild to moderate mitral regurgitation  Trace tricuspid regurgitation  Dilated aortic root 3 5 cm,   Mild    check chest CT scan to rule out aortic aneurysm

## 2021-06-10 ENCOUNTER — TELEPHONE (OUTPATIENT)
Dept: UROLOGY | Facility: MEDICAL CENTER | Age: 66
End: 2021-06-10

## 2021-06-10 NOTE — TELEPHONE ENCOUNTER
Pt calling to verify MRI of the prostate that is scheduled for 6/14/21 has been authorized by his insurance company  Call to central scheduling verified through conference call with patient that MRI of the prostate has been authorized by pts insurance company for 6/14/21

## 2021-06-11 ENCOUNTER — HOSPITAL ENCOUNTER (OUTPATIENT)
Dept: NON INVASIVE DIAGNOSTICS | Facility: HOSPITAL | Age: 66
Discharge: HOME | End: 2021-06-11
Attending: FAMILY MEDICINE
Payer: MEDICARE

## 2021-06-11 ENCOUNTER — HOSPITAL ENCOUNTER (OUTPATIENT)
Dept: BONE DENSITY | Facility: CLINIC | Age: 66
Discharge: HOME | End: 2021-06-11
Payer: MEDICARE

## 2021-06-11 DIAGNOSIS — I65.23 BILATERAL CAROTID ARTERY STENOSIS: ICD-10-CM

## 2021-06-11 DIAGNOSIS — M81.0 OSTEOPOROSIS WITHOUT CURRENT PATHOLOGICAL FRACTURE, UNSPECIFIED OSTEOPOROSIS TYPE: ICD-10-CM

## 2021-06-11 PROCEDURE — 93880 EXTRACRANIAL BILAT STUDY: CPT | Performed by: SURGERY

## 2021-06-11 PROCEDURE — 77080 DXA BONE DENSITY AXIAL: CPT

## 2021-06-11 PROCEDURE — 93880 EXTRACRANIAL BILAT STUDY: CPT

## 2021-06-13 ENCOUNTER — HOSPITAL ENCOUNTER (OUTPATIENT)
Dept: CT IMAGING | Facility: HOSPITAL | Age: 66
Discharge: HOME | End: 2021-06-13
Attending: FAMILY MEDICINE
Payer: MEDICARE

## 2021-06-13 DIAGNOSIS — I77.810 DILATED AORTIC ROOT (HCC): ICD-10-CM

## 2021-06-13 PROCEDURE — G1004 CDSM NDSC: HCPCS

## 2021-06-13 PROCEDURE — 71250 CT THORAX DX C-: CPT

## 2021-06-14 ENCOUNTER — TELEPHONE (OUTPATIENT)
Dept: UROLOGY | Facility: MEDICAL CENTER | Age: 66
End: 2021-06-14

## 2021-06-14 ENCOUNTER — HOSPITAL ENCOUNTER (OUTPATIENT)
Dept: RADIOLOGY | Age: 66
Discharge: HOME | End: 2021-06-14
Payer: MEDICARE

## 2021-06-14 DIAGNOSIS — R97.20 ELEVATED PSA: ICD-10-CM

## 2021-06-14 PROCEDURE — 72197 MRI PELVIS W/O & W/DYE: CPT

## 2021-06-14 PROCEDURE — G1004 CDSM NDSC: HCPCS

## 2021-06-14 PROCEDURE — 76377 3D RENDER W/INTRP POSTPROCES: CPT

## 2021-06-14 PROCEDURE — A9585 GADOBUTROL INJECTION: HCPCS | Performed by: NURSE PRACTITIONER

## 2021-06-14 RX ADMIN — GADOBUTROL 8 ML: 604.72 INJECTION INTRAVENOUS at 12:22

## 2021-06-14 NOTE — TELEPHONE ENCOUNTER
Patient of Dr Magdaleno Silverman in [facility]  Patient calling regarding MRI he had this morning  Would like to see Dr Magdaleno Silverman ASAP to discuss results and rising PSA  Please triage and schedule appropriately  Patient may be reached at [phone]

## 2021-06-15 NOTE — TELEPHONE ENCOUNTER
Spoke to patient and let him know the results are not yet in  Once we get the results we will call him

## 2021-06-21 NOTE — TELEPHONE ENCOUNTER
IMPRESSION:  1 7 x 1 5 x 0 9 cm nodule like lesion in the posterior right transition zone at the base probably an atypical nodule with intermediate probability for presence of clinically significant cancer     1  PI-RADSv2 1 Category 3 - Intermediate (the presence of clinically significant cancer is equivocal)      2  No extraprostatic tumor, seminal vesicle invasion, pelvic lymphadenopathy, or pelvic osseous metastatic disease         3  Calculated prostate volume of 44 2 cc      Prostate gland boundaries and areas of concern for significant prostate cancer were segmented using 3D advanced post-processing on an independent [employer] system workstation with active physician participation  The segmentation was performed should   MR-ultrasound fusion biopsy be required  Routing to provider for further review and recommendations

## 2021-06-21 NOTE — TELEPHONE ENCOUNTER
Please call and let patient know that his MRI revealed a 1 7 x 1 5 x 0 9 cm lesion with an intermediate probability of cancer  Per Dr Luiz Grover, there is a 20% chance that this nodule is cancerous  We do preform MRI fusion biopsies on these types of lesions for further evaluation   If patient is interested, I will place case request      Thanks

## 2021-06-21 NOTE — TELEPHONE ENCOUNTER
Call placed to Marcos Sapp in Radiology reading room  She will send report to Radiologist to finalize  Will check status of report later

## 2021-06-22 ENCOUNTER — OFFICE VISIT (OUTPATIENT)
Dept: FAMILY MEDICINE CLINIC | Facility: CLINIC | Age: 66
End: 2021-06-22
Payer: MEDICARE

## 2021-06-22 ENCOUNTER — TELEPHONE (OUTPATIENT)
Dept: UROLOGY | Facility: MEDICAL CENTER | Age: 66
End: 2021-06-22

## 2021-06-22 VITALS
BODY MASS INDEX: 24.81 KG/M2 | SYSTOLIC BLOOD PRESSURE: 128 MMHG | HEART RATE: 97 BPM | WEIGHT: 183.2 LBS | TEMPERATURE: 98.3 F | RESPIRATION RATE: 18 BRPM | HEIGHT: 72 IN | DIASTOLIC BLOOD PRESSURE: 68 MMHG | OXYGEN SATURATION: 98 %

## 2021-06-22 DIAGNOSIS — R03.0 ELEVATED BLOOD PRESSURE READING WITHOUT DIAGNOSIS OF HYPERTENSION: ICD-10-CM

## 2021-06-22 DIAGNOSIS — I25.84 CORONARY ARTERY CALCIFICATION: ICD-10-CM

## 2021-06-22 DIAGNOSIS — M85.80 OSTEOPENIA, UNSPECIFIED LOCATION: ICD-10-CM

## 2021-06-22 DIAGNOSIS — I49.1 PAC (PREMATURE ATRIAL CONTRACTION): ICD-10-CM

## 2021-06-22 DIAGNOSIS — I34.0 NONRHEUMATIC MITRAL VALVE REGURGITATION: ICD-10-CM

## 2021-06-22 DIAGNOSIS — R91.8 ABNORMAL CT SCAN OF LUNG: ICD-10-CM

## 2021-06-22 DIAGNOSIS — I65.23 BILATERAL CAROTID ARTERY STENOSIS: Primary | ICD-10-CM

## 2021-06-22 DIAGNOSIS — E78.00 ELEVATED LDL CHOLESTEROL LEVEL: ICD-10-CM

## 2021-06-22 DIAGNOSIS — R93.89 ABNORMAL MRI: ICD-10-CM

## 2021-06-22 DIAGNOSIS — R73.09 SUGAR BLOOD LEVEL INCREASED: ICD-10-CM

## 2021-06-22 DIAGNOSIS — J30.1 ALLERGIC RHINITIS DUE TO POLLEN, UNSPECIFIED SEASONALITY: ICD-10-CM

## 2021-06-22 DIAGNOSIS — I51.7 BILATERAL ENLARGEMENT OF ATRIA: ICD-10-CM

## 2021-06-22 DIAGNOSIS — I36.1 NONRHEUMATIC TRICUSPID VALVE REGURGITATION: ICD-10-CM

## 2021-06-22 DIAGNOSIS — R79.9 ABNORMAL BLOOD CHEMISTRY: ICD-10-CM

## 2021-06-22 DIAGNOSIS — I71.2 THORACIC AORTIC ANEURYSM WITHOUT RUPTURE (HCC): ICD-10-CM

## 2021-06-22 DIAGNOSIS — I49.3 PVC (PREMATURE VENTRICULAR CONTRACTION): ICD-10-CM

## 2021-06-22 DIAGNOSIS — I25.10 CORONARY ARTERY CALCIFICATION: ICD-10-CM

## 2021-06-22 PROBLEM — I71.20 THORACIC AORTIC ANEURYSM WITHOUT RUPTURE: Status: ACTIVE | Noted: 2021-06-22

## 2021-06-22 PROCEDURE — 99214 OFFICE O/P EST MOD 30 MIN: CPT | Performed by: FAMILY MEDICINE

## 2021-06-22 RX ORDER — FLUTICASONE PROPIONATE 50 MCG
2 SPRAY, SUSPENSION (ML) NASAL DAILY
Qty: 3 G | Refills: 1 | Status: SHIPPED | OUTPATIENT
Start: 2021-06-22 | End: 2021-10-13 | Stop reason: SDUPTHER

## 2021-06-22 NOTE — TELEPHONE ENCOUNTER
I discussed his MRI of prostate, category three lesion, 44 mL volume     He has moderate BPH symptoms     Just saw Colorectal for changes in bowel movements     I discussed MRI fusion biopsy and we will schedule

## 2021-06-22 NOTE — TELEPHONE ENCOUNTER
Pt calling in regards to results viewed My Chart,knows he needs biopsy upset that he has not been contacted wants appointment with Shae hansen

## 2021-06-22 NOTE — PROGRESS NOTES
Assessment/Plan:       No problem-specific Assessment & Plan notes found for this encounter  Diagnoses and all orders for this visit:    Bilateral carotid artery stenosis  Comments:  Recheck carotid ultrasound in 1 year    Allergic rhinitis due to pollen, unspecified seasonality  Comments:  controlled  Orders:  -     fluticasone (FLONASE) 50 mcg/act nasal spray; 2 sprays into each nostril daily    Osteopenia, unspecified location  -     PTH, intact; Future  -     Protein electrophoresis, urine  -     Testosterone, free, total; Future  -     Vitamin D 25 hydroxy; Future  -     Protein electrophoresis, serum; Future  -     Cortisol Level, AM Specimen; Future    Elevated blood pressure reading without diagnosis of hypertension  Comments:  Today blood pressure is normal    PVC (premature ventricular contraction)    PAC (premature atrial contraction)    Sugar blood level increased  Comments:  Hemoglobin A1c is normal   Advised to watch his sweet and carbohydrate intake    Thoracic aortic aneurysm without rupture ([facility] [address] )  Comments:  Advised patient to start statin  Avoid increased pressure a chest, keep blood pressure under control  Orders:  -     Ambulatory referral to Thoracic Surgery; Future    Abnormal blood chemistry  Comments:  Low anion gap  Resolved    Nonrheumatic mitral valve regurgitation  Comments:  Recheck echo Doppler in 1-2 years    Nonrheumatic tricuspid valve regurgitation  Comments:  Recheck echo Doppler in 1-2 years    Coronary artery calcification  -     Ambulatory referral to Cardiology; Future    Elevated LDL cholesterol level  Comments: To follow with low-fat diet  Recommend statin, patient is hesitant    Abnormal MRI  Comments:  of prostate  Discussed result with patient  Advised patient to call Urology for further recommendation and follow-up    Bilateral enlargement of atria    Abnormal CT scan of lung  Comments:  Atelectasis versus scar    To consider repeat CT scan in few months        There are no Patient Instructions on file for this visit  Orders Placed This Encounter   Procedures    PTH, intact     Standing Status:   Future     Standing Expiration Date:   6/22/2022    Protein electrophoresis, urine    Testosterone, free, total     This is a patient instruction: Fasting preferred  Collections for men not undergoing treatment must be completed between 7am-9am ONLY  Collection time restrictions are not applicable to women or men already undergoing treatment  Standing Status:   Future     Standing Expiration Date:   6/22/2022    Vitamin D 25 hydroxy     Standing Status:   Future     Standing Expiration Date:   6/22/2022    Protein electrophoresis, serum     Standing Status:   Future     Standing Expiration Date:   6/22/2022    Cortisol Level, AM Specimen     This is a patient instruction: This test must be collected between 7-9 AM  This test may exhibit interference when sample is collected from a person who is consuming a supplement with a high dose of biotin (also termed as vitamin B7 or B8, vitamin H or coenzyme R)  Patient should stop biotin consumption at least 72 hours prior to the collection of the sample       Standing Status:   Future     Standing Expiration Date:   6/22/2022    Ambulatory referral to Thoracic Surgery     Standing Status:   Future     Standing Expiration Date:   6/22/2022     Referral Priority:   Routine     Referral Type:   Consult - AMB     Referral Reason:   Specialty Services Required     Requested Specialty:   Cardiothoracic Surgery     Number of Visits Requested:   1     Expiration Date:   6/22/2022   [facility] Ambulatory referral to Cardiology     Standing Status:   Future     Standing Expiration Date:   6/22/2022     Referral Priority:   Routine     Referral Type:   Consult - AMB     Referral Reason:   Specialty Services Required     Requested Specialty:   Cardiology     Number of Visits Requested:   1     Expiration Date:   6/22/2022 Subjective:     Patient ID: Masha Fried is a 78 y o  male      HPI  Elevated PSA , had prostate MRI, he is not happy because he did not hear yet from Urology office about his report also he called few times  Anxiety  Patient admit to being anxious and also he is anxious right now to hear his reports  Patient stated he is following with a counselor but he does not like to take medication  Carotid stenosis  Denied loss of vision slurred speech, weakness or numbness  Allergy  Symptoms are controlled  Elevated blood sugar  Denied polyuria or polydipsia     patient today during office visit regarding anxiety    TEST RESULTS  Echo Doppler  Bone density  Chest Ct scan  labs done on May 19, 2021  Carotid ultrasound  Discussed result with patient    Review of Systems   Constitutional: Negative for activity change, appetite change, chills, fatigue, fever and unexpected weight change  HENT: Negative for congestion, ear discharge, ear pain, hearing loss, nosebleeds, rhinorrhea, sinus pressure, sore throat, tinnitus, trouble swallowing and voice change  Eyes: Negative for photophobia, pain and visual disturbance  Respiratory: Negative for cough, chest tightness, shortness of breath and wheezing  Cardiovascular: Negative for chest pain, palpitations and leg swelling  Gastrointestinal: Negative for abdominal pain, anal bleeding, blood in stool, constipation, diarrhea, nausea and vomiting  Endocrine: Negative for cold intolerance, heat intolerance, polydipsia and polyuria  Genitourinary: Negative for dysuria, frequency, hematuria and urgency  Musculoskeletal: Negative for arthralgias, back pain, gait problem, joint swelling, myalgias and neck pain  Skin: Negative for rash  Neurological: Negative for dizziness, tremors, seizures, syncope, weakness, light-headedness and headaches  Hematological: Negative for adenopathy  Does not bruise/bleed easily     Psychiatric/Behavioral: Negative for agitation, behavioral problems, confusion, dysphoric mood, hallucinations and sleep disturbance  Objective:     Physical Exam  Constitutional:       General: He is not in acute distress  Appearance: Normal appearance  He is well-developed  He is not ill-appearing or diaphoretic  HENT:      Head: Normocephalic  Eyes:      General: No scleral icterus  Right eye: No discharge  Left eye: No discharge  Pupils: Pupils are equal, round, and reactive to light  Neck:      Thyroid: No thyromegaly  Vascular: No carotid bruit or JVD  Cardiovascular:      Rate and Rhythm: Normal rate and regular rhythm  Heart sounds: Normal heart sounds  No murmur heard  No gallop  Pulmonary:      Effort: Pulmonary effort is normal       Breath sounds: Normal breath sounds  Abdominal:      General: Bowel sounds are normal  There is no distension  Palpations: Abdomen is soft  There is no mass  Tenderness: There is no abdominal tenderness  There is no guarding or rebound  Musculoskeletal:         General: No swelling or tenderness  Normal range of motion  Cervical back: Neck supple  Right lower leg: No edema  Left lower leg: No edema  Lymphadenopathy:      Cervical: No cervical adenopathy  Skin:     Findings: No rash  Neurological:      General: No focal deficit present  Mental Status: He is alert and oriented to person, place, and time  Cranial Nerves: No cranial nerve deficit  Motor: No abnormal muscle tone  Coordination: Coordination normal       Gait: Gait normal    Psychiatric:         Mood and Affect: Mood normal          Behavior: Behavior normal          Thought Content:  Thought content normal          Judgment: Judgment normal       Comments: Anxious

## 2021-06-22 NOTE — TELEPHONE ENCOUNTER
----- Message from Johnnie Owens MD sent at 6/22/2021  2:31 PM EDT -----  Patient agreeable to MRI fusion biopsy, please call and schedule

## 2021-06-24 ENCOUNTER — LAB (OUTPATIENT)
Dept: LAB | Facility: CLINIC | Age: 66
End: 2021-06-24
Payer: MEDICARE

## 2021-06-24 DIAGNOSIS — M85.80 OSTEOPENIA, UNSPECIFIED LOCATION: ICD-10-CM

## 2021-06-24 LAB
25(OH)D3 SERPL-MCNC: 54.2 NG/ML (ref 30–100)
CORTIS AM PEAK SERPL-MCNC: 2.9 UG/DL (ref 4.2–22.4)
PTH-INTACT SERPL-MCNC: 35.1 PG/ML (ref 18.4–80.1)

## 2021-06-24 PROCEDURE — 84165 PROTEIN E-PHORESIS SERUM: CPT | Performed by: PATHOLOGY

## 2021-06-24 PROCEDURE — 82533 TOTAL CORTISOL: CPT

## 2021-06-24 PROCEDURE — 84166 PROTEIN E-PHORESIS/URINE/CSF: CPT | Performed by: PATHOLOGY

## 2021-06-24 PROCEDURE — 84402 ASSAY OF FREE TESTOSTERONE: CPT

## 2021-06-24 PROCEDURE — 84403 ASSAY OF TOTAL TESTOSTERONE: CPT

## 2021-06-24 PROCEDURE — 82306 VITAMIN D 25 HYDROXY: CPT

## 2021-06-24 PROCEDURE — 84165 PROTEIN E-PHORESIS SERUM: CPT

## 2021-06-24 PROCEDURE — 83970 ASSAY OF PARATHORMONE: CPT

## 2021-06-24 PROCEDURE — 84166 PROTEIN E-PHORESIS/URINE/CSF: CPT | Performed by: FAMILY MEDICINE

## 2021-06-25 LAB
TESTOST FREE SERPL-MCNC: 4.9 PG/ML (ref 6.6–18.1)
TESTOST SERPL-MCNC: 508 NG/DL (ref 264–916)

## 2021-06-25 NOTE — TELEPHONE ENCOUNTER
Return call to scheduled 8/17/2021 [facility] with Dr Humberto Martinez     -instructions given verbally and mailed  --patient aware that he will need a  and needs to be NPO    -patient knows to avoid any potentially blood thinning meds including Aspirin 7 days prior  -CBC, and CMP 2 weeks prior  -MCCR/HBS - no auth required  -DEVONTE w/INO - 8/27/2021

## 2021-06-26 LAB
ALBUMIN SERPL ELPH-MCNC: 4.58 G/DL (ref 3.5–5)
ALBUMIN SERPL ELPH-MCNC: 67.3 % (ref 52–65)
ALBUMIN UR ELPH-MCNC: 100 %
ALPHA1 GLOB MFR UR ELPH: 0 %
ALPHA1 GLOB SERPL ELPH-MCNC: 0.26 G/DL (ref 0.1–0.4)
ALPHA1 GLOB SERPL ELPH-MCNC: 3.8 % (ref 2.5–5)
ALPHA2 GLOB MFR UR ELPH: 0 %
ALPHA2 GLOB SERPL ELPH-MCNC: 0.67 G/DL (ref 0.4–1.2)
ALPHA2 GLOB SERPL ELPH-MCNC: 9.8 % (ref 7–13)
B-GLOBULIN MFR UR ELPH: 0 %
BETA GLOB ABNORMAL SERPL ELPH-MCNC: 0.39 G/DL (ref 0.4–0.8)
BETA1 GLOB SERPL ELPH-MCNC: 5.7 % (ref 5–13)
BETA2 GLOB SERPL ELPH-MCNC: 4.5 % (ref 2–8)
BETA2+GAMMA GLOB SERPL ELPH-MCNC: 0.31 G/DL (ref 0.2–0.5)
GAMMA GLOB ABNORMAL SERPL ELPH-MCNC: 0.61 G/DL (ref 0.5–1.6)
GAMMA GLOB MFR UR ELPH: 0 %
GAMMA GLOB SERPL ELPH-MCNC: 8.9 % (ref 12–22)
IGG/ALB SER: 2.06 {RATIO} (ref 1.1–1.8)
PROT PATTERN SERPL ELPH-IMP: ABNORMAL
PROT PATTERN UR ELPH-IMP: NORMAL
PROT SERPL-MCNC: 6.8 G/DL (ref 6.4–8.2)
PROT UR-MCNC: 17 MG/DL

## 2021-06-28 ENCOUNTER — OFFICE VISIT (OUTPATIENT)
Dept: CARDIAC SURGERY | Facility: CLINIC | Age: 66
End: 2021-06-28
Payer: MEDICARE

## 2021-06-28 VITALS
OXYGEN SATURATION: 96 % | HEART RATE: 59 BPM | DIASTOLIC BLOOD PRESSURE: 72 MMHG | RESPIRATION RATE: 18 BRPM | BODY MASS INDEX: 24.91 KG/M2 | SYSTOLIC BLOOD PRESSURE: 120 MMHG | TEMPERATURE: 97.8 F | WEIGHT: 183.9 LBS | HEIGHT: 72 IN

## 2021-06-28 DIAGNOSIS — I71.2 THORACIC AORTIC ANEURYSM WITHOUT RUPTURE (HCC): ICD-10-CM

## 2021-06-28 PROCEDURE — 99205 OFFICE O/P NEW HI 60 MIN: CPT | Performed by: NURSE PRACTITIONER
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
## 2021-06-29 ENCOUNTER — TELEPHONE (OUTPATIENT)
Dept: FAMILY MEDICINE CLINIC | Facility: CLINIC | Age: 66
End: 2021-06-29

## 2021-06-29 DIAGNOSIS — E27.40 LOW SERUM CORTISOL LEVEL (HCC): Primary | ICD-10-CM

## 2021-06-29 NOTE — TELEPHONE ENCOUNTER
I called patient to give specific instructions on the blood work that he needs to get done  It states that the cortisol level should be collected from 7-9 am and also if patient is taking biotin or of vitamin b7 or b8, vitamin h or coenzyme r  If patient is taking the biotin should stop consumption at least 72 hours prior the the collection of the sample  Spoke to patient and advised and placed the order for the repeated blood work

## 2021-06-29 NOTE — TELEPHONE ENCOUNTER
----- Message from Traci Corona MD sent at 6/24/2021  3:30 PM EDT -----   Cortisol level is low  Recommend recheck cortisol level soon     Vitamin-D and PTH are normal  Await the rest of the labs

## 2021-07-02 ENCOUNTER — APPOINTMENT (OUTPATIENT)
Dept: LAB | Facility: CLINIC | Age: 66
End: 2021-07-02
Payer: MEDICARE

## 2021-07-06 ENCOUNTER — TELEPHONE (OUTPATIENT)
Dept: FAMILY MEDICINE CLINIC | Facility: CLINIC | Age: 66
End: 2021-07-06

## 2021-07-07 NOTE — TELEPHONE ENCOUNTER
Called patient back  Left a message for him ,I was off of yesterday  Please to call back regarding his concerned if  itis urgent or if he could wait until his office visit this month if not urgent 
Called pt   Stated is not happy with Urology at [facility]  Nobody called about his MRI even when he tried to reach to the few times  They scheduled his prostate  procedure  on August 17  Only 3 urologist knows about this procedure  on 8/17   Saw Dr Lucas Goff yesterday because he need to to look for his welfare  And schedule his biopsy with him next week 
Patient called stating that he possibly needs to go for biopsy on Tuesday of next week  He has some questions if you could please call him  Patient will be home   Thanks
Patient has a question about a biopsy and he is requesting a call back from you please 
no

## 2021-07-08 NOTE — TELEPHONE ENCOUNTER
Dr Kimberly Cherry,     Patient called today to cancel his 8/17/2021 MRI Fusion BX  Patient stated that he consulted with Dr Taurus Santillan for a 2nd opinion  Dr Taurus Santillan has him scheduled for US Guided Prostate BX 7/13/2021 rather than waiting for MRI Fusion  Fusion BX canceled as well as follow up  Patient gave thanks for all our help

## 2021-07-13 ENCOUNTER — HOSPITAL ENCOUNTER (OUTPATIENT)
Dept: ULTRASOUND IMAGING | Facility: HOSPITAL | Age: 66
Discharge: HOME | End: 2021-07-13
Attending: UROLOGY | Admitting: UROLOGY
Payer: MEDICARE

## 2021-07-13 DIAGNOSIS — R97.20 ELEVATED PROSTATE SPECIFIC ANTIGEN (PSA): ICD-10-CM

## 2021-07-13 PROCEDURE — 88363 XM ARCHIVE TISSUE MOLEC ANAL: CPT | Performed by: PATHOLOGY

## 2021-07-13 PROCEDURE — G0416 PROSTATE BIOPSY, ANY MTHD: HCPCS | Performed by: PATHOLOGY

## 2021-07-13 PROCEDURE — 88344 IMHCHEM/IMCYTCHM EA MLT ANTB: CPT | Performed by: PATHOLOGY

## 2021-07-13 PROCEDURE — 76942 ECHO GUIDE FOR BIOPSY: CPT

## 2021-07-13 PROCEDURE — 55700 HB BIOPSY OF PROSTATE: CPT

## 2021-07-13 RX ORDER — LIDOCAINE HYDROCHLORIDE 10 MG/ML
10 INJECTION, SOLUTION EPIDURAL; INFILTRATION; INTRACAUDAL; PERINEURAL ONCE
Status: COMPLETED | OUTPATIENT
Start: 2021-07-13 | End: 2021-07-13

## 2021-07-13 RX ORDER — LIDOCAINE HYDROCHLORIDE 20 MG/ML
1 JELLY TOPICAL ONCE
Status: COMPLETED | OUTPATIENT
Start: 2021-07-13 | End: 2021-07-13

## 2021-07-13 RX ADMIN — LIDOCAINE HYDROCHLORIDE 1 APPLICATION: 20 JELLY TOPICAL at 10:20

## 2021-07-13 RX ADMIN — LIDOCAINE HYDROCHLORIDE 10 ML: 10 INJECTION, SOLUTION EPIDURAL; INFILTRATION; INTRACAUDAL; PERINEURAL at 12:00

## 2021-07-13 RX ADMIN — LIDOCAINE HYDROCHLORIDE 10 ML: 10 INJECTION, SOLUTION EPIDURAL; INFILTRATION; INTRACAUDAL; PERINEURAL at 10:20

## 2021-07-13 NOTE — OP NOTE
Preoperative diagnosis  Elevated PSA velocity (PSA maikol from 1 8 last year to 3 1 this year)  Prostate nodule on MRI  Postoperative diagnosis  Same pending pathology  Procedure  Ultrasound-guided transrectal needle biopsy of the prostate  Surgeon  Álvaro Galindo  Anesthesia  Local  Brief history  This is a a 41-year-old white male with elevated PSA velocity  Prostate feels benign  MRI shows nodule at the right posterior transitional zone base  MRI was reviewed preoperatively with Dr Bebeto cordova and Kate Garvey RT  Procedure  Time-out was taken  Local anesthetic was instilled per rectum  Ultrasound probe was placed  Under ultrasound guidance anesthetic was instilled at the base of the prostate bilaterally  Under ultrasound guidance tissue cores were obtained  Six tissue cores were obtained from the left  Six tissue cores were obtained from the right  Three tissue cores were obtained from the nodular area seen on MRI  Ultrasound probe was withdrawn  Patient was discharged with written postoperative instructions  He is to follow-up at the office to review the biopsy results

## 2021-07-27 ENCOUNTER — OFFICE VISIT (OUTPATIENT)
Dept: FAMILY MEDICINE CLINIC | Facility: CLINIC | Age: 66
End: 2021-07-27
Payer: MEDICARE

## 2021-07-27 VITALS
HEART RATE: 63 BPM | WEIGHT: 183.4 LBS | TEMPERATURE: 98.5 F | OXYGEN SATURATION: 96 % | SYSTOLIC BLOOD PRESSURE: 120 MMHG | BODY MASS INDEX: 24.84 KG/M2 | HEIGHT: 72 IN | DIASTOLIC BLOOD PRESSURE: 70 MMHG | RESPIRATION RATE: 20 BRPM

## 2021-07-27 DIAGNOSIS — I25.84 CORONARY ARTERY CALCIFICATION: ICD-10-CM

## 2021-07-27 DIAGNOSIS — R73.09 SUGAR BLOOD LEVEL INCREASED: ICD-10-CM

## 2021-07-27 DIAGNOSIS — D75.89 MACROCYTOSIS WITHOUT ANEMIA: ICD-10-CM

## 2021-07-27 DIAGNOSIS — R03.0 ELEVATED BLOOD PRESSURE READING WITHOUT DIAGNOSIS OF HYPERTENSION: ICD-10-CM

## 2021-07-27 DIAGNOSIS — R79.89 DECREASED TESTOSTERONE LEVEL IN MALE: ICD-10-CM

## 2021-07-27 DIAGNOSIS — I25.10 CORONARY ARTERY CALCIFICATION: ICD-10-CM

## 2021-07-27 DIAGNOSIS — I71.2 THORACIC AORTIC ANEURYSM WITHOUT RUPTURE (HCC): Primary | ICD-10-CM

## 2021-07-27 DIAGNOSIS — M85.80 OSTEOPENIA, UNSPECIFIED LOCATION: ICD-10-CM

## 2021-07-27 PROCEDURE — 99214 OFFICE O/P EST MOD 30 MIN: CPT | Performed by: FAMILY MEDICINE

## 2021-07-27 RX ORDER — ALENDRONATE SODIUM 35 MG/1
35 TABLET ORAL
Qty: 4 TABLET | Refills: 1 | Status: SHIPPED | OUTPATIENT
Start: 2021-07-27 | End: 2022-01-07

## 2021-07-27 NOTE — PROGRESS NOTES
Assessment/Plan:       No problem-specific Assessment & Plan notes found for this encounter  Diagnoses and all orders for this visit:    Thoracic aortic aneurysm without rupture [facility])  Comments:  Thoracic surgeon consult noted  Advised patient to keep blood pressure under control  Not a good indication activity will raise pressure inside chest    Sugar blood level increased  Comments: To watch sweet and carbohydrate intake    Elevated blood pressure reading without diagnosis of hypertension  Comments:  Blood pressure today is normal     Coronary artery calcification  Comments:  Keep appointment with Cardiology    Macrocytosis without anemia  Comments:  Improving  X81 and folic acid not ordered because is not preferred service, to monitor CBC, consider hematology referral    Osteopenia, unspecified location  Comments:  Calcium and vitamin-D supplement discussed  Advised about GI side effect with Fosamax and to take it as directed  Orders:  -     alendronate (FOSAMAX) 35 mg tablet; Take 1 tablet (35 mg total) by mouth every 7 days    Decreased testosterone level in male  Comments: To follow with Urology        Patient Instructions   Patient has an appointment in October advised to keep it  No orders of the defined types were placed in this encounter  Subjective:     Patient ID: Ally See is a 78 y o  male      HPI  Osteopenia, denied bone fracture  Elevated blood pressure, denied flushing or dizziness  High blood sugar  Denied polyuria or polydipsia  Coronary artery calcification  has appointment with Cardiology  Denied chest  Thoracic aortic aneurysm he did see thoracic surgeon on June 28 20  Abnormal prostate MRI  Had had prostate biopsy and he is aware his prostate biopsy is back but he does not like me to discuss it with him he has an appointment with Urology tomorrow    Test results  Lab done on June 2nd 2021 discussed with patient  Prostate pathology report    Did not discussed with patient per his request  but advised patient to keep his appointment with Urology tomorrow to follow with the report  I did insinuate through my conversation his pathology report is abnormal but I didn't  tell him directly he has prostate cancer  Review of Systems   Constitutional: Negative for activity change, appetite change, chills, fatigue, fever and unexpected weight change  HENT: Negative for congestion, ear discharge, ear pain, hearing loss, nosebleeds, rhinorrhea, sinus pressure, sore throat, tinnitus, trouble swallowing and voice change  Eyes: Negative for photophobia, pain and visual disturbance  Respiratory: Negative for cough, chest tightness, shortness of breath and wheezing  Cardiovascular: Negative for chest pain, palpitations and leg swelling  Gastrointestinal: Negative for abdominal pain, anal bleeding, blood in stool, constipation, diarrhea, nausea and vomiting  Endocrine: Negative for cold intolerance, heat intolerance, polydipsia and polyuria  Genitourinary: Negative for dysuria, frequency, hematuria and urgency  Musculoskeletal: Negative for arthralgias, back pain, gait problem, joint swelling, myalgias and neck pain  Skin: Negative for rash  Neurological: Negative for dizziness, tremors, seizures, syncope, weakness, light-headedness and headaches  Hematological: Negative for adenopathy  Does not bruise/bleed easily  Psychiatric/Behavioral: Negative for agitation, behavioral problems, confusion, dysphoric mood, hallucinations and sleep disturbance  The patient is not nervous/anxious  Objective:     Physical Exam  Constitutional:       General: He is not in acute distress  Appearance: Normal appearance  He is well-developed  He is not ill-appearing  HENT:      Head: Normocephalic  Eyes:      General: No scleral icterus  Right eye: No discharge  Left eye: No discharge  Pupils: Pupils are equal, round, and reactive to light  Neck:      Thyroid: No thyromegaly  Vascular: No carotid bruit or JVD  Cardiovascular:      Rate and Rhythm: Normal rate and regular rhythm  Heart sounds: Normal heart sounds  No murmur heard  No gallop  Pulmonary:      Effort: Pulmonary effort is normal       Breath sounds: Normal breath sounds  Abdominal:      General: Bowel sounds are normal  There is no distension  Palpations: Abdomen is soft  There is no mass  Tenderness: There is no abdominal tenderness  There is no guarding or rebound  Musculoskeletal:         General: No swelling or tenderness  Normal range of motion  Cervical back: Neck supple  Right lower leg: No edema  Left lower leg: No edema  Lymphadenopathy:      Cervical: No cervical adenopathy  Skin:     Findings: No rash  Neurological:      General: No focal deficit present  Mental Status: He is alert and oriented to person, place, and time  Cranial Nerves: No cranial nerve deficit  Motor: No abnormal muscle tone  Coordination: Coordination normal       Gait: Gait normal    Psychiatric:         Mood and Affect: Mood normal          Behavior: Behavior normal          Thought Content:  Thought content normal

## 2021-07-30 ENCOUNTER — CONSULT (OUTPATIENT)
Dept: CARDIOLOGY CLINIC | Facility: CLINIC | Age: 66
End: 2021-07-30
Payer: MEDICARE

## 2021-07-30 VITALS
HEART RATE: 57 BPM | DIASTOLIC BLOOD PRESSURE: 70 MMHG | WEIGHT: 181 LBS | HEIGHT: 72 IN | SYSTOLIC BLOOD PRESSURE: 110 MMHG | BODY MASS INDEX: 24.52 KG/M2

## 2021-07-30 DIAGNOSIS — I25.84 CORONARY ARTERY CALCIFICATION: Primary | ICD-10-CM

## 2021-07-30 DIAGNOSIS — I25.10 CORONARY ARTERY CALCIFICATION: Primary | ICD-10-CM

## 2021-07-30 PROCEDURE — 93000 ELECTROCARDIOGRAM COMPLETE: CPT | Performed by: INTERNAL MEDICINE

## 2021-07-30 PROCEDURE — 99203 OFFICE O/P NEW LOW 30 MIN: CPT | Performed by: INTERNAL MEDICINE

## 2021-07-30 RX ORDER — TAMSULOSIN HYDROCHLORIDE 0.4 MG/1
CAPSULE ORAL
COMMUNITY
Start: 2021-07-28 | End: 2021-10-13 | Stop reason: ALTCHOICE

## 2021-07-30 NOTE — PROGRESS NOTES
[facility]  [address] [facility], [place]   [address]  [address]  Phone#  [phone]   Fax#  [phone]  *-*-*-*-*-*-*-*-*-*-*-*-*-*-*-*-*-*-*-*-*-*-*-*-*-*-*-*-*-*-*-*-*-*-*-*-*-*-*-*-*-*-*-*-*-*-*-*-*-*-*-*-*-*                                              Cardiology Consultation       ENCOUNTER DATE: 21 8:46 AM  PATIENT NAME: Fide Navas   : 1955    MRN: [id number]  AGE:66 y o  SEX: male  [address] MD     PRIMARY CARE PHYSICIAN: Maggy Trejo MD    ACTIVE DIAGNOSIS THIS VISIT  1   Coronary artery calcification  Ambulatory referral to Cardiology    POCT ECG     ACTIVE PROBLEM LIST  Patient Active Problem List   Diagnosis    Allergic rhinitis    Odontoid fracture (HCC)    Cervical disc herniation    Cervical spinal stenosis    Cervical spondylosis    Neck pain    Degeneration of intervertebral disc of cervical region    Hypothyroidism    Left arm pain    Loose body in knee, right    Muscle pain, myofacial    Neuralgia    Neuropathy    Nontoxic multinodular goiter    Nonunion of fracture    Occipital neuralgia    Post-traumatic osteoarthritis of one knee    Osteoarthritis of knee    Pruritus    Episodic lightheadedness    Spider veins of both lower extremities    Acquired unequal leg length    Venous insufficiency    Abnormal gait    Acquired trigger finger    Chondromalacia patellae    Chronic osteomyelitis of lower leg (HCC)    Generalized osteoarthritis    Knee joint effusion    Knee pain    Localized, primary osteoarthritis    Localized primary osteoarthritis of wrist    Nontoxic single thyroid nodule    Anxiety    Reactive depression    BPH without obstruction/lower urinary tract symptoms    Hypothyroidism due to acquired atrophy of thyroid    Post traumatic stress disorder (PTSD)    Vertigo    Vitamin D deficiency    Chronic pain syndrome    Varicose veins of bilateral lower extremities with other complications    Elevated EBV antibody titer    Fatigue    Sugar blood level increased    Osteoporosis without current pathological fracture    Bilateral carotid artery stenosis    PAC (premature atrial contraction)    PVC (premature ventricular contraction)    Elevated blood pressure reading without diagnosis of hypertension    Elevated LDL cholesterol level    Macrocytosis without anemia    Leukocytosis    Abnormal blood chemistry    Palpitation    Chronic neck pain    Thoracic aortic aneurysm without rupture (HCC)    Nonrheumatic mitral valve regurgitation    Bilateral enlargement of atria    Abnormal MRI    Coronary artery calcification    Abnormal CT scan of lung    Osteopenia    Decreased testosterone level in male       HPI:  Patient referred for evaluation of coronary artery calcifications  Patient had spine fusion 5-6 years ago which he relates to some of his symptoms described below although probably there is no relationship  Recently he has been going for frequent walks for about 45 minutes at a leisurely pace  When he comes home, he lays on the floor to cool down and relax  He becomes nauseous but does not vomit although he often feels like he might vomit  He becomes diaphoretic and his heart races and pounds  He has no specific chest discomfort  He has a history of smoking up to a pack a day for 2-3 years in the early [de-identified] and has not smoked since  His father may have had a heart attack  He had peripheral vascular disease and fell down a flight of stairs and was noted to be   He had an uncle who had CABG  He is a retired  from [employer]       testing up to this point has included an echocardiogram which demonstrated a mildly dilated ascending aorta, normal left ventricular systolic and diastolic function, biatrial enlargement, mild-to-moderate mitral regurgitation although by exam I do not hear murmur and think that most likely it is mild MR  Normal pulmonary artery pressures  A Holter monitor appeared benign  Carotid ultrasound demonstrated less than 50% stenosis bilaterally  CTA of the chest demonstrated enlarged ascending aorta at 41 mm  There was evidence of coronary artery calcification  Patient was advised not to lift more than 25 lb due to the ascending aortic aneurysm  DISCUSSION/PLAN:       1  Obtain nuclear stress test  2   Return following stress test    Lab Studies:    Lab Results   Component Value Date    CHOLESTEROL 189 04/27/2021    CHOLESTEROL 191 10/09/2020    CHOLESTEROL 187 08/26/2019     Lab Results   Component Value Date    TRIG 63 04/27/2021    TRIG 70 10/09/2020    TRIG 80 08/26/2019     Lab Results   Component Value Date    HDL 52 04/27/2021    HDL 57 10/09/2020    HDL 46 08/26/2019     Lab Results   Component Value Date    LDLCALC 124 (H) 04/27/2021    LDLCALC 120 (H) 10/09/2020    LDLCALC 125 (H) 08/26/2019       Lab Results   Component Value Date    HGBA1C 5 6 05/19/2021      Lab Results   Component Value Date    EGFR 102 07/02/2021    EGFR 103 04/27/2021    EGFR 105 10/09/2020    SODIUM 138 07/02/2021    SODIUM 144 04/27/2021    SODIUM 142 10/09/2020    K 4 2 07/02/2021    K 4 4 04/27/2021    K 4 1 10/09/2020     07/02/2021     04/27/2021     (H) 10/09/2020    CO2 28 07/02/2021    CO2 31 04/27/2021    CO2 30 10/09/2020    ANIONGAP 7 07/31/2015    ANIONGAP 9 02/24/2015    ANIONGAP 9 02/06/2015    BUN 20 07/02/2021    BUN 17 04/27/2021    BUN 18 10/09/2020    CREATININE 0 63 07/02/2021    CREATININE 0 62 04/27/2021    CREATININE 0 60 10/09/2020     Lab Results   Component Value Date    WBC 9 05 07/02/2021    WBC 7 90 04/27/2021    WBC 10 48 (H) 10/09/2020    HGB 14 3 07/02/2021    HGB 14 2 04/27/2021    HGB 15 0 10/09/2020    HCT 44 4 07/02/2021    HCT 44 7 04/27/2021    HCT 46 2 10/09/2020     (H) 07/02/2021     (H) 04/27/2021     (H) 10/09/2020    MCH 32 9 07/02/2021    MCH 32 8 04/27/2021    MCH 33 3 10/09/2020    MCHC 32 2 07/02/2021    MCHC 31 8 04/27/2021    [facility] 10/09[facility]     07/02/2021     04/27/2021     10/09/2020        Lab Results   Component Value Date    GLUCOSE 106 07/31/2015    GLUCOSE 119 02/24/2015    GLUCOSE 105 02/06/2015    CALCIUM 9 3 07/02/2021    CALCIUM 9 1 04/27/2021    CALCIUM 9 5 10/09/2020    AST 16 07/02/2021    AST 24 04/27/2021    AST 20 10/09/2020    ALT 29 07/02/2021    ALT 29 04/27/2021    ALT 33 10/09/2020    ALKPHOS 52 07/02/2021    ALKPHOS 53 04/27/2021    ALKPHOS 60 10/09/2020    PROT 7 2 07/31/2015    PROT 6 7 12/19/2014    BILITOT 0 60 07/31/2015    BILITOT 0 49 12/19/2014     Results for orders placed or performed in visit on 07/30/21   POCT ECG    Narrative      Sinus bradycardia rate of 57 beats per minute  Premature atrial complexes  Normal EKG  Current Outpatient Medications:     acetaminophen (TYLENOL) 500 mg tablet, Take 1,000 mg by mouth as needed , Disp: , Rfl:     alendronate (FOSAMAX) 35 mg tablet, Take 1 tablet (35 mg total) by mouth every 7 days, Disp: 4 tablet, Rfl: 1    ASPIRIN 81 PO, Take 1 tablet by mouth daily , Disp: , Rfl:     Calcium Carbonate-Vit D-Min (CALCIUM 1200 PO), Take 1 tablet by mouth daily, Disp: , Rfl:     doxycycline (ADOXA) 50 MG tablet, TAKE 1 TABLET BY ORAL ROUTE EVERY DAY WITH MEAL X3 MONTHS THEN [facility], Disp: , Rfl:     Flarex 0 1 % ophthalmic suspension, TAKEN AS NEEDED, Disp: , Rfl:     fluticasone (FLONASE) 50 mcg/act nasal spray, 2 sprays into each nostril daily, Disp: 3 g, Rfl: 1    ibuprofen (MOTRIN) 200 mg tablet, Take 200 mg by mouth every 4 (four) hours as needed for mild pain , Disp: , Rfl:     levothyroxine 100 mcg tablet, daily, Disp: , Rfl:     meclizine (ANTIVERT) 25 mg tablet, Take 1/2 tab 3 times a day   po, Disp: 135 tablet, Rfl: 0    metroNIDAZOLE (METROGEL) 0 75 % gel, USE TWICE A DAY ON NOSE, Disp: , Rfl:     Multiple Vitamin (MULTIVITAMINS PO), Take 1 capsule by mouth daily, Disp: , Rfl:     Omega-3 Fatty Acids (FISH OIL) 645 MG CAPS, Take 1 capsule by mouth daily , Disp: , Rfl:     RESTASIS MULTIDOSE 0 05 % ophthalmic emulsion, INSTILL 1 DROP INTO BOTH EYES TWICE A DAY, Disp: , Rfl: 3    tamsulosin (FLOMAX) 0 4 mg, , Disp: , Rfl:     tretinoin (ALTRALIN) 0 05 %, APPLY TO FACE AT NIGHT AS DIRECTED, Disp: , Rfl: 1  Allergies   Allergen Reactions    Amoxicillin-Pot Clavulanate Rash, Vomiting and Nausea Only       Past Medical History:   Diagnosis Date    Anxiety     Arthritis     Bleeding tendency (HCC)     BPH (benign prostatic hyperplasia)     Broken bones     left leg    Cataract     Chronic neck pain     Epilepsy ([facility] [address] )     Hypothyroidism     Neoplasm of unspecified behavior of bone, soft tissue, and skin     Osteomyelitis (HCC)     Skin cancer     Squamous carcinoma 2014    Thyroid condition     Thyroid disease      Social History     Socioeconomic History    Marital status: Legally      Spouse name: Not on file    Number of children: Not on file    Years of education: Not on file    Highest education level: Not on file   Occupational History    Occupation: Teacher    Occupation: retired   Tobacco Use    Smoking status: Former Smoker     Types: Cigarettes     Quit date:      Years since quittin 6    Smokeless tobacco: Never Used   Vaping Use    Vaping Use: Never used   Substance and Sexual Activity    Alcohol use: No     Comment: being a social drinker    Drug use: Not Currently     Comment: Medical marijuana    Sexual activity: Yes     Comment: no known std risk factors   Other Topics Concern    Not on file   Social History Narrative    Daily coffee consumption (3 cups/day)     Social Determinants of Health     Financial Resource Strain:     Difficulty of Paying Living Expenses:    Food Insecurity:     Worried About Running Out of Food in the Last Year:     Ran Out of Food in the Last Year:    Transportation Needs:     Lack of Transportation (Medical):  Lack of Transportation (Non-Medical):    Physical Activity:     Days of Exercise per Week:     Minutes of Exercise per Session:    Stress:     Feeling of Stress :    Social Connections:     Frequency of Communication with Friends and Family:     Frequency of Social Gatherings with Friends and Family:     Attends Jewish Services:     Active Member of Clubs or Organizations:     Attends Club or Organization Meetings:     Marital Status:    Intimate Partner Violence:     Fear of Current or Ex-Partner:     Emotionally Abused:     Physically Abused:     Sexually Abused:       Family History   Problem Relation Age of Onset    Bone cancer Mother     Other Father         Accident    Dementia Other     Cancer Other      Past Surgical History:   Procedure Laterality Date    ARTHRODESIS      H/o Arthrodesis Cervical to C2;  last assessed [id number]    COLONOSCOPY  01/22/2021    KNEE SURGERY Right 12/22/2017    LEG SURGERY Left 1999    Hardware placed in lower leg bone(s)    LEG SURGERY Left 01/01/2006 2006, 2012-Left leg vascular    NECK SURGERY  2013    C1-2 fusion    THYROID SURGERY Right 2013 [address] [place] BIOPSY  7/13/2021       Review of Systems:  Review of Systems   Constitutional: Negative  HENT: Negative  Eyes: Negative  Respiratory: Negative for chest tightness and shortness of breath  Cardiovascular: Negative for chest pain, palpitations and leg swelling  Gastrointestinal: Negative  Endocrine: Negative  Musculoskeletal: Negative  Skin: Negative  Allergic/Immunologic: Negative  Neurological: Negative  Hematological: Negative  Psychiatric/Behavioral: Negative          Physical Exam:  Ht 6' (1 829 m)   Wt 82 1 kg (181 lb)   BMI 24 55 kg/m²     PREVIOUS WEIGHTS:   Wt Readings from Last 10 Encounters:   07/30/21 82 1 kg (181 lb)   07/27/21 83 2 kg (183 lb 6 4 oz)   06/28/21 83 4 kg (183 lb 14 4 oz)   06/22/21 83 1 kg (183 lb 3 2 oz)   05/18/21 85 6 kg (188 lb 12 8 oz)   05/14/21 84 4 kg (186 lb)   04/23/21 87 1 kg (192 lb)   04/20/21 86 5 kg (190 lb 9 6 oz)   03/08/21 86 2 kg (190 lb)   01/22/21 85 7 kg (189 lb)      Physical Exam  Vitals reviewed  Constitutional:       General: He is not in acute distress  Appearance: He is well-developed  HENT:      Head: Normocephalic and atraumatic  Neck:      Thyroid: No thyromegaly  Vascular: No carotid bruit or JVD  Trachea: No tracheal deviation  Cardiovascular:      Rate and Rhythm: Normal rate and regular rhythm  Pulses: Normal pulses  Heart sounds: Normal heart sounds  No murmur heard  No friction rub  No gallop  Pulmonary:      Effort: Pulmonary effort is normal  No respiratory distress  Breath sounds: Normal breath sounds  No wheezing, rhonchi or rales  Chest:      Chest wall: No tenderness  Abdominal:      General: Bowel sounds are normal  There is no distension  Palpations: Abdomen is soft  Tenderness: There is no abdominal tenderness  Musculoskeletal:      Cervical back: Normal range of motion and neck supple  Right lower leg: No edema  Left lower leg: No edema  Skin:     General: Skin is warm and dry  Neurological:      General: No focal deficit present  Mental Status: He is alert and oriented to person, place, and time  Gait: Gait normal    Psychiatric:         Mood and Affect: Mood normal          Behavior: Behavior normal          Thought Content:  Thought content normal          Judgment: Judgment normal          --------------------------------------------------------------------------------  ECHO:   Results for orders placed during the hospital encounter of 06/02/21    Echo complete with contrast if indicated    Narrative  [facility] [address] Echocardiogram  2D, M-mode, Doppler, and Color Doppler    Study date:  2021    Patient: Chava Rowe  MR number: [id number]  Account number: [de-identified]  : 1955  Age: 78 years  Gender: Male  Status: Outpatient  Location: [facility]  Height: 72 in  Weight: 187 7 lb  BP: 122/ 74 mmHg    Indications: Palpitations    Diagnoses: R00 2 - Palpitations    Sonographer:  BLADIMIR Smith  Interpreting Physician:  Pamella Matson DO  Referring Physician:  Zari Amezquita MD  Group:  [facility]    SUMMARY    SUMMARY:  1  This is a technically adequate study  2  Left ventricle is normal in size and function  Left ventricular ejection fraction is estimated at 55%  3  Normal diastolic function  4  Mild biatrial dilatation  5  Mild-to-moderate mitral regurgitation  6  Trace tricuspid regurgitation with pulmonary artery systolic pressure is estimated at 15-20 mm Hg  7  Aortic root is mildly dilated 3 5 cm  8  There is no study for comparison    HISTORY: PRIOR HISTORY: PVC's, PAC's    PROCEDURE: The study was performed in the [address] [place] [address] OP  This was a routine study  The transthoracic approach was used  The study included complete 2D imaging, M-mode, complete spectral Doppler, and color Doppler  The heart rate was 56  bpm, at the start of the study  Images were obtained from the parasternal, apical, subcostal, and suprasternal notch acoustic windows  Image quality was adequate  LEFT VENTRICLE: Left ventricle is normal in size and function  Left ventricular ejection fraction is estimated at 55%  Normal wall thickness  Normal diastolic function  There are no obvious high-grade regional wall motion abnormalities  RIGHT VENTRICLE: Right ventricle is normal in size and function  LEFT ATRIUM: Left atrium is mildly dilated  ATRIAL SEPTUM: The interatrial septum appears to be grossly normal without evidence of shunting by color-flow Doppler      RIGHT ATRIUM: Right atrium is mildly dilated  MITRAL VALVE: Mitral valve opens well  No evidence of mitral stenosis  Mild-to-moderate mitral regurgitation  AORTIC VALVE: Aortic valve opens well  Aortic valve is trileaflet  No evidence of aortic stenosis  Trace aortic regurgitation  TRICUSPID VALVE: Tricuspid valve opens well  Trace tricuspid regurgitation  Pulmonary artery systolic pressures are estimated at 15-20 mm Hg    PULMONIC VALVE: Pulmonic valve opens well  No evidence of pulmonic stenosis  Mild pulmonic regurgitation  PERICARDIUM: There is no evidence of significant pericardial effusion  AORTA: Aortic root is mildly dilated 3 5 cm  SYSTEMIC VEINS: IVC: IVC is normal size with greater than 50% respirophasic collapse  SYSTEM MEASUREMENT TABLES    2D  %FS: 36 12 %  Ao Diam: 3 46 cm  EDV(Teich): 123 71 ml  EF(Teich): 65 45 %  ESV(Teich): 42 74 ml  IVSd: 0 85 cm  LA Area: 22 09 cm2  LA Diam: 4 34 cm  LVEDV MOD A4C: 192 55 ml  LVEF MOD A4C: 63 04 %  LVESV MOD A4C: 71 17 ml  LVIDd: 5 1 cm  LVIDs: 3 26 cm  LVLd A4C: 9 02 cm  LVLs A4C: 7 15 cm  LVPWd: 0 96 cm  RA Area: 20 87 cm2  RVIDd: 3 47 cm  SV MOD A4C: 121 38 ml  SV(Teich): 80 97 ml    CW  TR Vmax: 1 81 m/s  TR maxP 16 mmHg    MM  TAPSE: 2 29 cm    PW  E' Sept: 0 1 m/s  E/E' Sept: 7 93  MV A Ranjan: 0 52 m/s  MV Dec Lipscomb: 3 04 m/s2  MV DecT: 271 35 ms  MV E Ranjan: 0 83 m/s  MV E/A Ratio: 1 6  MV PHT: 78 69 ms  MVA By PHT: 2 8 cm2    [facility]    Prepared and electronically signed by    Layton Severino DO  Signed 2021 13:02:56    No results found for this or any previous visit     --------------------------------------------------------------------------------  HOLTER  Results for orders placed during the hospital encounter of 21    Holter monitor - 24 hour    Narrative  PT NAME: Linden Grover  : 1955  AGE: 78 y o    GENDER: male  MRN: [id number]   PROCEDURE: Holter monitor - 24 hour        INDICATIONS: Palpitations      FINDINGS:    Holter monitoring revealed predominant sinus rhythm with an average heart rate of 66 BPM, a minimum heart rate of 40 BPM, and a maximum heart rate of 105 BPM     There were about 49 ventricular ectopic beats, all occurring as single PVC's with no evidence of ventricular tachycardia  There were about 398 supraventricular ectopic beats, mostly occurring as single PAC's, with 2 short atrial runs (upto 3 beats), with no evidence of supraventricular tachycardia, atrial fibrillation, or atrial flutter  There was no evidence of significant bradyarrhythmia or advanced heart block  The longest R-R interval was 1 7 seconds  The patient's symptom diary reported no symptoms  No results found for this or any previous visit     --------------------------------------------------------------------------------  CAROTIDS  Results for orders placed during the hospital encounter of 06/11/21    VAS carotid complete study    Narrative  [facility] REPORT  CLINICAL:  Indications:  Patient presents with episodes of dizziness over the past 3  years  He states he notices it more when overheated or dehydrated  He has a  history of C1-C2 fusion in 2013  Operative History:  Left Vascular surgery, lower extremity, 2006, 2012  Risk Factors  The patient has history of previous smoking (quit >10 yrs ago)  Clinical  Right Pressure:  122/82 mm Hg     Left Pressure:  118/78 mm Hg    FINDINGS:    Right        Impression  PSV  EDV (cm/s)  Direction of Flow  Ratio  Dist  ICA                 49          14                      0 38  Mid  ICA                  61          18                      0 48  Prox   ICA    1 - 49%      68          17                      0 53  Dist CCA                  79           9  Mid CCA                  128          20                      1 00  Prox CCA                 128          22  Ext Carotid               91          12                      0 71  Prox Vert 41           8  Antegrade  Subclavian               134          10    Left         Impression  PSV  EDV (cm/s)  Direction of Flow  Ratio  Dist  ICA                 60          24                      0 48  Mid  ICA                  76          31                      0 60  Prox  ICA    1 - 49%      88          23                      0 70  Dist CCA                 106          24  Mid CCA                  125          31                      0 88  Prox CCA                 143          35  Ext Carotid               75          15                      0 60  Prox Vert                 48          15  Antegrade  Subclavian               161           3        CONCLUSION:    Impression  RIGHT:  There is <50% stenosis in the internal carotid artery  Plaque is homogenous and  smooth  Vertebral artery flow is antegrade  There is no significant subclavian artery  disease  Incidental finding: There is a 1 5 cm x 1 3 cm x 1 5 cm echogenic, avascular  focus with posterior enhancement in the parotid gland  LEFT:  There is <50% stenosis in the internal carotid artery  Plaque is homogenous and  irregular  Vertebral artery flow is antegrade  There is no significant subclavian artery  disease  There is no previous study for comparison  Internal carotid artery stenosis determination by consensus criteria from:  Sindhu Siddiqui et al  Carotid Artery Stenosis: Gray-Scale and Doppler US Diagnosis  - Society of Radiologists in [address] [place] [address] [facility], Radiology 2003;  [phone]  SIGNATURE:  Electronically Signed by: Mariaa Fuentes on 2021-06-11 05:20:51 PM     No results found for this or any previous visit      --------------------------------------------------------------------------------   RADIOLOGY RESULTS:    06/13/2021 CT scan of the chest without IV contrast  TCT CHEST WITHOUT IV CONTRAST     INDICATION:   I77 810: Thoracic aortic ectasia  Dilated aortic root    Palpitations      COMPARISON: Report of echo performed June 2, 2021      TECHNIQUE: CT examination of the chest was performed without intravenous contrast   Axial, sagittal, and coronal 2D reformatted images were created from the source data and submitted for interpretation      Radiation dose length product (DLP) for this visit:  242 mGy-cm   This examination, like all CT scans performed in the [facility], was performed utilizing techniques to minimize radiation dose exposure, including the use of iterative   reconstruction and automated exposure control       FINDINGS:     LUNGS:  Nodular subpleural atelectasis or scarring in the anterolateral base of the right middle lobe and in the periphery of the anterior lingula  No suspicious pulmonary mass  No groundglass or consolidative airspace opacity to suggest pneumonia  No   tracheal or endobronchial lesion      PLEURA:  Unremarkable      HEART/GREAT VESSELS:  Aortic root is prominent measuring up to 38 mm  There is mild fusiform aneurysmal enlargement of the ascending thoracic aorta measuring up to 40 mm, tapering to 36 mm the level of the proximal aortic arch  There are mild coronary   artery calcification  No pericardial effusion      MEDIASTINUM AND JANA:  Unremarkable      CHEST WALL AND LOWER NECK:   Unremarkable      VISUALIZED STRUCTURES IN THE UPPER ABDOMEN:  Unremarkable      OSSEOUS STRUCTURES:  No acute fracture or destructive osseous lesion      IMPRESSION:     Mild fusiform aneurysmal enlargement of the ascending thoracic aorta measuring up to 41 mm    ======================================================   I have personally reviewed pertinent reports  Portions of the record may have been created with voice recognition software  Occasional wrong word or "sound a like" substitutions may have occurred due to the inherent limitations of voice recognition software   Read the chart carefully and recognize, using context, where substitutions have occurred      SIGNATURES:   Qiana Gomez MD

## 2021-08-03 ENCOUNTER — HOSPITAL ENCOUNTER (OUTPATIENT)
Dept: RADIOLOGY | Facility: HOSPITAL | Age: 66
Discharge: HOME | End: 2021-08-03
Attending: UROLOGY
Payer: MEDICARE

## 2021-08-03 ENCOUNTER — HOSPITAL ENCOUNTER (OUTPATIENT)
Dept: CT IMAGING | Facility: HOSPITAL | Age: 66
Discharge: HOME | End: 2021-08-03
Attending: UROLOGY
Payer: MEDICARE

## 2021-08-03 DIAGNOSIS — C61 MALIGNANT NEOPLASM OF PROSTATE (HCC): ICD-10-CM

## 2021-08-03 PROCEDURE — 78306 BONE IMAGING WHOLE BODY: CPT

## 2021-08-03 PROCEDURE — A9503 TC99M MEDRONATE: HCPCS

## 2021-08-03 PROCEDURE — 74177 CT ABD & PELVIS W/CONTRAST: CPT

## 2021-08-03 RX ADMIN — IOHEXOL 100 ML: 350 INJECTION, SOLUTION INTRAVENOUS at 12:27

## 2021-08-13 ENCOUNTER — HOSPITAL ENCOUNTER (OUTPATIENT)
Dept: NUCLEAR MEDICINE | Facility: HOSPITAL | Age: 66
Discharge: HOME | End: 2021-08-13
Attending: INTERNAL MEDICINE
Payer: MEDICARE

## 2021-08-13 ENCOUNTER — HOSPITAL ENCOUNTER (OUTPATIENT)
Dept: NON INVASIVE DIAGNOSTICS | Facility: HOSPITAL | Age: 66
Discharge: HOME | End: 2021-08-13
Attending: INTERNAL MEDICINE
Payer: MEDICARE

## 2021-08-13 DIAGNOSIS — I25.10 CORONARY ARTERY CALCIFICATION: ICD-10-CM

## 2021-08-13 DIAGNOSIS — I25.84 CORONARY ARTERY CALCIFICATION: ICD-10-CM

## 2021-08-13 LAB
CHEST PAIN STATEMENT: NORMAL
MAX DIASTOLIC BP: 74 MMHG
MAX HEART RATE: 133 BPM
MAX PREDICTED HEART RATE: 153 BPM
MAX. SYSTOLIC BP: 178 MMHG
PROTOCOL NAME: NORMAL
TARGET HR FORMULA: NORMAL
TEST INDICATION: NORMAL
TIME IN EXERCISE PHASE: NORMAL

## 2021-08-13 PROCEDURE — G1004 CDSM NDSC: HCPCS

## 2021-08-13 PROCEDURE — 78452 HT MUSCLE IMAGE SPECT MULT: CPT | Performed by: INTERNAL MEDICINE

## 2021-08-13 PROCEDURE — 78452 HT MUSCLE IMAGE SPECT MULT: CPT

## 2021-08-13 PROCEDURE — A9502 TC99M TETROFOSMIN: HCPCS

## 2021-08-13 PROCEDURE — 93018 CV STRESS TEST I&R ONLY: CPT | Performed by: INTERNAL MEDICINE

## 2021-08-13 PROCEDURE — 93017 CV STRESS TEST TRACING ONLY: CPT

## 2021-08-13 PROCEDURE — 93016 CV STRESS TEST SUPVJ ONLY: CPT | Performed by: INTERNAL MEDICINE

## 2021-09-23 ENCOUNTER — OFFICE VISIT (OUTPATIENT)
Dept: CARDIOLOGY CLINIC | Facility: CLINIC | Age: 66
End: 2021-09-23
Payer: MEDICARE

## 2021-09-23 VITALS
BODY MASS INDEX: 25.73 KG/M2 | HEART RATE: 56 BPM | HEIGHT: 72 IN | WEIGHT: 190 LBS | SYSTOLIC BLOOD PRESSURE: 110 MMHG | DIASTOLIC BLOOD PRESSURE: 72 MMHG | RESPIRATION RATE: 16 BRPM

## 2021-09-23 DIAGNOSIS — I49.3 PVC (PREMATURE VENTRICULAR CONTRACTION): ICD-10-CM

## 2021-09-23 DIAGNOSIS — I25.10 CORONARY ARTERY CALCIFICATION: Primary | ICD-10-CM

## 2021-09-23 DIAGNOSIS — E78.00 PRIMARY HYPERCHOLESTEROLEMIA: ICD-10-CM

## 2021-09-23 DIAGNOSIS — I65.23 BILATERAL CAROTID ARTERY STENOSIS: ICD-10-CM

## 2021-09-23 DIAGNOSIS — I34.0 NONRHEUMATIC MITRAL VALVE REGURGITATION: ICD-10-CM

## 2021-09-23 DIAGNOSIS — I25.84 CORONARY ARTERY CALCIFICATION: Primary | ICD-10-CM

## 2021-09-23 DIAGNOSIS — I71.2 THORACIC AORTIC ANEURYSM WITHOUT RUPTURE (HCC): ICD-10-CM

## 2021-09-23 PROCEDURE — 99214 OFFICE O/P EST MOD 30 MIN: CPT | Performed by: INTERNAL MEDICINE

## 2021-09-23 NOTE — PROGRESS NOTES
[facility]  [address] [facility], [place]   [address]  [address]  Phone#  [phone]   Fax#  [phone]  *-*-*-*-*-*-*-*-*-*-*-*-*-*-*-*-*-*-*-*-*-*-*-*-*-*-*-*-*-*-*-*-*-*-*-*-*-*-*-*-*-*-*-*-*-*-*-*-*-*-*-*-*-*                                   Cardiology Follow Up      ENCOUNTER DATE: 21 2:30 PM  PATIENT NAME: Miki Ramirez   : 1955    MRN: [id number]  AGE:66 y o  SEX: male  [address] Rebeca White MD     PRIMARY CARE PHYSICIAN: Ean Mascorro MD    ACTIVE DIAGNOSIS THIS VISIT  1  Coronary artery calcification     2  PVC (premature ventricular contraction)     3  Nonrheumatic mitral valve regurgitation     4  Thoracic aortic aneurysm without rupture ([address] )  MRI chest with and without contrast    Basic metabolic panel   5  Primary hypercholesterolemia     6   Bilateral carotid artery stenosis < 50%       ACTIVE PROBLEM LIST  Patient Active Problem List   Diagnosis    Allergic rhinitis    Odontoid fracture (HCC)    Cervical disc herniation    Cervical spinal stenosis    Cervical spondylosis    Neck pain    Degeneration of intervertebral disc of cervical region    Hypothyroidism    Left arm pain    Loose body in knee, right    Muscle pain, myofacial    Neuralgia    Neuropathy    Nontoxic multinodular goiter    Nonunion of fracture    Occipital neuralgia    Post-traumatic osteoarthritis of one knee    Osteoarthritis of knee    Pruritus    Episodic lightheadedness    Spider veins of both lower extremities    Acquired unequal leg length    Venous insufficiency    Abnormal gait    Acquired trigger finger    Chondromalacia patellae    Chronic osteomyelitis of lower leg (HCC)    Generalized osteoarthritis    Knee joint effusion    Knee pain    Localized, primary osteoarthritis    Localized primary osteoarthritis of wrist    Nontoxic single thyroid nodule    Anxiety    Reactive depression    BPH without obstruction/lower urinary tract symptoms    Hypothyroidism due to acquired atrophy of thyroid    Post traumatic stress disorder (PTSD)    Vertigo    Vitamin D deficiency    Chronic pain syndrome    Varicose veins of bilateral lower extremities with other complications    Elevated EBV antibody titer    Fatigue    Sugar blood level increased    Osteoporosis without current pathological fracture    Bilateral carotid artery stenosis < 50%    PAC (premature atrial contraction)    PVC (premature ventricular contraction)    Elevated blood pressure reading without diagnosis of hypertension    Primary hypercholesterolemia    Macrocytosis without anemia    Leukocytosis    Abnormal blood chemistry    Palpitation    Chronic neck pain    Thoracic aortic aneurysm without rupture (HCC)    Nonrheumatic mitral valve regurgitation    Bilateral enlargement of atria    Abnormal MRI    Coronary artery calcification    Abnormal CT scan of lung    Osteopenia    Decreased testosterone level in male       CARDIOLOGY SPECIALTY COMMENTS  Patient 1st seen on 2021   Recently he has been going for frequent walks for about 45 minutes at a leisurely pace   When he comes home, he lays on the floor to cool down and relax  Marianna Munson becomes nauseous but does not vomit although he often feels like he might vomit   He becomes diaphoretic and his heart races and pounds    He has no specific chest discomfort          He has a history of smoking up to a pack a day for 2-3 years in the early [de-identified] and has not smoked since  [facility] father may have had a heart attack  Marianna Munson had peripheral vascular disease and fell down a flight of stairs and was noted to be   Marianna Munson had an uncle who had CABG  Marianna Munson is a retired  from [employer]         testing up to this point has included an echocardiogram which demonstrated a mildly dilated ascending aorta, normal left ventricular systolic and diastolic function, biatrial enlargement, mild-to-moderate mitral regurgitation although by exam I do not hear murmur and think that most likely it is mild MR  Normal pulmonary artery pressures         A Holter monitor appeared benign          Carotid ultrasound demonstrated less than 50% stenosis bilaterally        CTA of the chest demonstrated enlarged ascending aorta at 41 mm   There was evidence of coronary artery calcification   Patient was advised not to lift more than 25 lb due to the ascending aortic aneurysm  08/13/2021 nuclear stress test:  IMPRESSIONS: 1  Excellent exercise tolerance   2  Normal resting blood pressure   3  Normal resting EKG   4  Ventricular ectopy as described with exercise   5  Graded treadmill exercise test negative for symptoms of exertional angina pectoris and negative for electrocardiographic changes of ischemia  6  Low normal left ventricular systolic function, EF 40% with mild left ventricular cavity enlargement   7  Normal tomographic perfusion series with inferior diaphragmatic attenuation  INTERVAL HISTORY:        Patient returns  He has no cardiac symptoms  Patient denies chest discomfort or shortness of breath  Patient has no palpitations  Patient denies symptoms of dizziness, lightheadedness or near-syncope/syncope  Patient denies leg edema  Patient denies symptoms of orthopnea or paroxysmal nocturnal dyspnea  He has minimal aortic sclerosis with a tricuspid aortic valve and trace aortic regurgitation  He has a small ascending aortic aneurysm  He is concerned about rupture  He was noted to have calcium in his coronary arteries  A nuclear stress test demonstrated a normal perfusion series with normal left ventricular systolic function  He did have significant ventricular ectopy near the end of the stress test   However, he had a Holter monitor in May ordered by his primary care physician  This demonstrated 49 ventricular extrasystoles in 24 hours    There was also supraventricular ectopy with 2 short atrial runs   The longest atrial run was 3 beats  The patient is noting bruising working around the house  He takes aspirin therapy in wondered about discontinuing it  I told him I did not feel strongly 1 way or the other with his normal stress test     DISCUSSION/PLAN:            1  Obtain MRI of the thoracic aorta to rule out aortopathy  2  Obtain nonfasting BMP for contrast used with MRI  3  Return in 6 months  4  Personally call patient and discuss the results of the MRI with him      Lab Studies:    Lab Results   Component Value Date    CHOLESTEROL 189 04/27/2021    CHOLESTEROL 191 10/09/2020    CHOLESTEROL 187 08/26/2019     Lab Results   Component Value Date    TRIG 63 04/27/2021    TRIG 70 10/09/2020    TRIG 80 08/26/2019     Lab Results   Component Value Date    HDL 52 04/27/2021    HDL 57 10/09/2020    HDL 46 08/26/2019     Lab Results   Component Value Date    LDLCALC 124 (H) 04/27/2021    LDLCALC 120 (H) 10/09/2020    LDLCALC 125 (H) 08/26/2019       Lab Results   Component Value Date    HGBA1C 5 6 05/19/2021      Lab Results   Component Value Date    EGFR 102 07/02/2021    EGFR 103 04/27/2021    EGFR 105 10/09/2020    SODIUM 138 07/02/2021    SODIUM 144 04/27/2021    SODIUM 142 10/09/2020    K 4 2 07/02/2021    K 4 4 04/27/2021    K 4 1 10/09/2020     07/02/2021     04/27/2021     (H) 10/09/2020    CO2 28 07/02/2021    CO2 31 04/27/2021    CO2 30 10/09/2020    ANIONGAP 7 07/31/2015    ANIONGAP 9 02/24/2015    ANIONGAP 9 02/06/2015    BUN 20 07/02/2021    BUN 17 04/27/2021    BUN 18 10/09/2020    CREATININE 0 63 07/02/2021    CREATININE 0 62 04/27/2021    CREATININE 0 60 10/09/2020     Lab Results   Component Value Date    WBC 9 05 07/02/2021    WBC 7 90 04/27/2021    WBC 10 48 (H) 10/09/2020    HGB 14 3 07/02/2021    HGB 14 2 04/27/2021    HGB 15 0 10/09/2020    HCT 44 4 07/02/2021    HCT 44 7 04/27/2021    HCT 46 2 10/09/2020     (H) 07/02/2021     (H) 04/27/2021     (H) 10/09/2020    MCH 32 9 07/02/2021    MCH 32 8 04/27/2021    MCH 33 3 10/09/2020    MCHC 32 2 07/02/2021    MCHC 31 8 04/27/2021    MCHC 32 5 10/09/2020     07/02/2021     04/27/2021     10/09/2020      Lab Results   Component Value Date    GLUCOSE 106 07/31/2015    GLUCOSE 119 02/24/2015    GLUCOSE 105 02/06/2015    CALCIUM 9 3 07/02/2021    CALCIUM 9 1 04/27/2021    CALCIUM 9 5 10/09/2020    AST 16 07/02/2021    AST 24 04/27/2021    AST 20 10/09/2020    ALT 29 07/02/2021    ALT 29 04/27/2021    ALT 33 10/09/2020    ALKPHOS 52 07/02/2021    ALKPHOS 53 04/27/2021    ALKPHOS 60 10/09/2020    PROT 7 2 07/31/2015    PROT 6 7 12/19/2014    BILITOT 0 60 07/31/2015    BILITOT 0 49 12/19/2014     No results found for this visit on 09/23/21  Current Outpatient Medications:     acetaminophen (TYLENOL) 500 mg tablet, Take 1,000 mg by mouth as needed , Disp: , Rfl:     alendronate (FOSAMAX) 35 mg tablet, Take 1 tablet (35 mg total) by mouth every 7 days, Disp: 4 tablet, Rfl: 1    ASPIRIN 81 PO, Take 1 tablet by mouth daily , Disp: , Rfl:     Calcium Carbonate-Vit D-Min (CALCIUM 1200 PO), Take 1 tablet by mouth daily, Disp: , Rfl:     doxycycline (ADOXA) 50 MG tablet, TAKE 1 TABLET BY ORAL ROUTE EVERY DAY WITH MEAL X3 MONTHS THEN MW, Disp: , Rfl:     Flarex 0 1 % ophthalmic suspension, TAKEN AS NEEDED, Disp: , Rfl:     fluticasone (FLONASE) 50 mcg/act nasal spray, 2 sprays into each nostril daily, Disp: 3 g, Rfl: 1    ibuprofen (MOTRIN) 200 mg tablet, Take 200 mg by mouth every 4 (four) hours as needed for mild pain , Disp: , Rfl:     levothyroxine 100 mcg tablet, daily, Disp: , Rfl:     meclizine (ANTIVERT) 25 mg tablet, Take 1/2 tab 3 times a day   po, Disp: 135 tablet, Rfl: 0    metroNIDAZOLE (METROGEL) 0 75 % gel, USE TWICE A DAY ON NOSE, Disp: , Rfl:     Multiple Vitamin (MULTIVITAMINS PO), Take 1 capsule by mouth daily, Disp: , Rfl:     Omega-3 Fatty Acids (FISH OIL) 645 MG CAPS, Take 1 capsule by mouth daily , Disp: , Rfl:     RESTASIS MULTIDOSE 0 05 % ophthalmic emulsion, INSTILL 1 DROP INTO BOTH EYES TWICE A DAY, Disp: , Rfl: 3    tamsulosin (FLOMAX) 0 4 mg, , Disp: , Rfl:     tretinoin (ALTRALIN) 0 05 %, APPLY TO FACE AT NIGHT AS DIRECTED, Disp: , Rfl: 1  Allergies   Allergen Reactions    Amoxicillin-Pot Clavulanate Rash, Vomiting and Nausea Only       Past Medical History:   Diagnosis Date    Anxiety     Arthritis     Bleeding tendency (HCC)     BPH (benign prostatic hyperplasia)     Broken bones     left leg    Cataract     Chronic neck pain     Epilepsy (HCC)     Hypothyroidism     Neoplasm of unspecified behavior of bone, soft tissue, and skin     Osteomy[address] [address]     Skin cancer     Squamous carcinoma 2014    Thyroid condition     Thyroid disease      Social History     Socioeconomic History    Marital status: Legally      Spouse name: Not on file    Number of children: Not on file    Years of education: Not on file    Highest education level: Not on file   Occupational History    Occupation: Teacher    Occupation: retired   Tobacco Use    Smoking status: Former Smoker     Types: Cigarettes     Quit date:      Years since quittin 7    Smokeless tobacco: Never Used   Vaping Use    Vaping Use: Never used   Substance and Sexual Activity    Alcohol use: No     Comment: being a social drinker    Drug use: Not Currently     Comment: Medical marijuana    Sexual activity: Yes     Comment: no known std risk factors   Other Topics Concern    Not on file   Social History Narrative    Daily coffee consumption (3 cups/day)     Social Determinants of Health     Financial Resource Strain:     Difficulty of Paying Living Expenses:    Food Insecurity:     Worried About Running Out of Food in the Last Year:     Ran Out of Food in the Last Year:    Transportation Needs:     Lack of Transportation (Medical):      Lack of Transportation (Non-Medical):    Physical Activity:     Days of Exercise per Week:     Minutes of Exercise per Session:    Stress:     Feeling of Stress :    Social Connections:     Frequency of Communication with Friends and Family:     Frequency of Social Gatherings with Friends and Family:     Attends Bahai Services:     Active Member of Clubs or Organizations:     Attends Club or Organization Meetings:     Marital Status:    Intimate Partner Violence:     Fear of Current or Ex-Partner:     Emotionally Abused:     Physically Abused:     Sexually Abused:       Family History   Problem Relation Age of Onset    Bone cancer Mother     Other Father         Accident    Dementia Other     Cancer Other      Past Surgical History:   Procedure Laterality Date    ARTHRODESIS      H/o Arthrodesis Cervical to C2;  last assessed [id number]    COLONOSCOPY  01/22/2021    KNEE SURGERY Right 12/22/2017    LEG SURGERY Left 1999    Hardware placed in lower leg bone(s)    LEG SURGERY Left 01/01/2006 2006, 2012-Left leg vascular    NECK SURGERY  2013    C1-2 fusion    [facility]   [facility]       PREVIOUS WEIGHTS:   Wt Readings from Last 10 Encounters:   09/23/21 86 2 kg (190 lb)   07/30/21 82 1 kg (181 lb)   07/27/21 83 2 kg (183 lb 6 4 oz)   06/28/21 83 4 kg (183 lb 14 4 oz)   06/22/21 83 1 kg (183 lb 3 2 oz)   05/18/21 85 6 kg (188 lb 12 8 oz)   05/14/21 84 4 kg (186 lb)   04/23/21 87 1 kg (192 lb)   04/20/21 86 5 kg (190 lb 9 6 oz)   03/08/21 86 2 kg (190 lb)        Review of Systems:  Review of Systems   Constitutional: Negative for activity change  Respiratory: Negative for cough, chest tightness, shortness of breath and wheezing  Cardiovascular: Negative for chest pain, palpitations and leg swelling  Musculoskeletal: Negative for gait problem  Skin: Negative for color change     Neurological: Negative for dizziness, tremors, syncope, weakness, light-headedness and headaches  Psychiatric/Behavioral: Negative for agitation and confusion  Physical Exam:  /72   Pulse 56   Resp 16   Ht 6' (1 829 m)   Wt 86 2 kg (190 lb)   BMI 25 77 kg/m²     Physical Exam  Vitals reviewed  Constitutional:       General: He is not in acute distress  Appearance: He is well-developed  HENT:      Head: Normocephalic and atraumatic  Neck:      Thyroid: No thyromegaly  Vascular: No carotid bruit or JVD  Trachea: No tracheal deviation  Cardiovascular:      Rate and Rhythm: Normal rate and regular rhythm  Pulses: Normal pulses  Heart sounds: Normal heart sounds  No murmur heard  No friction rub  No gallop  Pulmonary:      Effort: Pulmonary effort is normal  No respiratory distress  Breath sounds: Normal breath sounds  No wheezing, rhonchi or rales  Chest:      Chest wall: No tenderness  Musculoskeletal:      Cervical back: Normal range of motion and neck supple  Right lower leg: No edema  Left lower leg: No edema  Skin:     General: Skin is warm and dry  Neurological:      General: No focal deficit present  Mental Status: He is alert and oriented to person, place, and time  Psychiatric:         Mood and Affect: Mood normal          Behavior: Behavior normal          Thought Content: Thought content normal          Judgment: Judgment normal          ======================================================  Imaging:   I have personally reviewed pertinent reports  Portions of the record may have been created with voice recognition software  Occasional wrong word or "sound a like" substitutions may have occurred due to the inherent limitations of voice recognition software  Read the chart carefully and recognize, using context, where substitutions have occurred      SIGNATURES:   Kim Dent MD

## 2021-09-28 PROBLEM — C61 PROSTATE CANCER (HCC): Status: ACTIVE | Noted: 2021-01-01

## 2021-09-29 ENCOUNTER — CLINICAL SUPPORT (OUTPATIENT)
Dept: RADIATION ONCOLOGY | Facility: CLINIC | Age: 66
End: 2021-09-29
Attending: RADIOLOGY
Payer: MEDICARE

## 2021-09-29 VITALS
RESPIRATION RATE: 21 BRPM | OXYGEN SATURATION: 96 % | WEIGHT: 178.57 LBS | HEART RATE: 85 BPM | DIASTOLIC BLOOD PRESSURE: 66 MMHG | BODY MASS INDEX: 24.19 KG/M2 | HEIGHT: 72 IN | SYSTOLIC BLOOD PRESSURE: 105 MMHG | TEMPERATURE: 98 F

## 2021-09-29 DIAGNOSIS — C61 PROSTATE CANCER (HCC): Primary | ICD-10-CM

## 2021-09-29 PROCEDURE — 99204 OFFICE O/P NEW MOD 45 MIN: CPT | Performed by: RADIOLOGY

## 2021-09-29 PROCEDURE — 99211 OFF/OP EST MAY X REQ PHY/QHP: CPT | Performed by: RADIOLOGY

## 2021-09-29 NOTE — PROGRESS NOTES
Consultation - Radiation Oncology      QC : 1955  Encounter: [id number]  Patient Information: [address]  Chief Complaint   Patient presents with    Consult     Rad Onc     Cancer Staging  No matching staging information was found for the patient  History of Present Illness   Martin Bell is a 78y o  year old male who presents with Martin Bell 1955 is a 78 y o  male  presents for radiation consult for GS 6 (3+3) prostate cancer  Referred by Dr Bassam Latif         78year old male has been under Dr Jorge Luis Stock care for over 25 years for infertility issues  History of BPH and recent PSA level has risen to 3 1  He has no family hx of prostate cancer  MRI revealed PI-RADS category 3, intermediate risk  Prostate biopsy revealed [place] 6 in 2 cores             Component PSA, Total   Latest Ref Rng & Units 0 0 - 4 0 ng/mL   2017 1 2   2018 1 6   2019 1 6   10/9/2020 1 8   2021 3  1         21 MRI prostate multiparametric wo w contrast  1 7 x 1 5 x 0 9 cm nodule like lesion in the posterior right transition zone at the base probably an atypical nodule with intermediate probability for presence of clinically significant cancer   1   PI-RADSv2 1 Category 3 - Intermediate (the presence of clinically significant cancer is equivocal)    2  No extraprostatic tumor, seminal vesicle invasion, pelvic lymphadenopathy, or pelvic osseous metastatic disease    3  Calculated prostate volume of 44 2 cc         21 Urology, Dr Rudy Bird  Recommended prostate biopsy         21 Prostate Biopsy   Prostatic adenocarcinoma, acinar type, [place] score 3+3=6 (Grade Group 1) involving 2 cores   - Percentage of [place] pattern 4: N/A   - Percentage of [place] pattern 5: N/A   - Perineural invasion: Not identified   - Periprostatic fat invasion: Not identified   - Lymph-vascular invasion: Not identified   - Seminal vesicle/ejaculatory duct invasion: Not identified   - Additional pathologic findings: None      8/3/21 CT abdomen pelvis w contrast  Small retroperitoneal pelvic and inguinal nodes are identified    Prostate enlargement    The osseous structures demonstrate degenerative changes without any evidence of blastic abnormality         8/3/21 Bone scan  No evidence of osseous metastasis        Ortho, Cardio, Endocrine, PCP Follow pt  Historical Information   Oncology History   Prostate cancer ([place] [address] )   2021 Initial Diagnosis    Prostate cancer ([place] [address] )      Biopsy    - Prostatic adenocarcinoma, acinar type, [place] score 3+3=6 (Grade Group 1) involving 2 cores   - Percentage of [place] pattern 4: N/A   - Percentage of [place] pattern 5: N/A   - Perineural invasion: Not identified   - Periprostatic fat invasion: Not identified   - Lymph-vascular invasion: Not identified   - Seminal vesicle/ejaculatory duct invasion: Not identified   - Additional pathologic findings: None   - Best representative block if additional studies are needed: B2 and B6      A  Right prostate (random biopsy): - Benign prostatic tissue  B  Left prostate (random biopsy):      - Small foci of prostatic adenocarcinoma, [place] score 3+3=6 (Grade Group 1), involving less than 5% of two (2) cores  C   Right mid prostate (biopsy): - Benign prostatic tissue              Past Medical History:   Diagnosis Date    Anxiety     Arthritis     [facility]     BPH (benign prostatic hyperplasia)     Broken bones     left leg    Cataract     Chronic neck pain     Epilepsy [facility]     Hypothyroidism     Neoplasm of unspecified behavior of bone, soft tissue, and skin     [facility]omyel[facility]     Prostate cancer ([place] [address] )     Skin cancer     Squamous carcinoma 12/01/2014 12/2014    Thyroid condition     Thyroid disease      Past Surgical History:   Procedure Laterality Date    ARTHRODESIS      H/o Arthrodesis Cervical to C2;  last assessed [id number]  COLONOSCOPY  2021    COLONOSCOPY      KNEE SURGERY Right 2017    LEG SURGERY Left     Hardware placed in lower leg bone(s)    LEG SURGERY Left 2006, -Left leg vascular    NECK SURGERY      C1-2 fusion    THYROID SURGERY Right     US GUIDED PROSTATE BIOPSY  2021       Family History   Problem Relation Age of Onset    Bone cancer Mother     Other Father         Accident    Dementia Other     Cancer Other        Social History   Social History     Substance and Sexual Activity   Alcohol Use No    Comment: being a social drinker     Social History     Substance and Sexual Activity   Drug Use Not Currently    Comment: Medical marijuana     Social History     Tobacco Use   Smoking Status Former Smoker    Types: Cigarettes    Quit date: 26    Years since quittin 7   Smokeless Tobacco Never Used         Meds/Allergies     Current Outpatient Medications:     acetaminophen (TYLENOL) 500 mg tablet, Take 1,000 mg by mouth as needed , Disp: , Rfl:     ASPIRIN 81 PO, Take 1 tablet by mouth daily , Disp: , Rfl:     Calcium Carbonate-Vit D-Min (CALCIUM 1200 PO), Take 1 tablet by mouth daily, Disp: , Rfl:     doxycycline (ADOXA) 50 MG tablet, TAKE 1 TABLET BY ORAL ROUTE EVERY DAY WITH MEAL X3 MONTHS THEN MW, Disp: , Rfl:     Flarex 0 1 % ophthalmic suspension, TAKEN AS NEEDED, Disp: , Rfl:     fluticasone (FLONASE) 50 mcg/act nasal spray, 2 sprays into each nostril daily, Disp: 3 g, Rfl: 1    ibuprofen (MOTRIN) 200 mg tablet, Take 200 mg by mouth every 4 (four) hours as needed for mild pain , Disp: , Rfl:     levothyroxine 100 mcg tablet, daily, Disp: , Rfl:     metroNIDAZOLE (METROGEL) 0 75 % gel, USE TWICE A DAY ON NOSE, Disp: , Rfl:     Multiple Vitamin (MULTIVITAMINS PO), Take 1 capsule by mouth daily, Disp: , Rfl:     Omega-3 Fatty Acids (FISH OIL) 645 MG CAPS, Take 1 capsule by mouth daily , Disp: , Rfl:     RESTASIS MULTIDOSE 0 05 % ophthalmic emulsion, INSTILL 1 DROP INTO BOTH EYES TWICE A DAY, Disp: , Rfl: 3    tamsulosin (FLOMAX) 0 4 mg, , Disp: , Rfl:     tretinoin (ALTRALIN) 0 05 %, APPLY TO FACE AT NIGHT AS DIRECTED, Disp: , Rfl: 1    alendronate (FOSAMAX) 35 mg tablet, Take 1 tablet (35 mg total) by mouth every 7 days (Patient not taking: Reported on 9/29/2021), Disp: 4 tablet, Rfl: 1    meclizine (ANTIVERT) 25 mg tablet, Take 1/2 tab 3 times a day  po (Patient not taking: Reported on 9/29/2021), Disp: 135 tablet, Rfl: 0  Allergies   Allergen Reactions    Amoxicillin-Pot Clavulanate Rash, Vomiting and Nausea Only         Review of Systems   Constitutional: Negative for activity change, appetite change and fever  HENT: Negative for congestion, sneezing and sore throat  Eyes: Negative  Respiratory: Negative for cough and shortness of breath  Cardiovascular: Negative for chest pain and leg swelling  Gastrointestinal: Negative for abdominal pain, blood in stool, diarrhea and rectal pain  Endocrine: Negative  Genitourinary: Negative for dysuria and hematuria  Musculoskeletal: Negative for arthralgias, back pain and gait problem  Skin: Negative  Allergic/Immunologic: Negative  Neurological: Negative for dizziness, weakness, numbness and headaches  Hematological: Negative  Psychiatric/Behavioral: Negative  OBJECTIVE:   /66 (BP Location: Left arm)   Pulse 85   Temp 98 °F (36 7 °C)   Resp 21   Ht 6' (1 829 m)   Wt 81 kg (178 lb 9 2 oz)   SpO2 96%   BMI 24 22 kg/m²   Pain Assessment:  0  Performance Status: Karnofsky: 100 - Fully active, able to carry on all pre-disease performed without restriction    Physical Exam  Constitutional:       Appearance: Normal appearance  He is normal weight  HENT:      Nose: No congestion  Mouth/Throat:      Pharynx: Oropharynx is clear  Eyes:      Extraocular Movements: Extraocular movements intact        Pupils: Pupils are equal, round, and reactive to light  Cardiovascular:      Rate and Rhythm: Normal rate and regular rhythm  Heart sounds: Normal heart sounds  Pulmonary:      Effort: Pulmonary effort is normal       Breath sounds: Normal breath sounds  Abdominal:      Palpations: Abdomen is soft  There is no mass  Tenderness: There is no abdominal tenderness  Genitourinary:     Comments: Defer rectal examination will do at the time of CT simulation treatment planning  Musculoskeletal:         General: No swelling or tenderness  Normal range of motion  Cervical back: Normal range of motion and neck supple  Skin:     General: Skin is dry  Findings: No lesion or rash  Neurological:      General: No focal deficit present  Mental Status: He is alert and oriented to person, place, and time  Mental status is at baseline  Psychiatric:         Mood and Affect: Mood normal          Behavior: Behavior normal          Thought Content: Thought content normal             RESULTS  Lab Results    Chemistry        Component Value Date/Time     07/31/2015 0932    K 4 2 07/02/2021 0843    K 5 0 07/31/2015 0932     07/02/2021 0843     07/31/2015 0932    CO2 28 07/02/2021 0843    CO2 30 07/31/2015 0932    BUN 20 07/02/2021 0843    BUN 16 07/31/2015 0932    CREATININE 0 63 07/02/2021 0843    CREATININE 0 72 07/31/2015 0932        Component Value Date/Time    CALCIUM 9 3 07/02/2021 0843    CALCIUM 9 5 07/31/2015 0932    ALKPHOS 52 07/02/2021 0843    ALKPHOS 53 07/31/2015 0932    AST 16 07/02/2021 0843    AST 22 07/31/2015 0932    ALT 29 07/02/2021 0843    ALT 28 07/31/2015 0932    BILITOT 0 60 07/31/2015 0932            Lab Results   Component Value Date    WBC 9 05 07/02/2021    HGB 14 3 07/02/2021    HCT 44 4 07/02/2021     (H) 07/02/2021     07/02/2021         Imaging Studies  No results found  Pathology:  [place] 6 3+3 adenocarcinoma prostate        ASSESSMENT  1   Prostate cancer ([facility] ) Cancer Staging  No matching staging information was found for the patient  PLAN/DISCUSSION  No orders of the defined types were placed in this encounter  Larry Romeo is a 78y o  year old male with probably stage I adenocarcinoma [place] 6+ 6 prostate  He is concerned about active surveillance although with his low risk disease this can definitely be one of his viable option  He seems to favor treatment and will have to decide whether robotic assisted radical prostatectomy or hypo fractionated course radiation therapy  We also explain after surgery he can still receive radiation therapy should there be indication but will not be the case after radiation therapy surgery will no longer be an option  We discuss IMRT radiation therapy will be 28 treatments will need placement of fiducial markers and SpaceOAR gel radio-opaque type  Total dose of 70 Gy in contrast to 44 treatments total dose 79 2 Gy  Patient expected to return Dr Patricia Foy to discuss his treatment options  If he opts for radiation therapy  we can be notified so that he can be scheduled for CT simulation treatment planning  Shahla Piña MD  9/29/2021,10:18 AM      Portions of the record may have been created with voice recognition software  Occasional wrong word or "sound a like" substitutions may have occurred due to the inherent limitations of voice recognition software  Read the chart carefully and recognize, using context, where substitutions have occurred
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
## 2021-09-30 ENCOUNTER — TELEPHONE (OUTPATIENT)
Dept: CARDIOLOGY CLINIC | Facility: CLINIC | Age: 66
End: 2021-09-30

## 2021-09-30 DIAGNOSIS — I49.3 PVC'S (PREMATURE VENTRICULAR CONTRACTIONS): Primary | ICD-10-CM

## 2021-09-30 NOTE — PROGRESS NOTES
At patient's last office visit, I discussed a Holter monitor  Since he had very frequent premature ventricular contractions near the end of his stress test   At the time of the office visit he refused but call the office and stated that he is now agreeable

## 2021-10-01 ENCOUNTER — TELEPHONE (OUTPATIENT)
Dept: CARDIOLOGY CLINIC | Facility: CLINIC | Age: 66
End: 2021-10-01

## 2021-10-07 ENCOUNTER — HOSPITAL ENCOUNTER (OUTPATIENT)
Dept: NON INVASIVE DIAGNOSTICS | Facility: HOSPITAL | Age: 66
Discharge: HOME | End: 2021-10-07
Attending: INTERNAL MEDICINE
Payer: MEDICARE

## 2021-10-07 DIAGNOSIS — I49.3 PVC'S (PREMATURE VENTRICULAR CONTRACTIONS): ICD-10-CM

## 2021-10-07 PROCEDURE — 93225 XTRNL ECG REC<48 HRS REC: CPT

## 2021-10-07 PROCEDURE — 93226 XTRNL ECG REC<48 HR SCAN A/R: CPT

## 2021-10-13 ENCOUNTER — APPOINTMENT (OUTPATIENT)
Dept: LAB | Facility: CLINIC | Age: 66
End: 2021-10-13
Payer: MEDICARE

## 2021-10-13 ENCOUNTER — OFFICE VISIT (OUTPATIENT)
Dept: FAMILY MEDICINE CLINIC | Facility: CLINIC | Age: 66
End: 2021-10-13
Payer: MEDICARE

## 2021-10-13 VITALS
HEIGHT: 72 IN | SYSTOLIC BLOOD PRESSURE: 120 MMHG | WEIGHT: 180.6 LBS | RESPIRATION RATE: 16 BRPM | DIASTOLIC BLOOD PRESSURE: 70 MMHG | BODY MASS INDEX: 24.46 KG/M2 | TEMPERATURE: 97.4 F | OXYGEN SATURATION: 96 % | HEART RATE: 59 BPM

## 2021-10-13 DIAGNOSIS — R45.4 ANGER: ICD-10-CM

## 2021-10-13 DIAGNOSIS — I25.10 CORONARY ARTERY CALCIFICATION: ICD-10-CM

## 2021-10-13 DIAGNOSIS — Z23 ENCOUNTER FOR IMMUNIZATION: ICD-10-CM

## 2021-10-13 DIAGNOSIS — R73.09 SUGAR BLOOD LEVEL INCREASED: ICD-10-CM

## 2021-10-13 DIAGNOSIS — N28.1 BILATERAL RENAL CYSTS: ICD-10-CM

## 2021-10-13 DIAGNOSIS — D75.89 MACROCYTOSIS WITHOUT ANEMIA: ICD-10-CM

## 2021-10-13 DIAGNOSIS — C61 PROSTATE CANCER (HCC): ICD-10-CM

## 2021-10-13 DIAGNOSIS — R59.0 ABDOMINAL LYMPHADENOPATHY: ICD-10-CM

## 2021-10-13 DIAGNOSIS — J30.1 ALLERGIC RHINITIS DUE TO POLLEN, UNSPECIFIED SEASONALITY: ICD-10-CM

## 2021-10-13 DIAGNOSIS — I71.2 THORACIC AORTIC ANEURYSM WITHOUT RUPTURE (HCC): ICD-10-CM

## 2021-10-13 DIAGNOSIS — M85.80 OSTEOPENIA, UNSPECIFIED LOCATION: Primary | ICD-10-CM

## 2021-10-13 DIAGNOSIS — K42.9 UMBILICAL HERNIA WITHOUT OBSTRUCTION AND WITHOUT GANGRENE: ICD-10-CM

## 2021-10-13 DIAGNOSIS — I25.84 CORONARY ARTERY CALCIFICATION: ICD-10-CM

## 2021-10-13 DIAGNOSIS — E78.00 ELEVATED LDL CHOLESTEROL LEVEL: ICD-10-CM

## 2021-10-13 DIAGNOSIS — I65.23 BILATERAL CAROTID ARTERY STENOSIS: ICD-10-CM

## 2021-10-13 LAB
ANION GAP SERPL CALCULATED.3IONS-SCNC: 2 MMOL/L (ref 4–13)
BUN SERPL-MCNC: 17 MG/DL (ref 5–25)
CALCIUM SERPL-MCNC: 9.8 MG/DL (ref 8.3–10.1)
CHLORIDE SERPL-SCNC: 107 MMOL/L (ref 100–108)
CO2 SERPL-SCNC: 30 MMOL/L (ref 21–32)
CREAT SERPL-MCNC: 0.68 MG/DL (ref 0.6–1.3)
GFR SERPL CREATININE-BSD FRML MDRD: 99 ML/MIN/1.73SQ M
GLUCOSE SERPL-MCNC: 120 MG/DL (ref 65–140)
POTASSIUM SERPL-SCNC: 4.4 MMOL/L (ref 3.5–5.3)
SODIUM SERPL-SCNC: 139 MMOL/L (ref 136–145)

## 2021-10-13 PROCEDURE — 80048 BASIC METABOLIC PNL TOTAL CA: CPT

## 2021-10-13 PROCEDURE — 99214 OFFICE O/P EST MOD 30 MIN: CPT | Performed by: FAMILY MEDICINE

## 2021-10-13 PROCEDURE — 90662 IIV NO PRSV INCREASED AG IM: CPT

## 2021-10-13 PROCEDURE — G0008 ADMIN INFLUENZA VIRUS VAC: HCPCS

## 2021-10-13 PROCEDURE — 36415 COLL VENOUS BLD VENIPUNCTURE: CPT

## 2021-10-13 PROCEDURE — G0439 PPPS, SUBSEQ VISIT: HCPCS | Performed by: FAMILY MEDICINE

## 2021-10-13 RX ORDER — FLUTICASONE PROPIONATE 50 MCG
2 SPRAY, SUSPENSION (ML) NASAL DAILY
Qty: 3 G | Refills: 1 | Status: SHIPPED | OUTPATIENT
Start: 2021-10-13 | End: 2021-11-23 | Stop reason: SDUPTHER

## 2021-10-13 RX ORDER — ALFUZOSIN HYDROCHLORIDE 10 MG/1
TABLET, EXTENDED RELEASE ORAL
COMMUNITY
Start: 2021-10-01 | End: 2022-02-21 | Stop reason: ALTCHOICE

## 2021-10-14 ENCOUNTER — TELEPHONE (OUTPATIENT)
Dept: CARDIOLOGY CLINIC | Facility: CLINIC | Age: 66
End: 2021-10-14

## 2021-10-16 PROCEDURE — 93227 XTRNL ECG REC<48 HR R&I: CPT | Performed by: INTERNAL MEDICINE

## 2021-11-02 ENCOUNTER — LAB (OUTPATIENT)
Dept: LAB | Facility: CLINIC | Age: 66
End: 2021-11-02
Payer: MEDICARE

## 2021-11-02 DIAGNOSIS — D75.89 MACROCYTOSIS WITHOUT ANEMIA: ICD-10-CM

## 2021-11-02 DIAGNOSIS — R59.0 ABDOMINAL LYMPHADENOPATHY: ICD-10-CM

## 2021-11-02 DIAGNOSIS — E78.00 ELEVATED LDL CHOLESTEROL LEVEL: ICD-10-CM

## 2021-11-02 DIAGNOSIS — R73.09 SUGAR BLOOD LEVEL INCREASED: ICD-10-CM

## 2021-11-02 LAB
ALBUMIN SERPL BCP-MCNC: 3.9 G/DL (ref 3.5–5)
ALP SERPL-CCNC: 57 U/L (ref 46–116)
ALT SERPL W P-5'-P-CCNC: 27 U/L (ref 12–78)
ANION GAP SERPL CALCULATED.3IONS-SCNC: 2 MMOL/L (ref 4–13)
AST SERPL W P-5'-P-CCNC: 19 U/L (ref 5–45)
BASOPHILS # BLD AUTO: 0.03 THOUSANDS/ΜL (ref 0–0.1)
BASOPHILS NFR BLD AUTO: 0 % (ref 0–1)
BILIRUB SERPL-MCNC: 0.55 MG/DL (ref 0.2–1)
BUN SERPL-MCNC: 15 MG/DL (ref 5–25)
CALCIUM SERPL-MCNC: 9.8 MG/DL (ref 8.3–10.1)
CHLORIDE SERPL-SCNC: 109 MMOL/L (ref 100–108)
CHOLEST SERPL-MCNC: 170 MG/DL (ref 50–200)
CO2 SERPL-SCNC: 30 MMOL/L (ref 21–32)
CREAT SERPL-MCNC: 0.7 MG/DL (ref 0.6–1.3)
EOSINOPHIL # BLD AUTO: 0.16 THOUSAND/ΜL (ref 0–0.61)
EOSINOPHIL NFR BLD AUTO: 2 % (ref 0–6)
ERYTHROCYTE [DISTWIDTH] IN BLOOD BY AUTOMATED COUNT: 13.7 % (ref 11.6–15.1)
GFR SERPL CREATININE-BSD FRML MDRD: 98 ML/MIN/1.73SQ M
GLUCOSE P FAST SERPL-MCNC: 111 MG/DL (ref 65–99)
HCT VFR BLD AUTO: 44.5 % (ref 36.5–49.3)
HDLC SERPL-MCNC: 56 MG/DL
HGB BLD-MCNC: 14.3 G/DL (ref 12–17)
IMM GRANULOCYTES # BLD AUTO: 0.03 THOUSAND/UL (ref 0–0.2)
IMM GRANULOCYTES NFR BLD AUTO: 0 % (ref 0–2)
LDLC SERPL CALC-MCNC: 100 MG/DL (ref 0–100)
LYMPHOCYTES # BLD AUTO: 2.12 THOUSANDS/ΜL (ref 0.6–4.47)
LYMPHOCYTES NFR BLD AUTO: 23 % (ref 14–44)
MCH RBC QN AUTO: 33.2 PG (ref 26.8–34.3)
MCHC RBC AUTO-ENTMCNC: 32.1 G/DL (ref 31.4–37.4)
MCV RBC AUTO: 103 FL (ref 82–98)
MONOCYTES # BLD AUTO: 0.7 THOUSAND/ΜL (ref 0.17–1.22)
MONOCYTES NFR BLD AUTO: 8 % (ref 4–12)
NEUTROPHILS # BLD AUTO: 6.25 THOUSANDS/ΜL (ref 1.85–7.62)
NEUTS SEG NFR BLD AUTO: 67 % (ref 43–75)
NRBC BLD AUTO-RTO: 0 /100 WBCS
PLATELET # BLD AUTO: 193 THOUSANDS/UL (ref 149–390)
PMV BLD AUTO: 10.9 FL (ref 8.9–12.7)
POTASSIUM SERPL-SCNC: 4.6 MMOL/L (ref 3.5–5.3)
PROT SERPL-MCNC: 7.2 G/DL (ref 6.4–8.2)
RBC # BLD AUTO: 4.31 MILLION/UL (ref 3.88–5.62)
SODIUM SERPL-SCNC: 141 MMOL/L (ref 136–145)
TRIGL SERPL-MCNC: 70 MG/DL
WBC # BLD AUTO: 9.29 THOUSAND/UL (ref 4.31–10.16)

## 2021-11-02 PROCEDURE — 85025 COMPLETE CBC W/AUTO DIFF WBC: CPT

## 2021-11-02 PROCEDURE — 36415 COLL VENOUS BLD VENIPUNCTURE: CPT

## 2021-11-02 PROCEDURE — 80053 COMPREHEN METABOLIC PANEL: CPT

## 2021-11-02 PROCEDURE — 80061 LIPID PANEL: CPT

## 2021-11-04 ENCOUNTER — TELEPHONE (OUTPATIENT)
Dept: FAMILY MEDICINE CLINIC | Facility: CLINIC | Age: 66
End: 2021-11-04

## 2021-11-04 DIAGNOSIS — D75.89 MACROCYTOSIS WITHOUT ANEMIA: Primary | ICD-10-CM

## 2021-11-05 ENCOUNTER — HOSPITAL ENCOUNTER (OUTPATIENT)
Dept: RADIOLOGY | Facility: HOSPITAL | Age: 66
Discharge: HOME | End: 2021-11-05
Attending: INTERNAL MEDICINE
Payer: MEDICARE

## 2021-11-05 DIAGNOSIS — I71.2 THORACIC AORTIC ANEURYSM WITHOUT RUPTURE (HCC): ICD-10-CM

## 2021-11-05 PROCEDURE — C8911 MRA W/O FOL W/CONT, CHEST: HCPCS

## 2021-11-05 PROCEDURE — G1004 CDSM NDSC: HCPCS

## 2021-11-05 PROCEDURE — A9585 GADOBUTROL INJECTION: HCPCS | Performed by: INTERNAL MEDICINE

## 2021-11-05 RX ADMIN — GADOBUTROL 9 ML: 604.72 INJECTION INTRAVENOUS at 13:08

## 2021-11-09 ENCOUNTER — TELEPHONE (OUTPATIENT)
Dept: FAMILY MEDICINE CLINIC | Facility: CLINIC | Age: 66
End: 2021-11-09

## 2021-11-09 DIAGNOSIS — R79.9 ABNORMAL BLOOD CHEMISTRY: Primary | ICD-10-CM

## 2021-11-09 DIAGNOSIS — D75.89 INCREASED MCV: ICD-10-CM

## 2021-11-09 DIAGNOSIS — R73.9 ELEVATED BLOOD SUGAR: Primary | ICD-10-CM

## 2021-11-09 DIAGNOSIS — R79.89 ABNORMAL CBC MEASUREMENT: ICD-10-CM

## 2021-11-09 DIAGNOSIS — D75.89 MACROCYTOSIS: ICD-10-CM

## 2021-11-23 ENCOUNTER — TELEPHONE (OUTPATIENT)
Dept: FAMILY MEDICINE CLINIC | Facility: CLINIC | Age: 66
End: 2021-11-23

## 2021-11-23 DIAGNOSIS — J30.1 ALLERGIC RHINITIS DUE TO POLLEN, UNSPECIFIED SEASONALITY: ICD-10-CM

## 2021-11-23 RX ORDER — FLUTICASONE PROPIONATE 50 MCG
2 SPRAY, SUSPENSION (ML) NASAL DAILY
Qty: 3 G | Refills: 1 | Status: SHIPPED | OUTPATIENT
Start: 2021-11-23 | End: 2022-07-22 | Stop reason: SDUPTHER

## 2021-11-24 ENCOUNTER — OFFICE VISIT (OUTPATIENT)
Dept: FAMILY MEDICINE CLINIC | Facility: CLINIC | Age: 66
End: 2021-11-24
Payer: MEDICARE

## 2021-11-24 ENCOUNTER — APPOINTMENT (OUTPATIENT)
Dept: LAB | Facility: CLINIC | Age: 66
End: 2021-11-24
Payer: MEDICARE

## 2021-11-24 VITALS
WEIGHT: 184.2 LBS | HEART RATE: 72 BPM | SYSTOLIC BLOOD PRESSURE: 131 MMHG | OXYGEN SATURATION: 97 % | TEMPERATURE: 97.6 F | DIASTOLIC BLOOD PRESSURE: 68 MMHG | HEIGHT: 72 IN | BODY MASS INDEX: 24.95 KG/M2

## 2021-11-24 DIAGNOSIS — G89.29 CHRONIC NECK PAIN: ICD-10-CM

## 2021-11-24 DIAGNOSIS — R20.2 TINGLING: Primary | ICD-10-CM

## 2021-11-24 DIAGNOSIS — M54.2 CHRONIC NECK PAIN: ICD-10-CM

## 2021-11-24 DIAGNOSIS — R79.9 ABNORMAL BLOOD CHEMISTRY: ICD-10-CM

## 2021-11-24 DIAGNOSIS — Z71.85 IMMUNIZATION COUNSELING: ICD-10-CM

## 2021-11-24 DIAGNOSIS — R03.0 BLOOD PRESSURE ELEVATED WITHOUT HISTORY OF HTN: ICD-10-CM

## 2021-11-24 PROCEDURE — 84166 PROTEIN E-PHORESIS/URINE/CSF: CPT

## 2021-11-24 PROCEDURE — 99214 OFFICE O/P EST MOD 30 MIN: CPT | Performed by: FAMILY MEDICINE

## 2021-11-24 PROCEDURE — 84165 PROTEIN E-PHORESIS SERUM: CPT

## 2021-11-24 PROCEDURE — 84165 PROTEIN E-PHORESIS SERUM: CPT | Performed by: PATHOLOGY

## 2021-11-24 PROCEDURE — 84166 PROTEIN E-PHORESIS/URINE/CSF: CPT | Performed by: PATHOLOGY

## 2021-11-24 PROCEDURE — 36415 COLL VENOUS BLD VENIPUNCTURE: CPT

## 2021-11-24 RX ORDER — TAMSULOSIN HYDROCHLORIDE 0.4 MG/1
CAPSULE ORAL
COMMUNITY
Start: 2021-10-08

## 2021-11-25 PROBLEM — R03.0 BLOOD PRESSURE ELEVATED WITHOUT HISTORY OF HTN: Status: ACTIVE | Noted: 2021-11-25

## 2021-11-25 PROBLEM — R20.2 TINGLING: Status: ACTIVE | Noted: 2021-11-25

## 2021-11-25 PROBLEM — Z71.85 IMMUNIZATION COUNSELING: Status: ACTIVE | Noted: 2021-11-25

## 2021-11-26 LAB
ALBUMIN SERPL ELPH-MCNC: 4.83 G/DL (ref 3.5–5)
ALBUMIN SERPL ELPH-MCNC: 66.2 % (ref 52–65)
ALBUMIN UR ELPH-MCNC: 100 %
ALPHA1 GLOB MFR UR ELPH: 0 %
ALPHA1 GLOB SERPL ELPH-MCNC: 0.33 G/DL (ref 0.1–0.4)
ALPHA1 GLOB SERPL ELPH-MCNC: 4.5 % (ref 2.5–5)
ALPHA2 GLOB MFR UR ELPH: 0 %
ALPHA2 GLOB SERPL ELPH-MCNC: 0.79 G/DL (ref 0.4–1.2)
ALPHA2 GLOB SERPL ELPH-MCNC: 10.8 % (ref 7–13)
B-GLOBULIN MFR UR ELPH: 0 %
BETA GLOB ABNORMAL SERPL ELPH-MCNC: 0.41 G/DL (ref 0.4–0.8)
BETA1 GLOB SERPL ELPH-MCNC: 5.6 % (ref 5–13)
BETA2 GLOB SERPL ELPH-MCNC: 4.5 % (ref 2–8)
BETA2+GAMMA GLOB SERPL ELPH-MCNC: 0.33 G/DL (ref 0.2–0.5)
GAMMA GLOB ABNORMAL SERPL ELPH-MCNC: 0.61 G/DL (ref 0.5–1.6)
GAMMA GLOB MFR UR ELPH: 0 %
GAMMA GLOB SERPL ELPH-MCNC: 8.4 % (ref 12–22)
IGG/ALB SER: 1.96 {RATIO} (ref 1.1–1.8)
PROT PATTERN SERPL ELPH-IMP: ABNORMAL
PROT PATTERN UR ELPH-IMP: NORMAL
PROT SERPL-MCNC: 7.3 G/DL (ref 6.4–8.2)
PROT UR-MCNC: 6 MG/DL

## 2021-12-02 ENCOUNTER — HOSPITAL ENCOUNTER (OUTPATIENT)
Dept: RADIOLOGY | Facility: HOSPITAL | Age: 66
Discharge: HOME | End: 2021-12-02
Attending: FAMILY MEDICINE
Payer: MEDICARE

## 2021-12-02 DIAGNOSIS — R20.2 TINGLING: ICD-10-CM

## 2021-12-02 DIAGNOSIS — G89.29 CHRONIC NECK PAIN: ICD-10-CM

## 2021-12-02 DIAGNOSIS — M54.2 CHRONIC NECK PAIN: ICD-10-CM

## 2021-12-02 PROCEDURE — 72050 X-RAY EXAM NECK SPINE 4/5VWS: CPT

## 2021-12-20 ENCOUNTER — TELEPHONE (OUTPATIENT)
Dept: FAMILY MEDICINE CLINIC | Facility: CLINIC | Age: 66
End: 2021-12-20

## 2021-12-20 DIAGNOSIS — R79.89 ABNORMAL CBC: ICD-10-CM

## 2021-12-20 DIAGNOSIS — R53.83 OTHER FATIGUE: Primary | ICD-10-CM

## 2021-12-20 DIAGNOSIS — M23.40 LOOSE BODY OF KNEE, UNSPECIFIED LATERALITY: ICD-10-CM

## 2021-12-22 ENCOUNTER — LAB (OUTPATIENT)
Dept: LAB | Facility: HOSPITAL | Age: 66
End: 2021-12-22
Attending: FAMILY MEDICINE
Payer: COMMERCIAL

## 2021-12-22 DIAGNOSIS — M23.40 LOOSE BODY OF KNEE, UNSPECIFIED LATERALITY: ICD-10-CM

## 2021-12-22 LAB — URATE SERPL-MCNC: 3.8 MG/DL (ref 4.2–8)

## 2021-12-22 PROCEDURE — 36415 COLL VENOUS BLD VENIPUNCTURE: CPT

## 2021-12-22 PROCEDURE — 84550 ASSAY OF BLOOD/URIC ACID: CPT

## 2021-12-27 ENCOUNTER — TELEPHONE (OUTPATIENT)
Dept: FAMILY MEDICINE CLINIC | Facility: CLINIC | Age: 66
End: 2021-12-27

## 2021-12-27 DIAGNOSIS — R79.89 LOW SERUM URIC ACID FOR AGE: Primary | ICD-10-CM

## 2021-12-28 ENCOUNTER — TELEPHONE (OUTPATIENT)
Dept: FAMILY MEDICINE CLINIC | Facility: CLINIC | Age: 66
End: 2021-12-28

## 2021-12-28 NOTE — TELEPHONE ENCOUNTER
Patient called he is asking for an ultrasound of the cheek, because he has an mri done and they state that he has a tumor  I can not see this report he states that you were going to receive a note from [facility] neurosurgery asking for you to order the ultrasound for him  Please advise if you received anything on this  And if you are ok ordering the ultrasound   thanks

## 2022-01-07 ENCOUNTER — OFFICE VISIT (OUTPATIENT)
Dept: FAMILY MEDICINE CLINIC | Facility: CLINIC | Age: 67
End: 2022-01-07
Payer: MEDICARE

## 2022-01-07 VITALS
BODY MASS INDEX: 24.41 KG/M2 | DIASTOLIC BLOOD PRESSURE: 72 MMHG | HEIGHT: 72 IN | WEIGHT: 180.2 LBS | HEART RATE: 67 BPM | OXYGEN SATURATION: 97 % | SYSTOLIC BLOOD PRESSURE: 132 MMHG | TEMPERATURE: 97.9 F

## 2022-01-07 DIAGNOSIS — K11.8 MASS OF RIGHT PAROTID GLAND: Primary | ICD-10-CM

## 2022-01-07 DIAGNOSIS — R79.89 LOW SERUM URIC ACID FOR AGE: ICD-10-CM

## 2022-01-07 DIAGNOSIS — M54.2 CHRONIC NECK PAIN: ICD-10-CM

## 2022-01-07 DIAGNOSIS — I71.2 THORACIC AORTIC ANEURYSM WITHOUT RUPTURE (HCC): ICD-10-CM

## 2022-01-07 DIAGNOSIS — G89.29 CHRONIC NECK PAIN: ICD-10-CM

## 2022-01-07 DIAGNOSIS — D75.89 MACROCYTOSIS: ICD-10-CM

## 2022-01-07 DIAGNOSIS — C61 PROSTATE CANCER (HCC): ICD-10-CM

## 2022-01-07 PROCEDURE — 99214 OFFICE O/P EST MOD 30 MIN: CPT | Performed by: FAMILY MEDICINE

## 2022-01-07 NOTE — PROGRESS NOTES
Assessment/Plan:       No problem-specific Assessment & Plan notes found for this encounter  Diagnoses and all orders for this visit:    Mass of right parotid gland  Comments:  has an appoitment today with  Dr Marine Eaton serum uric acid for age  Comments:  Recheck uric acid  Order exit  If continue to be low to consider further evaluation    Macrocytosis  Comments:  Patient would cover his L05 and folic acid test cost, he signed Medicare waiver  Orders:  -     Vitamin B12; Future  -     Folate; Future    Thoracic aortic aneurysm without rupture ([facility] [address] )  Comments: Following with thoracic surgeon    Prostate cancer [facility])  Comments: Following with urology    Chronic neck pain  Comments:  Neurosurgeon consult on 12 /13/21 noted        There are no Patient Instructions on file for this visit  Orders Placed This Encounter   Procedures    Vitamin B12     Standing Status:   Future     Standing Expiration Date:   2/7/2022    Folate     Standing Status:   Future     Standing Expiration Date:   1/7/2023         Subjective:     Patient ID: Shari Mayers is a 78 y o  male      HPI  Parotid mass  Patient stated in the last 2 years he did feel small mass behind his right mandible   stable  Not painful  Patient made an appointment on his own with ENT at [facility]     Neck pain  Chronic  Same  Following with the neurosurgeon   Low uric acid  Patient the by mistake he ask the medical assistant to put an order for uric acid assuming it was a folic acid he wanted to do the folic acid and G36 on his own because he has macrocytosis  Patient is wondering if his chronic knees pain which supposed to be secondary to osteoarthritis secondary to low the uric acid  Macrocytosis  Patient denied drinking  Test results  C-spine MRI  Right parotid gland ultrasound  Lab done on December 22, 2001  Discussed result with patient    Review of Systems   Constitutional: Negative for appetite change and fatigue  HENT: Negative for ear pain, tinnitus, trouble swallowing and voice change  Eyes: Negative for photophobia, pain and visual disturbance  Respiratory: Negative for cough, chest tightness and wheezing  Cardiovascular: Negative for chest pain, palpitations and leg swelling  Gastrointestinal: Negative for abdominal distention, abdominal pain, anal bleeding, constipation, diarrhea, nausea and rectal pain  Endocrine: Negative for cold intolerance, heat intolerance, polydipsia and polyuria  Genitourinary: Negative for decreased urine volume, difficulty urinating, dysuria, flank pain, frequency, hematuria and urgency  Musculoskeletal: Negative for arthralgias, back pain, gait problem and myalgias  Skin: Negative for pallor and rash  Allergic/Immunologic: Negative for immunocompromised state  Neurological: Negative for dizziness, seizures, syncope and speech difficulty  Hematological: Negative for adenopathy  Does not bruise/bleed easily  Psychiatric/Behavioral: Negative for agitation, confusion and hallucinations  The patient is not nervous/anxious  Objective:     Physical Exam  Constitutional:       General: He is not in acute distress  Appearance: Normal appearance  He is well-developed  HENT:      Head: Normocephalic  Eyes:      General: No scleral icterus  Right eye: No discharge  Left eye: No discharge  Pupils: Pupils are equal, round, and reactive to light  Neck:      Thyroid: No thyromegaly  Vascular: No carotid bruit or JVD  Comments: Small mass about one inch or less behind right  Mandible angle  Cardiovascular:      Rate and Rhythm: Normal rate and regular rhythm  Heart sounds: Normal heart sounds  No murmur heard  No gallop  Pulmonary:      Effort: Pulmonary effort is normal       Breath sounds: Normal breath sounds  Abdominal:      General: Bowel sounds are normal  There is no distension  Palpations: Abdomen is soft   There is no mass  Tenderness: There is no abdominal tenderness  There is no guarding or rebound  Musculoskeletal:         General: No swelling or tenderness  Normal range of motion  Cervical back: Neck supple  Right lower leg: No edema  Left lower leg: No edema  Lymphadenopathy:      Cervical: No cervical adenopathy  Skin:     Findings: No rash  Neurological:      General: No focal deficit present  Mental Status: He is alert and oriented to person, place, and time  Cranial Nerves: No cranial nerve deficit  Motor: No abnormal muscle tone  Coordination: Coordination normal       Gait: Gait normal    Psychiatric:         Mood and Affect: Mood normal          Behavior: Behavior normal          Thought Content:  Thought content normal

## 2022-01-16 ENCOUNTER — APPOINTMENT (OUTPATIENT)
Dept: LAB | Facility: HOSPITAL | Age: 67
End: 2022-01-16
Attending: FAMILY MEDICINE
Payer: COMMERCIAL

## 2022-01-16 DIAGNOSIS — D75.89 MACROCYTOSIS: ICD-10-CM

## 2022-01-16 LAB
FOLATE SERPL-MCNC: >20 NG/ML (ref 3.1–17.5)
VIT B12 SERPL-MCNC: 691 PG/ML (ref 100–900)

## 2022-01-16 PROCEDURE — 36415 COLL VENOUS BLD VENIPUNCTURE: CPT

## 2022-01-16 PROCEDURE — 82607 VITAMIN B-12: CPT

## 2022-01-16 PROCEDURE — 82746 ASSAY OF FOLIC ACID SERUM: CPT

## 2022-01-18 ENCOUNTER — HOSPITAL ENCOUNTER (OUTPATIENT)
Dept: RADIOLOGY | Facility: HOSPITAL | Age: 67
Discharge: HOME | End: 2022-01-18
Attending: FAMILY MEDICINE
Payer: MEDICARE

## 2022-01-18 ENCOUNTER — HOSPITAL ENCOUNTER (OUTPATIENT)
Dept: NON INVASIVE DIAGNOSTICS | Facility: CLINIC | Age: 67
Discharge: HOME | End: 2022-01-18
Payer: MEDICARE

## 2022-01-18 ENCOUNTER — OFFICE VISIT (OUTPATIENT)
Dept: FAMILY MEDICINE CLINIC | Facility: CLINIC | Age: 67
End: 2022-01-18
Payer: MEDICARE

## 2022-01-18 VITALS
DIASTOLIC BLOOD PRESSURE: 72 MMHG | HEIGHT: 72 IN | BODY MASS INDEX: 25.33 KG/M2 | TEMPERATURE: 97.9 F | WEIGHT: 187 LBS | HEART RATE: 62 BPM | SYSTOLIC BLOOD PRESSURE: 132 MMHG | OXYGEN SATURATION: 97 %

## 2022-01-18 DIAGNOSIS — D75.89 MACROCYTOSIS: ICD-10-CM

## 2022-01-18 DIAGNOSIS — Z87.39 HISTORY OF OSTEOMYELITIS: ICD-10-CM

## 2022-01-18 DIAGNOSIS — R79.89 LOW SERUM URIC ACID FOR AGE: ICD-10-CM

## 2022-01-18 DIAGNOSIS — H60.332 ACUTE SWIMMER'S EAR OF LEFT SIDE: ICD-10-CM

## 2022-01-18 DIAGNOSIS — R25.2 LEG CRAMPING: ICD-10-CM

## 2022-01-18 DIAGNOSIS — M79.662 PAIN OF LEFT CALF: Primary | ICD-10-CM

## 2022-01-18 DIAGNOSIS — M79.662 PAIN OF LEFT CALF: ICD-10-CM

## 2022-01-18 DIAGNOSIS — K11.8 MASS OF RIGHT PAROTID GLAND: ICD-10-CM

## 2022-01-18 PROCEDURE — 93971 EXTREMITY STUDY: CPT

## 2022-01-18 PROCEDURE — 99214 OFFICE O/P EST MOD 30 MIN: CPT | Performed by: FAMILY MEDICINE

## 2022-01-18 PROCEDURE — 73590 X-RAY EXAM OF LOWER LEG: CPT

## 2022-01-18 PROCEDURE — 93971 EXTREMITY STUDY: CPT | Performed by: SURGERY
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
## 2022-01-19 ENCOUNTER — TELEPHONE (OUTPATIENT)
Dept: HEMATOLOGY ONCOLOGY | Facility: CLINIC | Age: 67
End: 2022-01-19

## 2022-01-19 NOTE — TELEPHONE ENCOUNTER
New Patient Intake Form   Patient Details:    Gilberto Pino  1955  [id number]    Appointment Information   Who is calling to schedule? Patient    If not self, what is the caller's name? Please put name of [facility] nurse as well  Referring provider Dr Ardie Runner    What is the diagnosis? Macrocytosis   Is there a confirmed tissue diagnosis? no   Is patient aware of diagnosis? yes   Have you had any imaging or labs done? If yes, where? (If imaging done outside of [facility], please remind patient to bring a disk ) Yes    Have you been seen by another Oncologist/Hematologist?  If so, who and where? no   Are the records in [facility] or Care Everywhere? yes   Are records needed from an outside facility? no   If yes, Name of facility, city and state where facility is located  N/a    Preferred [place]   Is the patient willing to be seen by another provider?   (This is for breast patients only)    Miscellaneous Information:

## 2022-01-28 ENCOUNTER — TELEPHONE (OUTPATIENT)
Dept: HEMATOLOGY ONCOLOGY | Facility: CLINIC | Age: 67
End: 2022-01-28

## 2022-01-28 NOTE — TELEPHONE ENCOUNTER
Appointment Cancellation Or Reschedule     Person calling in Patient    Provider Dr Tony Huynh   Office Visit Date and Time  1/28/22 11:20am   Office Visit Location [place]   Did patient want to reschedule their office appointment? If so, when was it scheduled to? Yes  1/31/22 11:20am   Is this patient calling to reschedule an infusion appointment? no   When is their next infusion appointment? n/a   Is this patient a Chemo patient? no   Reason for Cancellation or Reschedule Patient calling in to reschedule appointment stating due to weather he will not be able to make appointment   If the patient is a treatment patient, please route this to the office nurse  If the patient is not on treatment, please route to the office MA

## 2022-01-28 NOTE — PROGRESS NOTES
Hematology/Oncology Outpatient Office Note    Date of Service: 1/31/2022    [facility] HEMATOLOGY ONCOLOGY SPECIALISTS [address]  [facility] [address]  [phone]    Reason for Consultation:   Chief Complaint   Patient presents with    Consult       Cancer Stage at diagnosis: 1    Referral Physician: Maribel Shaffer MD    Primary Care Physician:  Cosme Evangelista MD     Nickname: Regino Byrd      ASSESSMENT & PLAN      Diagnosis ICD-10-CM Associated Orders   1  Prostate cancer ([address] )  C61    2  Macrocytosis  D75 89 Ambulatory Referral to Hematology / Oncology         This is a 75 y o  c PMHx notable for osteomyelitis, benign parotid mass, BPH, and early stage prostate cancer being seen in consultation for macrocytosis        His Urologist was Dr Ha Spicer and is now Dr Emily Houston  7/13/21 prostate bx G6 (3+3 involving 2 cores) prostate cancer, long history of infertility  PSA 3 1 5/19/2021 6/14/2021 MRI prostate PI-RADS category 3, interemdiate risk     8/3/21 CT Abd/pelv w/c Small retroperitoneal pelvic and inguinal nodes are identified  8/3/21 Bone Scan: No evidence of osseous metastasis  1/5/22 US head/neck: Confirmation of a partially exophytic solid vascular mass in the right parotid   gland  Differential considerations include pleomorphic adenoma and Warthin tumor   but well-differentiated malignancy or lymphoma cannot be excluded      He is getting active surveillance for his prostate cancer  Macrocytosis: The prevalence of macrocytosis is about 2% in the general population  Risk factors include chronic liver disease, alcohol, B12 or folate deficiency, medications, and rarely copper deficiency  Hemolysis can also cause macrocytosis (T bilirubin normal recently)  The NRTIs such as zidovudine and stavudine are known agents of macrocytosis  The most common cause of macrocytosis is usually collection artifact, as RBC sit in the tube before analysis, they can swell   When samples are analysed soon after collection (i e  at the hospital), macrocytosis is not present  Macrocytosis is when the red blood cells are slightly larger than normal  Usually this is from the blood cells sitting in the tube for a while before testing  We will test this today, it is very likely to be normal  When persistent macrocytosis occurs, it is usually from liver conditions, alcohol use, medications, or vitamin deficiency  Sometimes other less common conditions can be present, such as red blood cells that break apart too easily, or bone marrow problems caused by chemotherapy  Discussion of decision making   Oncology history updated, accordingly, during this visit   Goals of care/patient communication  o I discussed with the patient the clinical course leading up to their cancer diagnosis  I reviewed relevant office notes, imaging reports and pathology result as well   o I told the patient that this is a case of curable disease and what this means  We discussed that the goal of anti-cancer therapy is to provide best quality of life, extend overall survival, and progression free survival as shown in clinical trials   TNM/Staging At Diagnosis  Cancer Staging  Prostate cancer [facility])  Staging form: Prostate, AJCC 8th Edition  - Clinical stage from 7/13/2021: Stage I (cT1a, cN0, cM0, PSA: 3 1, Grade Group: 1) - Signed by Dilan Farrell MD on 1/25/2022  Stage prefix: Initial diagnosis  Prostate specific antigen (PSA) range: Less than 10  [place] primary pattern: 3  [place] secondary pattern: 3  [place] score: 6  Histologic grading system: 5 grade system  Number of biopsy cores examined: 2  Number of biopsy cores positive: 2      Discussion of decision making    I personally reviewed the following lab results, the image studies, pathology, other specialty/physicians consult notes and recommendations, and outside medical records from [facility]   I had a lengthy discussion with the patient and shared the work-up findings  We discussed the diagnosis and management plan as below  I spent 46 minutes reviewing the records (labs, clinician notes, outside records, medical history, ordering medicine/tests/procedures, interpreting the imaging/labs previously done) and coordination of care as well as direct time with the patient today, of which greater than 50% of the time was spent in counseling and coordination of care with the patient/family  · Plan/Labs  · MMA to confirm absence of Vit b12 deficiency (Vit b12 appears to be normal 691 on 1/16/22)  · Liver U/S and fibrotest to assess for liver pathology  · Peripheral smear to assess for concerning morphology of hemogram    Regular follow up with hematology will likely not be needed  Important to see a hematologist if any of the following occur:  1) Absolute Neutrophil count is less than 1000  2) Hemoglobin less than 8  3) Platelets less than 50    Follow Up: 3 weeks    All questions were answered to the patient's satisfaction during this encounter  The patient knows the contact information for our office and knows to reach out for any relevant concerns related to this encounter  They are to call for any temperature 100 4 or higher, new symptoms including but not restricted to shaking chills, decreased appetite, nausea, vomiting, diarrhea, increased fatigue, shortness of breath or chest pain, confusion, and not feeling the strength to come to the clinic  For all other listed problems and medical diagnosis in their chart - they are managed by PCP and/or other specialists, which the patient acknowledges  Thank you very much for your consultation and making us a part of this patient's care  We are continuing to follow closely with you  Please do not hesitate to reach out to me with any additional questions or concerns  Karl Richardson MD  Hematology & Medical Oncology Staff Physician             Disclaimer:  This document was prepared using [employer] Flu[employer] Direct technology  If a word or phrase is confusing, or does not make sense, this is likely due to recognition error which was not discovered during this clinician's review  If you believe an error has occurred, please contact me through [address] [place] line for alberto? cation  ONCOLOGY HISTORY OF PRESENT ILLNESS        Oncology History   Prostate cancer ([facility] )   2021 Initial Diagnosis    Prostate cancer ([facility] )      Biopsy    - Prostatic adenocarcinoma, acinar type, [place] score 3+3=6 (Grade Group 1) involving 2 cores   - Percentage of [place] pattern 4: N/A   - Percentage of [place] pattern 5: N/A   - Perineural invasion: Not identified   - Periprostatic fat invasion: Not identified   - Lymph-vascular invasion: Not identified   - Seminal vesicle/ejaculatory duct invasion: Not identified   - Additional pathologic findings: None   - Best representative block if additional studies are needed: B2 and B6      A  Right prostate (random biopsy): - Benign prostatic tissue  B  Left prostate (random biopsy):      - Small foci of prostatic adenocarcinoma, [place] score 3+3=6 (Grade Group 1), involving less than 5% of two (2) cores  C   Right mid prostate (biopsy): - Benign prostatic tissue        7/13/2021 -  Cancer Staged    Staging form: Prostate, AJCC 8th Edition  - Clinical stage from 7/13/2021: Stage I (cT1a, cN0, cM0, PSA: 3 1, Grade Group: 1) - Signed by Rhett Hernandez MD on 1/25/2022  Stage prefix: Initial diagnosis  Prostate specific antigen (PSA) range: Less than 10  [place] primary pattern: 3  [place] secondary pattern: 3  [place] score: 6  Histologic grading system: 5 grade system  Number of biopsy cores examined: 2  Number of biopsy cores positive: 2             SUBJECTIVE  (INTERVAL HISTORY)      Clotting History None   Bleeding History Early teens (tooth procedure related) but none since then   Cancer History None    Family Cancer History Mother (multiple myeloma), mAunt (breast)   H/O Blood/Plt Transfusion None    Tobacco/etoh/drug abuse 1980's 1/2 PPD x 3 years  No other abuse   Hx COVID19 Infection and Vaccine Status Moderna x3   Cancer Screening history 1/2021 c-scope   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)   New medications in the last month: none      He has upcoming (2/8/22) surgery for his partially exophytic solid vascular mass in the right parotid   gland  I have reviewed the relevant past medical, surgical, social and family history  I have also reviewed allergies and medications for this patient  Review of Systems  His baseline weight was 190-195lbs and he is around that weight now  He deals with chronic neck pain and has a hx of surgeries in that area  He has an associated HA  He deals with chronic fatigue  He denies F/C, N/V, SOB, CP, LH, rash, itching, falls, generalized weakness, hematuria, melena, or hematochezia  A 10-point review of system was performed, pertinent positive and negative were detailed as above  Otherwise, the 10-point review of system was negative        Past Medical History:   Diagnosis Date    Anxiety     Arthritis     Bleeding tendency ([address] )     BPH (benign prostatic hyperplasia)     Broken bones     left leg    Cataract     Chronic neck pain     Epilepsy ([address] )     Hypothyroidism     Neoplasm of unspecified behavior of bone, soft tissue, and skin     Osteomyelitis ([address] )     Prostate cancer ([address] )     Skin cancer     Squamous carcinoma 12/01/2014 12/2014    Thyroid condition     Thyroid disease        Past Surgical History:   Procedure Laterality Date    ARTHRODESIS      H/o Arthrodesis Cervical to C2;  last assessed [id number]    COLONOSCOPY  01/22/2021    COLONOSCOPY      KNEE SURGERY Right 12/22/2017    LEG SURGERY Left 1999    Hardware placed in lower leg bone(s)    LEG SURGERY Left 01/01/2006    2006, 2012-Left leg vascular    NECK SURGERY  2013    C1-2 fusion    THYROID SURGERY Right 2013    US GUIDED PROSTATE BIOPSY  2021       Family History   Problem Relation Age of Onset    Bone cancer Mother     Other Father         Accident    Dementia Other     Cancer Other        Social History     Socioeconomic History    Marital status: Legally      Spouse name: Not on file    Number of children: Not on file    Years of education: Not on file    Highest education level: Not on file   Occupational History    Occupation: Teacher    Occupation: retired   Tobacco Use    Smoking status: Former Smoker     Types: Cigarettes     Quit date:      Years since quittin 1    Smokeless tobacco: Never Used   Vaping Use    Vaping Use: Never used   Substance and Sexual Activity    Alcohol use: No     Comment: being a social drinker    Drug use: Not Currently     Comment: Medical marijuana    Sexual activity: Yes     Comment: no known std risk factors   Other Topics Concern    Not on file   Social History Narrative    Daily coffee consumption (3 cups/day)     Social Determinants of Health     Financial Resource Strain: Not on file   Food Insecurity: Not on file   Transportation Needs: Not on file   Physical Activity: Not on file   Stress: Not on file   Social Connections: Not on file   Intimate Partner Violence: Not on file   Housing Stability: Not on file       Allergies   Allergen Reactions    Amoxicillin-Pot Clavulanate Rash, Vomiting and Nausea Only       Current Outpatient Medications   Medication Sig Dispense Refill    acetaminophen (TYLENOL) 500 mg tablet Take 1,000 mg by mouth as needed       ASPIRIN 81 PO Take 1 tablet by mouth daily       Calcium Carbonate-Vit D-Min (CALCIUM 1200 PO) Take 1 tablet by mouth daily      doxycycline (ADOXA) 50 MG tablet TAKE 1 TABLET BY ORAL ROUTE EVERY DAY WITH MEAL X3 MONTHS THEN MWF      Flarex 0 1 % ophthalmic suspension TAKEN AS NEEDED      fluticasone (FLONASE) 50 mcg/act nasal spray 2 sprays into each nostril daily 3 g 1    ibuprofen (MOTRIN) 200 mg tablet Take 200 mg by mouth every 4 (four) hours as needed for mild pain       levothyroxine 100 mcg tablet daily      metroNIDAZOLE (METROGEL) 0 75 % gel USE TWICE A DAY ON NOSE      Multiple Vitamin (MULTIVITAMINS PO) Take 1 capsule by mouth daily      neomycin-polymyxin-hydrocortisone (CORTISPORIN) otic solution Administer 4 drops into the left ear every 6 (six) hours 10 mL 0    Omega-3 Fatty Acids (FISH OIL) 645 MG CAPS Take 1 capsule by mouth daily       RESTASIS MULTIDOSE 0 05 % ophthalmic emulsion INSTILL 1 DROP INTO BOTH EYES TWICE A DAY  3    tamsulosin (FLOMAX) 0 4 mg       tretinoin (ALTRALIN) 0 05 % APPLY TO FACE AT NIGHT AS DIRECTED  1    alfuzosin (UROXATRAL) 10 mg 24 hr tablet  (Patient not taking: Reported on 11/24/2021 )       No current facility-administered medications for this visit  (Not in a hospital admission)      Objective:     24 Hour Vitals Assessment:     Vitals:    01/31/22 1133   BP: 136/70   Pulse: 61   Resp: 16   Temp: 97 5 °F (36 4 °C)   SpO2: 96%       PHYSICIAN EXAM:    General: Appearance: alert, cooperative, no distress  HEENT: Normocephalic, atraumatic  No scleral icterus  conjunctivae clear  EOMI  Chest: No tenderness to palpation  No open wound noted  Lungs: Clear to auscultation bilaterally, Respirations unlabored  Cardiac: Regular rate and rhythm, +S1and S2, -r/m/g  Abdomen: Soft, non-tender, non-distended  Bowel sounds are normal   Extremities:  No edema, cyanosis, clubbing  Skin: Skin color, turgor are normal  No rashes  Neurologic: Awake, Alert, and oriented, no gross focal deficits noted b/l         DATA REVIEW:    Pathology Result:    Final Diagnosis   Date Value Ref Range Status   07/13/2021   Final     -  Prostatic adenocarcinoma, acinar type, [place] score 3+3=6 (Grade Group 1) involving 2 cores   -  Percentage of [place] pattern 4: N/A   -  Percentage of [place] pattern 5: N/A   -  Perineural invasion: Not identified   - Periprostatic fat invasion: Not identified   -  Lymph-vascular invasion: Not identified   -  Seminal vesicle/ejaculatory duct invasion:  Not identified   -  Additional pathologic findings: None   -  Best representative block if additional studies are needed: B2 and B6    Final diagnosis listed by specimen  A  Right prostate (random biopsy): - Benign prostatic tissue  B  Left prostate (random biopsy):      - Small foci of prostatic adenocarcinoma, [place] score 3+3=6 (Grade Group 1), involving less than 5% of two (2)  cores  C   Right mid prostate (biopsy): - Benign prostatic tissue  Comment: This is an appended report  These results have been appended to a previously preliminary verified report  Image Results:   Image result are reviewed and documented in Hematology/Oncology history    XR tibia fibula 2 vw left  Narrative: LEFT TIBIA AND FIBULA    INDICATION:   Left calf pain  Prior history of ORIF  COMPARISON:  None    VIEWS:  XR TIBIA FIBULA 2 VW LEFT     FINDINGS:    Evidence of prior left tibia and fibula trauma with intramedullary florencio within the tibia  No evidence of acute fracture  There is arthritis of the left knee with chondrocalcinosis    No radiographic evidence of osteomyelitis  No soft tissue air  Impression: No acute osseous abnormality  As there is a history of left lower leg pain and past history of osteomyelitis, if this remains of clinical concern, further evaluation with nuclear white blood cell scan may be considered    Workstation performed: [id number]  VAS lower limb venous duplex study, unilateral/limited     [facility] REPORT  CLINICAL:  Indications:  Patient presents with Left lower extremity pain and swelling x few weeks  Operative History:  Left Vascular surgery, lower extremity, 2006, 2012  Risk Factors  The patient has history of previous smoking (quit >10yrs ago)          CONCLUSION:        Impression:  RIGHT LOWER LIMB LIMITED:  Evaluation shows no evidence of thrombus in the common femoral vein  Doppler evaluation shows a normal response to augmentation maneuvers  LEFT LOWER LIMB:  No evidence of acute or chronic deep vein thrombosis  No evidence of superficial thrombophlebitis noted  Doppler evaluation shows a normal response to augmentation maneuvers  Popliteal, posterior tibial and anterior tibial arterial Doppler waveforms are  triphasic  Technical findings were given to Les Peace MD     SIGNATURE:  Electronically Signed by: Galileo Conklin MD, [address] on 2022-01-18 03:38:50 PM      LABS:  Lab data are reviewed and documented in HemOn history  Lab Results   Component Value Date    HGB 14 3 11/02/2021    HCT 44 5 11/02/2021     (H) 11/02/2021     11/02/2021    WBC 9 29 11/02/2021    NRBC 0 11/02/2021     Lab Results   Component Value Date     07/31/2015    K 4 6 11/02/2021     (H) 11/02/2021    CO2 30 11/02/2021    ANIONGAP 7 07/31/2015    BUN 15 11/02/2021    CREATININE 0 70 11/02/2021    GLUCOSE 106 07/31/2015    GLUF 111 (H) 11/02/2021    CALCIUM 9 8 11/02/2021    AST 19 11/02/2021    ALT 27 11/02/2021    ALKPHOS 57 11/02/2021    PROT 7 2 07/31/2015    BILITOT 0 60 07/31/2015    EGFR 98 11/02/2021       No results found for: IRON, TIBC, FERRITIN    Lab Results   Component Value Date    DYDCRLEE15 691 01/16/2022       No results for input(s): WBC, CREAT in the last 72 hours      Invalid input(s):  PLT    By:  Lewis Kong MD, 1/31/2022, 11:50 AM

## 2022-01-31 ENCOUNTER — TELEPHONE (OUTPATIENT)
Dept: HEMATOLOGY ONCOLOGY | Facility: CLINIC | Age: 67
End: 2022-01-31

## 2022-01-31 ENCOUNTER — CONSULT (OUTPATIENT)
Dept: HEMATOLOGY ONCOLOGY | Facility: CLINIC | Age: 67
End: 2022-01-31
Payer: MEDICARE

## 2022-01-31 VITALS
HEART RATE: 61 BPM | WEIGHT: 190 LBS | OXYGEN SATURATION: 96 % | SYSTOLIC BLOOD PRESSURE: 136 MMHG | TEMPERATURE: 97.5 F | DIASTOLIC BLOOD PRESSURE: 70 MMHG | RESPIRATION RATE: 16 BRPM | HEIGHT: 72 IN | BODY MASS INDEX: 25.73 KG/M2

## 2022-01-31 DIAGNOSIS — C61 PROSTATE CANCER (HCC): Primary | ICD-10-CM

## 2022-01-31 DIAGNOSIS — D75.89 MACROCYTOSIS: ICD-10-CM

## 2022-01-31 PROCEDURE — 99204 OFFICE O/P NEW MOD 45 MIN: CPT | Performed by: INTERNAL MEDICINE

## 2022-01-31 NOTE — TELEPHONE ENCOUNTER
Left message with patients  For his appts   I sched a us  For bef 4th at 9:30 at [facility] a f/u appt for  Feb 21st at 9:00 at  I did   Leave [phone] is the us  appt was not good and I also left  [phone] if the f/u appt was  Not good

## 2022-02-04 ENCOUNTER — HOSPITAL ENCOUNTER (OUTPATIENT)
Dept: ULTRASOUND IMAGING | Facility: HOSPITAL | Age: 67
Discharge: HOME | End: 2022-02-04
Attending: INTERNAL MEDICINE
Payer: MEDICARE

## 2022-02-04 DIAGNOSIS — D75.89 MACROCYTOSIS: ICD-10-CM

## 2022-02-04 PROCEDURE — 76700 US EXAM ABDOM COMPLETE: CPT

## 2022-02-16 ENCOUNTER — TELEPHONE (OUTPATIENT)
Dept: HEMATOLOGY ONCOLOGY | Facility: CLINIC | Age: 67
End: 2022-02-16

## 2022-02-16 NOTE — TELEPHONE ENCOUNTER
Called patient and left a message in regards to his 2/21 appointment with Mera Alcala  Stated that there are some labs that need to be collected before this appointment and that patient will need to have these completed before this appointment  Mentioned that the orders are already in the system and as long as patient goes to a [facility] that there is no paper work needed  Also, stated that if patient has any questions and or concerns to please give the office a call back

## 2022-02-17 ENCOUNTER — APPOINTMENT (OUTPATIENT)
Dept: LAB | Facility: CLINIC | Age: 67
End: 2022-02-17
Payer: MEDICARE

## 2022-02-17 DIAGNOSIS — R25.2 LEG CRAMPING: ICD-10-CM

## 2022-02-17 DIAGNOSIS — D75.89 MACROCYTOSIS: ICD-10-CM

## 2022-02-17 DIAGNOSIS — R79.89 LOW SERUM URIC ACID FOR AGE: ICD-10-CM

## 2022-02-17 LAB
ANION GAP SERPL CALCULATED.3IONS-SCNC: 3 MMOL/L (ref 4–13)
BASOPHILS # BLD AUTO: 0.04 THOUSANDS/ΜL (ref 0–0.1)
BASOPHILS NFR BLD AUTO: 1 % (ref 0–1)
BUN SERPL-MCNC: 18 MG/DL (ref 5–25)
CALCIUM SERPL-MCNC: 9.4 MG/DL (ref 8.3–10.1)
CHLORIDE SERPL-SCNC: 110 MMOL/L (ref 100–108)
CK MB SERPL-MCNC: 1.4 % (ref 0–2.5)
CK MB SERPL-MCNC: 2.4 NG/ML (ref 0–5)
CK SERPL-CCNC: 172 U/L (ref 39–308)
CO2 SERPL-SCNC: 29 MMOL/L (ref 21–32)
CREAT SERPL-MCNC: 0.57 MG/DL (ref 0.6–1.3)
EOSINOPHIL # BLD AUTO: 0.23 THOUSAND/ΜL (ref 0–0.61)
EOSINOPHIL NFR BLD AUTO: 3 % (ref 0–6)
ERYTHROCYTE [DISTWIDTH] IN BLOOD BY AUTOMATED COUNT: 13.2 % (ref 11.6–15.1)
GFR SERPL CREATININE-BSD FRML MDRD: 105 ML/MIN/1.73SQ M
GLUCOSE P FAST SERPL-MCNC: 116 MG/DL (ref 65–99)
HCT VFR BLD AUTO: 46 % (ref 36.5–49.3)
HGB BLD-MCNC: 14.8 G/DL (ref 12–17)
IMM GRANULOCYTES # BLD AUTO: 0.01 THOUSAND/UL (ref 0–0.2)
IMM GRANULOCYTES NFR BLD AUTO: 0 % (ref 0–2)
LYMPHOCYTES # BLD AUTO: 2.38 THOUSANDS/ΜL (ref 0.6–4.47)
LYMPHOCYTES NFR BLD AUTO: 35 % (ref 14–44)
MAGNESIUM SERPL-MCNC: 2.3 MG/DL (ref 1.6–2.6)
MCH RBC QN AUTO: 33 PG (ref 26.8–34.3)
MCHC RBC AUTO-ENTMCNC: 32.2 G/DL (ref 31.4–37.4)
MCV RBC AUTO: 103 FL (ref 82–98)
MONOCYTES # BLD AUTO: 0.59 THOUSAND/ΜL (ref 0.17–1.22)
MONOCYTES NFR BLD AUTO: 9 % (ref 4–12)
NEUTROPHILS # BLD AUTO: 3.52 THOUSANDS/ΜL (ref 1.85–7.62)
NEUTS SEG NFR BLD AUTO: 52 % (ref 43–75)
NRBC BLD AUTO-RTO: 0 /100 WBCS
PLATELET # BLD AUTO: 193 THOUSANDS/UL (ref 149–390)
PMV BLD AUTO: 10.8 FL (ref 8.9–12.7)
POTASSIUM SERPL-SCNC: 4.2 MMOL/L (ref 3.5–5.3)
RBC # BLD AUTO: 4.48 MILLION/UL (ref 3.88–5.62)
SODIUM SERPL-SCNC: 142 MMOL/L (ref 136–145)
URATE SERPL-MCNC: 3.5 MG/DL (ref 4.2–8)
WBC # BLD AUTO: 6.77 THOUSAND/UL (ref 4.31–10.16)

## 2022-02-17 PROCEDURE — 85007 BL SMEAR W/DIFF WBC COUNT: CPT

## 2022-02-17 PROCEDURE — 82525 ASSAY OF COPPER: CPT

## 2022-02-17 PROCEDURE — 84550 ASSAY OF BLOOD/URIC ACID: CPT

## 2022-02-17 PROCEDURE — 82550 ASSAY OF CK (CPK): CPT

## 2022-02-17 PROCEDURE — 36415 COLL VENOUS BLD VENIPUNCTURE: CPT

## 2022-02-17 PROCEDURE — 83918 ORGANIC ACIDS TOTAL QUANT: CPT

## 2022-02-17 PROCEDURE — 85025 COMPLETE CBC W/AUTO DIFF WBC: CPT

## 2022-02-17 PROCEDURE — 83735 ASSAY OF MAGNESIUM: CPT

## 2022-02-17 PROCEDURE — 82553 CREATINE MB FRACTION: CPT

## 2022-02-17 PROCEDURE — 80048 BASIC METABOLIC PNL TOTAL CA: CPT

## 2022-02-17 NOTE — PROGRESS NOTES
Hematology/Oncology Outpatient Office Note    Date of Service: 2/21/2022    [address] [facility]  [phone]    Reason for Consultation:   Chief Complaint   Patient presents with    Follow-up       Cancer Stage at diagnosis: 1    Referral Physician: No ref  provider found    Primary Care Physician:  Kiara Santos MD     Nickname: Humberto De La Cruz      ASSESSMENT & PLAN      Diagnosis ICD-10-CM Associated Orders   1  Macrocytosis  D75 89 CBC and differential   2  Prostate cancer [facility])  C61          This is a 75 y o  c PMHx notable for osteomyelitis, benign parotid mass, BPH, and early stage prostate cancer being seen in consultation for macrocytosis        His Urologist was Dr Flavio Padilla and is now Dr Colleen Benítez  7/13/21 prostate bx G6 (3+3 involving 2 cores) prostate cancer, long history of infertility  PSA 3 1 5/19/2021 6/14/2021 MRI prostate PI-RADS category 3, interemdiate risk     8/3/21 CT Abd/pelv w/c Small retroperitoneal pelvic and inguinal nodes are identified  8/3/21 Bone Scan: No evidence of osseous metastasis  1/5/22 US head/neck: Confirmation of a partially exophytic solid vascular mass in the right parotid   gland  Differential considerations include pleomorphic adenoma and Warthin tumor   but well-differentiated malignancy or lymphoma cannot be excluded      He is getting active surveillance for his prostate cancer  Macrocytosis: The prevalence of macrocytosis is about 2% in the general population  Risk factors include chronic liver disease, alcohol, B12 or folate deficiency, medications, and rarely copper deficiency  Hemolysis can also cause macrocytosis (T bilirubin normal recently)  The NRTIs such as zidovudine and stavudine are known agents of macrocytosis  The most common cause of macrocytosis is usually collection artifact, as RBC sit in the tube before analysis, they can swell   When samples are analysed soon after collection (i e  at the hospital), macrocytosis is not present  Macrocytosis is when the red blood cells are slightly larger than normal  Usually this is from the blood cells sitting in the tube for a while before testing  We will test this today, it is very likely to be normal  When persistent macrocytosis occurs, it is usually from liver conditions, alcohol use, medications, or vitamin deficiency  Sometimes other less common conditions can be present, such as red blood cells that break apart too easily, or bone marrow problems caused by cancer infiltration (unlikely in this patient with early stage cancer)  Discussion of decision making   Oncology history updated, accordingly, during this visit   Goals of care/patient communication  o I discussed with the patient the clinical course leading up to their cancer diagnosis  I reviewed relevant office notes, imaging reports and pathology result as well   o I told the patient that this is a case of curable disease and what this means  We discussed that the goal of anti-cancer therapy is to provide best quality of life, extend overall survival, and progression free survival as shown in clinical trials   TNM/Staging At Diagnosis  Cancer Staging  Prostate cancer [facility])  Staging form: Prostate, AJCC 8th Edition  - Clinical stage from 7/13/2021: Stage I (cT1a, cN0, cM0, PSA: 3 1, Grade Group: 1) - Signed by Shiloh Da Silva MD on 1/25/2022  Stage prefix: Initial diagnosis  Prostate specific antigen (PSA) range: Less than 10  [place] primary pattern: 3  [place] secondary pattern: 3  [place] score: 6  Histologic grading system: 5 grade system  Number of biopsy cores examined: 2  Number of biopsy cores positive: 2      Discussion of decision making    I personally reviewed the following lab results, the image studies, pathology, other specialty/physicians consult notes and recommendations, and outside medical records from [facility]   I had a lengthy discussion with the patient and shared the work-up findings  We discussed the diagnosis and management plan as below  I spent 28 minutes reviewing the records (labs, clinician notes, outside records, medical history, ordering medicine/tests/procedures, interpreting the imaging/labs previously done) and coordination of care as well as direct time with the patient today, of which greater than 50% of the time was spent in counseling and coordination of care with the patient/family  2/17/22 Path review of smear: Few atypical lymphocytes present    2/4/22 U/S Abdomen: mild hepatomegaly and steatosis    · Plan/Labs  · MMA to confirm absence of Vit b12 deficiency (Vit b12 appears to be normal 691 on 1/16/22), it is in process  · Fibrotest to assess for liver pathology (in process)    Regular follow up with hematology will likely not be needed after 6 months from now if labs are stable  Important to see a hematologist if any of the following occur:  1) Absolute Neutrophil count is less than 1000  2) Hemoglobin less than 8  3) Platelets less than 50    Follow Up: prn    All questions were answered to the patient's satisfaction during this encounter  The patient knows the contact information for our office and knows to reach out for any relevant concerns related to this encounter  They are to call for any temperature 100 4 or higher, new symptoms including but not restricted to shaking chills, decreased appetite, nausea, vomiting, diarrhea, increased fatigue, shortness of breath or chest pain, confusion, and not feeling the strength to come to the clinic  For all other listed problems and medical diagnosis in their chart - they are managed by PCP and/or other specialists, which the patient acknowledges  Thank you very much for your consultation and making us a part of this patient's care  We are continuing to follow closely with you   Please do not hesitate to reach out to me with any additional questions or concerns  Karl Shah MD  Hematology & Medical Oncology Staff Physician             Disclaimer: This document was prepared using [employer] Direct technology  If a word or phrase is confusing, or does not make sense, this is likely due to recognition error which was not discovered during this clinician's review  If you believe an error has occurred, please contact me through [address] [place] line for alberto? cation  ONCOLOGY HISTORY OF PRESENT ILLNESS        Oncology History   Prostate cancer ([place] [address] )   2021 Initial Diagnosis    Prostate cancer ([facility] )      Biopsy    - Prostatic adenocarcinoma, acinar type, [place] score 3+3=6 (Grade Group 1) involving 2 cores   - Percentage of [place] pattern 4: N/A   - Percentage of [place] pattern 5: N/A   - Perineural invasion: Not identified   - Periprostatic fat invasion: Not identified   - Lymph-vascular invasion: Not identified   - Seminal vesicle/ejaculatory duct invasion: Not identified   - Additional pathologic findings: None   - Best representative block if additional studies are needed: B2 and B6      A  Right prostate (random biopsy): - Benign prostatic tissue  B  Left prostate (random biopsy):      - Small foci of prostatic adenocarcinoma, [place] score 3+3=6 (Grade Group 1), involving less than 5% of two (2) cores  C   Right mid prostate (biopsy): - Benign prostatic tissue        7/13/2021 -  Cancer Staged    Staging form: Prostate, AJCC 8th Edition  - Clinical stage from 7/13/2021: Stage I (cT1a, cN0, cM0, PSA: 3 1, Grade Group: 1) - Signed by Johana Wilkes MD on 1/25/2022  Stage prefix: Initial diagnosis  Prostate specific antigen (PSA) range: Less than 10  [place] primary pattern: 3  [place] secondary pattern: 3  [place] score: 6  Histologic grading system: 5 grade system  Number of biopsy cores examined: 2  Number of biopsy cores positive: 2             SUBJECTIVE  (INTERVAL HISTORY)      Clotting History None   Bleeding History Early teens (tooth procedure related) but none since then   Cancer History None    Family Cancer History Mother (multiple myeloma), mAunt (breast)   H/O Blood/Plt Transfusion None    Tobacco/etoh/drug abuse 1980's 1/2 PPD x 3 years  No other abuse   Hx COVID19 Infection and Vaccine Status Moderna x3   Cancer Screening history 1/2021 c-scope   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)   New medications in the last month: none        I have reviewed the relevant past medical, surgical, social and family history  I have also reviewed allergies and medications for this patient  Interval events: no new acute issues since last visit  He had surgery for his partially exophytic solid vascular mass in the right parotid   Gland with it negative for malignancy  Review of Systems  His baseline weight was 190-195lbs and he is around that weight now  He deals with chronic neck pain and has a hx of surgeries in that area  He has an associated HA  He deals with chronic fatigue  He denies F/C, N/V, SOB, CP, LH, rash, itching, falls, generalized weakness, hematuria, melena, or hematochezia  A 10-point review of system was performed, pertinent positive and negative were detailed as above  Otherwise, the 10-point review of system was negative        Past Medical History:   Diagnosis Date    Anxiety     Arthritis     Bleeding tendency ([address] )     BPH (benign prostatic hyperplasia)     Broken bones     left leg    Cataract     Chronic neck pain     Epilepsy ([address] )     Hypothyroidism     Neoplasm of unspecified behavior of bone, soft tissue, and skin     Osteomyelitis ([address] )     Prostate cancer ([address] )     Skin cancer     Squamous carcinoma 12/01/2014 12/2014    Thyroid condition     Thyroid disease        Past Surgical History:   Procedure Laterality Date    ARTHRODESIS      H/o Arthrodesis Cervical to C2;  last assessed [id number]    COLONOSCOPY  01/22/2021    COLONOSCOPY      KNEE SURGERY Right 2017    LEG SURGERY Left     Hardware placed in lower leg bone(s)    LEG SURGERY Left 2006, 2012-Left leg vascular    NECK SURGERY  2013    C1-2 fusion    THYROID SURGERY Right     US GUIDED PROSTATE BIOPSY  2021       Family History   Problem Relation Age of Onset    Bone cancer Mother     Other Father         Accident    Dementia Other     Cancer Other        Social History     Socioeconomic History    Marital status: Legally      Spouse name: Not on file    Number of children: Not on file    Years of education: Not on file    Highest education level: Not on file   Occupational History    Occupation: Teacher    Occupation: retired   Tobacco Use    Smoking status: Former Smoker     Types: Cigarettes     Quit date:      Years since quittin 1    Smokeless tobacco: Never Used   Vaping Use    Vaping Use: Never used   Substance and Sexual Activity    Alcohol use: No     Comment: being a social drinker    Drug use: Not Currently     Comment: Medical marijuana    Sexual activity: Yes     Comment: no known std risk factors   Other Topics Concern    Not on file   Social History Narrative    Daily coffee consumption (3 cups/day)     Social Determinants of Health     Financial Resource Strain: Not on file   Food Insecurity: Not on file   Transportation Needs: Not on file   Physical Activity: Not on file   Stress: Not on file   Social Connections: Not on file   Intimate Partner Violence: Not on file   Housing Stability: Not on file       Allergies   Allergen Reactions    Amoxicillin-Pot Clavulanate Rash, Vomiting and Nausea Only       Current Outpatient Medications   Medication Sig Dispense Refill    acetaminophen (TYLENOL) 500 mg tablet Take 1,000 mg by mouth as needed       ASPIRIN 81 PO Take 1 tablet by mouth daily       Calcium Carbonate-Vit D-Min (CALCIUM 1200 PO) Take 1 tablet by mouth daily      doxycycline (ADOXA) 50 MG tablet TAKE 1 TABLET BY ORAL ROUTE EVERY DAY WITH MEAL X3 MONTHS THEN MWF      Flarex 0 1 % ophthalmic suspension TAKEN AS NEEDED      fluticasone (FLONASE) 50 mcg/act nasal spray 2 sprays into each nostril daily 3 g 1    ibuprofen (MOTRIN) 200 mg tablet Take 200 mg by mouth every 4 (four) hours as needed for mild pain       levothyroxine 100 mcg tablet daily      metroNIDAZOLE (METROGEL) 0 75 % gel USE TWICE A DAY ON NOSE      Multiple Vitamin (MULTIVITAMINS PO) Take 1 capsule by mouth daily      neomycin-polymyxin-hydrocortisone (CORTISPORIN) otic solution Administer 4 drops into the left ear every 6 (six) hours 10 mL 0    Omega-3 Fatty Acids (FISH OIL) 645 MG CAPS Take 1 capsule by mouth daily       RESTASIS MULTIDOSE 0 05 % ophthalmic emulsion INSTILL 1 DROP INTO BOTH EYES TWICE A DAY  3    tamsulosin (FLOMAX) 0 4 mg       tretinoin (ALTRALIN) 0 05 % APPLY TO FACE AT NIGHT AS DIRECTED  1     No current facility-administered medications for this visit  (Not in a hospital admission)      Objective:     24 Hour Vitals Assessment:     Vitals:    02/21/22 0918   BP: 126/70   Pulse: 85   Resp: 18   Temp: 98 5 °F (36 9 °C)   SpO2: 99%       PHYSICIAN EXAM:    General: Appearance: alert, cooperative, no distress  HEENT: Normocephalic, atraumatic  No scleral icterus  conjunctivae clear  EOMI  Chest: No tenderness to palpation  No open wound noted  Lungs: Clear to auscultation bilaterally, Respirations unlabored  Cardiac: Regular rate and rhythm, +S1and S2, -r/m/g  Abdomen: Soft, non-tender, non-distended  Bowel sounds are normal   Extremities:  No edema, cyanosis, clubbing  Skin: Skin color, turgor are normal  No rashes  Neurologic: Awake, Alert, and oriented, no gross focal deficits noted b/l         DATA REVIEW:    Pathology Result:    Final Diagnosis   Date Value Ref Range Status   07/13/2021   Final     -  Prostatic adenocarcinoma, acinar type, [place] score 3+3=6 (Grade Group 1) involving 2 cores   - Percentage of [place] pattern 4: N/A   -  Percentage of [place] pattern 5: N/A   -  Perineural invasion: Not identified   -  Periprostatic fat invasion: Not identified   -  Lymph-vascular invasion: Not identified   -  Seminal vesicle/ejaculatory duct invasion:  Not identified   -  Additional pathologic findings: None   -  Best representative block if additional studies are needed: B2 and B6    Final diagnosis listed by specimen  A  Right prostate (random biopsy): - Benign prostatic tissue  B  Left prostate (random biopsy):      - Small foci of prostatic adenocarcinoma, [place] score 3+3=6 (Grade Group 1), involving less than 5% of two (2)  cores  C   Right mid prostate (biopsy): - Benign prostatic tissue  Comment: This is an appended report  These results have been appended to a previously preliminary verified report  Image Results:   Image result are reviewed and documented in Hematology/Oncology history    US abdomen complete  Narrative: ABDOMEN ULTRASOUND, COMPLETE     INDICATION:   D75 89: Other specified diseases of blood and blood-forming organs  COMPARISON:  CT 8/3/2021    TECHNIQUE:   Real-time ultrasound of the abdomen was performed with a curvilinear transducer with both volumetric sweeps and still imaging techniques  FINDINGS:    PANCREAS:  Visualized portions of the pancreas are within normal limits  AORTA AND IVC:  Visualized portions are normal for patient age  LIVER:  Size:  Mild enlargement  The liver measures 16 3 cm in the midclavicular line  Contour:  Surface contour is smooth  Parenchyma:  Mild increased echogenicity relative to right renal cortex  No liver mass identified  Limited imaging of the main portal vein shows it to be patent and hepatopetal     BILIARY:  Anterior gallbladder wall 2 mm nonmobile nonechogenic focus with punctate hyperechogenicity  No intrahepatic biliary dilatation  CBD measures 6 mm    No choledocholithiasis  Negative Lopez's sign    KIDNEY:   Right kidney measures 14 2 x 5 1 x 6 1  cm  Volume 229 9 mL  1 9 cm cyst    Left kidney measures 12 4 x 5 8 x 5 2 cm  Volume 193 9 mL  1 9 cm parapelvic cysts, 0 4 cm exophytic cyst and 0 5 cm lower pole cyst    SPLEEN:   Measures 9 6 x 8 6 x 2 8 cm  Volume 121 2 mL  Within normal limits  ASCITES:  None  Impression: Mild hepatomegaly    Hepatic mild steatosis    2 mm gallbladder polyp  According to current literature guidelines (JACR 2013;10:953-956) polyps this small are benign and no workup nor follow-up necessitated  Workstation performed: [id number]      LABS:  Lab data are reviewed and documented in HemOnc history  Lab Results   Component Value Date    HGB 14 8 02/17/2022    HCT 46 0 02/17/2022     (H) 02/17/2022     02/17/2022    WBC 6 77 02/17/2022    NRBC 0 02/17/2022     Lab Results   Component Value Date     07/31/2015    K 4 2 02/17/2022     (H) 02/17/2022    CO2 29 02/17/2022    ANIONGAP 7 07/31/2015    BUN 18 02/17/2022    CREATININE 0 57 (L) 02/17/2022    GLUCOSE 106 07/31/2015    GLUF 116 (H) 02/17/2022    CALCIUM 9 4 02/17/2022    AST 19 11/02/2021    ALT 27 11/02/2021    ALKPHOS 57 11/02/2021    PROT 7 2 07/31/2015    BILITOT 0 60 07/31/2015    EGFR 105 02/17/2022       No results found for: IRON, TIBC, FERRITIN    Lab Results   Component Value Date    [id number] 691 01/16/2022       No results for input(s): WBC, CREAT in the last 72 hours      Invalid input(s):  PLT    By:  Anastasiya Gilmore MD, 2/21/2022, 9:29 AM

## 2022-02-18 LAB
BASOPHILS NFR BLD MANUAL: 2 % (ref 0–1)
IMM EOSINOPHIL NFR BLD MANUAL: 4 % (ref 0–6)
LYMPHOCYTES NFR BLD: 30 % (ref 14–44)
MONOCYTES NFR BLD AUTO: 3 % (ref 4–12)
NEUTS BAND NFR BLD MANUAL: 2 THOUSAND/UL
NEUTS SEG NFR BLD AUTO: 52 % (ref 45–77)
PATHOLOGIST INTERPRETATION: NORMAL
PATHOLOGY REVIEW: YES
RBC MORPH BLD: NORMAL
TOTAL CELLS COUNTED SPEC: 100
VARIANT LYMPHS BLD QL SMEAR: 7 % (ref 0–0)

## 2022-02-21 ENCOUNTER — OFFICE VISIT (OUTPATIENT)
Dept: HEMATOLOGY ONCOLOGY | Facility: CLINIC | Age: 67
End: 2022-02-21
Payer: MEDICARE

## 2022-02-21 ENCOUNTER — RA CDI HCC (OUTPATIENT)
Dept: OTHER | Facility: HOSPITAL | Age: 67
End: 2022-02-21

## 2022-02-21 VITALS
RESPIRATION RATE: 18 BRPM | OXYGEN SATURATION: 99 % | TEMPERATURE: 98.5 F | BODY MASS INDEX: 24.92 KG/M2 | HEART RATE: 85 BPM | SYSTOLIC BLOOD PRESSURE: 126 MMHG | WEIGHT: 184 LBS | HEIGHT: 72 IN | DIASTOLIC BLOOD PRESSURE: 70 MMHG

## 2022-02-21 DIAGNOSIS — C61 PROSTATE CANCER (HCC): ICD-10-CM

## 2022-02-21 DIAGNOSIS — D75.89 MACROCYTOSIS: Primary | ICD-10-CM

## 2022-02-21 PROCEDURE — 99213 OFFICE O/P EST LOW 20 MIN: CPT | Performed by: INTERNAL MEDICINE

## 2022-02-21 NOTE — PROGRESS NOTES
G40 909    [facility]  coding opportunities             Chart Reviewed * (Number of) [place] suggestions sent to Provider: 1                  Patients insurance company: [employer] (Medicare Advantage and Commercial)

## 2022-02-21 NOTE — Clinical Note
Beny Anthony,    I told [employer] that I have ordered a f/u CBC in 6months and he can f/u with me prn  My w/u was only significant for NAFLD and this may be contributing to his macrocytosis as I don't see another etiology  We will just need to watch him      Ariana Sher

## 2022-02-22 ENCOUNTER — OFFICE VISIT (OUTPATIENT)
Dept: FAMILY MEDICINE CLINIC | Facility: CLINIC | Age: 67
End: 2022-02-22
Payer: MEDICARE

## 2022-02-22 VITALS
BODY MASS INDEX: 24.89 KG/M2 | HEIGHT: 72 IN | DIASTOLIC BLOOD PRESSURE: 76 MMHG | OXYGEN SATURATION: 95 % | TEMPERATURE: 96.9 F | HEART RATE: 75 BPM | WEIGHT: 183.8 LBS | SYSTOLIC BLOOD PRESSURE: 131 MMHG

## 2022-02-22 DIAGNOSIS — I71.2 THORACIC AORTIC ANEURYSM WITHOUT RUPTURE (HCC): ICD-10-CM

## 2022-02-22 DIAGNOSIS — C61 PROSTATE CANCER (HCC): ICD-10-CM

## 2022-02-22 DIAGNOSIS — R79.89 LOW SERUM URIC ACID FOR AGE: ICD-10-CM

## 2022-02-22 DIAGNOSIS — R59.0 ABDOMINAL LYMPHADENOPATHY: ICD-10-CM

## 2022-02-22 DIAGNOSIS — R91.8 ABNORMAL CT SCAN OF LUNG: ICD-10-CM

## 2022-02-22 DIAGNOSIS — M86.662 CHRONIC OSTEOMYELITIS OF LEFT LOWER LEG (HCC): ICD-10-CM

## 2022-02-22 DIAGNOSIS — N28.1 RENAL CYSTS, ACQUIRED, BILATERAL: ICD-10-CM

## 2022-02-22 DIAGNOSIS — K11.8 MASS OF RIGHT PAROTID GLAND: Primary | ICD-10-CM

## 2022-02-22 DIAGNOSIS — D75.89 MACROCYTOSIS: ICD-10-CM

## 2022-02-22 LAB
COPPER SERPL-MCNC: 96 UG/DL (ref 69–132)
METHYLMALONATE SERPL-SCNC: 173 NMOL/L (ref 0–378)

## 2022-02-22 PROCEDURE — 99214 OFFICE O/P EST MOD 30 MIN: CPT | Performed by: FAMILY MEDICINE

## 2022-02-22 NOTE — PROGRESS NOTES
Assessment/Plan:       No problem-specific Assessment & Plan notes found for this encounter  Diagnoses and all orders for this visit:    Mass of right parotid gland  Comments:  Post excision  Too weak well  Pathology is benign    Low serum uric acid for age  -     Uric acid, urine, 24 hour; Future    Prostate cancer [facility])  Comments: To follow with Oncology and Neurology    Macrocytosis  Comments:  Hem consults  yesterday noted    Abdominal lymphadenopathy  Comments:  Advised to follow with Dr Rafaela George and Oncology    Renal cysts, acquired, bilateral  Comments: To follow with Urology    Chronic osteomyelitis of left lower leg ([facility] [address] )  Comments:  resolved    Thoracic aortic aneurysm without rupture ([facility] )  Comments:  Stable  To follow with his vascular surgeon    Abnormal CT scan of lung  Comments:  Patient is going to have chest CT scan        Patient Instructions   Follow-up  With test results      Orders Placed This Encounter   Procedures    Uric acid, urine, 24 hour     Standing Status:   Future     Number of Occurrences:   1     Standing Expiration Date:   2/22/2023         Subjective:     Patient ID: Mario Reina is a 75 y o  male      HPI  Post excision, right parotid mass  Did well  Pathology is benign  Macrocytosis, he follow with Hematology ,  Low uric acid, denied generalized arthralgia, he does have chronic neck pain  Left leg pain  Resolved  Thoracic aortic aneurysm  Denied chest pain or upper back pain  Abnormal CT scan of the lung   He is going for repeat CT scan of the chest    Test results   labs 2-17-22  Methylmalonic acid pending  pathalogy parotid gland mass  x ray left lower leg  venoud dupplex left leg  Abdominal and pelvic CT done August last year  Discussed with pt    Review of Systems   Constitutional: Negative for activity change, appetite change, chills, fatigue, fever and unexpected weight change     HENT: Negative for congestion, ear discharge, ear pain, hearing loss, nosebleeds, rhinorrhea, sinus pressure, sore throat, tinnitus, trouble swallowing and voice change  Eyes: Negative for photophobia, pain and visual disturbance  Respiratory: Negative for cough, chest tightness, shortness of breath and wheezing  Cardiovascular: Negative for chest pain, palpitations and leg swelling  Gastrointestinal: Negative for abdominal pain, anal bleeding, blood in stool, constipation, diarrhea, nausea and vomiting  Endocrine: Negative for cold intolerance, heat intolerance, polydipsia and polyuria  Genitourinary: Negative for dysuria, frequency, hematuria and urgency  Musculoskeletal: Negative for arthralgias, back pain, gait problem, joint swelling and myalgias  Skin: Negative for rash  Neurological: Negative for dizziness, tremors, seizures, syncope, weakness, light-headedness and headaches  Hematological: Negative for adenopathy  Does not bruise/bleed easily  Psychiatric/Behavioral: Negative for agitation, behavioral problems, confusion, dysphoric mood, hallucinations and sleep disturbance  The patient is not nervous/anxious  Objective:     Physical Exam  Constitutional:       General: He is not in acute distress  Appearance: He is well-developed  HENT:      Head: Normocephalic  Eyes:      General: No scleral icterus  Pupils: Pupils are equal, round, and reactive to light  Neck:      Thyroid: No thyromegaly  Vascular: No JVD  Comments: Surgical wound healed well  Right neck   Cardiovascular:      Rate and Rhythm: Normal rate and regular rhythm  Pulses:           Carotid pulses are 3+ on the right side and 3+ on the left side  Heart sounds: Normal heart sounds  No murmur heard  No friction rub  No gallop  Comments: Legs , no edema   Pulmonary:      Effort: Pulmonary effort is normal       Breath sounds: Normal breath sounds  Abdominal:      General: Bowel sounds are normal  There is no distension        Palpations: Abdomen is soft  Tenderness: There is no abdominal tenderness  There is no guarding  Musculoskeletal:         General: No tenderness  Normal range of motion  Cervical back: Normal range of motion and neck supple  Lymphadenopathy:      Cervical: No cervical adenopathy  Skin:     Findings: No rash  Neurological:      General: No focal deficit present  Mental Status: He is alert and oriented to person, place, and time  Cranial Nerves: No cranial nerve deficit  Motor: No weakness or abnormal muscle tone  Coordination: Coordination normal       Gait: Gait normal       Comments: Normal gait   Psychiatric:         Mood and Affect: Mood normal          Behavior: Behavior normal          Thought Content:  Thought content normal

## 2022-02-23 PROBLEM — R59.0 ABDOMINAL LYMPHADENOPATHY: Status: ACTIVE | Noted: 2022-02-23

## 2022-02-23 PROBLEM — N28.1 RENAL CYSTS, ACQUIRED, BILATERAL: Status: ACTIVE | Noted: 2022-02-23

## 2022-02-24 ENCOUNTER — LAB (OUTPATIENT)
Dept: LAB | Facility: CLINIC | Age: 67
End: 2022-02-24
Payer: MEDICARE

## 2022-02-24 ENCOUNTER — TELEPHONE (OUTPATIENT)
Dept: FAMILY MEDICINE CLINIC | Facility: CLINIC | Age: 67
End: 2022-02-24

## 2022-02-24 DIAGNOSIS — R73.9 ELEVATED BLOOD SUGAR: Primary | ICD-10-CM

## 2022-02-24 DIAGNOSIS — R79.89 LOW SERUM URIC ACID FOR AGE: ICD-10-CM

## 2022-02-24 LAB
MISCELLANEOUS LAB TEST RESULT: NORMAL
PERIOD: 24 HOURS
SPECIMEN VOL UR: 5000 ML
URATE 24H UR-MCNC: 620 MG/24 HRS (ref 150–990)

## 2022-02-24 PROCEDURE — 84560 ASSAY OF URINE/URIC ACID: CPT

## 2022-03-16 ENCOUNTER — TELEPHONE (OUTPATIENT)
Dept: FAMILY MEDICINE CLINIC | Facility: CLINIC | Age: 67
End: 2022-03-16

## 2022-03-16 DIAGNOSIS — R79.89 LOW SERUM URIC ACID FOR AGE: Primary | ICD-10-CM

## 2022-03-23 ENCOUNTER — APPOINTMENT (OUTPATIENT)
Dept: LAB | Facility: CLINIC | Age: 67
End: 2022-03-23
Payer: MEDICARE

## 2022-03-23 ENCOUNTER — OFFICE VISIT (OUTPATIENT)
Dept: FAMILY MEDICINE CLINIC | Facility: CLINIC | Age: 67
End: 2022-03-23
Payer: MEDICARE

## 2022-03-23 VITALS
DIASTOLIC BLOOD PRESSURE: 70 MMHG | OXYGEN SATURATION: 98 % | SYSTOLIC BLOOD PRESSURE: 139 MMHG | WEIGHT: 183.2 LBS | BODY MASS INDEX: 24.81 KG/M2 | HEIGHT: 72 IN | TEMPERATURE: 97.5 F | HEART RATE: 67 BPM

## 2022-03-23 DIAGNOSIS — C61 PROSTATE CANCER (HCC): ICD-10-CM

## 2022-03-23 DIAGNOSIS — R79.89 LOW SERUM URIC ACID FOR AGE: ICD-10-CM

## 2022-03-23 DIAGNOSIS — R35.89: ICD-10-CM

## 2022-03-23 DIAGNOSIS — H66.92 LEFT OTITIS MEDIA, UNSPECIFIED OTITIS MEDIA TYPE: Primary | ICD-10-CM

## 2022-03-23 DIAGNOSIS — D75.89 MACROCYTOSIS: ICD-10-CM

## 2022-03-23 DIAGNOSIS — R73.9 ELEVATED BLOOD SUGAR: ICD-10-CM

## 2022-03-23 DIAGNOSIS — E03.9 ACQUIRED HYPOTHYROIDISM: ICD-10-CM

## 2022-03-23 LAB
EST. AVERAGE GLUCOSE BLD GHB EST-MCNC: 114 MG/DL
HBA1C MFR BLD: 5.6 %

## 2022-03-23 PROCEDURE — 99214 OFFICE O/P EST MOD 30 MIN: CPT | Performed by: FAMILY MEDICINE

## 2022-03-23 PROCEDURE — 36415 COLL VENOUS BLD VENIPUNCTURE: CPT

## 2022-03-23 PROCEDURE — 83036 HEMOGLOBIN GLYCOSYLATED A1C: CPT

## 2022-03-23 RX ORDER — AMOXICILLIN 500 MG/1
500 CAPSULE ORAL EVERY 8 HOURS SCHEDULED
Qty: 30 CAPSULE | Refills: 0 | Status: SHIPPED | OUTPATIENT
Start: 2022-03-23 | End: 2022-04-02

## 2022-03-23 NOTE — PROGRESS NOTES
Assessment/Plan:       No problem-specific Assessment & Plan notes found for this encounter  Diagnoses and all orders for this visit:    Left otitis media, unspecified otitis media type  Comments:  to follow with his ENT, patient has an appointment with ENT at the end of this month  Patient to call if pain persist in 2 days or get worse  Orders:  -     amoxicillin (AMOXIL) 500 mg capsule; Take 1 capsule (500 mg total) by mouth every 8 (eight) hours for 10 days    Low serum uric acid for age  Comments:  24 hour urine is normal, so he doesnot have Fanconi syndrome  Recent TSH is normal    Increased urine volume  Comments:  Most likely secondary to increase fluid intake and caffeine intake    Elevated blood sugar  Comments:  Watch sweet and carbohydrate intake  Check hemoglobin A1c, order exit    Macrocytosis  Comments:  Check CBC  Order exist  Following with Hematology    Prostate cancer [facility])  Comments: Following with Urology,  urology consultation on March 3, 2022 noted    Acquired hypothyroidism  Comments:  Recent Endocrinology office visit noted  Compensated        There are no Patient Instructions on file for this visit  No orders of the defined types were placed in this encounter  Subjective:     Patient ID: Mindi Obando is a 75 y o  male      HPI  Left earache  Started 2 days ago  Mild  Intermittent  Denied any ear discharge  Denied fever or chills  Denied headache  Denied nasal congestion  Post parotid gland gland excision  He has a follow-up with ENT in 1 week  Doing well    Recent 24 hour urine in indicate high urine Valium  Patient admit to high fluid intake and caffeine intake    Prostate cancer  Patient stated recently his PSA is little higher  He is going to go for another a prostate biopsy and MRI  And was referred to Urology at [address] [place] [address] on 2-24-22 and 3-13 -22  Discussed results with  Pt    CBC and Hba1c -not done       Review of Systems   Constitutional: Negative for activity change, appetite change, chills, fatigue, fever and unexpected weight change  HENT: Positive for ear pain  Negative for congestion, ear discharge, hearing loss, nosebleeds, rhinorrhea, sinus pressure, sinus pain, sneezing, sore throat, tinnitus, trouble swallowing and voice change  Eyes: Negative for photophobia, pain and visual disturbance  Respiratory: Negative for cough, chest tightness, shortness of breath and wheezing  Cardiovascular: Negative for chest pain, palpitations and leg swelling  Gastrointestinal: Negative for abdominal pain, anal bleeding, blood in stool, constipation, diarrhea, nausea and vomiting  Endocrine: Negative for cold intolerance, heat intolerance, polydipsia and polyuria  Genitourinary: Negative for dysuria, frequency, hematuria and urgency  Musculoskeletal: Negative for arthralgias, back pain, gait problem, joint swelling, myalgias and neck pain  Skin: Negative for rash  Neurological: Negative for dizziness, tremors, seizures, syncope, weakness, light-headedness and headaches  Hematological: Negative for adenopathy  Does not bruise/bleed easily  Psychiatric/Behavioral: Negative for agitation, behavioral problems, confusion, dysphoric mood, hallucinations and sleep disturbance  The patient is not nervous/anxious  Objective:     Physical Exam  Constitutional:       General: He is not in acute distress  Appearance: Normal appearance  He is well-developed  HENT:      Head: Normocephalic  Right Ear: Tympanic membrane, ear canal and external ear normal       Left Ear: Ear canal and external ear normal       Ears:      Comments: Tympanic membrane of the left ear not red  The is some area are cloudy     Nose:      Comments: Slight septal deviation  Eyes:      General: No scleral icterus  Right eye: No discharge  Left eye: No discharge        Extraocular Movements: Extraocular movements intact  Pupils: Pupils are equal, round, and reactive to light  Neck:      Thyroid: No thyromegaly  Vascular: No carotid bruit or JVD  Cardiovascular:      Rate and Rhythm: Normal rate and regular rhythm  Heart sounds: Normal heart sounds  No murmur heard  No gallop  Pulmonary:      Effort: Pulmonary effort is normal       Breath sounds: Normal breath sounds  Abdominal:      General: Bowel sounds are normal  There is no distension  Palpations: Abdomen is soft  There is no mass  Tenderness: There is no abdominal tenderness  There is no guarding or rebound  Musculoskeletal:         General: No swelling or tenderness  Normal range of motion  Cervical back: Neck supple  Right lower leg: No edema  Left lower leg: No edema  Lymphadenopathy:      Cervical: No cervical adenopathy  Skin:     Findings: No rash  Neurological:      General: No focal deficit present  Mental Status: He is alert and oriented to person, place, and time  Cranial Nerves: No cranial nerve deficit  Motor: No abnormal muscle tone  Coordination: Coordination normal       Gait: Gait normal    Psychiatric:         Mood and Affect: Mood normal          Behavior: Behavior normal          Thought Content:  Thought content normal

## 2022-04-11 ENCOUNTER — TELEPHONE (OUTPATIENT)
Dept: CARDIOLOGY CLINIC | Facility: CLINIC | Age: 67
End: 2022-04-11

## 2022-04-11 NOTE — TELEPHONE ENCOUNTER
Dr Aleksandar Wood ordered an MRI of chest wall with diagnosis of aortic aneurysm  If you want to see just chest wall this is ordered correctly  If you need to confirm a aortic aneurysm, an MRA needs to be ordered and done at a different location  Previously it was an MRA ordered and Sonali Funes thinks that this may be what you really wanted this time also, on [address], but she is confirming before bringing patient in  Patient called her to set up MRI

## 2022-04-12 NOTE — TELEPHONE ENCOUNTER
This was a phone call from the department/Megan stating that you ordered an MRI  She will schedule the MRI, with no problem, but the diagnosis on script states "Thorasic aortic aneurysm" and you cannot see a thoracic aortic aneurysm on an MRI, per tech  So she thought you ordered the wrong test, and meant to order an MRA  She was being proactive, I guess  If you want the MRI, no problem, she will go ahead and schedule it and bring patient in  An MRI is the actual test ordered, but coordinating  diagnosis might not fly with insurance

## 2022-04-12 NOTE — TELEPHONE ENCOUNTER
I am very confused  Please read my note of 09/30/2021 and 10/3/2021  Patient had the MRA of his chest on 11/05/2021  I do not see any note that a additional MRA has subsequently been ordered and I do not see why it would be repeated 6 months later  Please clarify

## 2022-04-22 ENCOUNTER — OFFICE VISIT (OUTPATIENT)
Dept: FAMILY MEDICINE CLINIC | Facility: CLINIC | Age: 67
End: 2022-04-22
Payer: MEDICARE

## 2022-04-22 VITALS
SYSTOLIC BLOOD PRESSURE: 116 MMHG | HEIGHT: 72 IN | TEMPERATURE: 97.6 F | DIASTOLIC BLOOD PRESSURE: 62 MMHG | BODY MASS INDEX: 25.52 KG/M2 | HEART RATE: 68 BPM | RESPIRATION RATE: 16 BRPM | WEIGHT: 188.4 LBS | OXYGEN SATURATION: 96 %

## 2022-04-22 DIAGNOSIS — Z79.82 ASPIRIN LONG-TERM USE: ICD-10-CM

## 2022-04-22 DIAGNOSIS — K11.8 MASS OF RIGHT PAROTID GLAND: ICD-10-CM

## 2022-04-22 DIAGNOSIS — H66.92 LEFT OTITIS MEDIA, UNSPECIFIED OTITIS MEDIA TYPE: ICD-10-CM

## 2022-04-22 DIAGNOSIS — D75.89 MACROCYTOSIS: ICD-10-CM

## 2022-04-22 DIAGNOSIS — Z71.85 IMMUNIZATION COUNSELING: ICD-10-CM

## 2022-04-22 DIAGNOSIS — R73.9 ELEVATED BLOOD SUGAR: ICD-10-CM

## 2022-04-22 DIAGNOSIS — R23.4 SCAB: ICD-10-CM

## 2022-04-22 DIAGNOSIS — C61 PROSTATE CANCER (HCC): ICD-10-CM

## 2022-04-22 DIAGNOSIS — I77.810 AORTIC ECTASIA, THORACIC (HCC): Primary | ICD-10-CM

## 2022-04-22 PROCEDURE — 99214 OFFICE O/P EST MOD 30 MIN: CPT | Performed by: FAMILY MEDICINE

## 2022-04-22 NOTE — PROGRESS NOTES
Assessment/Plan:       No problem-specific Assessment & Plan notes found for this encounter  Diagnoses and all orders for this visit:    Aortic ectasia, thoracic ([address] )  Comments:  Patient is asymptomatic  He said he is going to call Dr William Solo and follow-up with him    Aspirin long-term use  Comments:  Advised patient aspirin is not recommended for primary use anymore ,he can stop    Elevated blood sugar  Comments:  Good control  To watch sweet and carbohydrate intake    Macrocytosis  Comments: Following with Hematology    Prostate cancer [facility])  Comments: Following with urology  Is going to have a 2nd biopsy    Mass of right parotid gland  Comments:  Doing well  Recent ENT office visit note noted    Scab  Comments:  Right external canal   Advised patient not to clean his ear anymore with Q-tips  Recheck ear with next office visit and to call if any problem    Left otitis media, unspecified otitis media type  Comments:  Resolved    Immunization counseling        There are no Patient Instructions on file for this visit  No orders of the defined types were placed in this encounter  Subjective:     Patient ID: María Sánchez is a 75 y o  male      HPI  Prostate cancer, patient is a about having a prostate biopsy  Dr Drena Brittle refer him to Dr Nisa Joseph at [facility]  Elevated blood sugar  Denied polyuria or poly  Macrocytosis  Following with Hematology  Otitis media, saw ENT3-30-22  No further ear pain  Thoracic aortic ectasia  Denied chest pain or upper back pain  He did not follow with Dr Anne Sat  He take asprin as primary prevention, he never had a stroke or heart    Test results  Labs done on March 23, 2020 to discuss  CBC    cReview of Systems   Constitutional: Negative for activity change, appetite change, chills, fatigue, fever and unexpected weight change     HENT: Negative for congestion, ear discharge, ear pain, facial swelling, hearing loss, nosebleeds, rhinorrhea, sinus pressure, sore throat, tinnitus, trouble swallowing and voice change  Eyes: Negative for photophobia, pain and visual disturbance  Respiratory: Negative for cough, chest tightness, shortness of breath and wheezing  Cardiovascular: Negative for chest pain, palpitations and leg swelling  Gastrointestinal: Negative for abdominal pain, anal bleeding, blood in stool, constipation, diarrhea, nausea and vomiting  Endocrine: Negative for cold intolerance, heat intolerance, polydipsia and polyuria  Genitourinary: Negative for dysuria, frequency, hematuria and urgency  Musculoskeletal: Negative for arthralgias, back pain, gait problem, joint swelling, myalgias and neck pain  Skin: Negative for rash  Neurological: Negative for dizziness, tremors, seizures, syncope, weakness, light-headedness and headaches  Hematological: Negative for adenopathy  Does not bruise/bleed easily  Psychiatric/Behavioral: Negative for agitation, behavioral problems, confusion, dysphoric mood, hallucinations and sleep disturbance  The patient is not nervous/anxious  Objective:     Physical Exam  Constitutional:       General: He is not in acute distress  Appearance: Normal appearance  He is well-developed  He is not ill-appearing or diaphoretic  HENT:      Head: Normocephalic  Right Ear: Tympanic membrane and external ear normal  There is no impacted cerumen  Left Ear: Tympanic membrane, ear canal and external ear normal  There is no impacted cerumen  Ears:      Comments: There is a small scab at the distal part of the external canal of the right ear  Patient stated he teen his ear with a Q-tip and he did notice it will bleeding here and there  Eyes:      General: No scleral icterus  Right eye: No discharge  Left eye: No discharge  Pupils: Pupils are equal, round, and reactive to light  Neck:      Thyroid: No thyromegaly  Vascular: No carotid bruit or JVD  Cardiovascular:      Rate and Rhythm: Normal rate and regular rhythm  Heart sounds: Normal heart sounds  No murmur heard  No gallop  Pulmonary:      Effort: Pulmonary effort is normal       Breath sounds: Normal breath sounds  Abdominal:      General: Bowel sounds are normal  There is no distension  Palpations: Abdomen is soft  There is no mass  Tenderness: There is no abdominal tenderness  There is no guarding or rebound  Musculoskeletal:         General: No swelling or tenderness  Normal range of motion  Cervical back: Neck supple  Right lower leg: No edema  Left lower leg: No edema  Lymphadenopathy:      Cervical: No cervical adenopathy  Skin:     Findings: No rash  Neurological:      General: No focal deficit present  Mental Status: He is alert and oriented to person, place, and time  Cranial Nerves: No cranial nerve deficit  Motor: No weakness or abnormal muscle tone  Coordination: Coordination normal       Gait: Gait normal    Psychiatric:         Mood and Affect: Mood normal          Behavior: Behavior normal          Thought Content: Thought content normal          Judgment: Judgment normal         BMI Counseling: Body mass index is 25 55 kg/m²  The BMI is above normal  Nutrition recommendations include decreasing portion sizes  Exercise recommendations include moderate physical activity 150 minutes/week  Rationale for BMI follow-up plan is due to patient being overweight or obese

## 2022-04-27 ENCOUNTER — OFFICE VISIT (OUTPATIENT)
Dept: CARDIOLOGY CLINIC | Facility: CLINIC | Age: 67
End: 2022-04-27
Payer: MEDICARE

## 2022-04-27 VITALS
HEART RATE: 60 BPM | DIASTOLIC BLOOD PRESSURE: 80 MMHG | BODY MASS INDEX: 25.23 KG/M2 | SYSTOLIC BLOOD PRESSURE: 136 MMHG | WEIGHT: 186 LBS

## 2022-04-27 DIAGNOSIS — E78.00 PRIMARY HYPERCHOLESTEROLEMIA: ICD-10-CM

## 2022-04-27 DIAGNOSIS — I71.2 THORACIC AORTIC ANEURYSM WITHOUT RUPTURE (HCC): ICD-10-CM

## 2022-04-27 DIAGNOSIS — I25.84 CORONARY ARTERY CALCIFICATION: Primary | ICD-10-CM

## 2022-04-27 DIAGNOSIS — I65.23 BILATERAL CAROTID ARTERY STENOSIS: ICD-10-CM

## 2022-04-27 DIAGNOSIS — I25.10 CORONARY ARTERY CALCIFICATION: Primary | ICD-10-CM

## 2022-04-27 DIAGNOSIS — I10 PRIMARY HYPERTENSION: ICD-10-CM

## 2022-04-27 PROCEDURE — 99214 OFFICE O/P EST MOD 30 MIN: CPT | Performed by: INTERNAL MEDICINE

## 2022-04-27 PROCEDURE — 93000 ELECTROCARDIOGRAM COMPLETE: CPT | Performed by: INTERNAL MEDICINE

## 2022-04-27 NOTE — PROGRESS NOTES
[facility]  [id number] [facility], [place]   [address]  [id number]  Phone#  [phone]   Fax#  [phone]  *-*-*-*-*-*-*-*-*-*-*-*-*-*-*-*-*-*-*-*-*-*-*-*-*-*-*-*-*-*-*-*-*-*-*-*-*-*-*-*-*-*-*-*-*-*-*-*-*-*-*-*-*-*                                   Cardiology Follow Up      ENCOUNTER DATE: 22 3:03 PM  PATIENT NAME: Moses Mcfarlane   : 1955    MRN: [id number]  AGE:67 y o  SEX: male  [address] Rebeca White MD     PRIMARY CARE PHYSICIAN: Jourdan Ortiz MD    ACTIVE DIAGNOSIS THIS VISIT  1  Coronary artery calcification  POCT ECG   2  Thoracic aortic aneurysm without rupture ([address] )     3  Primary hypercholesterolemia     4  Bilateral carotid artery stenosis < 50%     5   Primary hypertension       ACTIVE PROBLEM LIST  Patient Active Problem List   Diagnosis    Allergic rhinitis    Odontoid fracture (HCC)    Cervical disc herniation    Cervical spinal stenosis    Cervical spondylosis    Neck pain    Degeneration of intervertebral disc of cervical region    Hypothyroidism    Left arm pain    Loose body in knee, right    Muscle pain, myofacial    Neuralgia    Neuropathy    Nontoxic multinodular goiter    Nonunion of fracture    Occipital neuralgia    Post-traumatic osteoarthritis of one knee    Osteoarthritis of knee    Pruritus    Episodic lightheadedness    Spider veins of both lower extremities    Acquired unequal leg length    Venous insufficiency    Abnormal gait    Acquired trigger finger    Chondromalacia patellae    Chronic osteomyelitis of lower leg (HCC)    Generalized osteoarthritis    Knee joint effusion    Knee pain    Localized, primary osteoarthritis    Localized primary osteoarthritis of wrist    Nontoxic single thyroid nodule    Anxiety    Reactive depression    BPH without obstruction/lower urinary tract symptoms    Hypothyroidism due to acquired atrophy of thyroid    Post traumatic stress disorder (PTSD)    Vertigo    Vitamin D deficiency    Chronic pain syndrome    Varicose veins of bilateral lower extremities with other complications    Elevated EBV antibody titer    Fatigue    Sugar blood level increased    Osteoporosis without current pathological fracture    Bilateral carotid artery stenosis < 50%    PAC (premature atrial contraction)    PVC (premature ventricular contraction)    Elevated blood pressure reading without diagnosis of hypertension    Primary hypercholesterolemia    Macrocytosis without anemia    Leukocytosis    Abnormal blood chemistry    Palpitation    Chronic neck pain    Thoracic aortic aneurysm without rupture (HCC)    Nonrheumatic mitral valve regurgitation    Bilateral enlargement of atria    Abnormal MRI    Coronary artery calcification    Abnormal CT scan of lung    Osteopenia    Decreased testosterone level in male    Prostate cancer (HCC)    Anger    Elevated LDL cholesterol level    Encounter for immunization    Tingling    Blood pressure elevated without history of HTN    Immunization counseling    Macrocytosis    Mass of right parotid gland    Low serum uric acid for age   [place] Acute swimmer's ear of left side    Pain of left calf    History of osteomyelitis    Abdominal lymphadenopathy    Renal cysts, acquired, bilateral    Left otitis media    Scab    Elevated blood sugar    Aspirin long-term use    Aortic ectasia, thoracic ([facility] [address] )    Primary hypertension       CARDIOLOGY SPECIALTY COMMENTS  Patient 1st seen on 07/30/2021   Recently he has been going for frequent walks for about 45 minutes at a leisurely pace   When he comes home, he lays on the floor to cool down and relax  Marliss Shahla becomes nauseous but does not vomit although he often feels like he might vomit   He becomes diaphoretic and his heart races and pounds    He has no specific chest discomfort          He has a history of smoking up to a pack a day for 2-3 years in the early [de-identified] and has not smoked since  [facility] father may have had a heart attack  Daphne Oliva had peripheral vascular disease and fell down a flight of stairs and was noted to be   Daphne Oliva had an uncle who had CABG  Daphne Oliva is a retired  from [employer]         2021 echocardiogram:  Normal LV systolic and diastolic function EF 55 percent  Mild biatrial dilatation  Mild-to-moderate mitral regurgitation  Normal pulmonary artery pressures  Ascending aortic aneurysm       2021 Carotid ultrasound demonstrated less than 50% stenosis bilaterally        2021 CTA of the chest demonstrated enlarged ascending aorta at 41 mm   There was evidence of coronary artery calcification   Patient was advised not to lift more than 25 lb due to the ascending aortic aneurysm  2021 nuclear stress test:  Ventricular ectopy as described with exercise   Low normal left ventricular systolic function, EF 82% with mild left ventricular cavity enlargement   Normal tomographic perfusion series with inferior diaphragmatic attenuation  10/07/2021 Holter monitor: Heart rate  beats per minute averaging 66 bpm  Rare ventricular ectopy  Occasional supraventricular ectopy  Two 3 beat runs of SVT, fastest 152 beats per minute  Intermittent 1st degree AV block and 1 episode of second-degree AV block Mobitz type 1  Longest RR 1 9 seconds    2021 MR a of the chest: Mildly ectatic ascending aorta maximum diameter 40 mm  Remainder the aorta normal in caliber  INTERVAL HISTORY:         patient with coronary artery calcifications, a ascending aortic aneurysm measuring 4 0 cm, primary hypertension and hyperlipidemia returns  Patient is very anxious over his ascending aortic aneurysm  He was told not to lift more than 25 lb because of the aneurysm  He is very worried about aneurysm the aneurysm rupturing    DISCUSSION/PLAN:           1  Take BP daily and call us if it is frequently over 231 systolic     2  Keep tablet of BP recordings and dates and bring to office next visit  3  I tried to reassure him that at an aneurysm of 4 0, it is unlikely that it would rupture and that the most important thing is blood pressure control  4  I told him although he should not lift heavy, I did not have strict guidelines of exactly how much he can left  He also should rest after lifting something heavy and should not continue lifting     5  Return in 6 months      Lab Studies:    Lab Results   Component Value Date    CHOLESTEROL 170 11/02/2021    CHOLESTEROL 189 04/27/2021    CHOLESTEROL 191 10/09/2020     Lab Results   Component Value Date    TRIG 70 11/02/2021    TRIG 63 04/27/2021    TRIG 70 10/09/2020     Lab Results   Component Value Date    HDL 56 11/02/2021    HDL 52 04/27/2021    HDL 57 10/09/2020     Lab Results   Component Value Date    LDLCALC 100 11/02/2021    LDLCALC 124 (H) 04/27/2021    LDLCALC 120 (H) 10/09/2020       Lab Results   Component Value Date    HGBA1C 5 6 03/23/2022      Lab Results   Component Value Date    EGFR 105 02/17/2022    EGFR 98 11/02/2021    EGFR 99 10/13/2021    SODIUM 142 02/17/2022    SODIUM 141 11/02/2021    SODIUM 139 10/13/2021    K 4 2 02/17/2022    K 4 6 11/02/2021    K 4 4 10/13/2021     (H) 02/17/2022     (H) 11/02/2021     10/13/2021    CO2 29 02/17/2022    CO2 30 11/02/2021    CO2 30 10/13/2021    ANIONGAP 7 07/31/2015    ANIONGAP 9 02/24/2015    ANIONGAP 9 02/06/2015    BUN 18 02/17/2022    BUN 15 11/02/2021    BUN 17 10/13/2021    CREATININE 0 57 (L) 02/17/2022    CREATININE 0 70 11/02/2021    CREATININE 0 68 10/13/2021     Lab Results   Component Value Date    WBC 6 77 02/17/2022    WBC 9 29 11/02/2021    WBC 9 05 07/02/2021    HGB 14 8 02/17/2022    HGB 14 3 11/02/2021    HGB 14 3 07/02/2021    HCT 46 0 02/17/2022    HCT 44 5 11/02/2021    HCT 44 4 07/02/2021     (H) 02/17/2022     (H) 11/02/2021     (H) 07/02/2021    MCH 33 0 02/17/2022    MCH 33 2 11/02/2021    MCH 32 9 07/02/2021    MCHC 32 2 02/17/2022    MCHC 32 1 11/02/2021    MCHC 32 2 07/02/2021     02/17/2022     11/02/2021     07/02/2021      Lab Results   Component Value Date    GLUCOSE 106 07/31/2015    GLUCOSE 119 02/24/2015    GLUCOSE 105 02/06/2015    CALCIUM 9 4 02/17/2022    CALCIUM 9 8 11/02/2021    CALCIUM 9 8 10/13/2021    AST 19 11/02/2021    AST 16 07/02/2021    AST 24 04/27/2021    ALT 27 11/02/2021    ALT 29 07/02/2021    ALT 29 04/27/2021    ALKPHOS 57 11/02/2021    ALKPHOS 52 07/02/2021    ALKPHOS 53 04/27/2021    PROT 7 2 07/31/2015    PROT 6 7 12/19/2014    BILITOT 0 60 07/31/2015    BILITOT 0 49 12/19/2014    MG 2 3 02/17/2022     Results for orders placed or performed in visit on 04/27/22   POCT ECG    Narrative    Normal sinus rhythm at rate 60 min  Sinus arrhythmia versus PACs  Normal EKG           Current Outpatient Medications:     acetaminophen (TYLENOL) 500 mg tablet, Take 1,000 mg by mouth as needed , Disp: , Rfl:     ASPIRIN 81 PO, Take 1 tablet by mouth daily , Disp: , Rfl:     Calcium Carbonate-Vit D-Min (CALCIUM 1200 PO), Take 1 tablet by mouth daily, Disp: , Rfl:     doxycycline (ADOXA) 50 MG tablet, TAKE 1 TABLET BY ORAL ROUTE EVERY DAY WITH MEAL X3 MONTHS THEN MW, Disp: , Rfl:     fluticasone (FLONASE) 50 mcg/act nasal spray, 2 sprays into each nostril daily, Disp: 3 g, Rfl: 1    ibuprofen (MOTRIN) 200 mg tablet, Take 200 mg by mouth every 4 (four) hours as needed for mild pain , Disp: , Rfl:     levothyroxine 100 mcg tablet, daily, Disp: , Rfl:     metroNIDAZOLE (METROGEL) 0 75 % gel, USE TWICE A DAY ON NOSE, Disp: , Rfl:     Multiple Vitamin (MULTIVITAMINS PO), Take 1 capsule by mouth daily, Disp: , Rfl:     Omega-3 Fatty Acids (FISH OIL) 645 MG CAPS, Take 1 capsule by mouth daily , Disp: , Rfl:     RESTASIS MULTIDOSE 0 05 % ophthalmic emulsion, INSTILL 1 DROP INTO BOTH EYES TWICE A DAY, Disp: , Rfl: 3    tamsulosin (FLOMAX) 0 4 mg, , Disp: , Rfl:     tretinoin (ALTRALIN) 0 05 %, APPLY TO FACE AT NIGHT AS DIRECTED, Disp: , Rfl: 1    Flarex 0 1 % ophthalmic suspension, TAKEN AS NEEDED (Patient not taking: Reported on 2022 ), Disp: , Rfl:   Allergies   Allergen Reactions    Amoxicillin-Pot Clavulanate Rash, Vomiting and Nausea Only       Past Medical History:   Diagnosis Date    Anxiety     Arthritis     Bleeding tendency (HCC)     BPH (benign prostatic hyperplasia)     Broken bones     left leg    Cataract     Chronic neck pain     Epilepsy ([facility] )     Hypothyroidism     Neoplasm of unspecified behavior of bone, soft tissue, and skin     Osteomyelitis (HCC)     Prostate cancer ([facility] )     Skin cancer     Squamous carcinoma 2014    Thyroid condition     Thyroid disease      Social History     Socioeconomic History    Marital status: Legally      Spouse name: Not on file    Number of children: Not on file    Years of education: Not on file    Highest education level: Not on file   Occupational History    Occupation: Teacher    Occupation: retired   Tobacco Use    Smoking status: Former Smoker     Types: Cigarettes     Quit date:      Years since quittin 3    Smokeless tobacco: Never Used   Vaping Use    Vaping Use: Never used   Substance and Sexual Activity    Alcohol use: No     Comment: being a social drinker    Drug use: Not Currently     Comment: Medical marijuana    Sexual activity: Yes     Comment: no known std risk factors   Other Topics Concern    Not on file   Social History Narrative    Daily coffee consumption (3 cups/day)     Social Determinants of Health     Financial Resource Strain: Not on file   Food Insecurity: Not on file   Transportation Needs: Not on file   Physical Activity: Not on file   Stress: Not on file   Social Connections: Not on file   Intimate Partner Violence: Not on file   Housing Stability: Not on file      Family History   Problem Relation Age of Onset    Bone cancer Mother     Other Father         Accident    Dementia Other     Cancer Other      Past Surgical History:   Procedure Laterality Date    ARTHRODESIS      H/o Arthrodesis Cervical to C2;  last assessed [id number]    COLONOSCOPY  01/22/2021    COLONOSCOPY      KNEE SURGERY Right 12/22/2017    LEG SURGERY Left 1999    Hardware placed in lower leg bone(s)    LEG SURGERY Left 01/01/2006 2006, 2012-Left leg vascular    NECK SURGERY  2013    C1-2 fusion    THYROID SURGERY Right 2013    US GUIDED PROSTATE BIOPSY  7/13/2021       PREVIOUS WEIGHTS:   Wt Readings from Last 10 Encounters:   04/27/22 84 4 kg (186 lb)   04/22/22 85 5 kg (188 lb 6 4 oz)   03/23/22 83 1 kg (183 lb 3 2 oz)   02/22/22 83 4 kg (183 lb 12 8 oz)   02/21/22 83 5 kg (184 lb)   01/31/22 86 2 kg (190 lb)   01/18/22 84 8 kg (187 lb)   01/07/22 81 7 kg (180 lb 3 2 oz)   11/24/21 83 6 kg (184 lb 3 2 oz)   10/13/21 81 9 kg (180 lb 9 6 oz)        Review of Systems:  Review of Systems   Constitutional: Negative for activity change  Respiratory: Negative for cough, chest tightness, shortness of breath and wheezing  Cardiovascular: Negative for chest pain, palpitations and leg swelling  Musculoskeletal: Negative for gait problem  Skin: Negative for color change  Neurological: Negative for dizziness, tremors, syncope, weakness, light-headedness and headaches  Psychiatric/Behavioral: Negative for agitation and confusion  Physical Exam:  /80 (BP Location: Right arm, Patient Position: Sitting, Cuff Size: Large)   Pulse 60   Wt 84 4 kg (186 lb)   BMI 25 23 kg/m²     Physical Exam  Vitals reviewed  Constitutional:       General: He is not in acute distress  Appearance: He is well-developed  HENT:      Head: Normocephalic and atraumatic  Neck:      Thyroid: No thyromegaly  Vascular: No carotid bruit or JVD  Trachea: No tracheal deviation     Cardiovascular:      Rate and Rhythm: Normal rate and regular rhythm  Pulses: Normal pulses  Heart sounds: Normal heart sounds  No murmur heard  No friction rub  No gallop  Pulmonary:      Effort: Pulmonary effort is normal  No respiratory distress  Breath sounds: Normal breath sounds  No wheezing, rhonchi or rales  Chest:      Chest wall: No tenderness  Musculoskeletal:      Cervical back: Normal range of motion and neck supple  Right lower leg: No edema  Left lower leg: No edema  Skin:     General: Skin is warm and dry  Neurological:      General: No focal deficit present  Mental Status: He is alert and oriented to person, place, and time  Psychiatric:         Mood and Affect: Mood normal          Behavior: Behavior normal          Thought Content: Thought content normal          Judgment: Judgment normal        ======================================================  Imaging:   I have personally reviewed pertinent reports  Portions of the record may have been created with voice recognition software  Occasional wrong word or "sound a like" substitutions may have occurred due to the inherent limitations of voice recognition software  Read the chart carefully and recognize, using context, where substitutions have occurred      SIGNATURES:   Anne Forde MD

## 2022-04-27 NOTE — PATIENT INSTRUCTIONS
Take BP daily and call us if it is frequently over 888 systolic  Keep tablet of BP recordings and dates and bring to office next visit

## 2022-05-26 ENCOUNTER — TELEPHONE (OUTPATIENT)
Dept: FAMILY MEDICINE CLINIC | Facility: CLINIC | Age: 67
End: 2022-05-26

## 2022-05-26 ENCOUNTER — TELEPHONE (OUTPATIENT)
Dept: NEPHROLOGY | Facility: CLINIC | Age: 67
End: 2022-05-26

## 2022-05-26 ENCOUNTER — HOSPITAL ENCOUNTER (OUTPATIENT)
Dept: NEUROLOGY | Facility: CLINIC | Age: 67
Discharge: HOME | End: 2022-05-26
Payer: MEDICARE

## 2022-05-26 DIAGNOSIS — R20.2 TINGLING: ICD-10-CM

## 2022-05-26 PROCEDURE — 95886 MUSC TEST DONE W/N TEST COMP: CPT | Performed by: PSYCHIATRY & NEUROLOGY

## 2022-05-26 PROCEDURE — 95909 NRV CNDJ TST 5-6 STUDIES: CPT | Performed by: PSYCHIATRY & NEUROLOGY

## 2022-05-26 NOTE — TELEPHONE ENCOUNTER
Left voice mail to schedule appointment  This is the first attempt  New Patient Intake Form   Patient Details   Moses Mcfarlane     1955     [id number]     Appointment Information   Who is calling to schedule? If not patient, what is callers name? Patient     Referring Provider  Dr Kelsy Flor   Referring Provider Number [id number]   Reason for Appt (Diagnosis) Low serum uric acid for age   Is patient aware of why they are being referred? yes   Does Patient have labs done at [place] [address]? If not, where do they go? [facility]   Has patient had labs / urine work done? List date of most recent lab / urine work yes   Has patient had a BMP & CBC done in the past 2 years? If so, list the date yes   Has patient been hospitalized recently? If yes, list name and location of hospital they were In no   Has patient been seen by a Nephrologist before? If yes, list name, location and phone number no   Has patient been see by another Specialty before (ex  Neurology, urology, cardiology)? If yes, please list name, and specialty  Neurology, cardiology, urology, otolaryngology, endo, general surgery, pt,    Has the patient had imaging done? If so, list the most recent date and type of imagineg yes   Does the patient has a stone analysis report if history of kidney stones? No    Appointment Details   Is there a referral on file?  yes   Appointment Date  09/06   [facility]

## 2022-06-28 ENCOUNTER — HOSPITAL ENCOUNTER (OUTPATIENT)
Dept: CT IMAGING | Facility: HOSPITAL | Age: 67
Discharge: HOME | End: 2022-06-28
Payer: MEDICARE

## 2022-06-28 DIAGNOSIS — I71.2 THORACIC AORTIC ANEURYSM WITHOUT RUPTURE (HCC): ICD-10-CM

## 2022-06-28 PROCEDURE — G1004 CDSM NDSC: HCPCS

## 2022-06-28 PROCEDURE — 71250 CT THORAX DX C-: CPT

## 2022-07-06 ENCOUNTER — TELEPHONE (OUTPATIENT)
Dept: FAMILY MEDICINE CLINIC | Facility: CLINIC | Age: 67
End: 2022-07-06

## 2022-07-06 NOTE — TELEPHONE ENCOUNTER
Patient is asking for a refill on alendronate 35mg he stopped taking the medication because he was having knee pain but he wants to restart this medication he was taking one tablet every 7 days   If you are okay with this I can place the order for him [facility]

## 2022-07-06 NOTE — TELEPHONE ENCOUNTER
Patient is requesting a refill for Fosamax, but looks like it was last filled on 10/2021  Please [facility]ill [facility]

## 2022-07-11 ENCOUNTER — OFFICE VISIT (OUTPATIENT)
Dept: CARDIAC SURGERY | Facility: CLINIC | Age: 67
End: 2022-07-11
Payer: MEDICARE

## 2022-07-11 VITALS
OXYGEN SATURATION: 93 % | TEMPERATURE: 98.5 F | BODY MASS INDEX: 24.79 KG/M2 | DIASTOLIC BLOOD PRESSURE: 60 MMHG | SYSTOLIC BLOOD PRESSURE: 128 MMHG | HEART RATE: 64 BPM | WEIGHT: 182.8 LBS

## 2022-07-11 DIAGNOSIS — I71.20 THORACIC AORTIC ANEURYSM WITHOUT RUPTURE: Primary | ICD-10-CM

## 2022-07-11 PROCEDURE — 99214 OFFICE O/P EST MOD 30 MIN: CPT | Performed by: PHYSICIAN ASSISTANT

## 2022-07-11 NOTE — LETTER
July 11, 2022     MD Luc Brantley [address]    Patient: Ayanna Sainz   YOB: 1955   Date of Visit: 7/11/2022       Dear Dr Erich Cardoso: Thank you for referring Huijamin Zimmerman to me for evaluation  Below are my notes for this consultation  If you have questions, please do not hesitate to call me  I look forward to following your patient along with you  Sincerely,        Jenn Rojas,         CC: No Recipients  Trini Robbins PA-C  7/11/2022  2:03 PM  Attested  [facility]  Ayanna Sainz 75 y o  male MRN: [id number]    Physician Requesting Consult: No att  providers found    Reason for Consult / Principal Problem: Aortic aneurysm    History of Present Illness: Ayanna Sainz is a 75y o  year old male who presents today for ongoing surveillance of their ascending aortic aneurysm  This was initially identified in June 2021  Ayanna Sainz was most recently evaluated in our office in June 2021  At that time the maximal ascending aortic diameter was measured at 41 mm  He was instructed to follow up with CT chest noncontrast in 1 year  He is here today to review the results  Tirso Moore reports that overall he has been feeling well  He reports updates to medical history including tumor removal from his jaw in February 2022, and diagnosis of prostate cancer, which he is currently under active medical surveillance  He does note however that he would like to undergo surgery for removal  He currently denies chest pain, shortness of breath, palpitations, dizziness  He does have some lightheadedness when sitting up from a prone position, but reports he is watchful and careful about this, monitoring his symptoms  He checks his blood pressure at home, and reports that his systolic pressures run around 125  He continues to follow with Dr Annia Hernandez of Cardiology as well           Past Medical History:  Past Medical History:   Diagnosis Date    Anxiety     Arthritis     Bleeding tendency (HCC)     BPH (benign prostatic hyperplasia)     Broken bones     left leg    Cataract     Chronic neck pain     Epilepsy ([facility] [address] [facility]     Hypothyroidism     Neoplasm of unspecified behavior of bone, soft tissue, and skin     Osteomyelitis ([facility] [facility]     Prostate cancer ([facility] [address] [facility]     Skin cancer     Squamous carcinoma 2014    Thyroid condition     Thyroid disease        Past Surgical History:   Past Surgical History:   Procedure Laterality Date    ARTHRODESIS      H/o Arthrodesis Cervical to C2;  last assessed [id number]    COLONOSCOPY  2021    COLONOSCOPY      KNEE SURGERY Right 2017    LEG SURGERY Left     Hardware placed in lower leg bone(s)    LEG SURGERY Left 2006, -Left leg vascular    NECK SURGERY  2013    C1-2 fusion    THYROID SURGERY Right     US GUIDED PROSTATE BIOPSY  2021       Family History:  Family History   Problem Relation Age of Onset    Bone cancer Mother     Other Father         Accident    Dementia Other     Cancer Other        Social History:  Social History     Substance and Sexual Activity   Alcohol Use No    Comment: being a social drinker     Social History     Substance and Sexual Activity   Drug Use Not Currently    Comment: Medical marijuana     Social History     Tobacco Use   Smoking Status Former Smoker    Types: Cigarettes    Quit date: 26    Years since quittin 5   Smokeless Tobacco Never Used         Home Medications:   Prior to Admission medications    Medication Sig Start Date End Date Taking?  Authorizing Provider   acetaminophen (TYLENOL) 500 mg tablet Take 1,000 mg by mouth as needed    Yes Historical Provider, MD   ASPIRIN 81 PO Take 1 tablet by mouth daily    Yes Historical Provider, MD   Calcium Carbonate-Vit D-Min (CALCIUM 1200 PO) Take 1 tablet by mouth daily   Yes Historical Provider, MD   doxycycline (ADOXA) 50 MG tablet TAKE 1 TABLET BY ORAL ROUTE EVERY DAY WITH MEAL X3 MONTHS THEN MWF 9/21/20  Yes Historical Provider, MD   fluticasone (FLONASE) 50 mcg/act nasal spray 2 sprays into each nostril daily 11/23/21  Yes Angie Capone MD   ibuprofen (MOTRIN) 200 mg tablet Take 200 mg by mouth every 4 (four) hours as needed for mild pain    Yes Historical Provider, MD   levothyroxine 100 mcg tablet daily 2/9/18  Yes Historical Provider, MD   metroNIDAZOLE (METROGEL) 0 75 % gel USE TWICE A DAY ON NOSE 8/6/20  Yes Historical Provider, MD   Multiple Vitamin (MULTIVITAMINS PO) Take 1 capsule by mouth daily   Yes Historical Provider, MD   Omega-3 Fatty Acids (FISH OIL) 645 MG CAPS Take 1 capsule by mouth daily    Yes Historical Provider, MD   RESTASIS MULTIDOSE 0 05 % ophthalmic emulsion INSTILL 1 DROP INTO BOTH EYES TWICE A DAY 12/7/18  Yes Historical Provider, MD   tamsulosin (FLOMAX) 0 4 mg  10/8/21  Yes Historical Provider, MD   tretinoin (ALTRALIN) 0 05 % APPLY TO FACE AT NIGHT AS DIRECTED 1/17/19  Yes Historical Provider, MD   Flarex 0 1 % ophthalmic suspension TAKEN AS NEEDED  Patient not taking: No sig reported 3/2/21   Historical Provider, MD       Allergies:   Allergies   Allergen Reactions    Amoxicillin-Pot Clavulanate Rash, Vomiting and Nausea Only       Review of Systems:   Review of Systems - History obtained from the patient  General ROS: negative  Psychological ROS: negative  Ophthalmic ROS: negative  ENT ROS: negative  Allergy and Immunology ROS: negative  Hematological and Lymphatic ROS: negative  Endocrine ROS: negative  Respiratory ROS: negative  Cardiovascular ROS: negative  Gastrointestinal ROS: negative  Genito-Urinary ROS: negative  Musculoskeletal ROS: negative  Neurological ROS: no TIA or stroke symptoms  Dermatological ROS: negative    Vital Signs:   Vitals:    07/11/22 1323   BP: 128/60   BP Location: Left arm   Patient Position: Sitting   Cuff Size: Standard   Pulse: 64   Temp: 98 5 °F (36 9 °C)   SpO2: 93%   Weight: 82 9 kg (182 lb 12 8 oz)       Physical Exam:  General: alert, pleasant, NAD  HEENT/NECK:  PERRL  No jugular venous distention  Cardiac:Regular rate and rhythm  Carotid arteries: brisk, no bruits, 2+   Pulmonary:  Breath sounds clear bilaterally  Abdomen:  Non-tender, Non-distended  Positive bowel sounds  Upper extremities: 2+ radial pulses; <2sec capillary refill  Lower extremities: Extremities warm/dry  PT/DP pulses 2+ bilaterally  No edema B/L  Neuro: Alert and oriented X 3  Sensation is grossly intact  No focal deficits  Musculoskeletal: RIGGS  Skin: Warm/Dry, without rashes or lesions  Lab Results:               Invalid input(s): LABGLOM      Lab Results   Component Value Date    HGBA1C 5 6 03/23/2022     Lab Results   Component Value Date    CKTOTAL 172 02/17/2022    CKMB 2 4 02/17/2022    CKMBINDEX 1 4 02/17/2022       Imaging Studies:     CT Chest: 6/28/22  INDICATION:   I71 2: Thoracic aortic aneurysm, without rupture      COMPARISON:  Chest MRI from 11/5/2021, chest CT from 6/13/2021      TECHNIQUE: Chest CT without intravenous contrast   Axial, sagittal, coronal 2D reformats and coronal MIPS from source data      Radiation dose length product (DLP):  255 36 mGy-cm   Radiation dose exposure minimized using iterative reconstruction and automated exposure control      FINDINGS:     LUNGS:  No acute disease  Benign biapical pleural-parenchymal scar  Stable benign intrapulmonary lymph node in the juxtapleural lingula (3/90)     AIRWAYS: No significant filling defects      PLEURA:  Unremarkable      HEART/GREAT VESSELS:  Normal heart size  Mild coronary artery calcification indicating atherosclerotic heart disease  Minimal ectasia of the ascending aorta at 4 1 cm    Stable trace pericardial effusion      MEDIASTINUM AND JANA:  Unremarkable      CHEST WALL AND LOWER NECK: Unremarkable      UPPER ABDOMEN:  Unremarkable      OSSEOUS STRUCTURES: Mild degenerative disease in the spine      IMPRESSION:     Mild ectasia of the ascending aorta at 4 1 cm  Continue yearly follow-up with unenhanced chest CT  I have personally reviewed pertinent reports        Assessment:  Patient Active Problem List    Diagnosis Date Noted    Carpal tunnel syndrome on right     Primary hypertension 04/27/2022    Left otitis media 04/22/2022    Scab 04/22/2022    Elevated blood sugar 04/22/2022    Aspirin long-term use 04/22/2022    Aortic ectasia, thoracic ([facility] [address] [facility] 04/22/2022    Abdominal lymphadenopathy 02/23/2022    Renal cysts, acquired, bilateral 02/23/2022    Acute swimmer's ear of left side 01/18/2022    Pain of left calf 01/18/2022    History of osteomyelitis 01/18/2022    Macrocytosis 01/07/2022    Mass of right parotid gland 01/07/2022    Low serum uric acid for age 01/07/2022    Tingling 11/25/2021    Blood pressure elevated without history of HTN 11/25/2021    Immunization counseling 11/25/2021    Anger 10/13/2021    Elevated LDL cholesterol level 10/13/2021    Encounter for immunization 10/13/2021    Osteopenia 07/27/2021    Decreased testosterone level in male 07/27/2021    Thoracic aortic aneurysm without rupture ([facility] [address] ) 06/22/2021    Nonrheumatic mitral valve regurgitation 06/22/2021    Bilateral enlargement of atria 06/22/2021    Abnormal MRI 06/22/2021    Coronary artery calcification 06/22/2021    Abnormal CT scan of lung 06/22/2021    Elevated EBV antibody titer 05/19/2021    Fatigue 05/19/2021    Sugar blood level increased 05/19/2021    Osteoporosis without current pathological fracture 05/19/2021    Bilateral carotid artery stenosis < 50% 05/19/2021    PAC (premature atrial contraction) 05/19/2021    PVC (premature ventricular contraction) 05/19/2021    Elevated blood pressure reading without diagnosis of hypertension 05/19/2021    Primary hypercholesterolemia 05/19/2021    Macrocytosis without anemia 05/19/2021    Leukocytosis 05/19/2021    Abnormal blood chemistry 05/19/2021    Palpitation 05/19/2021    Chronic neck pain 05/19/2021    Prostate cancer ([facility] [address] ) 2021    Varicose veins of bilateral lower extremities with other complications 76/57/4273    Chronic pain syndrome 02/03/2020    Vitamin D deficiency 08/27/2019    Vertigo 08/26/2019    Post traumatic stress disorder (PTSD)     BPH without obstruction/lower urinary tract symptoms 05/03/2019    Anxiety 12/18/2018    Reactive depression 12/18/2018    Abnormal gait 08/24/2018    Acquired trigger finger 08/24/2018    Chondromalacia patellae 08/24/2018    Chronic osteomyelitis of lower leg (HCC) 08/24/2018    Generalized osteoarthritis 08/24/2018    Knee joint effusion 08/24/2018    Knee pain 08/24/2018    Localized, primary osteoarthritis 08/24/2018    Localized primary osteoarthritis of wrist 08/24/2018    Loose body in knee, right 12/12/2017    Osteoarthritis of knee 12/12/2017    Spider veins of both lower extremities 04/11/2016    Neuropathy 01/20/2016    Hypothyroidism due to acquired atrophy of thyroid 01/20/2016    Muscle pain, myofacial 10/16/2015    Cervical spondylosis 09/11/2015    Occipital neuralgia 07/14/2015    Pruritus 06/03/2015    Neuralgia 05/13/2015    Post-traumatic osteoarthritis of one knee 04/07/2015    Odontoid fracture ([facility] [address] ) 01/19/2015    Cervical disc herniation 12/15/2014    Cervical spinal stenosis 12/15/2014    Nontoxic single thyroid nodule 09/15/2014    Left arm pain 06/18/2014    Acquired unequal leg length 03/03/2014    Venous insufficiency 03/03/2014    Degeneration of intervertebral disc of cervical region 07/29/2013    Nonunion of fracture 07/29/2013    Allergic rhinitis 05/06/2013    Hypothyroidism 05/06/2013    Nontoxic multinodular goiter 01/07/2013    Neck pain 10/02/2012    Episodic lightheadedness 10/02/2012     Ascending aortic aneurysm;  Ongoing ascending aortic replacement workup    Plan:    CT imaging performed prior to this visit demonstrates the ascending aorta measuring 41 mm in size at its greatest diameter  These findings were confirmed and shared with the patient today  As they are asymptomatic, with no family history, follow-up monitoring is the treatment plan  Arrangements have been made for future surveillance to be completed with CT chest, without contrast in 2 Years  Ilan Rodriguez was comfortable with our recommendations, and their questions were answered to their satisfaction  Thank you for allowing us to participate in the care of this patient  Aortic Aneurysm Instructions were provided to the patient as follows:    1  No lifting more than 50 pounds  Regular aerobic exercise permitted and recommended  2  Maintain a controlled blood pressure with a goal of less than 120/80  3  Follow up in [facility] as recommended with radiology follow up as instructed  4  Report to the ER or call 911 immediately with the following signs / symptoms: sudden onset of back pain, chest pain or shortness of breath  5  Tobacco cessation discussed  The patient recently had a screening colonoscopy in 2020  Therefore GI referral is not indicated at this time  SIGNATURE: Saray Blake PA-C  DATE: July 11, 2022  TIME: 1:30 PM  Attestation signed by Angie Sanchez DO at 7/11/2022  2:13 PM:  Mr Elle Canas is here for routine surveillance of his ascending aortic aneurysm  CT was reviewed by me personally and it shows no change a 41mm  TTE from June of last year was also reviewed by me and it shows a normally functioning aortic valve  Based on these findings, I recommend continued smoking cessation, blood pressure control, surveillance and aortic precautions  SBP goal should be 110s-120s with resting heart rate goal 60-70  The patient will return to the office in 2 years for review of surveillance imaging

## 2022-07-11 NOTE — PROGRESS NOTES
[facility]  Derian Bearden 75 y o  male MRN: [id number]    Physician Requesting Consult: No att  providers found    Reason for Consult / Principal Problem: Aortic aneurysm    History of Present Illness: Derian Bearden is a 75y o  year old male who presents today for ongoing surveillance of their ascending aortic aneurysm  This was initially identified in June 2021  Derian Bearden was most recently evaluated in our office in June 2021  At that time the maximal ascending aortic diameter was measured at 41 mm  He was instructed to follow up with CT chest noncontrast in 1 year  He is here today to review the results  [facility] reports that overall he has been feeling well  He reports updates to medical history including tumor removal from his jaw in February 2022, and diagnosis of prostate cancer, which he is currently under active medical surveillance  He does note however that he would like to undergo surgery for removal  He currently denies chest pain, shortness of breath, palpitations, dizziness  He does have some lightheadedness when sitting up from a prone position, but reports he is watchful and careful about this, monitoring his symptoms  He checks his blood pressure at home, and reports that his systolic pressures run around 125  He continues to follow with Dr Jcarlos Cummins of Cardiology as well           Past Medical History:  Past Medical History:   Diagnosis Date    Anxiety     Arthritis     Bleeding tendency ([address] )     BPH (benign prostatic hyperplasia)     Broken bones     left leg    Cataract     Chronic neck pain     Epilepsy ([address] )     Hypothyroidism     Neoplasm of unspecified behavior of bone, soft tissue, and skin     Osteomyelitis ([address] )     [facility] [facility] [address] [address]     Skin cancer     Squamous carcinoma 12/01/2014 12/2014    Thyroid condition     Thyroid disease        Past Surgical History:   Past Surgical History:   Procedure Laterality Date    ARTHRODESIS      H/o Arthrodesis Cervical to C2;  last assessed [id number]    COLONOSCOPY  2021    COLONOSCOPY      KNEE SURGERY Right 2017    LEG SURGERY Left     Hardware placed in lower leg bone(s)    LEG SURGERY Left 2006, -Left leg vascular    NECK SURGERY      C1-2 fusion    THYROID SURGERY Right     US GUIDED PROSTATE BIOPSY  2021       Family History:  Family History   Problem Relation Age of Onset    Bone cancer Mother     Other Father         Accident    Dementia Other     Cancer Other        Social History:  Social History     Substance and Sexual Activity   Alcohol Use No    Comment: being a social drinker     Social History     Substance and Sexual Activity   Drug Use Not Currently    Comment: Medical marijuana     Social History     Tobacco Use   Smoking Status Former Smoker    Types: Cigarettes    Quit date: 26    Years since quittin 5   Smokeless Tobacco Never Used         Home Medications:   Prior to Admission medications    Medication Sig Start Date End Date Taking?  Authorizing Provider   acetaminophen (TYLENOL) 500 mg tablet Take 1,000 mg by mouth as needed    Yes Historical Provider, MD   ASPIRIN 81 PO Take 1 tablet by mouth daily    Yes Historical Provider, MD   Calcium Carbonate-Vit D-Min (CALCIUM 1200 PO) Take 1 tablet by mouth daily   Yes Historical Provider, MD   doxycycline (ADOXA) 50 MG tablet TAKE 1 TABLET BY ORAL ROUTE EVERY DAY WITH MEAL X3 MONTHS THEN MWF 20  Yes Historical Provider, MD   fluticasone (FLONASE) 50 mcg/act nasal spray 2 sprays into each nostril daily 21  Yes Rashmi Piña MD   ibuprofen (MOTRIN) 200 mg tablet Take 200 mg by mouth every 4 (four) hours as needed for mild pain    Yes Historical Provider, MD   levothyroxine 100 mcg tablet daily 18  Yes Historical Provider, MD   metroNIDAZOLE (METROGEL) 0 75 % gel USE TWICE A DAY ON NOSE 20  Yes Historical Provider, MD   Multiple Vitamin (MULTIVITAMINS PO) Take 1 capsule by mouth daily   Yes Historical Provider, MD   Omega-3 Fatty Acids (FISH OIL) 645 MG CAPS Take 1 capsule by mouth daily    Yes Historical Provider, MD   RESTASIS MULTIDOSE 0 05 % ophthalmic emulsion INSTILL 1 DROP INTO BOTH EYES TWICE A DAY 12/7/18  Yes Historical Provider, MD   tamsulosin (FLOMAX) 0 4 mg  10/8/21  Yes Historical Provider, MD   tretinoin (ALTRALIN) 0 05 % APPLY TO FACE AT NIGHT AS DIRECTED 1/17/19  Yes Historical Provider, MD   Flarex 0 1 % ophthalmic suspension TAKEN AS NEEDED  Patient not taking: No sig reported 3/2/21   Historical Provider, MD       Allergies: Allergies   Allergen Reactions    Amoxicillin-Pot Clavulanate Rash, Vomiting and Nausea Only       Review of Systems:   Review of Systems - History obtained from the patient  General ROS: negative  Psychological ROS: negative  Ophthalmic ROS: negative  ENT ROS: negative  Allergy and Immunology ROS: negative  Hematological and Lymphatic ROS: negative  Endocrine ROS: negative  Respiratory ROS: negative  Cardiovascular ROS: negative  Gastrointestinal ROS: negative  Genito-Urinary ROS: negative  Musculoskeletal ROS: negative  Neurological ROS: no TIA or stroke symptoms  Dermatological ROS: negative    Vital Signs:   Vitals:    07/11/22 1323   BP: 128/60   BP Location: Left arm   Patient Position: Sitting   Cuff Size: Standard   Pulse: 64   Temp: 98 5 °F (36 9 °C)   SpO2: 93%   Weight: 82 9 kg (182 lb 12 8 oz)       Physical Exam:  General: alert, pleasant, NAD  HEENT/NECK:  PERRL  No jugular venous distention  Cardiac:Regular rate and rhythm  Carotid arteries: brisk, no bruits, 2+   Pulmonary:  Breath sounds clear bilaterally  Abdomen:  Non-tender, Non-distended  Positive bowel sounds  Upper extremities: 2+ radial pulses; <2sec capillary refill  Lower extremities: Extremities warm/dry  PT/DP pulses 2+ bilaterally  No edema B/L  Neuro: Alert and oriented X 3  Sensation is grossly intact  No focal deficits    Musculoskeletal: RIGGS  Skin: Warm/Dry, without rashes or lesions  Lab Results:               Invalid input(s): LABGLOM      Lab Results   Component Value Date    HGBA1C 5 6 03/23/2022     Lab Results   Component Value Date    CKTOTAL 172 02/17/2022    CKMB 2 4 02/17/2022    CKMBINDEX 1 4 02/17/2022       Imaging Studies:     CT Chest: 6/28/22  INDICATION:   I71 2: Thoracic aortic aneurysm, without rupture      COMPARISON:  Chest MRI from 11/5/2021, chest CT from 6/13/2021      TECHNIQUE: Chest CT without intravenous contrast   Axial, sagittal, coronal 2D reformats and coronal MIPS from source data      Radiation dose length product (DLP):  255 36 mGy-cm   Radiation dose exposure minimized using iterative reconstruction and automated exposure control      FINDINGS:     LUNGS:  No acute disease  Benign biapical pleural-parenchymal scar  Stable benign intrapulmonary lymph node in the juxtapleural lingula (3/90)     AIRWAYS: No significant filling defects      PLEURA:  Unremarkable      HEART/GREAT VESSELS:  Normal heart size  Mild coronary artery calcification indicating atherosclerotic heart disease  Minimal ectasia of the ascending aorta at 4 1 cm  Stable trace pericardial effusion      MEDIASTINUM AND JANA:  Unremarkable      CHEST WALL AND LOWER NECK: Unremarkable      UPPER ABDOMEN:  Unremarkable      OSSEOUS STRUCTURES: Mild degenerative disease in the spine      IMPRESSION:     Mild ectasia of the ascending aorta at 4 1 cm  Continue yearly follow-up with unenhanced chest CT  I have personally reviewed pertinent reports        Assessment:  Patient Active Problem List    Diagnosis Date Noted    Carpal tunnel syndrome on right     Primary hypertension 04/27/2022    Left otitis media 04/22/2022    Scab 04/22/2022    Elevated blood sugar 04/22/2022    Aspirin long-term use 04/22/2022    Aortic ectasia, thoracic ([address] ) 04/22/2022    Abdominal lymphadenopathy 02/23/2022    Renal cysts, acquired, bilateral 02/23/2022  Acute swimmer's ear of left side 01/18/2022    Pain of left calf 01/18/2022    History of osteomyelitis 01/18/2022    Macrocytosis 01/07/2022    Mass of right parotid gland 01/07/2022    Low serum uric acid for age 01/07/2022    Tingling 11/25/2021    Blood pressure elevated without history of HTN 11/25/2021    Immunization counseling 11/25/2021    Anger 10/13/2021    Elevated LDL cholesterol level 10/13/2021    Encounter for immunization 10/13/2021    Osteopenia 07/27/2021    Decreased testosterone level in male 07/27/2021    Thoracic aortic aneurysm without rupture ([facility] [address] ) 06/22/2021    Nonrheumatic mitral valve regurgitation 06/22/2021    Bilateral enlargement of atria 06/22/2021    Abnormal MRI 06/22/2021    Coronary artery calcification 06/22/2021    Abnormal CT scan of lung 06/22/2021    Elevated EBV antibody titer 05/19/2021    Fatigue 05/19/2021    Sugar blood level increased 05/19/2021    Osteoporosis without current pathological fracture 05/19/2021    Bilateral carotid artery stenosis < 50% 05/19/2021    PAC (premature atrial contraction) 05/19/2021    PVC (premature ventricular contraction) 05/19/2021    Elevated blood pressure reading without diagnosis of hypertension 05/19/2021    Primary hypercholesterolemia 05/19/2021    Macrocytosis without anemia 05/19/2021    Leukocytosis 05/19/2021    Abnormal blood chemistry 05/19/2021    Palpitation 05/19/2021    Chronic neck pain 05/19/2021    Prostate cancer ([facility] [address] ) 2021    Varicose veins of bilateral lower extremities with other complications 12/73/9704    Chronic pain syndrome 02/03/2020    Vitamin D deficiency 08/27/2019    Vertigo 08/26/2019    Post traumatic stress disorder (PTSD)     BPH without obstruction/lower urinary tract symptoms 05/03/2019    Anxiety 12/18/2018    Reactive depression 12/18/2018    Abnormal gait 08/24/2018    Acquired trigger finger 08/24/2018    Chondromalacia patellae 08/24/2018    Chronic osteomyelitis of lower leg (HCC) 08/24/2018    Generalized osteoarthritis 08/24/2018    Knee joint effusion 08/24/2018    Knee pain 08/24/2018    Localized, primary osteoarthritis 08/24/2018    Localized primary osteoarthritis of wrist 08/24/2018    Loose body in knee, right 12/12/2017    Osteoarthritis of knee 12/12/2017    Spider veins of both lower extremities 04/11/2016    Neuropathy 01/20/2016    Hypothyroidism due to acquired atrophy of thyroid 01/20/2016    Muscle pain, myofacial 10/16/2015    Cervical spondylosis 09/11/2015    Occipital neuralgia 07/14/2015    Pruritus 06/03/2015    Neuralgia 05/13/2015    Post-traumatic osteoarthritis of one knee 04/07/2015    Odontoid fracture ([facility] [address] ) 01/19/2015    Cervical disc herniation 12/15/2014    Cervical spinal stenosis 12/15/2014    Nontoxic single thyroid nodule 09/15/2014    Left arm pain 06/18/2014    Acquired unequal leg length 03/03/2014    Venous insufficiency 03/03/2014    Degeneration of intervertebral disc of cervical region 07/29/2013    Nonunion of fracture 07/29/2013    Allergic rhinitis 05/06/2013    Hypothyroidism 05/06/2013    Nontoxic multinodular goiter 01/07/2013    Neck pain 10/02/2012    Episodic lightheadedness 10/02/2012     Ascending aortic aneurysm; Ongoing ascending aortic replacement workup    Plan:    CT imaging performed prior to this visit demonstrates the ascending aorta measuring 41 mm in size at its greatest diameter  These findings were confirmed and shared with the patient today  As they are asymptomatic, with no family history, follow-up monitoring is the treatment plan  Arrangements have been made for future surveillance to be completed with CT chest, without contrast in 2 Years  Mindi Obando was comfortable with our recommendations, and their questions were answered to their satisfaction  Thank you for allowing us to participate in the care of this patient       Aortic Aneurysm Instructions were provided to the patient as follows:    1  No lifting more than 50 pounds  Regular aerobic exercise permitted and recommended  2  Maintain a controlled blood pressure with a goal of less than 120/80  3  Follow up in Aortic Clinic as recommended with radiology follow up as instructed  4  Report to the ER or call 911 immediately with the following signs / symptoms: sudden onset of back pain, chest pain or shortness of breath  5  Tobacco cessation discussed  The patient recently had a screening colonoscopy in 2020  Therefore GI referral is not indicated at this time       SIGNATURE: Thiago Vail PA-C  DATE: July 11, 2022  TIME: 1:30 PM

## 2022-07-22 ENCOUNTER — OFFICE VISIT (OUTPATIENT)
Dept: FAMILY MEDICINE CLINIC | Facility: CLINIC | Age: 67
End: 2022-07-22
Payer: MEDICARE

## 2022-07-22 VITALS
DIASTOLIC BLOOD PRESSURE: 66 MMHG | SYSTOLIC BLOOD PRESSURE: 134 MMHG | OXYGEN SATURATION: 79 % | HEIGHT: 72 IN | WEIGHT: 181.2 LBS | BODY MASS INDEX: 24.54 KG/M2 | HEART RATE: 97 BPM | TEMPERATURE: 98 F

## 2022-07-22 DIAGNOSIS — C61 PROSTATE CANCER (HCC): ICD-10-CM

## 2022-07-22 DIAGNOSIS — D75.89 MACROCYTOSIS: ICD-10-CM

## 2022-07-22 DIAGNOSIS — I77.810 AORTIC ECTASIA, THORACIC (HCC): Primary | ICD-10-CM

## 2022-07-22 DIAGNOSIS — R59.0 ABDOMINAL LYMPHADENOPATHY: ICD-10-CM

## 2022-07-22 DIAGNOSIS — J30.1 ALLERGIC RHINITIS DUE TO POLLEN, UNSPECIFIED SEASONALITY: ICD-10-CM

## 2022-07-22 DIAGNOSIS — E03.9 ACQUIRED HYPOTHYROIDISM: ICD-10-CM

## 2022-07-22 DIAGNOSIS — Z23 IMMUNIZATION DUE: ICD-10-CM

## 2022-07-22 DIAGNOSIS — I25.10 CORONARY ARTERY CALCIFICATION: ICD-10-CM

## 2022-07-22 DIAGNOSIS — I25.84 CORONARY ARTERY CALCIFICATION: ICD-10-CM

## 2022-07-22 DIAGNOSIS — G56.01 CARPAL TUNNEL SYNDROME OF RIGHT WRIST: ICD-10-CM

## 2022-07-22 PROCEDURE — 90677 PCV20 VACCINE IM: CPT

## 2022-07-22 PROCEDURE — 99214 OFFICE O/P EST MOD 30 MIN: CPT | Performed by: FAMILY MEDICINE

## 2022-07-22 PROCEDURE — G0009 ADMIN PNEUMOCOCCAL VACCINE: HCPCS

## 2022-07-22 RX ORDER — FLUTICASONE PROPIONATE 50 MCG
2 SPRAY, SUSPENSION (ML) NASAL DAILY
Qty: 3 G | Refills: 1 | Status: SHIPPED | OUTPATIENT
Start: 2022-07-22 | End: 2022-10-14 | Stop reason: SDUPTHER

## 2022-07-24 NOTE — PROGRESS NOTES
Assessment/Plan:       No problem-specific Assessment & Plan notes found for this encounter  Diagnoses and all orders for this visit:    Aortic ectasia, thoracic [facility])  Comments:  Stable  Thoracic surgeon office visit on July 11, 2022 noted    Prostate cancer [facility])  Comments:  urology  ov on 7-13-22 noted    Carpal tunnel syndrome of right wrist  Comments:  Patient patient is doing well  Discussed wearing wrist splint    Coronary artery calcification  Comments:  Patient is asymptomatic to follow with Cardiology    Macrocytosis  Comments: To follow with Hematology    Acquired hypothyroidism  Comments:  compensate , followung with  endo    Abdominal lymphadenopathy  Comments:  Advised patient to follow with his oncologist    Immunization due  Comments:  Discussed COVID booster  Shingrix vaccine , and  Tdap  Orders:  -     Pneumococcal Conjugate Vaccine 20-valent (Pcv20)        There are no Patient Instructions on file for this visit  Orders Placed This Encounter   Procedures    Pneumococcal Conjugate Vaccine 20-valent (Pcv20)         Subjective:     Patient ID: Sissy Escoto is a 75 y o  male      HPI   Follow-up chronic medical problem  Hypothyroid  Denied weight gain, cold intolerance or fatigue  Abdominal lymphadenopathy  Denied abdominal pain  Prostate cancer  Patient was seen by Urology at [facility] for 2nd opinion  And still following with Dr Broderick Cornejo  Coronary artery calcification  Denied chest pain  Following with Cardiology  Carpal tunnel syndrome right hand he is doing well denied any significant numbness  Denied weakness  Thoracic aortic ectasia  Denied chest pain or upper back pain  Following with thoracic surgeon  Test result  lab done on June 3rd 2022  Chest CT scan  Discussed result with patient  Also rediscussed EMG of the right arm done last year    EMG of the right arm colonic some muscle it    Review of Systems   Constitutional: Negative for activity change, appetite change, chills, fatigue, fever and unexpected weight change  HENT: Negative for congestion, ear discharge, ear pain, hearing loss, nosebleeds, rhinorrhea, sinus pressure, sore throat, tinnitus, trouble swallowing and voice change  Eyes: Negative for photophobia, pain and visual disturbance  Respiratory: Negative for cough, chest tightness, shortness of breath and wheezing  Cardiovascular: Negative for chest pain, palpitations and leg swelling  Gastrointestinal: Negative for abdominal pain, anal bleeding, blood in stool, constipation, diarrhea, nausea and vomiting  Endocrine: Negative for cold intolerance, heat intolerance, polydipsia and polyuria  Genitourinary: Negative for dysuria, frequency, hematuria and urgency  Musculoskeletal: Negative for arthralgias, back pain, gait problem, joint swelling and myalgias  Skin: Negative for rash  Neurological: Negative for dizziness, tremors, seizures, syncope, weakness, light-headedness and headaches  Hematological: Negative for adenopathy  Does not bruise/bleed easily  Psychiatric/Behavioral: Negative for agitation, behavioral problems, confusion, dysphoric mood, hallucinations and sleep disturbance  The patient is not nervous/anxious  Objective:     Physical Exam  Constitutional:       General: He is not in acute distress  Appearance: Normal appearance  He is well-developed  HENT:      Head: Normocephalic  Eyes:      General: No scleral icterus  Right eye: No discharge  Left eye: No discharge  Pupils: Pupils are equal, round, and reactive to light  Neck:      Thyroid: No thyromegaly  Vascular: No carotid bruit or JVD  Cardiovascular:      Rate and Rhythm: Normal rate and regular rhythm  Heart sounds: Normal heart sounds  No murmur heard  No gallop  Pulmonary:      Effort: Pulmonary effort is normal       Breath sounds: Normal breath sounds     Abdominal:      General: Bowel sounds are normal  There is no distension  Palpations: Abdomen is soft  There is no mass  Tenderness: There is no abdominal tenderness  There is no guarding or rebound  Musculoskeletal:         General: No swelling or tenderness  Normal range of motion  Cervical back: Neck supple  Right lower leg: No edema  Left lower leg: No edema  Lymphadenopathy:      Cervical: No cervical adenopathy  Skin:     Findings: No rash  Neurological:      General: No focal deficit present  Mental Status: He is alert and oriented to person, place, and time  Cranial Nerves: No cranial nerve deficit  Motor: No abnormal muscle tone  Coordination: Coordination normal       Gait: Gait normal    Psychiatric:         Mood and Affect: Mood normal          Behavior: Behavior normal          Thought Content:  Thought content normal

## 2022-09-02 ENCOUNTER — TELEPHONE (OUTPATIENT)
Dept: NEPHROLOGY | Facility: CLINIC | Age: 67
End: 2022-09-02

## 2022-09-02 NOTE — TELEPHONE ENCOUNTER
Appointment Confirmation   Person confirmed appointment with  If not patient, name of the person Voice msg    Date and time of appointment 9/6 3:30    Patient acknowledged and will be at appointment? no    Did you advise the patient that they will need a urine sample if they are a new patient?  N/A    Did you advise the patient to bring their current medications for verification? (including any OTC) Yes    Additional Information

## 2022-09-06 ENCOUNTER — CONSULT (OUTPATIENT)
Dept: NEPHROLOGY | Facility: CLINIC | Age: 67
End: 2022-09-06
Payer: COMMERCIAL

## 2022-09-06 VITALS
WEIGHT: 184 LBS | HEIGHT: 72 IN | DIASTOLIC BLOOD PRESSURE: 80 MMHG | HEART RATE: 70 BPM | BODY MASS INDEX: 24.92 KG/M2 | SYSTOLIC BLOOD PRESSURE: 134 MMHG

## 2022-09-06 DIAGNOSIS — N28.9 RENAL INSUFFICIENCY: Primary | ICD-10-CM

## 2022-09-06 DIAGNOSIS — R79.89 LOW SERUM URIC ACID FOR AGE: ICD-10-CM

## 2022-09-06 LAB
SL AMB  POCT GLUCOSE, UA: NORMAL
SL AMB LEUKOCYTE ESTERASE,UA: NORMAL
SL AMB POCT BILIRUBIN,UA: NORMAL
SL AMB POCT BLOOD,UA: NORMAL
SL AMB POCT CLARITY,UA: CLEAR
SL AMB POCT COLOR,UA: YELLOW
SL AMB POCT KETONES,UA: NORMAL
SL AMB POCT NITRITE,UA: NORMAL
SL AMB POCT PH,UA: 7
SL AMB POCT SPECIFIC GRAVITY,UA: 1.01
SL AMB POCT URINE PROTEIN: NORMAL
SL AMB POCT UROBILINOGEN: 0.2

## 2022-09-06 PROCEDURE — 99204 OFFICE O/P NEW MOD 45 MIN: CPT | Performed by: INTERNAL MEDICINE

## 2022-09-06 PROCEDURE — 81002 URINALYSIS NONAUTO W/O SCOPE: CPT | Performed by: INTERNAL MEDICINE
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
## 2022-09-06 NOTE — PATIENT INSTRUCTIONS
You are here for your 1st visit to evaluate your kidney function  Thank you very much for providing her history  Seems a big concern is taking ibuprofen for your chronic pain and you wanted her kidneys evaluated  The good news is with respect your kidney function the creatinine which is the blood test is normal at 0 5  Generally I tell my patients if it is 1 it is normal so years even better than that and there is no signs of any kidney disease cause there was no protein in your urine and everything is normal     The issues you have with urine frequency her bladder emptying you can discuss with your urologist     If there is ever anything I can do you have any questions in the future please feel free to reach out to give me a call
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
## 2022-09-15 ENCOUNTER — HOSPITAL ENCOUNTER (OUTPATIENT)
Dept: ULTRASOUND IMAGING | Facility: HOSPITAL | Age: 67
Discharge: HOME | End: 2022-09-15
Attending: UROLOGY
Payer: COMMERCIAL

## 2022-09-15 DIAGNOSIS — R31.1 BENIGN ESSENTIAL MICROSCOPIC HEMATURIA: ICD-10-CM

## 2022-09-15 PROCEDURE — 76770 US EXAM ABDO BACK WALL COMP: CPT

## 2022-09-27 ENCOUNTER — OFFICE VISIT (OUTPATIENT)
Dept: FAMILY MEDICINE CLINIC | Facility: CLINIC | Age: 67
End: 2022-09-27
Payer: COMMERCIAL

## 2022-09-27 VITALS
SYSTOLIC BLOOD PRESSURE: 125 MMHG | DIASTOLIC BLOOD PRESSURE: 69 MMHG | OXYGEN SATURATION: 96 % | WEIGHT: 185.4 LBS | HEIGHT: 72 IN | HEART RATE: 68 BPM | TEMPERATURE: 97 F | BODY MASS INDEX: 25.11 KG/M2

## 2022-09-27 DIAGNOSIS — R53.83 OTHER FATIGUE: Primary | ICD-10-CM

## 2022-09-27 DIAGNOSIS — C61 PROSTATE CANCER (HCC): ICD-10-CM

## 2022-09-27 DIAGNOSIS — R73.9 ELEVATED BLOOD SUGAR: ICD-10-CM

## 2022-09-27 DIAGNOSIS — I77.810 AORTIC ECTASIA, THORACIC (HCC): ICD-10-CM

## 2022-09-27 DIAGNOSIS — E03.9 ACQUIRED HYPOTHYROIDISM: ICD-10-CM

## 2022-09-27 DIAGNOSIS — Z23 ENCOUNTER FOR IMMUNIZATION: ICD-10-CM

## 2022-09-27 DIAGNOSIS — I77.810 DILATED AORTIC ROOT (HCC): ICD-10-CM

## 2022-09-27 PROCEDURE — 99214 OFFICE O/P EST MOD 30 MIN: CPT | Performed by: FAMILY MEDICINE

## 2022-09-27 PROCEDURE — 90662 IIV NO PRSV INCREASED AG IM: CPT

## 2022-09-27 PROCEDURE — 1160F RVW MEDS BY RX/DR IN RCRD: CPT | Performed by: FAMILY MEDICINE

## 2022-09-27 PROCEDURE — G0008 ADMIN INFLUENZA VIRUS VAC: HCPCS

## 2022-09-27 NOTE — PROGRESS NOTES
Assessment/Plan:       No problem-specific Assessment & Plan notes found for this encounter  Diagnoses and all orders for this visit:    Other fatigue  Comments:  Advised patient to sleep acute 7 hours during the night  Orders:  -     TSH, 3rd generation with Free T4 reflex; Future  -     Comprehensive metabolic panel; Future  -     CBC and differential; Future  -     Lyme Total Antibody Profile with reflex to WB; Future    Acquired hypothyroidism  -     TSH, 3rd generation with Free T4 reflex; Future    Elevated blood sugar  -     Hemoglobin A1C; Future    Dilated aortic root (HCC)  Comments:  following with  cardiology    Prostate cancer [facility])  Comments: Following with urology    Aortic ectasia, 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[facility] )  Comments:  following with  thoracic surgeon    Encounter for immunization  -     influenza vaccine, high-dose, PF 0 7 mL (FLUZONE HIGH-DOSE)        Patient Instructions    Patient has an appointment  October 14 he will keep it   To follow up with test results and follo      Orders Placed This Encounter   Procedures    influenza vaccine, high-dose, PF 0 7 mL (FLUZONE HIGH-DOSE)    TSH, 3rd generation with Free T4 reflex     Standing Status:   Future     Number of Occurrences:   1     Standing Expiration Date:   9/27/2023    Comprehensive metabolic panel     This is a patient instruction: Patient fasting for 8 hours or longer recommended  Standing Status:   Future     Number of Occurrences:   1     Standing Expiration Date:   9/27/2023    Hemoglobin A1C     Standing Status:   Future     Number of Occurrences:   1     Standing Expiration Date:   9/27/2023    CBC and differential     This is a patient instruction: This test is non-fasting  Please drink two glasses of water morning of bloodwork          Standing Status:   Future     Number of Occurrences:   1     Standing Expiration Date:   9/27/2023    Lyme Total Antibody Profile with reflex to WB     Standing Status:   Future     Number of Occurrences:   1     Standing Expiration Date:   9/27/2023         Subjective:     Patient ID: Jim Smiley is a 75 y o  male      HPI  Fatigue  Has been feeling tired for the last 3-4 weeks, every day,  Start taking a Nap our during the day  When he wakes up he feels fine, he works around the house for 2 hours , then he start feeling tired again      patient stated over the last 1 year he has to wake up early morning to take care of his cat  but at any rate he feel more tired than usual  No change in weight  He does not snore or stop breathing at night  Prostate cancer  Following with Urology  patient has been following with an orthopedic regarding  Low back pain he had MRI recently which was remarkable for a possible mass involving the left L3 and for neural foraminal which could represent inner she tumor or less likely disc extract still done to go for a repeat  MRI of the lumbar spine with can  Low uric acid  He did see Nephrology  Anxiety  He stated is under control, denied depression    Review of Systems   Constitutional: Positive for fatigue  Negative for activity change, appetite change, chills, fever and unexpected weight change  HENT: Negative for congestion, ear discharge, ear pain, hearing loss, nosebleeds, rhinorrhea, sinus pressure, sore throat, tinnitus, trouble swallowing and voice change  Eyes: Negative for photophobia, pain, discharge, itching and visual disturbance  Respiratory: Negative for cough, chest tightness, shortness of breath and wheezing  Cardiovascular: Negative for chest pain, palpitations and leg swelling  Gastrointestinal: Negative for abdominal pain, anal bleeding, blood in stool, constipation, diarrhea, nausea and vomiting  Endocrine: Negative for cold intolerance, heat intolerance, polydipsia and polyuria  Genitourinary: Negative for dysuria, frequency, hematuria, penile discharge, penile pain, penile swelling, scrotal swelling, testicular pain and urgency  Musculoskeletal: Positive for back pain  Negative for arthralgias, gait problem, joint swelling, myalgias and neck pain  Skin: Negative for rash  Neurological: Negative for dizziness, tremors, seizures, syncope, facial asymmetry, speech difficulty, weakness, light-headedness and headaches  Hematological: Negative for adenopathy  Does not bruise/bleed easily  Psychiatric/Behavioral: Negative for agitation, behavioral problems, confusion, dysphoric mood, hallucinations, self-injury, sleep disturbance and suicidal ideas  Objective:     Physical Exam  Constitutional:       General: He is not in acute distress  Appearance: Normal appearance  He is well-developed  He is not ill-appearing or diaphoretic  HENT:      Head: Normocephalic  Right Ear: Tympanic membrane, ear canal and external ear normal       Left Ear: Tympanic membrane, ear canal and external ear normal    Eyes:      General: No scleral icterus  Right eye: No discharge  Left eye: No discharge  Pupils: Pupils are equal, round, and reactive to light  Neck:      Thyroid: No thyromegaly  Vascular: No carotid bruit or JVD  Cardiovascular:      Rate and Rhythm: Normal rate and regular rhythm  Heart sounds: Normal heart sounds  No murmur heard  No gallop  Pulmonary:      Effort: Pulmonary effort is normal       Breath sounds: Normal breath sounds  Abdominal:      General: Bowel sounds are normal  There is no distension  Palpations: Abdomen is soft  There is no mass  Tenderness: There is no abdominal tenderness  There is no guarding or rebound  Musculoskeletal:         General: No swelling or tenderness  Normal range of motion  Cervical back: Neck supple  Right lower leg: No edema  Left lower leg: No edema  Lymphadenopathy:      Cervical: No cervical adenopathy  Skin:     Coloration: Skin is not jaundiced or pale  Findings: No rash     Neurological:      General: No focal deficit present  Mental Status: He is alert and oriented to person, place, and time  Cranial Nerves: No cranial nerve deficit  Sensory: No sensory deficit  Motor: No weakness or abnormal muscle tone  Coordination: Coordination normal       Gait: Gait normal       Deep Tendon Reflexes: Reflexes normal    Psychiatric:         Mood and Affect: Mood normal          Behavior: Behavior normal          Thought Content:  Thought content normal

## 2022-09-28 ENCOUNTER — APPOINTMENT (OUTPATIENT)
Dept: LAB | Facility: CLINIC | Age: 67
End: 2022-09-28
Payer: COMMERCIAL

## 2022-09-28 DIAGNOSIS — R53.83 OTHER FATIGUE: ICD-10-CM

## 2022-09-28 DIAGNOSIS — R73.9 ELEVATED BLOOD SUGAR: ICD-10-CM

## 2022-09-28 DIAGNOSIS — E03.9 ACQUIRED HYPOTHYROIDISM: ICD-10-CM

## 2022-09-28 LAB
ALBUMIN SERPL BCP-MCNC: 3.5 G/DL (ref 3.5–5)
ALP SERPL-CCNC: 60 U/L (ref 46–116)
ALT SERPL W P-5'-P-CCNC: 26 U/L (ref 12–78)
ANION GAP SERPL CALCULATED.3IONS-SCNC: 4 MMOL/L (ref 4–13)
AST SERPL W P-5'-P-CCNC: 18 U/L (ref 5–45)
BASOPHILS # BLD AUTO: 0.03 THOUSANDS/ΜL (ref 0–0.1)
BASOPHILS NFR BLD AUTO: 0 % (ref 0–1)
BILIRUB SERPL-MCNC: 0.57 MG/DL (ref 0.2–1)
BUN SERPL-MCNC: 16 MG/DL (ref 5–25)
CALCIUM SERPL-MCNC: 9.1 MG/DL (ref 8.3–10.1)
CHLORIDE SERPL-SCNC: 109 MMOL/L (ref 96–108)
CO2 SERPL-SCNC: 27 MMOL/L (ref 21–32)
CREAT SERPL-MCNC: 0.67 MG/DL (ref 0.6–1.3)
EOSINOPHIL # BLD AUTO: 0.2 THOUSAND/ΜL (ref 0–0.61)
EOSINOPHIL NFR BLD AUTO: 2 % (ref 0–6)
ERYTHROCYTE [DISTWIDTH] IN BLOOD BY AUTOMATED COUNT: 13.7 % (ref 11.6–15.1)
GFR SERPL CREATININE-BSD FRML MDRD: 98 ML/MIN/1.73SQ M
GLUCOSE P FAST SERPL-MCNC: 118 MG/DL (ref 65–99)
HCT VFR BLD AUTO: 45.4 % (ref 36.5–49.3)
HGB BLD-MCNC: 14 G/DL (ref 12–17)
IMM GRANULOCYTES # BLD AUTO: 0.02 THOUSAND/UL (ref 0–0.2)
IMM GRANULOCYTES NFR BLD AUTO: 0 % (ref 0–2)
LYMPHOCYTES # BLD AUTO: 2.35 THOUSANDS/ΜL (ref 0.6–4.47)
LYMPHOCYTES NFR BLD AUTO: 26 % (ref 14–44)
MCH RBC QN AUTO: 32 PG (ref 26.8–34.3)
MCHC RBC AUTO-ENTMCNC: 30.8 G/DL (ref 31.4–37.4)
MCV RBC AUTO: 104 FL (ref 82–98)
MONOCYTES # BLD AUTO: 0.58 THOUSAND/ΜL (ref 0.17–1.22)
MONOCYTES NFR BLD AUTO: 7 % (ref 4–12)
NEUTROPHILS # BLD AUTO: 5.74 THOUSANDS/ΜL (ref 1.85–7.62)
NEUTS SEG NFR BLD AUTO: 65 % (ref 43–75)
NRBC BLD AUTO-RTO: 0 /100 WBCS
PLATELET # BLD AUTO: 190 THOUSANDS/UL (ref 149–390)
PMV BLD AUTO: 11 FL (ref 8.9–12.7)
POTASSIUM SERPL-SCNC: 4.1 MMOL/L (ref 3.5–5.3)
PROT SERPL-MCNC: 7.1 G/DL (ref 6.4–8.4)
RBC # BLD AUTO: 4.37 MILLION/UL (ref 3.88–5.62)
SODIUM SERPL-SCNC: 140 MMOL/L (ref 135–147)
TSH SERPL DL<=0.05 MIU/L-ACNC: 0.79 UIU/ML (ref 0.45–4.5)
WBC # BLD AUTO: 8.92 THOUSAND/UL (ref 4.31–10.16)

## 2022-09-28 PROCEDURE — 80053 COMPREHEN METABOLIC PANEL: CPT

## 2022-09-28 PROCEDURE — 83036 HEMOGLOBIN GLYCOSYLATED A1C: CPT

## 2022-09-28 PROCEDURE — 86618 LYME DISEASE ANTIBODY: CPT

## 2022-09-28 PROCEDURE — 84443 ASSAY THYROID STIM HORMONE: CPT

## 2022-09-28 PROCEDURE — 85025 COMPLETE CBC W/AUTO DIFF WBC: CPT

## 2022-09-28 PROCEDURE — 36415 COLL VENOUS BLD VENIPUNCTURE: CPT

## 2022-09-29 LAB
B BURGDOR IGG+IGM SER-ACNC: <0.2 AI
EST. AVERAGE GLUCOSE BLD GHB EST-MCNC: 120 MG/DL
HBA1C MFR BLD: 5.8 %

## 2022-10-12 PROBLEM — H60.332 ACUTE SWIMMER'S EAR OF LEFT SIDE: Status: RESOLVED | Noted: 2022-01-18 | Resolved: 2022-10-12

## 2022-10-14 ENCOUNTER — OFFICE VISIT (OUTPATIENT)
Dept: FAMILY MEDICINE CLINIC | Facility: CLINIC | Age: 67
End: 2022-10-14
Payer: MEDICARE

## 2022-10-14 VITALS
OXYGEN SATURATION: 97 % | WEIGHT: 186.6 LBS | SYSTOLIC BLOOD PRESSURE: 129 MMHG | HEIGHT: 72 IN | BODY MASS INDEX: 25.27 KG/M2 | DIASTOLIC BLOOD PRESSURE: 79 MMHG | TEMPERATURE: 97.7 F | HEART RATE: 69 BPM

## 2022-10-14 DIAGNOSIS — M85.80 OSTEOPENIA, UNSPECIFIED LOCATION: ICD-10-CM

## 2022-10-14 DIAGNOSIS — D75.89 MACROCYTOSIS: ICD-10-CM

## 2022-10-14 DIAGNOSIS — R53.83 OTHER FATIGUE: ICD-10-CM

## 2022-10-14 DIAGNOSIS — R73.9 ELEVATED BLOOD SUGAR: ICD-10-CM

## 2022-10-14 DIAGNOSIS — C61 PROSTATE CANCER (HCC): ICD-10-CM

## 2022-10-14 DIAGNOSIS — Z00.00 MEDICARE ANNUAL WELLNESS VISIT, SUBSEQUENT: Primary | ICD-10-CM

## 2022-10-14 DIAGNOSIS — R79.82 CRP ELEVATED: ICD-10-CM

## 2022-10-14 DIAGNOSIS — D49.2 LUMBAR SPINE TUMOR: ICD-10-CM

## 2022-10-14 DIAGNOSIS — E03.8 OTHER SPECIFIED HYPOTHYROIDISM: ICD-10-CM

## 2022-10-14 DIAGNOSIS — J30.1 ALLERGIC RHINITIS DUE TO POLLEN, UNSPECIFIED SEASONALITY: ICD-10-CM

## 2022-10-14 PROCEDURE — 99214 OFFICE O/P EST MOD 30 MIN: CPT | Performed by: FAMILY MEDICINE

## 2022-10-14 PROCEDURE — G0439 PPPS, SUBSEQ VISIT: HCPCS | Performed by: FAMILY MEDICINE

## 2022-10-14 RX ORDER — ALENDRONATE SODIUM 35 MG/1
35 TABLET ORAL
Qty: 4 TABLET | Refills: 2 | Status: SHIPPED | OUTPATIENT
Start: 2022-10-14

## 2022-10-14 RX ORDER — TAZAROTENE 1 MG/G
CREAM TOPICAL
COMMUNITY
Start: 2022-10-13

## 2022-10-14 RX ORDER — FLUTICASONE PROPIONATE 50 MCG
2 SPRAY, SUSPENSION (ML) NASAL DAILY
Qty: 3 G | Refills: 1 | Status: SHIPPED | OUTPATIENT
Start: 2022-10-14

## 2022-10-14 NOTE — PATIENT INSTRUCTIONS
Medicare Preventive Visit Patient Instructions  Thank you for completing your Welcome to Medicare Visit or Medicare Annual Wellness Visit today  Your next wellness visit will be due in one year (10/15/2023)  The screening/preventive services that you may require over the next 5-10 years are detailed below  Some tests may not apply to you based off risk factors and/or age  Screening tests ordered at today's visit but not completed yet may show as past due  Also, please note that scanned in results may not display below  Preventive Screenings:  Service Recommendations Previous Testing/Comments   Colorectal Cancer Screening  Colonoscopy    Fecal Occult Blood Test (FOBT)/Fecal Immunochemical Test (FIT)  Fecal DNA/Cologuard Test  Flexible Sigmoidoscopy Age: 39-70 years old   Colonoscopy: every 10 years (May be performed more frequently if at higher risk)  OR  FOBT/FIT: every 1 year  OR  Cologuard: every 3 years  OR  Sigmoidoscopy: every 5 years  Screening may be recommended earlier than age 39 if at higher risk for colorectal cancer  Also, an individualized decision between you and your healthcare provider will decide whether screening between the ages of 74-80 would be appropriate  Colonoscopy: 01/22/2021  FOBT/FIT: Not on file  Cologuard: Not on file  Sigmoidoscopy: Not on file          Prostate Cancer Screening Individualized decision between patient and health care provider in men between ages of 53-78   Medicare will cover every 12 months beginning on the day after your 50th birthday PSA: 3 1 ng/mL           Hepatitis C Screening Once for adults born between 1945 and 1965  More frequently in patients at high risk for Hepatitis C Hep C Antibody: 10/08/2019        Diabetes Screening 1-2 times per year if you're at risk for diabetes or have pre-diabetes Fasting glucose: 118 mg/dL (9/28/2022)  A1C: 5 8 % (9/28/2022)      Cholesterol Screening Once every 5 years if you don't have a lipid disorder   May order more often based on risk factors  Lipid panel: 11/02/2021         Other Preventive Screenings Covered by Medicare:  Abdominal Aortic Aneurysm (AAA) Screening: covered once if your at risk  You're considered to be at risk if you have a family history of AAA or a male between the age of 73-68 who smoking at least 100 cigarettes in your lifetime  Lung Cancer Screening: covers low dose CT scan once per year if you meet all of the following conditions: (1) Age 50-69; (2) No signs or symptoms of lung cancer; (3) Current smoker or have quit smoking within the last 15 years; (4) You have a tobacco smoking history of at least 20 pack years (packs per day x number of years you smoked); (5) You get a written order from a healthcare provider  Glaucoma Screening: covered annually if you're considered high risk: (1) You have diabetes OR (2) Family history of glaucoma OR (3)  aged 48 and older OR (3)  American aged 72 and older  Osteoporosis Screening: covered every 2 years if you meet one of the following conditions: (1) Have a vertebral abnormality; (2) On glucocorticoid therapy for more than 3 months; (3) Have primary hyperparathyroidism; (4) On osteoporosis medications and need to assess response to drug therapy  HIV Screening: covered annually if you're between the age of 12-76  Also covered annually if you are younger than 13 and older than 72 with risk factors for HIV infection  For pregnant patients, it is covered up to 3 times per pregnancy      Immunizations:  Immunization Recommendations   Influenza Vaccine Annual influenza vaccination during flu season is recommended for all persons aged >= 6 months who do not have contraindications   Pneumococcal Vaccine   * Pneumococcal conjugate vaccine = PCV13 (Prevnar 13), PCV15 (Vaxneuvance), PCV20 (Prevnar 20)  * Pneumococcal polysaccharide vaccine = PPSV23 (Pneumovax) Adults 2364 years old: 1-3 doses may be recommended based on certain risk factors  Adults 72 years old: 1-2 doses may be recommended based off what pneumonia vaccine you previously received   Hepatitis B Vaccine 3 dose series if at intermediate or high risk (ex: diabetes, end stage renal disease, liver disease)   Tetanus (Td) Vaccine - COST NOT COVERED BY MEDICARE PART B Following completion of primary series, a booster dose should be given every 10 years to maintain immunity against tetanus  Td may also be given as tetanus wound prophylaxis  Tdap Vaccine - COST NOT COVERED BY MEDICARE PART B Recommended at least once for all adults  For pregnant patients, recommended with each pregnancy  Shingles Vaccine (Shingrix) - COST NOT COVERED BY MEDICARE PART B  2 shot series recommended in those aged 48 and above     Health Maintenance Due:      Topic Date Due    Colorectal Cancer Screening  01/22/2024    Hepatitis C Screening  Completed     Immunizations Due:      Topic Date Due    COVID-19 Vaccine (4 - Booster for Moderna series) 04/15/2022     Advance Directives   What are advance directives? Advance directives are legal documents that state your wishes and plans for medical care  These plans are made ahead of time in case you lose your ability to make decisions for yourself  Advance directives can apply to any medical decision, such as the treatments you want, and if you want to donate organs  What are the types of advance directives? There are many types of advance directives, and each state has rules about how to use them  You may choose a combination of any of the following:  Living will: This is a written record of the treatment you want  You can also choose which treatments you do not want, which to limit, and which to stop at a certain time  This includes surgery, medicine, IV fluid, and tube feedings  Durable power of  for healthcare [place] [facility]): This is a written record that states who you want to make healthcare choices for you when you are unable to make them for yourself   This person, called a proxy, is usually a family member or a friend  You may choose more than 1 proxy  Do not resuscitate (DNR) order:  A DNR order is used in case your heart stops beating or you stop breathing  It is a request not to have certain forms of treatment, such as CPR  A DNR order may be included in other types of advance directives  Medical directive: This covers the care that you want if you are in a coma, near death, or unable to make decisions for yourself  You can list the treatments you want for each condition  Treatment may include pain medicine, surgery, blood transfusions, dialysis, IV or tube feedings, and a ventilator (breathing machine)  Values history: This document has questions about your views, beliefs, and how you feel and think about life  This information can help others choose the care that you would choose  Why are advance directives important? An advance directive helps you control your care  Although spoken wishes may be used, it is better to have your wishes written down  Spoken wishes can be misunderstood, or not followed  Treatments may be given even if you do not want them  An advance directive may make it easier for your family to make difficult choices about your care  Weight Management   Why it is important to manage your weight:  Being overweight increases your risk of health conditions such as heart disease, high blood pressure, type 2 diabetes, and certain types of cancer  It can also increase your risk for osteoarthritis, sleep apnea, and other respiratory problems  Aim for a slow, steady weight loss  Even a small amount of weight loss can lower your risk of health problems  How to lose weight safely:  A safe and healthy way to lose weight is to eat fewer calories and get regular exercise  You can lose up about 1 pound a week by decreasing the number of calories you eat by 500 calories each day     Healthy meal plan for weight management:  A healthy meal plan includes a variety of foods, contains fewer calories, and helps you stay healthy  A healthy meal plan includes the following:  Eat whole-grain foods more often  A healthy meal plan should contain fiber  Fiber is the part of grains, fruits, and vegetables that is not broken down by your body  Whole-grain foods are healthy and provide extra fiber in your diet  Some examples of whole-grain foods are whole-wheat breads and pastas, oatmeal, brown rice, and bulgur  Eat a variety of vegetables every day  Include dark, leafy greens such as spinach, kale, aspen greens, and mustard greens  Eat yellow and orange vegetables such as carrots, sweet potatoes, and winter squash  Eat a variety of fruits every day  Choose fresh or canned fruit (canned in its own juice or light syrup) instead of juice  Fruit juice has very little or no fiber  Eat low-fat dairy foods  Drink fat-free (skim) milk or 1% milk  Eat fat-free yogurt and low-fat cottage cheese  Try low-fat cheeses such as mozzarella and other reduced-fat cheeses  Choose meat and other protein foods that are low in fat  Choose beans or other legumes such as split peas or lentils  Choose fish, skinless poultry (chicken or turkey), or lean cuts of red meat (beef or pork)  Before you cook meat or poultry, cut off any visible fat  Use less fat and oil  Try baking foods instead of frying them  Add less fat, such as margarine, sour cream, regular salad dressing and mayonnaise to foods  Eat fewer high-fat foods  Some examples of high-fat foods include french fries, doughnuts, ice cream, and cakes  Eat fewer sweets  Limit foods and drinks that are high in sugar  This includes candy, cookies, regular soda, and sweetened drinks  Exercise:  Exercise at least 30 minutes per day on most days of the week  Some examples of exercise include walking, biking, dancing, and swimming   You can also fit in more physical activity by taking the stairs instead of the elevator or parking farther away from stores  Ask your healthcare provider about the best exercise plan for you  © Copyright [employer] 2018 Information is for End User's use only and may not be sold, redistributed or otherwise used for commercial purposes   All illustrations and images included in CareNotes® are the copyrighted property of A [employer]  or [facility] Cayla White  To follow with test results

## 2022-10-14 NOTE — PROGRESS NOTES
Assessment and Plan:     Problem List Items Addressed This Visit    None          Preventive health issues were discussed with patient, and age appropriate screening tests were ordered as noted in patient's After Visit Summary  Personalized health advice and appropriate referrals for health education or preventive services given if needed, as noted in patient's After Visit Summary       History of Present Illness:     Patient presents for a Medicare Wellness Visit    HPI   Patient Care Team:  Zackary Elizalde MD as PCP - General (Family Medicine)  BOWEN Tejeda MD Jinx Blanc, DO (Urology)     Review of Systems:     Review of Systems     Problem List:     Patient Active Problem List   Diagnosis   • Allergic rhinitis   • Odontoid fracture [facility])   • Cervical disc herniation   • Cervical spinal stenosis   • Cervical spondylosis   • Neck pain   • Degeneration of intervertebral disc of cervical region   • Hypothyroidism   • Left arm pain   • Loose body in knee, right   • Muscle pain, myofacial   • Neuralgia   • Neuropathy   • Nontoxic multinodular goiter   • Nonunion of fracture   • Occipital neuralgia   • Post-traumatic osteoarthritis of one knee   • Osteoarthritis of knee   • Pruritus   • Episodic lightheadedness   • Spider veins of both lower extremities   • Acquired unequal leg length   • Venous insufficiency   • Abnormal gait   • Acquired trigger finger   • Chondromalacia patellae   • Chronic osteomyelitis of lower leg (HCC)   • Generalized osteoarthritis   • Knee joint effusion   • Knee pain   • Localized, primary osteoarthritis   • Localized primary osteoarthritis of wrist   • Nontoxic single thyroid nodule   • Anxiety   • Reactive depression   • BPH without obstruction/lower urinary tract symptoms   • Hypothyroidism due to acquired atrophy of thyroid   • Post traumatic stress disorder (PTSD)   • Vertigo   • Vitamin D deficiency   • Chronic pain syndrome   • Varicose veins of bilateral lower extremities with other complications   • Elevated EBV antibody titer   • Fatigue   • Sugar blood level increased   • Osteoporosis without current pathological fracture   • Bilateral carotid artery stenosis < 50%   • PAC (premature atrial contraction)   • PVC (premature ventricular contraction)   • Elevated blood pressure reading without diagnosis of hypertension   • Primary hypercholesterolemia   • Macrocytosis without anemia   • Leukocytosis   • Abnormal blood chemistry   • Palpitation   • Chronic neck pain   • Thoracic aortic aneurysm without rupture   • Nonrheumatic mitral valve regurgitation   • Bilateral enlargement of atria   • Abnormal MRI   • Coronary artery calcification   • Abnormal CT scan of lung   • Osteopenia   • Decreased testosterone level in male   • Prostate cancer (HCC)   • Anger   • Elevated LDL cholesterol level   • Encounter for immunization   • Tingling   • Blood pressure elevated without history of HTN   • Immunization counseling   • Macrocytosis   • Mass of right parotid gland   • Pain of left calf   • History of osteomyelitis   • Abdominal lymphadenopathy   • Renal cysts, acquired, bilateral   • Left otitis media   • Scab   • Elevated blood sugar   • Aspirin long-term use   • Aortic ectasia, thoracic (HCC)   • Primary hypertension   • Carpal tunnel syndrome on right      Past Medical and Surgical History:     Past Medical History:   Diagnosis Date   • Anxiety    • Arthritis    • Bleeding tendency (HCC)    • BPH (benign prostatic hyperplasia)    • Broken bones     left leg   • Cataract    • Chronic neck pain    • Epilepsy ([address] )    • Hypothyroidism    • Neoplasm of unspecified behavior of bone, soft tissue, and skin    • Osteomyelitis (HCC)    • Prostate cancer ([address] )    • Skin cancer    • Squamous carcinoma 12/01/2014 12/2014   • Thyroid condition    • Thyroid disease      Past Surgical History:   Procedure Laterality Date   • ARTHRODESIS      H/o Arthrodesis Cervical to C2;  last assessed [id number]   • COLONOSCOPY  2021   • COLONOSCOPY     • KNEE SURGERY Right 2017   • LEG SURGERY Left     Hardware placed in lower leg bone(s)   • LEG SURGERY Left 2006, -Left leg vascular   • NECK SURGERY      C1-2 fusion   • THYROID SURGERY Right    • US GUIDED PROSTATE BIOPSY  2021      Family History:     Family History   Problem Relation Age of Onset   • Bone cancer Mother    • Other Father         Accident   • Dementia Other    • Cancer Other       Social History:     Social History     Socioeconomic History   • Marital status: Legally      Spouse name: None   • Number of children: None   • Years of education: None   • Highest education level: None   Occupational History   • Occupation: Teacher   • Occupation: retired   Tobacco Use   • Smoking status: Former Smoker     Types: Cigarettes     Quit date:      Years since quittin 8   • Smokeless tobacco: Never Used   Vaping Use   • Vaping Use: Never used   Substance and Sexual Activity   • Alcohol use: No     Comment: being a social drinker   • Drug use: Not Currently     Comment: Medical marijuana   • Sexual activity: Yes     Comment: no known std risk factors   Other Topics Concern   • None   Social History Narrative    Daily coffee consumption (3 cups/day)     Social Determinants of Health     Financial Resource Strain: Low Risk    • Difficulty of Paying Living Expenses: Not hard at all   Food Insecurity: Not on file   Transportation Needs: No Transportation Needs   • Lack of Transportation (Medical): No   • Lack of Transportation (Non-Medical):  No   Physical Activity: Not on file   Stress: Not on file   Social Connections: Not on file   Intimate Partner Violence: Not on file   Housing Stability: Not on file      Medications and Allergies:     Current Outpatient Medications   Medication Sig Dispense Refill   • acetaminophen (TYLENOL) 500 mg tablet Take 1,000 mg by mouth as needed • Calcium Carbonate-Vit D-Min (CALCIUM 1200 PO) Take 1 tablet by mouth daily     • doxycycline (ADOXA) 50 MG tablet TAKE 1 TABLET BY ORAL ROUTE EVERY DAY WITH MEAL X3 MONTHS THEN MWF     • fluticasone (FLONASE) 50 mcg/act nasal spray 2 sprays into each nostril daily 3 g 1   • ibuprofen (MOTRIN) 200 mg tablet Take 200 mg by mouth every 4 (four) hours as needed for mild pain      • levothyroxine 100 mcg tablet daily     • metroNIDAZOLE (METROGEL) 0 75 % gel      • Multiple Vitamin (MULTIVITAMINS PO) Take 1 capsule by mouth daily     • Omega-3 Fatty Acids (FISH OIL) 645 MG CAPS Take 1 capsule by mouth daily      • RESTASIS MULTIDOSE 0 05 % ophthalmic emulsion INSTILL 1 DROP INTO BOTH EYES TWICE A DAY  3   • tamsulosin (FLOMAX) 0 4 mg      • tazarotene (AVAGE) 0 1 % cream      • tretinoin (ALTRALIN) 0 05 %   1     No current facility-administered medications for this visit       Allergies   Allergen Reactions   • Amoxicillin-Pot Clavulanate Rash, Vomiting and Nausea Only      Immunizations:     Immunization History   Administered Date(s) Administered   • COVID-19 MODERNA VACC 0 5 ML IM 04/01/2021, 04/29/2021, 12/15/2021   • INFLUENZA 10/10/2007, 10/15/2008, 10/05/2009, 09/17/2010, 10/10/2011, 10/29/2014, 10/30/2015, 10/30/2015, 11/02/2016, 10/22/2017, 10/22/2017, 10/15/2020, 09/27/2022   • Influenza Quadrivalent, 6-35 Months IM 11/02/2016, 10/02/2017   • Influenza, high dose seasonal 0 7 mL 10/08/2019, 10/09/2020, 10/13/2021, 09/27/2022   • Influenza, recombinant, quadrivalent,injectable, preservative free 11/23/2018   • Pneumococcal Conjugate 13-Valent 10/08/2019   • Pneumococcal Conjugate Vaccine 20-valent (Pcv20), Polysace 07/22/2022   • Pneumococcal Polysaccharide PPV23 11/18/2016      Health Maintenance:         Topic Date Due   • Colorectal Cancer Screening  01/22/2024   • Hepatitis C Screening  Completed         Topic Date Due   • COVID-19 Vaccine (4 - Booster for Moderna series) 04/15/2022      Medicare Screening Tests and Risk Assessments:     Rafa Olvera is here for his Subsequent Wellness visit  Health Risk Assessment:   Patient rates overall health as good  Patient feels that their physical health rating is same  Patient is satisfied with their life  Eyesight was rated as same  Hearing was rated as slightly worse  Patient feels that their emotional and mental health rating is same  Patients states they are never, rarely angry  Patient states they are sometimes unusually tired/fatigued  Pain experienced in the last 7 days has been some  Patient's pain rating has been 6/10  Patient states that he has experienced no weight loss or gain in last 6 months  Regarding his hearing he had been following with ENT and he had audiogram   patient with chronic neck pain,  Was evaluated by neurosurgeAllan fu    Depression Screening:   PHQ-9 Score: 0      Fall Risk Screening: In the past year, patient has experienced: no history of falling in past year      Home Safety:  Patient does not have trouble with stairs inside or outside of their home  Patient has working smoke alarms and has working carbon monoxide detector  Home safety hazards include: none  Nutrition:   Current diet is Regular, No Added Salt and Limited junk food  Medications:   Patient is currently taking over-the-counter supplements  OTC medications include: Advil, Tylenol  Patient is able to manage medications  Activities of Daily Living (ADLs)/Instrumental Activities of Daily Living (IADLs):   Walk and transfer into and out of bed and chair?: Yes  Dress and groom yourself?: Yes    Bathe or shower yourself?: Yes    Feed yourself?  Yes  Do your laundry/housekeeping?: Yes  Manage your money, pay your bills and track your expenses?: Yes  Make your own meals?: Yes    Do your own shopping?: Yes    Previous Hospitalizations:   Any hospitalizations or ED visits within the last 12 months?: Yes    How many hospitalizations have you had in the last year?: 1-2    Advance Care Planning:   Living will: Yes    Durable POA for healthcare: Yes    Advanced directive: Yes      Cognitive Screening:   Provider or family/friend/caregiver concerned regarding cognition?: No    PREVENTIVE SCREENINGS      Cardiovascular Screening:    General: History Lipid Disorder and Screening Current      Diabetes Screening:     General: Screening Current      Colorectal Cancer Screening:     General: Screening Current      Prostate Cancer Screening:    General: History Prostate Cancer and Screening Current      Osteoporosis Screening:    General: History Osteoporosis and Screening Current      Abdominal Aortic Aneurysm (AAA) Screening:    Risk factors include: age between 73-67 yo and tobacco use        General: Screening Current      Lung Cancer Screening:     General: Screening Not Indicated      Hepatitis C Screening:    General: Screening Current    Screening, Brief Intervention, and Referral to Treatment (SBIRT)    Screening  Typical number of drinks in a day: 0  Typical number of drinks in a week: 0  Interpretation: Low risk drinking behavior  AUDIT-C Screenin) How often did you have a drink containing alcohol in the past year? monthly or less  2) How many drinks did you have on a typical day when you were drinking in the past year? 0  3) How often did you have 6 or more drinks on one occasion in the past year? never    AUDIT-C Score: 1  Interpretation: Score 0-3 (male): Negative screen for alcohol misuse    Single Item Drug Screening:  How often have you used an illegal drug (including marijuana) or a prescription medication for non-medical reasons in the past year? never    Single Item Drug Screen Score: 0  Interpretation: Negative screen for possible drug use disorder    Other Counseling Topics:   Car/seat belt/driving safety, skin self-exam, sunscreen and regular weightbearing exercise and calcium and vitamin D intake       No exam data present     Physical Exam:     Pulse 69 Temp 97 7 °F (36 5 °C) (Temporal)   Ht 6' (1 829 m)   Wt 84 6 kg (186 lb 9 6 oz)   SpO2 97%   BMI 25 31 kg/m²     Physical Exam     Caro Helton MD

## 2022-10-14 NOTE — PROGRESS NOTES
To Assessment/Plan:       No problem-specific Assessment & Plan notes found for this encounter  Diagnoses and all orders for this visit:    Medicare annual wellness visit, subsequent    Other fatigue  Comments:  Most likely secondary to not a good sleep quality  Advised patient to try see better  To call if any further fatigue  Orders:  -     UA (URINE) with reflex to Scope  -     [place] Virus Antibody Panel; Future  -     MUSA Screen w/ Reflex to Titer/Pattern; Future  -     C-reactive protein; Future    Elevated blood sugar  Comments:  Advised patient to follow with low sweet and carbohydrate diet    Prostate cancer ([facility] [address] )  Comments: To follow with Urology    Macrocytosis  Comments:  Stable  Patient was evaluated by Hematology  CRP elevated  -     MUSA Screen w/ Reflex to Titer/Pattern; Future  -     C-reactive protein; Future    Allergic rhinitis due to pollen, unspecified seasonality  Comments:  controlled  Continue Flonase  Orders:  -     fluticasone (FLONASE) 50 mcg/act nasal spray; 2 sprays into each nostril daily    Other specified hypothyroidism  Comments:  Compensated    Osteopenia, unspecified location  Comments:  Take Fosamax as directed  Orders:  -     alendronate (FOSAMAX) 35 mg tablet; Take 1 tablet (35 mg total) by mouth every 7 days    Lumbar spine tumor  Comments:   advised to connect with Dr Jose Perla    Other orders  -     tazarotene (AVAGE) 0 1 % cream        Patient Instructions       Medicare Preventive Visit Patient Instructions  Thank you for completing your Welcome to Medicare Visit or Medicare Annual Wellness Visit today  Your next wellness visit will be due in one year (10/15/2023)  The screening/preventive services that you may require over the next 5-10 years are detailed below  Some tests may not apply to you based off risk factors and/or age  Screening tests ordered at today's visit but not completed yet may show as past due   Also, please note that scanned in results may not display below  Preventive Screenings:  Service Recommendations Previous Testing/Comments   Colorectal Cancer Screening  · Colonoscopy    · Fecal Occult Blood Test (FOBT)/Fecal Immunochemical Test (FIT)  · Fecal DNA/Cologuard Test  · Flexible Sigmoidoscopy Age: 39-70 years old   Colonoscopy: every 10 years (May be performed more frequently if at higher risk)  OR  FOBT/FIT: every 1 year  OR  Cologuard: every 3 years  OR  Sigmoidoscopy: every 5 years  Screening may be recommended earlier than age 39 if at higher risk for colorectal cancer  Also, an individualized decision between you and your healthcare provider will decide whether screening between the ages of 74-80 would be appropriate  Colonoscopy: 01/22/2021  FOBT/FIT: Not on file  Cologuard: Not on file  Sigmoidoscopy: Not on file          Prostate Cancer Screening Individualized decision between patient and health care provider in men between ages of 53-78   Medicare will cover every 12 months beginning on the day after your 50th birthday PSA: 3 1 ng/mL           Hepatitis C Screening Once for adults born between 1945 and 1965  More frequently in patients at high risk for Hepatitis C Hep C Antibody: 10/08/2019        Diabetes Screening 1-2 times per year if you're at risk for diabetes or have pre-diabetes Fasting glucose: 118 mg/dL (9/28/2022)  A1C: 5 8 % (9/28/2022)      Cholesterol Screening Once every 5 years if you don't have a lipid disorder  May order more often based on risk factors  Lipid panel: 11/02/2021         Other Preventive Screenings Covered by Medicare:  1  Abdominal Aortic Aneurysm (AAA) Screening: covered once if your at risk  You're considered to be at risk if you have a family history of AAA or a male between the age of 73-68 who smoking at least 100 cigarettes in your lifetime    2  Lung Cancer Screening: covers low dose CT scan once per year if you meet all of the following conditions: (1) Age 50-69; (2) No signs or symptoms of lung cancer; (3) Current smoker or have quit smoking within the last 15 years; (4) You have a tobacco smoking history of at least 20 pack years (packs per day x number of years you smoked); (5) You get a written order from a healthcare provider  3  Glaucoma Screening: covered annually if you're considered high risk: (1) You have diabetes OR (2) Family history of glaucoma OR (3)  aged 48 and older OR (3)  American aged 72 and older  3  Osteoporosis Screening: covered every 2 years if you meet one of the following conditions: (1) Have a vertebral abnormality; (2) On glucocorticoid therapy for more than 3 months; (3) Have primary hyperparathyroidism; (4) On osteoporosis medications and need to assess response to drug therapy  5  HIV Screening: covered annually if you're between the age of 12-76  Also covered annually if you are younger than 13 and older than 72 with risk factors for HIV infection  For pregnant patients, it is covered up to 3 times per pregnancy  Immunizations:  Immunization Recommendations   Influenza Vaccine Annual influenza vaccination during flu season is recommended for all persons aged >= 6 months who do not have contraindications   Pneumococcal Vaccine   * Pneumococcal conjugate vaccine = PCV13 (Prevnar 13), PCV15 (Vaxneuvance), PCV20 (Prevnar 20)  * Pneumococcal polysaccharide vaccine = PPSV23 (Pneumovax) Adults 25-60 years old: 1-3 doses may be recommended based on certain risk factors  Adults 72 years old: 1-2 doses may be recommended based off what pneumonia vaccine you previously received   Hepatitis B Vaccine 3 dose series if at intermediate or high risk (ex: diabetes, end stage renal disease, liver disease)   Tetanus (Td) Vaccine - COST NOT COVERED BY MEDICARE PART B Following completion of primary series, a booster dose should be given every 10 years to maintain immunity against tetanus  Td may also be given as tetanus wound prophylaxis     Tdap Vaccine - COST NOT COVERED BY MEDICARE PART B Recommended at least once for all adults  For pregnant patients, recommended with each pregnancy  Shingles Vaccine (Shingrix) - COST NOT COVERED BY MEDICARE PART B  2 shot series recommended in those aged 48 and above     Health Maintenance Due:      Topic Date Due   • Colorectal Cancer Screening  01/22/2024   • Hepatitis C Screening  Completed     Immunizations Due:      Topic Date Due   • COVID-19 Vaccine (4 - Booster for Natty Bergerson series) 04/15/2022     Advance Directives   What are advance directives? Advance directives are legal documents that state your wishes and plans for medical care  These plans are made ahead of time in case you lose your ability to make decisions for yourself  Advance directives can apply to any medical decision, such as the treatments you want, and if you want to donate organs  What are the types of advance directives? There are many types of advance directives, and each state has rules about how to use them  You may choose a combination of any of the following:  · Living will: This is a written record of the treatment you want  You can also choose which treatments you do not want, which to limit, and which to stop at a certain time  This includes surgery, medicine, IV fluid, and tube feedings  · Durable power of  for healthcare [facility]): This is a written record that states who you want to make healthcare choices for you when you are unable to make them for yourself  This person, called a proxy, is usually a family member or a friend  You may choose more than 1 proxy  · Do not resuscitate (DNR) order:  A DNR order is used in case your heart stops beating or you stop breathing  It is a request not to have certain forms of treatment, such as CPR  A DNR order may be included in other types of advance directives  · Medical directive: This covers the care that you want if you are in a coma, near death, or unable to make decisions for yourself  You can list the treatments you want for each condition  Treatment may include pain medicine, surgery, blood transfusions, dialysis, IV or tube feedings, and a ventilator (breathing machine)  · Values history: This document has questions about your views, beliefs, and how you feel and think about life  This information can help others choose the care that you would choose  Why are advance directives important? An advance directive helps you control your care  Although spoken wishes may be used, it is better to have your wishes written down  Spoken wishes can be misunderstood, or not followed  Treatments may be given even if you do not want them  An advance directive may make it easier for your family to make difficult choices about your care  Weight Management   Why it is important to manage your weight:  Being overweight increases your risk of health conditions such as heart disease, high blood pressure, type 2 diabetes, and certain types of cancer  It can also increase your risk for osteoarthritis, sleep apnea, and other respiratory problems  Aim for a slow, steady weight loss  Even a small amount of weight loss can lower your risk of health problems  How to lose weight safely:  A safe and healthy way to lose weight is to eat fewer calories and get regular exercise  You can lose up about 1 pound a week by decreasing the number of calories you eat by 500 calories each day  Healthy meal plan for weight management:  A healthy meal plan includes a variety of foods, contains fewer calories, and helps you stay healthy  A healthy meal plan includes the following:  · Eat whole-grain foods more often  A healthy meal plan should contain fiber  Fiber is the part of grains, fruits, and vegetables that is not broken down by your body  Whole-grain foods are healthy and provide extra fiber in your diet  Some examples of whole-grain foods are whole-wheat breads and pastas, oatmeal, brown rice, and bulgur    · Eat a variety of vegetables every day  Include dark, leafy greens such as spinach, kale, aspen greens, and mustard greens  Eat yellow and orange vegetables such as carrots, sweet potatoes, and winter squash  · Eat a variety of fruits every day  Choose fresh or canned fruit (canned in its own juice or light syrup) instead of juice  Fruit juice has very little or no fiber  · Eat low-fat dairy foods  Drink fat-free (skim) milk or 1% milk  Eat fat-free yogurt and low-fat cottage cheese  Try low-fat cheeses such as mozzarella and other reduced-fat cheeses  · Choose meat and other protein foods that are low in fat  Choose beans or other legumes such as split peas or lentils  Choose fish, skinless poultry (chicken or turkey), or lean cuts of red meat (beef or pork)  Before you cook meat or poultry, cut off any visible fat  · Use less fat and oil  Try baking foods instead of frying them  Add less fat, such as margarine, sour cream, regular salad dressing and mayonnaise to foods  Eat fewer high-fat foods  Some examples of high-fat foods include french fries, doughnuts, ice cream, and cakes  · Eat fewer sweets  Limit foods and drinks that are high in sugar  This includes candy, cookies, regular soda, and sweetened drinks  Exercise:  Exercise at least 30 minutes per day on most days of the week  Some examples of exercise include walking, biking, dancing, and swimming  You can also fit in more physical activity by taking the stairs instead of the elevator or parking farther away from stores  Ask your healthcare provider about the best exercise plan for you  © Copyright [employer] 2018 Information is for End User's use only and may not be sold, redistributed or otherwise used for commercial purposes   All illustrations and images included in CareNotes® are the copyrighted property of A D A M , Inc  or Kasi White  To follow with test results      Orders Placed This Encounter   Procedures   • UA (URINE) with reflex to Scope   • Arleta Bacca Virus Antibody Panel     Standing Status:   Future     Standing Expiration Date:   10/14/2023   • MUSA Screen w/ Reflex to Titer/Pattern     Standing Status:   Future     Standing Expiration Date:   10/14/2023   • C-reactive protein     Standing Status:   Future     Standing Expiration Date:   10/14/2023         Subjective:     Patient ID: Mario Reina is a 75 y o  male      HPI   fatigue  Patient stated he continued to feel tired  Also he continued to wake up at 4:00 a m  in the morning to take care of his scab  So he is not having enough sleep  No new symptoms  He has a chronic neck pain  Also he said sometimes that is contributing to not sleeping well  Denied snoring or stopping breathing during the night  Elevated blood sugar, denied polyuria or polydipsia     test results   Lab done September 28 22  MRI of the lumbar spine done at [facility]  Discussed results with patient    Review of Systems   Constitutional: Positive for fatigue  Negative for activity change, appetite change, chills, fever and unexpected weight change  HENT: Negative for congestion, ear discharge, ear pain, hearing loss, nosebleeds, rhinorrhea, sinus pressure, sore throat, tinnitus, trouble swallowing and voice change  Eyes: Negative for photophobia, pain and visual disturbance  Respiratory: Negative for cough, chest tightness, shortness of breath and wheezing  Cardiovascular: Negative for chest pain, palpitations and leg swelling  Gastrointestinal: Negative for abdominal pain, anal bleeding, blood in stool, constipation, diarrhea, nausea and vomiting  Endocrine: Negative for cold intolerance, heat intolerance, polydipsia and polyuria  Genitourinary: Negative for dysuria, frequency, hematuria and urgency  Musculoskeletal: Negative for arthralgias, gait problem, joint swelling, myalgias and neck pain  Skin: Negative for rash     Neurological: Negative for dizziness, tremors, seizures, syncope, facial asymmetry, weakness, light-headedness, numbness and headaches  Hematological: Negative for adenopathy  Does not bruise/bleed easily  Psychiatric/Behavioral: Negative for agitation, behavioral problems, confusion, dysphoric mood, hallucinations and sleep disturbance  The patient is not nervous/anxious  Objective:     Physical Exam  Constitutional:       General: He is not in acute distress  Appearance: Normal appearance  He is well-developed  He is not ill-appearing or diaphoretic  HENT:      Head: Normocephalic  Right Ear: Tympanic membrane, ear canal and external ear normal       Left Ear: Tympanic membrane, ear canal and external ear normal    Eyes:      General: No scleral icterus  Right eye: No discharge  Left eye: No discharge  Pupils: Pupils are equal, round, and reactive to light  Neck:      Thyroid: No thyromegaly  Vascular: No carotid bruit or JVD  Cardiovascular:      Rate and Rhythm: Normal rate and regular rhythm  Heart sounds: Normal heart sounds  No murmur heard  No gallop  Pulmonary:      Effort: Pulmonary effort is normal       Breath sounds: Normal breath sounds  Abdominal:      General: Bowel sounds are normal  There is no distension  Palpations: Abdomen is soft  There is no mass  Tenderness: There is no abdominal tenderness  There is no guarding or rebound  Musculoskeletal:         General: No swelling or tenderness  Normal range of motion  Cervical back: Neck supple  Right lower leg: No edema  Left lower leg: No edema  Lymphadenopathy:      Cervical: No cervical adenopathy  Skin:     Coloration: Skin is not pale  Findings: No rash  Neurological:      General: No focal deficit present  Mental Status: He is alert and oriented to person, place, and time  Cranial Nerves: No cranial nerve deficit  Motor: No abnormal muscle tone  Coordination: Coordination normal       Gait: Gait normal    Psychiatric:         Mood and Affect: Mood normal          Behavior: Behavior normal          Thought Content:  Thought content normal          Judgment: Judgment normal

## 2022-10-15 PROBLEM — D49.2 LUMBAR SPINE TUMOR: Status: ACTIVE | Noted: 2022-10-15

## 2022-10-21 ENCOUNTER — APPOINTMENT (OUTPATIENT)
Dept: LAB | Facility: CLINIC | Age: 67
End: 2022-10-21
Payer: COMMERCIAL

## 2022-10-21 DIAGNOSIS — R53.83 OTHER FATIGUE: ICD-10-CM

## 2022-10-21 DIAGNOSIS — R79.82 CRP ELEVATED: ICD-10-CM

## 2022-10-21 LAB
BILIRUB UR QL STRIP: NEGATIVE
CLARITY UR: CLEAR
COLOR UR: NORMAL
CRP SERPL QL: <3 MG/L
GLUCOSE UR STRIP-MCNC: NEGATIVE MG/DL
HGB UR QL STRIP.AUTO: NEGATIVE
KETONES UR STRIP-MCNC: NEGATIVE MG/DL
LEUKOCYTE ESTERASE UR QL STRIP: NEGATIVE
NITRITE UR QL STRIP: NEGATIVE
PH UR STRIP.AUTO: 6.5 [PH]
PROT UR STRIP-MCNC: NEGATIVE MG/DL
SP GR UR STRIP.AUTO: 1.01 (ref 1–1.03)
UROBILINOGEN UR STRIP-ACNC: <2 MG/DL

## 2022-10-21 PROCEDURE — 86140 C-REACTIVE PROTEIN: CPT

## 2022-10-21 PROCEDURE — 36415 COLL VENOUS BLD VENIPUNCTURE: CPT

## 2022-10-21 PROCEDURE — 86038 ANTINUCLEAR ANTIBODIES: CPT

## 2022-10-21 PROCEDURE — 86664 EPSTEIN-BARR NUCLEAR ANTIGEN: CPT

## 2022-10-21 PROCEDURE — 86663 EPSTEIN-BARR ANTIBODY: CPT

## 2022-10-21 PROCEDURE — 86665 EPSTEIN-BARR CAPSID VCA: CPT

## 2022-10-21 PROCEDURE — 81003 URINALYSIS AUTO W/O SCOPE: CPT | Performed by: FAMILY MEDICINE

## 2022-10-22 LAB
EBV NA IGG SER IA-ACNC: 68.2 U/ML (ref 0–17.9)
EBV VCA IGG SER IA-ACNC: 151 U/ML (ref 0–17.9)
EBV VCA IGM SER IA-ACNC: <36 U/ML (ref 0–35.9)
INTERPRETATION: ABNORMAL

## 2022-10-24 LAB — RYE IGE QN: NEGATIVE

## 2022-10-24 NOTE — RESULT ENCOUNTER NOTE
Tanner-Barr test positive representing previous mono infection  Inflammatory markers are normal   MUSA, test for lupus, negative

## 2022-11-16 ENCOUNTER — OFFICE VISIT (OUTPATIENT)
Dept: FAMILY MEDICINE CLINIC | Facility: CLINIC | Age: 67
End: 2022-11-16

## 2022-11-16 VITALS
DIASTOLIC BLOOD PRESSURE: 68 MMHG | BODY MASS INDEX: 25.41 KG/M2 | HEIGHT: 72 IN | WEIGHT: 187.6 LBS | SYSTOLIC BLOOD PRESSURE: 129 MMHG | TEMPERATURE: 97.6 F | HEART RATE: 83 BPM | OXYGEN SATURATION: 96 %

## 2022-11-16 DIAGNOSIS — R53.83 OTHER FATIGUE: ICD-10-CM

## 2022-11-16 DIAGNOSIS — J30.1 ALLERGIC RHINITIS DUE TO POLLEN, UNSPECIFIED SEASONALITY: ICD-10-CM

## 2022-11-16 DIAGNOSIS — D49.2 LUMBAR SPINE TUMOR: Primary | ICD-10-CM

## 2022-11-16 DIAGNOSIS — R79.82 CRP ELEVATED: ICD-10-CM

## 2022-11-16 RX ORDER — DESLORATADINE 5 MG/1
5 TABLET ORAL DAILY
Qty: 30 TABLET | Refills: 2 | Status: SHIPPED | OUTPATIENT
Start: 2022-11-16

## 2022-11-16 NOTE — PROGRESS NOTES
Assessment/Plan:       No problem-specific Assessment & Plan notes found for this encounter  Diagnoses and all orders for this visit:    Lumbar spine tumor  Comments:  Patient requested 2nd opinion   Orders:  -     Ambulatory Referral to Neurosurgery; Future    Other fatigue  Comments:  Persist most likely secondary to sleeping enough, recommend further testing , patient would like to hold on it for now    CRP elevated  Comments:  Corrected    Allergic rhinitis due to pollen, unspecified seasonality  Comments:  Advised patient to call if symptoms not better  Advised to stop over-the-counter medication  Orders:  -     desloratadine (CLARINEX) 5 MG tablet; Take 1 tablet (5 mg total) by mouth daily        Patient Instructions   Patient will call for follow-up appointment      Orders Placed This Encounter   Procedures   • Ambulatory Referral to Neurosurgery     Standing Status:   Future     Standing Expiration Date:   11/16/2023     Referral Priority:   ASAP     Referral Type:   Consult - AMB     Referral Reason:   Specialty Services Required     Referred to Provider:   Majo Pena MD     Requested Specialty:   Neurosurgery     Number of Visits Requested:   1     Expiration Date:   11/16/2023         Subjective:     Patient ID: Hakeem Gunderson is a 75 y o  male      HPI  Follow up   Fatigue  Patient stated continued to feel tired  Patient also stated he have to wake at 4:00 a m  in the morning to feed his cat so he does not sleep enough hours  Mass of spine  saw neurosurgeon Ana Jordan  at [facility]  Recommend surgery  pt request second oponion  Allergies  Patient continued to have clear postnasal drainage symptoms started since September this year  Taking Flonase   Start taking sinus max  Patient denied sore throat, fever  or cough       test results  Labs 10-21-22  Discussed with pt    Review of Systems   Constitutional: Negative for activity change, appetite change, chills, fatigue, fever and unexpected weight change  HENT: Positive for postnasal drip  Negative for congestion, ear discharge, ear pain, hearing loss, nosebleeds, rhinorrhea, sinus pressure, sore throat, tinnitus, trouble swallowing and voice change  Eyes: Negative for photophobia, pain and visual disturbance  Respiratory: Negative for cough, chest tightness, shortness of breath and wheezing  Cardiovascular: Negative for chest pain, palpitations and leg swelling  Gastrointestinal: Negative for abdominal pain, anal bleeding, blood in stool, constipation, diarrhea, nausea and vomiting  Endocrine: Negative for cold intolerance, heat intolerance, polydipsia and polyuria  Genitourinary: Negative for dysuria, frequency, hematuria and urgency  Musculoskeletal: Positive for back pain  Negative for gait problem, joint swelling, myalgias and neck pain  Skin: Negative for rash  Neurological: Negative for dizziness, tremors, seizures, syncope, weakness, light-headedness and headaches  Hematological: Negative for adenopathy  Does not bruise/bleed easily  Psychiatric/Behavioral: Negative for agitation, behavioral problems, confusion, dysphoric mood, hallucinations and sleep disturbance  The patient is not nervous/anxious  Objective:     Physical Exam  Constitutional:       General: He is not in acute distress  Appearance: Normal appearance  He is well-developed  He is not ill-appearing  HENT:      Head: Normocephalic  Nose: No congestion or rhinorrhea  Mouth/Throat:      Pharynx: No posterior oropharyngeal erythema  Eyes:      General: No scleral icterus  Right eye: No discharge  Left eye: No discharge  Pupils: Pupils are equal, round, and reactive to light  Neck:      Thyroid: No thyromegaly  Vascular: No carotid bruit or JVD  Cardiovascular:      Rate and Rhythm: Normal rate and regular rhythm  Heart sounds: Normal heart sounds  No murmur heard  No gallop     Pulmonary: Effort: Pulmonary effort is normal       Breath sounds: Normal breath sounds  Abdominal:      General: Bowel sounds are normal  There is no distension  Palpations: Abdomen is soft  There is no mass  Tenderness: There is no abdominal tenderness  There is no guarding or rebound  Musculoskeletal:         General: No swelling or tenderness  Normal range of motion  Cervical back: Neck supple  Right lower leg: No edema  Left lower leg: No edema  Lymphadenopathy:      Cervical: No cervical adenopathy  Skin:     Coloration: Skin is not pale  Findings: No rash  Neurological:      General: No focal deficit present  Mental Status: He is alert and oriented to person, place, and time  Cranial Nerves: No cranial nerve deficit  Motor: No weakness or abnormal muscle tone  Coordination: Coordination normal    Psychiatric:         Mood and Affect: Mood normal          Behavior: Behavior normal          Thought Content:  Thought content normal

## 2022-11-17 PROBLEM — R79.82 CRP ELEVATED: Status: ACTIVE | Noted: 2022-11-17

## 2022-11-22 ENCOUNTER — OFFICE VISIT (OUTPATIENT)
Dept: NEUROSURGERY | Facility: CLINIC | Age: 67
End: 2022-11-22

## 2022-11-22 VITALS
DIASTOLIC BLOOD PRESSURE: 77 MMHG | HEIGHT: 72 IN | BODY MASS INDEX: 25.12 KG/M2 | SYSTOLIC BLOOD PRESSURE: 143 MMHG | HEART RATE: 61 BPM | TEMPERATURE: 99.3 F | RESPIRATION RATE: 18 BRPM | WEIGHT: 185.5 LBS

## 2022-11-22 DIAGNOSIS — M48.061 LUMBAR SPINAL STENOSIS: ICD-10-CM

## 2022-11-22 DIAGNOSIS — D49.2 LUMBAR SPINE TUMOR: Primary | ICD-10-CM

## 2022-11-22 NOTE — H&P (VIEW-ONLY)
DISCUSSION SUMMARY  This is a 75 y o  male with an L3 nerve root sheath mass  I have recommended that this be debulked  We spoke at length about the risks, possibility of complication and indications for removal of this mass  All questions were answered  He wishes to proceed  The risks, benefits and complications of surgery were explained in detail to [employer]  including hemorrhage, infection, CSF leakage, wound problems, pain, weakness, numbness, dysesthesiae, paralysis, coma, and death  Also, the possibility  of further surgery being required was emphasized  Other potential medical complications were outlined, including deep venous thrombosis, pulmonary embolism, pneumonia, urinary tract infection, myocardial infarction,  and stroke  The need for physical therapy, occupational therapy, and rehabilitation was also mentioned  The alternatives to surgery were discussed  [employer] asked relevant questions and asked that we proceed with arrangements for surgery  Return for Surgical intervention  Diagnosis ICD-10-CM Associated Orders   1  Lumbar spine tumor  D49 2 Ambulatory Referral to Neurosurgery     Case request operating room: L2/3 decompressive laminectomy and biopsy (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     Case request operating room: L2/3 decompressive laminectomy and biopsy (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    Patient requested 2nd opinion       2   Lumbar spinal stenosis  M48 061 Case request operating room: L2/3 decompressive laminectomy and biopsy (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     Case request operating room: L2/3 decompressive laminectomy and biopsy (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             Chief Complaint   Patient presents with   • Consult     ASAP - NEW/RETURN PATIENT FOR LUMBAR SPINAL TUMOR W/MRI [address] [facility] - CE - IN PACS       HPI   this is a 69-year-old gentleman who complains of back pain  This is centered in the low back and radiates into the left buttock and left leg stopping at the level of the knee  This affects both the back and front of his leg  He is not had physical therapy nor has he had pain medicine  Feels that his left leg is weak he has numbness and tingling centered in the buttocks going down into the left leg  The pain sometimes interrupt his ability to sleep  Underwent an MRI of the LS spine which demonstrated a tumor  Review of Systems   Constitutional: Negative  HENT: Negative  Eyes: Negative  Respiratory: Negative  Cardiovascular: Negative  Gastrointestinal: Negative  Endocrine: Negative  Genitourinary: Negative  Musculoskeletal: Positive for back pain (chronic centered of lower back pain radiates into left buttock down left leg stopping at left knee, both back and front of leg) and neck pain (neck pain radiates into back of head)  Negative for gait problem (does have one leg (left) shorter then the other)  No PT  No PM  Chiro previously which sometimes helped and sometimes didn't help   Skin: Negative  Allergic/Immunologic: Negative  Neurological: Positive for weakness (left leg), numbness (numbness and tingling in centered of lower back into left buttock down left leg) and headaches (due to neck pain, occasionally)  Negative for dizziness, tremors, seizures and syncope  Hematological: Negative  Psychiatric/Behavioral: Positive for sleep disturbance (due to pain)  I reviewed the ROS        Vitals:    /77   Pulse 61   Temp 99 3 °F (37 4 °C)   Resp 18   Ht 6' (1 829 m)   Wt 84 1 kg (185 lb 8 oz)   BMI 25 16 kg/m²       MEDICAL HISTORY  Past Medical History:   Diagnosis Date   • Anxiety    • Arthritis    • Bleeding tendency (HCC)    • BPH (benign prostatic hyperplasia)    • Broken bones     left leg   • Cataract    • Chronic neck pain    • Epilepsy (HCC)    • Hypothyroidism    • Neoplasm of unspecified behavior of bone, soft tissue, and skin    • Osteomyelitis ([facility] [address] )    • Prostate cancer ([facility] [address] )    • Skin cancer    • Squamous carcinoma 2014   • Thyroid condition    • Thyroid disease      Past Surgical History:   Procedure Laterality Date   • ARTHRODESIS      H/o Arthrodesis Cervical to C2;  last assessed [id number]   • COLONOSCOPY  2021   • COLONOSCOPY     • KNEE SURGERY Right 2017   • LEG SURGERY Left     Hardware placed in lower leg bone(s)   • LEG SURGERY Left 2006, -Left leg vascular   • THYROID SURGERY Right    • [facility] BIOPSY  2021     Social History     Tobacco Use   • Smoking status: Former     Types: Cigarettes     Quit date:      Years since quittin 9   • Smokeless tobacco: Never   Vaping Use   • Vaping Use: Never used   Substance Use Topics   • Alcohol use: No     Comment: being a social drinker   • Drug use: Not Currently     Comment: Medical marijuana        Current Outpatient Medications:   •  acetaminophen (TYLENOL) 500 mg tablet, Take 1,000 mg by mouth as needed , Disp: , Rfl:   •  alendronate (FOSAMAX) 35 mg tablet, Take 1 tablet (35 mg total) by mouth every 7 days, Disp: 4 tablet, Rfl: 2  •  Calcium Carbonate-Vit D-Min (CALCIUM 1200 PO), Take 1 tablet by mouth daily, Disp: , Rfl:   •  desloratadine (CLARINEX) 5 MG tablet, Take 1 tablet (5 mg total) by mouth daily, Disp: 30 tablet, Rfl: 2  •  doxycycline (ADOXA) 50 MG tablet, TAKE 1 TABLET BY ORAL ROUTE EVERY DAY WITH MEAL X3 MONTHS THEN [facility], Disp: , Rfl:   •  fluticasone (FLONASE) 50 mcg/act nasal spray, 2 sprays into each nostril daily, Disp: 3 g, Rfl: 1  •  ibuprofen (MOTRIN) 200 mg tablet, Take 400 mg by mouth every 4 (four) hours as needed for mild pain, Disp: , Rfl:   •  levothyroxine 100 mcg tablet, daily, Disp: , Rfl:   •  metroNIDAZOLE (METROGEL) 0 75 % gel, Apply topically 2 (two) times a day, Disp: , Rfl:   • Multiple Vitamin (MULTIVITAMINS PO), Take 1 capsule by mouth daily, Disp: , Rfl:   •  Omega-3 Fatty Acids (FISH OIL) 645 MG CAPS, Take 1 capsule by mouth daily , Disp: , Rfl:   •  RESTASIS MULTIDOSE 0 05 % ophthalmic emulsion, INSTILL 1 DROP INTO BOTH EYES TWICE A DAY, Disp: , Rfl: 3  •  tamsulosin (FLOMAX) 0 4 mg, Take 0 4 mg by mouth 2 (two) times a day, Disp: , Rfl:   •  tazarotene (AVAGE) 0 1 % cream, Apply topically daily at bedtime, Disp: , Rfl:   •  tretinoin (ALTRALIN) 0 05 %, , Disp: , Rfl: 1   Allergies   Allergen Reactions   • Amoxicillin-Pot Clavulanate Rash, Vomiting and Nausea Only        The following portions of the patient's history were updated by MA and reviewed by MD: allergies, current medications, past family history, past medical history, past social history, past surgical history and problem list       Physical Exam  Vitals and nursing note reviewed  Constitutional:       General: He is not in acute distress  Appearance: Normal appearance  He is normal weight  He is not ill-appearing, toxic-appearing or diaphoretic  HENT:      Head: Normocephalic and atraumatic  Nose: Nose normal    Eyes:      Extraocular Movements: Extraocular movements intact  Pupils: Pupils are equal, round, and reactive to light  Cardiovascular:      Rate and Rhythm: Normal rate and regular rhythm  Pulses: Normal pulses  Heart sounds: Normal heart sounds  No murmur heard  No friction rub  No gallop  Pulmonary:      Effort: Pulmonary effort is normal  No respiratory distress  Breath sounds: Normal breath sounds  No stridor  No wheezing, rhonchi or rales  Chest:      Chest wall: No tenderness  Abdominal:      General: Abdomen is flat  Bowel sounds are normal    Musculoskeletal:         General: No swelling, tenderness, deformity or signs of injury  Normal range of motion  Cervical back: Normal range of motion and neck supple  Right lower leg: No edema        Left lower leg: No edema  Skin:     General: Skin is warm and dry  Neurological:      Mental Status: He is alert and oriented to person, place, and time  Mental status is at baseline  Cranial Nerves: No cranial nerve deficit  Sensory: No sensory deficit  Motor: Weakness (Left quad and gastrocnemius are at 4/5) present  Coordination: Coordination normal       Gait: Gait abnormal (  slight antalgic gait favoring the right from the left)  Deep Tendon Reflexes: Reflexes abnormal ( absent patella on the left)  Psychiatric:         Mood and Affect: Mood normal          Behavior: Behavior normal          Thought Content: Thought content normal          Judgment: Judgment normal            RESULTS/DATA  I have personally reviewed pertinent films in PACS   MRI of the LS spine is carefully reviewed with the patient    There is a contrast-enhancing mass in the left neural foramen at L3 this as a dumbbell configuration with expansion of the neural foramen 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

## 2022-11-22 NOTE — PROGRESS NOTES
DISCUSSION SUMMARY  This is a 75 y o  male with an L3 nerve root sheath mass  I have recommended that this be debulked  We spoke at length about the risks, possibility of complication and indications for removal of this mass  All questions were answered  He wishes to proceed  The risks, benefits and complications of surgery were explained in detail to [employer]  including hemorrhage, infection, CSF leakage, wound problems, pain, weakness, numbness, dysesthesiae, paralysis, coma, and death  Also, the possibility  of further surgery being required was emphasized  Other potential medical complications were outlined, including deep venous thrombosis, pulmonary embolism, pneumonia, urinary tract infection, myocardial infarction,  and stroke  The need for physical therapy, occupational therapy, and rehabilitation was also mentioned  The alternatives to surgery were discussed  [employer] asked relevant questions and asked that we proceed with arrangements for surgery  Return for Surgical intervention  Diagnosis ICD-10-CM Associated Orders   1  Lumbar spine tumor  D49 2 Ambulatory Referral to Neurosurgery     Case request operating room: L2/3 decompressive laminectomy and biopsy (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     Case request operating room: L2/3 decompressive laminectomy and biopsy (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    Patient requested 2nd opinion       2   Lumbar spinal stenosis  M48 061 Case request operating room: L2/3 decompressive laminectomy and biopsy (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     Case request operating room: L2/3 decompressive laminectomy and biopsy (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             Chief Complaint   Patient presents with   • Consult     ASAP - NEW/RETURN PATIENT FOR LUMBAR SPINAL TUMOR W/MRI [address] [facility] 9/3/22 [facility] - CE - IN PACS       HPI   this is a 79-year-old gentleman who complains of back pain  This is centered in the low back and radiates into the left buttock and left leg stopping at the level of the knee  This affects both the back and front of his leg  He is not had physical therapy nor has he had pain medicine  Feels that his left leg is weak he has numbness and tingling centered in the buttocks going down into the left leg  The pain sometimes interrupt his ability to sleep  Underwent an MRI of the LS spine which demonstrated a tumor  Review of Systems   Constitutional: Negative  HENT: Negative  Eyes: Negative  Respiratory: Negative  Cardiovascular: Negative  Gastrointestinal: Negative  Endocrine: Negative  Genitourinary: Negative  Musculoskeletal: Positive for back pain (chronic centered of lower back pain radiates into left buttock down left leg stopping at left knee, both back and front of leg) and neck pain (neck pain radiates into back of head)  Negative for gait problem (does have one leg (left) shorter then the other)  No PT  No PM  Chiro previously which sometimes helped and sometimes didn't help   Skin: Negative  Allergic/Immunologic: Negative  Neurological: Positive for weakness (left leg), numbness (numbness and tingling in centered of lower back into left buttock down left leg) and headaches (due to neck pain, occasionally)  Negative for dizziness, tremors, seizures and syncope  Hematological: Negative  Psychiatric/Behavioral: Positive for sleep disturbance (due to pain)  I reviewed the ROS        Vitals:    /77   Pulse 61   Temp 99 3 °F (37 4 °C)   Resp 18   Ht 6' (1 829 m)   Wt 84 1 kg (185 lb 8 oz)   BMI 25 16 kg/m²       MEDICAL HISTORY  Past Medical History:   Diagnosis Date   • Anxiety    • Arthritis    • Bleeding tendency (HCC)    • BPH (benign prostatic hyperplasia)    • Broken bones     left leg   • Cataract    • Chronic neck pain    • Epilepsy (HCC)    • Hypothyroidism    • Neoplasm of unspecified behavior of bone, soft tissue, and skin    • Osteomyelitis ([facility] [address] )    • Prostate cancer ([facility] [address] )    • Skin cancer    • Squamous carcinoma 2014   • Thyroid condition    • Thyroid disease      Past Surgical History:   Procedure Laterality Date   • ARTHRODESIS      H/o Arthrodesis Cervical to C2;  last assessed [id number]   • COLONOSCOPY  2021   • COLONOSCOPY     • KNEE SURGERY Right 2017   • LEG SURGERY Left     Hardware placed in lower leg bone(s)   • LEG SURGERY Left 2006, -Left leg vascular   • THYROID SURGERY Right    • [facility] BIOPSY  2021     Social History     Tobacco Use   • Smoking status: Former     Types: Cigarettes     Quit date:      Years since quittin 9   • Smokeless tobacco: Never   Vaping Use   • Vaping Use: Never used   Substance Use Topics   • Alcohol use: No     Comment: being a social drinker   • Drug use: Not Currently     Comment: Medical marijuana        Current Outpatient Medications:   •  acetaminophen (TYLENOL) 500 mg tablet, Take 1,000 mg by mouth as needed , Disp: , Rfl:   •  alendronate (FOSAMAX) 35 mg tablet, Take 1 tablet (35 mg total) by mouth every 7 days, Disp: 4 tablet, Rfl: 2  •  Calcium Carbonate-Vit D-Min (CALCIUM 1200 PO), Take 1 tablet by mouth daily, Disp: , Rfl:   •  desloratadine (CLARINEX) 5 MG tablet, Take 1 tablet (5 mg total) by mouth daily, Disp: 30 tablet, Rfl: 2  •  doxycycline (ADOXA) 50 MG tablet, TAKE 1 TABLET BY ORAL ROUTE EVERY DAY WITH MEAL X3 MONTHS THEN [facility], Disp: , Rfl:   •  fluticasone (FLONASE) 50 mcg/act nasal spray, 2 sprays into each nostril daily, Disp: 3 g, Rfl: 1  •  ibuprofen (MOTRIN) 200 mg tablet, Take 400 mg by mouth every 4 (four) hours as needed for mild pain, Disp: , Rfl:   •  levothyroxine 100 mcg tablet, daily, Disp: , Rfl:   •  metroNIDAZOLE (METROGEL) 0 75 % gel, Apply topically 2 (two) times a day, Disp: , Rfl:   • Multiple Vitamin (MULTIVITAMINS PO), Take 1 capsule by mouth daily, Disp: , Rfl:   •  Omega-3 Fatty Acids (FISH OIL) 645 MG CAPS, Take 1 capsule by mouth daily , Disp: , Rfl:   •  RESTASIS MULTIDOSE 0 05 % ophthalmic emulsion, INSTILL 1 DROP INTO BOTH EYES TWICE A DAY, Disp: , Rfl: 3  •  tamsulosin (FLOMAX) 0 4 mg, Take 0 4 mg by mouth 2 (two) times a day, Disp: , Rfl:   •  tazarotene (AVAGE) 0 1 % cream, Apply topically daily at bedtime, Disp: , Rfl:   •  tretinoin (ALTRALIN) 0 05 %, , Disp: , Rfl: 1   Allergies   Allergen Reactions   • Amoxicillin-Pot Clavulanate Rash, Vomiting and Nausea Only        The following portions of the patient's history were updated by MA and reviewed by MD: allergies, current medications, past family history, past medical history, past social history, past surgical history and problem list       Physical Exam  Vitals and nursing note reviewed  Constitutional:       General: He is not in acute distress  Appearance: Normal appearance  He is normal weight  He is not ill-appearing, toxic-appearing or diaphoretic  HENT:      Head: Normocephalic and atraumatic  Nose: Nose normal    Eyes:      Extraocular Movements: Extraocular movements intact  Pupils: Pupils are equal, round, and reactive to light  Cardiovascular:      Rate and Rhythm: Normal rate and regular rhythm  Pulses: Normal pulses  Heart sounds: Normal heart sounds  No murmur heard  No friction rub  No gallop  Pulmonary:      Effort: Pulmonary effort is normal  No respiratory distress  Breath sounds: Normal breath sounds  No stridor  No wheezing, rhonchi or rales  Chest:      Chest wall: No tenderness  Abdominal:      General: Abdomen is flat  Bowel sounds are normal    Musculoskeletal:         General: No swelling, tenderness, deformity or signs of injury  Normal range of motion  Cervical back: Normal range of motion and neck supple  Right lower leg: No edema        Left lower leg: No edema  Skin:     General: Skin is warm and dry  Neurological:      Mental Status: He is alert and oriented to person, place, and time  Mental status is at baseline  Cranial Nerves: No cranial nerve deficit  Sensory: No sensory deficit  Motor: Weakness (Left quad and gastrocnemius are at 4/5) present  Coordination: Coordination normal       Gait: Gait abnormal (  slight antalgic gait favoring the right from the left)  Deep Tendon Reflexes: Reflexes abnormal ( absent patella on the left)  Psychiatric:         Mood and Affect: Mood normal          Behavior: Behavior normal          Thought Content: Thought content normal          Judgment: Judgment normal            RESULTS/DATA  I have personally reviewed pertinent films in PACS   MRI of the LS spine is carefully reviewed with the patient    There is a contrast-enhancing mass in the left neural foramen at L3 this as a dumbbell configuration with expansion of the neural foramen 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

## 2022-11-30 RX ORDER — CHLORHEXIDINE GLUCONATE 0.12 MG/ML
15 RINSE ORAL ONCE
OUTPATIENT
Start: 2022-11-30 | End: 2022-11-30

## 2022-11-30 RX ORDER — VANCOMYCIN HYDROCHLORIDE 1 G/200ML
1000 INJECTION, SOLUTION INTRAVENOUS ONCE
OUTPATIENT
Start: 2022-11-30 | End: 2022-11-30

## 2022-12-07 ENCOUNTER — APPOINTMENT (OUTPATIENT)
Dept: LAB | Facility: HOSPITAL | Age: 67
End: 2022-12-07

## 2022-12-07 ENCOUNTER — LAB (OUTPATIENT)
Dept: LAB | Facility: HOSPITAL | Age: 67
End: 2022-12-07

## 2022-12-07 DIAGNOSIS — M48.061 LUMBAR SPINAL STENOSIS: ICD-10-CM

## 2022-12-07 DIAGNOSIS — D49.2 LUMBAR SPINE TUMOR: ICD-10-CM

## 2022-12-07 DIAGNOSIS — Z01.818 OTHER SPECIFIED PRE-OPERATIVE EXAMINATION: ICD-10-CM

## 2022-12-07 LAB
ALBUMIN SERPL BCP-MCNC: 3.9 G/DL (ref 3.5–5)
ALP SERPL-CCNC: 57 U/L (ref 46–116)
ALT SERPL W P-5'-P-CCNC: 29 U/L (ref 12–78)
ANION GAP SERPL CALCULATED.3IONS-SCNC: 4 MMOL/L (ref 4–13)
APTT PPP: 27 SECONDS (ref 23–37)
AST SERPL W P-5'-P-CCNC: 22 U/L (ref 5–45)
ATRIAL RATE: 59 BPM
BASOPHILS # BLD AUTO: 0.03 THOUSANDS/ÂΜL (ref 0–0.1)
BASOPHILS NFR BLD AUTO: 0 % (ref 0–1)
BILIRUB SERPL-MCNC: 0.53 MG/DL (ref 0.2–1)
BUN SERPL-MCNC: 19 MG/DL (ref 5–25)
CALCIUM SERPL-MCNC: 9.6 MG/DL (ref 8.3–10.1)
CHLORIDE SERPL-SCNC: 105 MMOL/L (ref 96–108)
CO2 SERPL-SCNC: 30 MMOL/L (ref 21–32)
CREAT SERPL-MCNC: 0.58 MG/DL (ref 0.6–1.3)
EOSINOPHIL # BLD AUTO: 0.18 THOUSAND/ÂΜL (ref 0–0.61)
EOSINOPHIL NFR BLD AUTO: 2 % (ref 0–6)
ERYTHROCYTE [DISTWIDTH] IN BLOOD BY AUTOMATED COUNT: 13.7 % (ref 11.6–15.1)
EST. AVERAGE GLUCOSE BLD GHB EST-MCNC: 108 MG/DL
GFR SERPL CREATININE-BSD FRML MDRD: 104 ML/MIN/1.73SQ M
GLUCOSE SERPL-MCNC: 106 MG/DL (ref 65–140)
HBA1C MFR BLD: 5.4 %
HCT VFR BLD AUTO: 44.3 % (ref 36.5–49.3)
HGB BLD-MCNC: 14.2 G/DL (ref 12–17)
IMM GRANULOCYTES # BLD AUTO: 0.02 THOUSAND/UL (ref 0–0.2)
IMM GRANULOCYTES NFR BLD AUTO: 0 % (ref 0–2)
INR PPP: 1.05 (ref 0.84–1.19)
LYMPHOCYTES # BLD AUTO: 2.2 THOUSANDS/ÂΜL (ref 0.6–4.47)
LYMPHOCYTES NFR BLD AUTO: 26 % (ref 14–44)
MCH RBC QN AUTO: 32.3 PG (ref 26.8–34.3)
MCHC RBC AUTO-ENTMCNC: 32.1 G/DL (ref 31.4–37.4)
MCV RBC AUTO: 101 FL (ref 82–98)
MONOCYTES # BLD AUTO: 0.51 THOUSAND/ÂΜL (ref 0.17–1.22)
MONOCYTES NFR BLD AUTO: 6 % (ref 4–12)
NEUTROPHILS # BLD AUTO: 5.63 THOUSANDS/ÂΜL (ref 1.85–7.62)
NEUTS SEG NFR BLD AUTO: 66 % (ref 43–75)
NRBC BLD AUTO-RTO: 0 /100 WBCS
P AXIS: 71 DEGREES
PLATELET # BLD AUTO: 177 THOUSANDS/UL (ref 149–390)
PMV BLD AUTO: 10.3 FL (ref 8.9–12.7)
POTASSIUM SERPL-SCNC: 3.7 MMOL/L (ref 3.5–5.3)
PR INTERVAL: 166 MS
PROT SERPL-MCNC: 7.2 G/DL (ref 6.4–8.4)
PROTHROMBIN TIME: 13.7 SECONDS (ref 11.6–14.5)
QRS AXIS: 27 DEGREES
QRSD INTERVAL: 92 MS
QT INTERVAL: 424 MS
QTC INTERVAL: 419 MS
RBC # BLD AUTO: 4.39 MILLION/UL (ref 3.88–5.62)
SODIUM SERPL-SCNC: 139 MMOL/L (ref 135–147)
T WAVE AXIS: 75 DEGREES
VENTRICULAR RATE: 59 BPM
WBC # BLD AUTO: 8.57 THOUSAND/UL (ref 4.31–10.16)

## 2022-12-08 ENCOUNTER — LAB REQUISITION (OUTPATIENT)
Dept: LAB | Facility: HOSPITAL | Age: 67
End: 2022-12-08

## 2022-12-08 ENCOUNTER — APPOINTMENT (OUTPATIENT)
Dept: LAB | Facility: CLINIC | Age: 67
End: 2022-12-08

## 2022-12-08 DIAGNOSIS — D49.2 LUMBAR SPINE TUMOR: ICD-10-CM

## 2022-12-08 DIAGNOSIS — Z01.818 ENCOUNTER FOR OTHER PREPROCEDURAL EXAMINATION: ICD-10-CM

## 2022-12-08 DIAGNOSIS — M48.061 LUMBAR SPINAL STENOSIS: ICD-10-CM

## 2022-12-08 LAB
ABO GROUP BLD: NORMAL
BILIRUB UR QL STRIP: NEGATIVE
BLD GP AB SCN SERPL QL: NEGATIVE
CLARITY UR: CLEAR
COLOR UR: COLORLESS
GLUCOSE UR STRIP-MCNC: NEGATIVE MG/DL
HGB UR QL STRIP.AUTO: NEGATIVE
KETONES UR STRIP-MCNC: NEGATIVE MG/DL
LEUKOCYTE ESTERASE UR QL STRIP: NEGATIVE
NITRITE UR QL STRIP: NEGATIVE
PH UR STRIP.AUTO: 7 [PH]
PROT UR STRIP-MCNC: NEGATIVE MG/DL
RH BLD: POSITIVE
SP GR UR STRIP.AUTO: 1 (ref 1–1.03)
SPECIMEN EXPIRATION DATE: NORMAL
UROBILINOGEN UR STRIP-ACNC: <2 MG/DL

## 2022-12-12 NOTE — PRE-PROCEDURE INSTRUCTIONS
Pre-Surgery Instructions:   Medication Instructions   • acetaminophen (TYLENOL) 500 mg tablet Uses PRN- OK to take day of surgery   • Calcium Carbonate-Vit D-Min (CALCIUM 1200 PO) Stop taking 7 days prior to surgery  • desloratadine (CLARINEX) 5 MG tablet Take day of surgery  • doxycycline (ADOXA) 50 MG tablet Hold day of surgery  • fluticasone (FLONASE) 50 mcg/act nasal spray Take day of surgery  • ibuprofen (MOTRIN) 200 mg tablet Stop taking 7 days prior to surgery  • levothyroxine 100 mcg tablet Take day of surgery  • metroNIDAZOLE (METROGEL) 0 75 % gel Hold day of surgery  • Multiple Vitamin (MULTIVITAMINS PO) Stop taking 7 days prior to surgery  • Omega-3 Fatty Acids (FISH OIL) 645 MG CAPS Stop taking 7 days prior to surgery  • RESTASIS MULTIDOSE 0 05 % ophthalmic emulsion Take day of surgery  • tamsulosin (FLOMAX) 0 4 mg Take night before surgery   • tazarotene (AVAGE) 0 1 % cream Hold day of surgery  - Reviewed with patient, in detail, instructions from "My Surgical Experience"  - Instructed to avoid all OTC vitamins/supplements and NSAIDS for one week prior to surgery per anesthesia guidelines  Tylenol ok to take PRN  - Advised patient nothing eat or drink after midnight prior to surgery, except medications that he/she is to take morning DOS with only small sip of water     - Advised patient that [facility] will call with surgery arrival time and hospital directions the business day prior to surgery  Patient verbalized understanding of current visitor restrictions/masking guidelines and advised that he/she can confirm these at time of arrival call with [facility]      - Patient verbalized understanding and knows to call surgeon's office with any additional questions prior to surgery  Instructed to call surgeon's office in meantime with any new illnesses/exposure, patient verbalized understanding

## 2022-12-18 ENCOUNTER — ANESTHESIA EVENT (OUTPATIENT)
Dept: PERIOP | Facility: HOSPITAL | Age: 67
DRG: 517 | End: 2022-12-18
Payer: COMMERCIAL

## 2022-12-19 ENCOUNTER — HOSPITAL ENCOUNTER (INPATIENT)
Facility: HOSPITAL | Age: 67
LOS: 3 days | Discharge: HOME | End: 2022-12-22
Attending: NEUROLOGICAL SURGERY | Admitting: NEUROLOGICAL SURGERY

## 2022-12-19 ENCOUNTER — TELEPHONE (OUTPATIENT)
Dept: NEUROSURGERY | Facility: CLINIC | Age: 67
End: 2022-12-19

## 2022-12-19 ENCOUNTER — APPOINTMENT (OUTPATIENT)
Dept: RADIOLOGY | Facility: HOSPITAL | Age: 67
End: 2022-12-19

## 2022-12-19 ENCOUNTER — ANESTHESIA (OUTPATIENT)
Dept: PERIOP | Facility: HOSPITAL | Age: 67
DRG: 517 | End: 2022-12-19
Payer: COMMERCIAL

## 2022-12-19 DIAGNOSIS — Z98.890 POSTOPERATIVE STATE: ICD-10-CM

## 2022-12-19 DIAGNOSIS — D49.2 NERVE SHEATH TUMOR: Primary | ICD-10-CM

## 2022-12-19 DIAGNOSIS — M48.061 LUMBAR SPINAL STENOSIS: ICD-10-CM

## 2022-12-19 DIAGNOSIS — D49.2 LUMBAR SPINE TUMOR: ICD-10-CM

## 2022-12-19 LAB
ABO GROUP BLD: NORMAL
PLATELET # BLD AUTO: 159 THOUSANDS/UL (ref 149–390)
PMV BLD AUTO: 10.5 FL (ref 8.9–12.7)
RH BLD: POSITIVE

## 2022-12-19 PROCEDURE — 01BB0ZZ EXCISION OF LUMBAR NERVE, OPEN APPROACH: ICD-10-PCS | Performed by: NEUROLOGICAL SURGERY

## 2022-12-19 PROCEDURE — 01NB0ZZ RELEASE LUMBAR NERVE, OPEN APPROACH: ICD-10-PCS | Performed by: NEUROLOGICAL SURGERY

## 2022-12-19 RX ORDER — METRONIDAZOLE 7.5 MG/G
GEL TOPICAL 2 TIMES DAILY
Status: DISCONTINUED | OUTPATIENT
Start: 2022-12-19 | End: 2022-12-20

## 2022-12-19 RX ORDER — OXYCODONE HYDROCHLORIDE 5 MG/1
5 TABLET ORAL EVERY 4 HOURS PRN
Status: DISCONTINUED | OUTPATIENT
Start: 2022-12-19 | End: 2022-12-22 | Stop reason: HOSPADM

## 2022-12-19 RX ORDER — NEOSTIGMINE METHYLSULFATE 1 MG/ML
INJECTION INTRAVENOUS AS NEEDED
Status: DISCONTINUED | OUTPATIENT
Start: 2022-12-19 | End: 2022-12-19

## 2022-12-19 RX ORDER — HYDROMORPHONE HCL/PF 1 MG/ML
SYRINGE (ML) INJECTION AS NEEDED
Status: DISCONTINUED | OUTPATIENT
Start: 2022-12-19 | End: 2022-12-19

## 2022-12-19 RX ORDER — GLYCOPYRROLATE 0.2 MG/ML
INJECTION INTRAMUSCULAR; INTRAVENOUS AS NEEDED
Status: DISCONTINUED | OUTPATIENT
Start: 2022-12-19 | End: 2022-12-19

## 2022-12-19 RX ORDER — MIDAZOLAM HYDROCHLORIDE 2 MG/2ML
INJECTION, SOLUTION INTRAMUSCULAR; INTRAVENOUS AS NEEDED
Status: DISCONTINUED | OUTPATIENT
Start: 2022-12-19 | End: 2022-12-19

## 2022-12-19 RX ORDER — DOXYCYCLINE HYCLATE 50 MG/1
50 CAPSULE ORAL DAILY
Status: DISCONTINUED | OUTPATIENT
Start: 2022-12-19 | End: 2022-12-20

## 2022-12-19 RX ORDER — LIDOCAINE HYDROCHLORIDE 10 MG/ML
0.5 INJECTION, SOLUTION EPIDURAL; INFILTRATION; INTRACAUDAL; PERINEURAL ONCE AS NEEDED
Status: DISCONTINUED | OUTPATIENT
Start: 2022-12-19 | End: 2022-12-19 | Stop reason: HOSPADM

## 2022-12-19 RX ORDER — OXYCODONE HYDROCHLORIDE 10 MG/1
5 TABLET ORAL EVERY 4 HOURS PRN
Qty: 20 TABLET | Refills: 0 | Status: SHIPPED | OUTPATIENT
Start: 2022-12-19 | End: 2022-12-29

## 2022-12-19 RX ORDER — ONDANSETRON 2 MG/ML
INJECTION INTRAMUSCULAR; INTRAVENOUS AS NEEDED
Status: DISCONTINUED | OUTPATIENT
Start: 2022-12-19 | End: 2022-12-19

## 2022-12-19 RX ORDER — CHLORHEXIDINE GLUCONATE 0.12 MG/ML
15 RINSE ORAL ONCE
Status: COMPLETED | OUTPATIENT
Start: 2022-12-19 | End: 2022-12-19

## 2022-12-19 RX ORDER — HYDROMORPHONE HCL IN WATER/PF 6 MG/30 ML
0.2 PATIENT CONTROLLED ANALGESIA SYRINGE INTRAVENOUS
Status: DISCONTINUED | OUTPATIENT
Start: 2022-12-19 | End: 2022-12-19 | Stop reason: HOSPADM

## 2022-12-19 RX ORDER — DOCUSATE SODIUM 100 MG/1
100 CAPSULE, LIQUID FILLED ORAL 2 TIMES DAILY
Status: DISCONTINUED | OUTPATIENT
Start: 2022-12-19 | End: 2022-12-22 | Stop reason: HOSPADM

## 2022-12-19 RX ORDER — DEXAMETHASONE SODIUM PHOSPHATE 10 MG/ML
INJECTION, SOLUTION INTRAMUSCULAR; INTRAVENOUS AS NEEDED
Status: DISCONTINUED | OUTPATIENT
Start: 2022-12-19 | End: 2022-12-19

## 2022-12-19 RX ORDER — METHOCARBAMOL 750 MG/1
750 TABLET, FILM COATED ORAL EVERY 6 HOURS PRN
Status: DISCONTINUED | OUTPATIENT
Start: 2022-12-19 | End: 2022-12-20

## 2022-12-19 RX ORDER — FLUTICASONE PROPIONATE 50 MCG
2 SPRAY, SUSPENSION (ML) NASAL DAILY
Status: DISCONTINUED | OUTPATIENT
Start: 2022-12-19 | End: 2022-12-22 | Stop reason: HOSPADM

## 2022-12-19 RX ORDER — EPHEDRINE SULFATE 50 MG/ML
INJECTION INTRAVENOUS AS NEEDED
Status: DISCONTINUED | OUTPATIENT
Start: 2022-12-19 | End: 2022-12-19

## 2022-12-19 RX ORDER — BUPIVACAINE HYDROCHLORIDE AND EPINEPHRINE 5; 5 MG/ML; UG/ML
INJECTION, SOLUTION EPIDURAL; INTRACAUDAL; PERINEURAL AS NEEDED
Status: DISCONTINUED | OUTPATIENT
Start: 2022-12-19 | End: 2022-12-19 | Stop reason: HOSPADM

## 2022-12-19 RX ORDER — VANCOMYCIN HYDROCHLORIDE 1 G/200ML
1000 INJECTION, SOLUTION INTRAVENOUS EVERY 12 HOURS
Status: COMPLETED | OUTPATIENT
Start: 2022-12-19 | End: 2022-12-20

## 2022-12-19 RX ORDER — SODIUM CHLORIDE 9 MG/ML
INJECTION, SOLUTION INTRAVENOUS CONTINUOUS PRN
Status: DISCONTINUED | OUTPATIENT
Start: 2022-12-19 | End: 2022-12-19

## 2022-12-19 RX ORDER — HEPARIN SODIUM 5000 [USP'U]/ML
5000 INJECTION, SOLUTION INTRAVENOUS; SUBCUTANEOUS EVERY 8 HOURS SCHEDULED
Status: DISCONTINUED | OUTPATIENT
Start: 2022-12-20 | End: 2022-12-22 | Stop reason: HOSPADM

## 2022-12-19 RX ORDER — FENTANYL CITRATE/PF 50 MCG/ML
25 SYRINGE (ML) INJECTION
Status: DISCONTINUED | OUTPATIENT
Start: 2022-12-19 | End: 2022-12-19 | Stop reason: HOSPADM

## 2022-12-19 RX ORDER — ACETAMINOPHEN 500 MG
1000 TABLET ORAL EVERY 8 HOURS PRN
Refills: 0
Start: 2022-12-19

## 2022-12-19 RX ORDER — ONDANSETRON 2 MG/ML
4 INJECTION INTRAMUSCULAR; INTRAVENOUS EVERY 6 HOURS PRN
Status: DISCONTINUED | OUTPATIENT
Start: 2022-12-19 | End: 2022-12-22 | Stop reason: HOSPADM

## 2022-12-19 RX ORDER — DIPHENHYDRAMINE HYDROCHLORIDE 50 MG/ML
12.5 INJECTION INTRAMUSCULAR; INTRAVENOUS ONCE AS NEEDED
Status: DISCONTINUED | OUTPATIENT
Start: 2022-12-19 | End: 2022-12-19 | Stop reason: HOSPADM

## 2022-12-19 RX ORDER — LEVOTHYROXINE SODIUM 0.1 MG/1
100 TABLET ORAL
Status: DISCONTINUED | OUTPATIENT
Start: 2022-12-20 | End: 2022-12-22 | Stop reason: HOSPADM

## 2022-12-19 RX ORDER — OXYCODONE HYDROCHLORIDE 10 MG/1
10 TABLET ORAL EVERY 4 HOURS PRN
Status: DISCONTINUED | OUTPATIENT
Start: 2022-12-19 | End: 2022-12-22 | Stop reason: HOSPADM

## 2022-12-19 RX ORDER — ACETAMINOPHEN 325 MG/1
975 TABLET ORAL EVERY 8 HOURS PRN
Status: DISCONTINUED | OUTPATIENT
Start: 2022-12-19 | End: 2022-12-20

## 2022-12-19 RX ORDER — FENTANYL CITRATE 50 UG/ML
INJECTION, SOLUTION INTRAMUSCULAR; INTRAVENOUS AS NEEDED
Status: DISCONTINUED | OUTPATIENT
Start: 2022-12-19 | End: 2022-12-19

## 2022-12-19 RX ORDER — PROPOFOL 10 MG/ML
INJECTION, EMULSION INTRAVENOUS AS NEEDED
Status: DISCONTINUED | OUTPATIENT
Start: 2022-12-19 | End: 2022-12-19

## 2022-12-19 RX ORDER — VANCOMYCIN HYDROCHLORIDE 1 G/200ML
1000 INJECTION, SOLUTION INTRAVENOUS ONCE
Status: DISCONTINUED | OUTPATIENT
Start: 2022-12-19 | End: 2022-12-19 | Stop reason: HOSPADM

## 2022-12-19 RX ORDER — VANCOMYCIN HYDROCHLORIDE 1 G/200ML
INJECTION, SOLUTION INTRAVENOUS AS NEEDED
Status: DISCONTINUED | OUTPATIENT
Start: 2022-12-19 | End: 2022-12-19

## 2022-12-19 RX ORDER — METOCLOPRAMIDE HYDROCHLORIDE 5 MG/ML
10 INJECTION INTRAMUSCULAR; INTRAVENOUS ONCE AS NEEDED
Status: DISCONTINUED | OUTPATIENT
Start: 2022-12-19 | End: 2022-12-19 | Stop reason: HOSPADM

## 2022-12-19 RX ORDER — HYDRALAZINE HYDROCHLORIDE 20 MG/ML
INJECTION INTRAMUSCULAR; INTRAVENOUS AS NEEDED
Status: DISCONTINUED | OUTPATIENT
Start: 2022-12-19 | End: 2022-12-19

## 2022-12-19 RX ORDER — LORATADINE 10 MG/1
10 TABLET ORAL DAILY
Status: DISCONTINUED | OUTPATIENT
Start: 2022-12-20 | End: 2022-12-22 | Stop reason: HOSPADM

## 2022-12-19 RX ORDER — NITROGLYCERIN 5 MG/ML
INJECTION, SOLUTION INTRAVENOUS AS NEEDED
Status: DISCONTINUED | OUTPATIENT
Start: 2022-12-19 | End: 2022-12-19

## 2022-12-19 RX ORDER — SODIUM CHLORIDE, SODIUM LACTATE, POTASSIUM CHLORIDE, CALCIUM CHLORIDE 600; 310; 30; 20 MG/100ML; MG/100ML; MG/100ML; MG/100ML
125 INJECTION, SOLUTION INTRAVENOUS CONTINUOUS
Status: DISCONTINUED | OUTPATIENT
Start: 2022-12-19 | End: 2022-12-20

## 2022-12-19 RX ORDER — HYDROMORPHONE HCL 110MG/55ML
PATIENT CONTROLLED ANALGESIA SYRINGE INTRAVENOUS AS NEEDED
Status: DISCONTINUED | OUTPATIENT
Start: 2022-12-19 | End: 2022-12-19

## 2022-12-19 RX ORDER — PROPOFOL 10 MG/ML
INJECTION, EMULSION INTRAVENOUS CONTINUOUS PRN
Status: DISCONTINUED | OUTPATIENT
Start: 2022-12-19 | End: 2022-12-19

## 2022-12-19 RX ORDER — METHOCARBAMOL 750 MG/1
750 TABLET, FILM COATED ORAL EVERY 6 HOURS PRN
Qty: 30 TABLET | Refills: 0 | Status: SHIPPED | OUTPATIENT
Start: 2022-12-19 | End: 2023-01-03 | Stop reason: ALTCHOICE

## 2022-12-19 RX ORDER — ROCURONIUM BROMIDE 10 MG/ML
INJECTION, SOLUTION INTRAVENOUS AS NEEDED
Status: DISCONTINUED | OUTPATIENT
Start: 2022-12-19 | End: 2022-12-19

## 2022-12-19 RX ORDER — SUCCINYLCHOLINE/SOD CL,ISO/PF 100 MG/5ML
SYRINGE (ML) INTRAVENOUS AS NEEDED
Status: DISCONTINUED | OUTPATIENT
Start: 2022-12-19 | End: 2022-12-19

## 2022-12-19 RX ORDER — TAMSULOSIN HYDROCHLORIDE 0.4 MG/1
0.4 CAPSULE ORAL 2 TIMES DAILY
Status: DISCONTINUED | OUTPATIENT
Start: 2022-12-19 | End: 2022-12-22 | Stop reason: HOSPADM

## 2022-12-19 RX ADMIN — NEOSTIGMINE METHYLSULFATE 2 MG: 1 INJECTION INTRAVENOUS at 10:55

## 2022-12-19 RX ADMIN — Medication 25 MCG: at 11:47

## 2022-12-19 RX ADMIN — VANCOMYCIN HYDROCHLORIDE 1000 MG: 1 INJECTION, SOLUTION INTRAVENOUS at 21:32

## 2022-12-19 RX ADMIN — TAMSULOSIN HYDROCHLORIDE 0.4 MG: 0.4 CAPSULE ORAL at 13:32

## 2022-12-19 RX ADMIN — FENTANYL CITRATE 100 MCG: 50 INJECTION, SOLUTION INTRAMUSCULAR; INTRAVENOUS at 07:58

## 2022-12-19 RX ADMIN — HYDRALAZINE HYDROCHLORIDE 2.5 MG: 20 INJECTION INTRAMUSCULAR; INTRAVENOUS at 10:11

## 2022-12-19 RX ADMIN — TAMSULOSIN HYDROCHLORIDE 0.4 MG: 0.4 CAPSULE ORAL at 21:32

## 2022-12-19 RX ADMIN — PROPOFOL 60 MG: 10 INJECTION, EMULSION INTRAVENOUS at 09:39

## 2022-12-19 RX ADMIN — Medication 1 MG: at 10:47

## 2022-12-19 RX ADMIN — SODIUM CHLORIDE, POTASSIUM CHLORIDE, SODIUM LACTATE AND CALCIUM CHLORIDE: 600; 310; 30; 20 INJECTION, SOLUTION INTRAVENOUS at 10:00

## 2022-12-19 RX ADMIN — Medication 0.5 MG: at 09:43

## 2022-12-19 RX ADMIN — SODIUM CHLORIDE, POTASSIUM CHLORIDE, SODIUM LACTATE AND CALCIUM CHLORIDE 125 ML/HR: 600; 310; 30; 20 INJECTION, SOLUTION INTRAVENOUS at 19:29

## 2022-12-19 RX ADMIN — NITROGLYCERIN 20 MCG: 5 INJECTION, SOLUTION INTRAVENOUS at 09:45

## 2022-12-19 RX ADMIN — OXYCODONE HYDROCHLORIDE 10 MG: 10 TABLET ORAL at 21:32

## 2022-12-19 RX ADMIN — ROCURONIUM BROMIDE 50 MG: 10 INJECTION, SOLUTION INTRAVENOUS at 08:10

## 2022-12-19 RX ADMIN — EPHEDRINE SULFATE 5 MG: 50 INJECTION INTRAVENOUS at 07:48

## 2022-12-19 RX ADMIN — Medication 0.5 MG: at 09:37

## 2022-12-19 RX ADMIN — PROPOFOL 100 MCG/KG/MIN: 10 INJECTION, EMULSION INTRAVENOUS at 08:01

## 2022-12-19 RX ADMIN — Medication 0.5 MG: at 09:39

## 2022-12-19 RX ADMIN — MIDAZOLAM HYDROCHLORIDE 2 MG: 2 INJECTION, SOLUTION INTRAMUSCULAR; INTRAVENOUS at 07:31

## 2022-12-19 RX ADMIN — EPHEDRINE SULFATE 5 MG: 50 INJECTION INTRAVENOUS at 07:51

## 2022-12-19 RX ADMIN — METHOCARBAMOL TABLETS 750 MG: 750 TABLET, COATED ORAL at 17:13

## 2022-12-19 RX ADMIN — Medication 0.5 MG: at 09:32

## 2022-12-19 RX ADMIN — ACETAMINOPHEN 975 MG: 325 TABLET, FILM COATED ORAL at 22:52

## 2022-12-19 RX ADMIN — DOCUSATE SODIUM 100 MG: 100 CAPSULE, LIQUID FILLED ORAL at 17:13

## 2022-12-19 RX ADMIN — VANCOMYCIN HYDROCHLORIDE 1000 MG: 1 INJECTION, SOLUTION INTRAVENOUS at 08:05

## 2022-12-19 RX ADMIN — EPHEDRINE SULFATE 5 MG: 50 INJECTION INTRAVENOUS at 07:57

## 2022-12-19 RX ADMIN — NITROGLYCERIN 40 MCG: 5 INJECTION, SOLUTION INTRAVENOUS at 09:49

## 2022-12-19 RX ADMIN — Medication 100 MG: at 08:00

## 2022-12-19 RX ADMIN — DEXAMETHASONE SODIUM PHOSPHATE 10 MG: 10 INJECTION, SOLUTION INTRAMUSCULAR; INTRAVENOUS at 08:40

## 2022-12-19 RX ADMIN — ONDANSETRON 4 MG: 2 INJECTION INTRAMUSCULAR; INTRAVENOUS at 10:43

## 2022-12-19 RX ADMIN — ONDANSETRON 4 MG: 2 INJECTION INTRAMUSCULAR; INTRAVENOUS at 08:57

## 2022-12-19 RX ADMIN — PROPOFOL 200 MG: 10 INJECTION, EMULSION INTRAVENOUS at 07:59

## 2022-12-19 RX ADMIN — Medication 25 MCG: at 11:51

## 2022-12-19 RX ADMIN — SODIUM CHLORIDE, POTASSIUM CHLORIDE, SODIUM LACTATE AND CALCIUM CHLORIDE: 600; 310; 30; 20 INJECTION, SOLUTION INTRAVENOUS at 07:59

## 2022-12-19 RX ADMIN — HYDRALAZINE HYDROCHLORIDE 2.5 MG: 20 INJECTION INTRAMUSCULAR; INTRAVENOUS at 10:01

## 2022-12-19 RX ADMIN — GLYCOPYRROLATE 0.4 MCG: 0.2 INJECTION INTRAMUSCULAR; INTRAVENOUS at 10:55

## 2022-12-19 RX ADMIN — OXYCODONE HYDROCHLORIDE 10 MG: 10 TABLET ORAL at 17:13

## 2022-12-19 RX ADMIN — SODIUM CHLORIDE: 9 INJECTION, SOLUTION INTRAVENOUS at 07:39

## 2022-12-19 RX ADMIN — GLYCERIN, HYPROMELLOSE, POLYETHYLENE GLYCOL 1 DROP: .2; .2; 1 LIQUID OPHTHALMIC at 17:14

## 2022-12-19 RX ADMIN — CHLORHEXIDINE GLUCONATE 0.12% ORAL RINSE 15 ML: 1.2 LIQUID ORAL at 06:14

## 2022-12-19 RX ADMIN — MIDAZOLAM HYDROCHLORIDE 2 MG: 2 INJECTION, SOLUTION INTRAMUSCULAR; INTRAVENOUS at 07:23

## 2022-12-19 NOTE — DISCHARGE INSTR - AVS FIRST PAGE
Discharge Instructions  Lumbar decompression  (laminectomy/laminotomy/foraminotomy/discectomy)      Surgical incisional care:  Maintain dressing in place for 3 days  On postoperative day 3, dressing may be removed and incision may be left open to air  Keep incision clean and dry  Avoid applying creams, lotion or antiseptic to incision area  Check the wound daily  If the incision becomes red, swollen, tender, warm, or has increased drainage please notify physician immediately  If steri-strips are in place, allow them to fall off  If still in place at two week postoperative visit, we will remove them  May shower 3 days after surgery, but do not soak in a tub and no swimming  Incision may be cleaned with water and a mild antimicrobial soap using a clean washcloth  Incision is to be gently patted dry with a clean towel  Continue to change bed linens and pajamas more frequently  Wear clean clothes daily  Activity Restrictions:  No heavy lifting greater than 10lbs and no strenuous activities until cleared  No bending or twisting  No BLTs (bending, lifting, twisting)  Avoid pushing/pulling movements  May walk as tolerated  Encourage at least 4 short walks per day  No driving for at least 2 weeks or until cleared by physician  Postoperative medication:  Take pain medications to relieve incision pain, and muscle relaxants to prevent spasms as directed  Please see after visit summary (AVS) for details  Do not operate heavy machinery or vehicles while taking sedating medications  Use a bowel regimen while on opioids as they induce constipation  Ie  Senokot-S, Miralax, Colace, etc  Increase fiber and water intake  Do not take ibuprofen, Naproxen/Aleve or any NSAID until cleared by surgeon  May take Tylenol instead  If taking Coumadin, Aspirin, or Plavix, you may resume these medications when cleared by Neurosurgery  Follow-up as scheduled for a 2 week incision check and pathology review   Follow-up as scheduled for 6 week postoperative visit       **Please notify MD if you experience a fever 101F, chills or have increased pain, numbness, tingling, or weakness in your legs and/or bowel/bladder dysfunction/incontinence**

## 2022-12-19 NOTE — ANESTHESIA POSTPROCEDURE EVALUATION
Post-Op Assessment Note    CV Status:  Stable  Pain Score: 0    Pain management: adequate  Multimodal analgesia used between 6 hours prior to anesthesia start to PACU discharge    Mental Status:  Alert and awake   Hydration Status:  Euvolemic and stable   PONV Controlled:  Controlled   Airway Patency:  Patent   Two or more mitigation strategies used for obstructive sleep apnea   Post Op Vitals Reviewed: Yes      Staff: CRNA         No notable events documented      BP  115/68   Temp   97 1   Pulse  55   Resp   14   SpO2   99

## 2022-12-19 NOTE — TELEPHONE ENCOUNTER
12/23/2022-PT DISCHARGED TO HOME    12/22/2022-PT STILL IN HOSPITAL    12/21/2022-PT STILL IN HOSPITAL    12/20/2022-PT Carmela 25 12/19/2022-PT Ariana 25 01/03/2023-2 WK MARYANN W/DKO

## 2022-12-19 NOTE — ANESTHESIA PREPROCEDURE EVALUATION
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)    Relevant Problems   CARDIO   (+) Aortic ectasia, thoracic (HCC)   (+) Nonrheumatic mitral valve regurgitation   (+) PAC (premature atrial contraction)   (+) PVC (premature ventricular contraction)   (+) Primary hypercholesterolemia   (+) Primary hypertension   (+) Thoracic aortic aneurysm without rupture      ENDO   (+) Hypothyroidism   (+) Hypothyroidism due to acquired atrophy of thyroid      /RENAL   (+) BPH without obstruction/lower urinary tract symptoms   (+) Prostate cancer (HCC)   (+) Renal cysts, acquired, bilateral      MUSCULOSKELETAL   (+) Cervical spondylosis   (+) Chondromalacia patellae   (+) Degeneration of intervertebral disc of cervical region   (+) Generalized osteoarthritis   (+) Localized primary osteoarthritis of wrist   (+) Localized, primary osteoarthritis   (+) Osteoarthritis of knee      NEURO/PSYCH   (+) Anxiety   (+) Chronic neck pain   (+) Chronic pain syndrome   (+) History of osteomyelitis   (+) Post traumatic stress disorder (PTSD)   (+) Reactive depression      Other   (+) Abdominal lymphadenopathy   (+) Chronic osteomyelitis of lower leg ([address] )      NM STRESS SUMMARY (2021:  -  Stress results: Duration of exercise was 9 min  Target heart rate was achieved  There was no chest pain during stress      IMPRESSIONS: 1  Excellent exercise tolerance  2  Normal resting blood pressure  3  Normal resting EKG  4  Ventricular ectopy as described with exercise  5  Graded treadmill exercise test negative for symptoms of exertional angina pectoris and negative for electrocardiographic changes of ischemia    6  Low normal left ventricular systolic function, EF 48% with mild left ventricular cavity enlargement  7  Normal tomographic perfusion series with inferior diaphragmatic attenuation      Prepared and signed by     SUMMARY:  1  This is a technically adequate study  2  Left ventricle is normal in size and function  Left ventricular ejection fraction is estimated at 55%  3  Normal diastolic function  4  Mild biatrial dilatation  5  Mild-to-moderate mitral regurgitation  6  Trace tricuspid regurgitation with pulmonary artery systolic pressure is estimated at 15-20 mm Hg  7  Aortic root is mildly dilated 3 5 cm  8  There is no study for comparison    Physical Exam    Airway  Comment: Patient s/p cervical fusion, limited rotation, extension and flexion okay  Mallampati score: I  TM Distance: >3 FB  Neck ROM: limited     Dental   No notable dental hx     Cardiovascular  Rhythm: regular, Rate: normal,     Pulmonary  Breath sounds clear to auscultation,     Other Findings        Anesthesia Plan  ASA Score- 3     Anesthesia Type- general with ASA Monitors  Additional Monitors: arterial line  Airway Plan: ETT  Comment: I explained the risks and benefits of anesthesia including but not limited to cardiopulmonary vs cardiovascular complications  I also explained the rare risk of postop vision loss due to prone positioning and spinal surgery  I explained that this is rare  I also reviewed the more common side effects of anesthesia including nausea/vomiting, headache and sore throat  I answered all of his questions and addressed his concerns          Plan Factors-Exercise tolerance (METS): >4 METS  Chart reviewed  Existing labs reviewed  Patient summary reviewed  Patient is not a current smoker  Obstructive sleep apnea risk education given perioperatively  Induction- intravenous  Postoperative Plan- Plan for postoperative opioid use  Planned trial extubation    Informed Consent- Anesthetic plan and risks discussed with patient    I personally reviewed this patient with the CRNA  Discussed and agreed on the Anesthesia Plan with the MELANIE Levine Dock

## 2022-12-19 NOTE — INTERVAL H&P NOTE
H&P reviewed  After examining the patient I find no changes in the patients condition since the H&P had been written      Vitals:    12/19/22 0607   BP: 141/81   Pulse: 61   Resp: 20   Temp: 98 5 °F (36 9 °C)   SpO2: 96%

## 2022-12-19 NOTE — OP NOTE
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]  Lumbar spinal stenosis [M48 061]    Post-Op Diagnosis Codes:     * 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]     * Lumbar spinal stenosis [M48 061]    Procedure(s) (LRB):  L2/3 decompressive laminectomy and biopsy (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 (N/A)    Specimen(s):  ID Type Source Tests Collected by Time Destination   1 : left L3 nerve  sheath tumor Tissue Spinal Cord TISSUE EXAM Meghana Tony MD 2022 0855    2 : L3 left nerve sheath tumor Tissue Back TISSUE EXAM Meghana Tony MD 2022 1049        Estimated Blood Loss:   Minimal    Drains:  Urethral Catheter Latex 16 Fr  (Active)   Site Assessment Clean;Skin intact 22   Collection Container Standard drainage bag 22   Securement Method Securing device (Describe) 22 0912   Number of days: 0       Anesthesia Type:   General    Operative Indications:  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]  Lumbar spinal stenosis [M48 061]      Operative Findings:  Nerve sheath tumor    Complications:   None    Procedure and Technique:  After adequate general endotracheal anesthesia the patient was placed prone on the operating room table  The back was prepped with Betadine soap and DuraPrep  Double her drains were placed in normal fashion and a 3M Betadine impregnanted sticky drape was placed over these  A timeout was called and all parameters of the timeout were followed  The skin centered over the L2-3 region was injected with 0 5% bupivacaine with 1 100,000 epinephrine  A #15 blade was used to incise the skin  Bovie and bipolar were used throughout the procedure to maintain hemostasis  Bovie on the cutting setting was used to divide the tissues to the dorsal fascia    A subperiosteal dissection was carried out over the spinous process lamina and medial facets on the left side only over the L3 level  Intraoperative fluoroscopy was used to confirm this level and positioning of the Jacob retractor and there was radiology over read  Copious quantities of irrigation were used to cleanse out the region  The aqua mantis device was used to maintain hemostasis  A Midas Justin drill with a 3 0 matchstick was utilized to form a hemilaminectomy and to undercut the spinous process and to perform a medial facetectomy  This allowed for ample room to be able to identify the thecal sac directly beneath this  A series of Kerrison rongeurs was used to remove the ligament  Copious quantities of irrigation were used to cleanse out the region  The intraoperative microscope was then used at various degrees of magnification to aid in the identification, illumination, and microdissection necessary to perform this procedure  A #11 blade was then used to incise the thecal sac  4-0 Nurolon sutures were used to maintain this under traction  The nerve roots were gently dissected from the underlying tumor which was immediately identified  The course of the nerve root could be identified and the tumor appeared to be eccentric and was thereby dissected  The tumor was debulked internally by incising this and using pituitary rongeurs and microtechnique to shell out the inner portions  A portion of this specimen was sent to the laboratory and was returned as being consistent with a nerve sheath tumor  Copious quantities of irrigation were used to cleanse out the region  The remainder of the tumor was sent to the laboratory as permanent specimen  With adequate removal of the intra thecal section of the tumor the tumor extending into the foramen and more laterally was then carefully dissected    With adequate portions of the tumor removed and no visible tumor remaining following this the wall was coagulated and the region was thoroughly irrigated  A small piece of thrombin-soaked Gelfoam was placed  The dura was then approximated with interrupted 4-0 Nurolon suture  The central portion of this was closed with the dura stat and closed quite nicely  DuraSeal was then placed over this  A small piece of thrombin-soaked Gelfoam was placed over that to restore the volume that was the lamina  The retractor was removed  The muscle was approximated with interrupted 0 Vicryl suture  The fascia was closed with interrupted and running 0 Vicryl suture subcutaneous tissues were closed with erupted inverted 2-0 Vicryl suture the skin was closed with a running 4-0 Monocryl  Histacol was placed over this  The patient was then taken to the recovery room having tolerated the procedure well  There were no alterations in the motor evoked potentials, somatosensory evoked potentials, or EMG during this procedure     I was present for the entire procedure    Patient Disposition:  PACU         SIGNATURE: Tiffany Reed MD  DATE: December 19, 2022  TIME: 11:03 AM

## 2022-12-20 LAB
ANION GAP SERPL CALCULATED.3IONS-SCNC: 4 MMOL/L (ref 4–13)
APTT PPP: 25 SECONDS (ref 23–37)
BUN SERPL-MCNC: 14 MG/DL (ref 5–25)
CALCIUM SERPL-MCNC: 8.9 MG/DL (ref 8.3–10.1)
CHLORIDE SERPL-SCNC: 110 MMOL/L (ref 96–108)
CO2 SERPL-SCNC: 27 MMOL/L (ref 21–32)
CREAT SERPL-MCNC: 0.59 MG/DL (ref 0.6–1.3)
ERYTHROCYTE [DISTWIDTH] IN BLOOD BY AUTOMATED COUNT: 13.9 % (ref 11.6–15.1)
GFR SERPL CREATININE-BSD FRML MDRD: 104 ML/MIN/1.73SQ M
GLUCOSE SERPL-MCNC: 122 MG/DL (ref 65–140)
HCT VFR BLD AUTO: 37.7 % (ref 36.5–49.3)
HGB BLD-MCNC: 12.5 G/DL (ref 12–17)
INR PPP: 0.99 (ref 0.84–1.19)
MCH RBC QN AUTO: 33 PG (ref 26.8–34.3)
MCHC RBC AUTO-ENTMCNC: 33.2 G/DL (ref 31.4–37.4)
MCV RBC AUTO: 100 FL (ref 82–98)
PLATELET # BLD AUTO: 166 THOUSANDS/UL (ref 149–390)
PMV BLD AUTO: 10.6 FL (ref 8.9–12.7)
POTASSIUM SERPL-SCNC: 3.5 MMOL/L (ref 3.5–5.3)
PROTHROMBIN TIME: 13.3 SECONDS (ref 11.6–14.5)
RBC # BLD AUTO: 3.79 MILLION/UL (ref 3.88–5.62)
SODIUM SERPL-SCNC: 141 MMOL/L (ref 135–147)
WBC # BLD AUTO: 12.8 THOUSAND/UL (ref 4.31–10.16)

## 2022-12-20 RX ORDER — POLYETHYLENE GLYCOL 3350 17 G/17G
17 POWDER, FOR SOLUTION ORAL DAILY
Status: DISCONTINUED | OUTPATIENT
Start: 2022-12-20 | End: 2022-12-22 | Stop reason: HOSPADM

## 2022-12-20 RX ORDER — BISACODYL 10 MG
10 SUPPOSITORY, RECTAL RECTAL DAILY PRN
Status: DISCONTINUED | OUTPATIENT
Start: 2022-12-20 | End: 2022-12-22 | Stop reason: HOSPADM

## 2022-12-20 RX ORDER — ACETAMINOPHEN 325 MG/1
975 TABLET ORAL EVERY 8 HOURS SCHEDULED
Status: DISCONTINUED | OUTPATIENT
Start: 2022-12-20 | End: 2022-12-22 | Stop reason: HOSPADM

## 2022-12-20 RX ORDER — METHOCARBAMOL 750 MG/1
750 TABLET, FILM COATED ORAL EVERY 6 HOURS SCHEDULED
Status: DISCONTINUED | OUTPATIENT
Start: 2022-12-20 | End: 2022-12-22 | Stop reason: HOSPADM

## 2022-12-20 RX ORDER — SENNOSIDES 8.6 MG
1 TABLET ORAL
Status: DISCONTINUED | OUTPATIENT
Start: 2022-12-20 | End: 2022-12-22 | Stop reason: HOSPADM

## 2022-12-20 RX ADMIN — OXYCODONE HYDROCHLORIDE 10 MG: 10 TABLET ORAL at 17:21

## 2022-12-20 RX ADMIN — LORATADINE 10 MG: 10 TABLET ORAL at 08:31

## 2022-12-20 RX ADMIN — OXYCODONE HYDROCHLORIDE 10 MG: 10 TABLET ORAL at 21:20

## 2022-12-20 RX ADMIN — TAMSULOSIN HYDROCHLORIDE 0.4 MG: 0.4 CAPSULE ORAL at 08:31

## 2022-12-20 RX ADMIN — GLYCERIN, HYPROMELLOSE, POLYETHYLENE GLYCOL 1 DROP: .2; .2; 1 LIQUID OPHTHALMIC at 17:21

## 2022-12-20 RX ADMIN — LEVOTHYROXINE SODIUM 100 MCG: 100 TABLET ORAL at 06:14

## 2022-12-20 RX ADMIN — VANCOMYCIN HYDROCHLORIDE 1000 MG: 1 INJECTION, SOLUTION INTRAVENOUS at 08:43

## 2022-12-20 RX ADMIN — DOCUSATE SODIUM 100 MG: 100 CAPSULE, LIQUID FILLED ORAL at 08:31

## 2022-12-20 RX ADMIN — SENNOSIDES 8.6 MG: 8.6 TABLET, FILM COATED ORAL at 21:20

## 2022-12-20 RX ADMIN — HEPARIN SODIUM 5000 UNITS: 5000 INJECTION INTRAVENOUS; SUBCUTANEOUS at 21:20

## 2022-12-20 RX ADMIN — DOCUSATE SODIUM 100 MG: 100 CAPSULE, LIQUID FILLED ORAL at 17:21

## 2022-12-20 RX ADMIN — TAMSULOSIN HYDROCHLORIDE 0.4 MG: 0.4 CAPSULE ORAL at 21:20

## 2022-12-20 RX ADMIN — OXYCODONE HYDROCHLORIDE 10 MG: 10 TABLET ORAL at 04:02

## 2022-12-20 RX ADMIN — OXYCODONE HYDROCHLORIDE 10 MG: 10 TABLET ORAL at 12:20

## 2022-12-20 RX ADMIN — METHOCARBAMOL TABLETS 750 MG: 750 TABLET, COATED ORAL at 12:03

## 2022-12-20 RX ADMIN — ACETAMINOPHEN 975 MG: 325 TABLET, FILM COATED ORAL at 13:25

## 2022-12-20 RX ADMIN — METHOCARBAMOL TABLETS 750 MG: 750 TABLET, COATED ORAL at 06:14

## 2022-12-20 RX ADMIN — HEPARIN SODIUM 5000 UNITS: 5000 INJECTION INTRAVENOUS; SUBCUTANEOUS at 13:25

## 2022-12-20 RX ADMIN — ACETAMINOPHEN 975 MG: 325 TABLET, FILM COATED ORAL at 21:20

## 2022-12-20 RX ADMIN — POLYETHYLENE GLYCOL 3350 17 G: 17 POWDER, FOR SOLUTION ORAL at 12:20

## 2022-12-20 RX ADMIN — METHOCARBAMOL TABLETS 750 MG: 750 TABLET, COATED ORAL at 17:21

## 2022-12-20 NOTE — ASSESSMENT & PLAN NOTE
POD 1 from L2-3 laminectomy for resection of nerve sheath tumor    No imaging required postoperatively     Plan:   · Ongoing neurologic checks  · Ongoing medical management  · Home medication reordered  · Ongoing pain control  · Will DC IV narcotics  · Controlled on oral regimen currently with following adjustments   · Will schedule tylenol and robaxin  · Continue bowel regimen as ordered  · Colace  · Will add Senakot and miralax daily  · DVT ppx: SCD's, HSQ to start tonight  · Physical therapy/occupational therapy evaluation  · Case management following for disposition  · Rounds completed with ABDIRASHID Basurto  · Neurosurgery will continue to follow as primary team  Pending therapy evaluation with potential DC tomorrow  Call with questions or concerns

## 2022-12-20 NOTE — UTILIZATION REVIEW
Initial Clinical Review    AUTH [id number]    Elective Inpatient nsurgical procedure  Age/Sex: 75 y o  male  Surgery Date: 12/19/22  Procedure: S/p L2/3 decompressive laminectomy and biopsy (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   Anesthesia: General    POD#1 Progress Note:   Iv antibiotics  Ongoing neuro checks  Ongoing pain control  Add senokot and Miralax daily  Admission Orders: Date/Time/Statement:   Admission Orders (From admission, onward)     Ordered        12/19/22 0823  Inpatient Admission  Once                      Orders Placed This Encounter   Procedures   • Inpatient Admission     Standing Status:   Standing     Number of Occurrences:   1     Order Specific Question:   Level of Care     Answer:   Med Surg [16]     Order Specific Question:   Estimated length of stay     Answer:   Inpatient Only Surgery     Vital Signs: /69   Pulse 64   Temp 98 5 °F (36 9 °C)   Resp 16   Ht 6' (1 829 m)   Wt 83 9 kg (185 lb)   SpO2 94%   BMI 25 09 kg/m²     Pertinent Labs/Diagnostic Test Results:   XR spine lumbosacral 1 view   Final Result by Desmond Danielle MD (12/19 1634)      Fluoroscopic guidance provided for surgical procedure  Please refer to the separate procedure notes for additional details              Localization procedure was performed, with the OR notified of the level at approximately 9:10 AM on 12/19/2022 via telephone conversation with the OR x-ray technologist              Workstation performed: [id number]               Results from last 7 days   Lab Units 12/20/22  0459 12/19/22  2350   WBC Thousand/uL 12 80*  --    HEMOGLOBIN g/dL 12 5  --    HEMATOCRIT % 37 7  --    PLATELETS Thousands/uL 166 159         Results from last 7 days   Lab Units 12/20/22  0459   SODIUM mmol/L 141   POTASSIUM mmol/L 3 5   CHLORIDE mmol/L 110*   CO2 mmol/L 27   ANION GAP mmol/L 4   BUN mg/dL 14   CREATININE mg/dL 0 59*   EGFR ml/min/1 73sq m 104   CALCIUM mg/dL 8 9             Results from last 7 days Lab Units 12/20/22  0459   GLUCOSE RANDOM mg/dL 122         Results from last 7 days   Lab Units 12/19/22  2350   PROTIME seconds 13 3   INR  0 99   PTT seconds 25       Diet: Regular  Mobility: OOB  DVT Prophylaxis: SCD    Medications/Pain Control:   Scheduled Medications:  acetaminophen, 975 mg, Oral, Q8H [facility]  docusate sodium, 100 mg, Oral, BID  fluticasone, 2 spray, Nasal, Daily  glycerin-hypromellose-, 1 drop, Both Eyes, BID  heparin (porcine), 5,000 Units, Subcutaneous, Q8H [facility]  levothyroxine, 100 mcg, Oral, Early Morning  loratadine, 10 mg, Oral, Daily  methocarbamol, 750 mg, Oral, Q6H [facility]  polyethylene glycol, 17 g, Oral, Daily  senna, 1 tablet, Oral, HS  tamsulosin, 0 4 mg, Oral, BID  vancomycin, 1,000 mg, Intravenous, Q12H      Continuous IV Infusions:    lactated ringers infusion  Rate: 125 mL/hr Dose: 125 mL/hr  Freq: Continuous Route: IV  Last Dose: Stopped (12/20/22 0420)  Start: 12/19/22 0545 End: 12/20/22 0921      PRN Meds:  bisacodyl, 10 mg, Rectal, Daily PRN  ondansetron, 4 mg, Intravenous, Q6H PRN  oxyCODONE, 10 mg, Oral, Q4H PRN 12/19 x2, 12/20 x1  oxyCODONE, 5 mg, Oral, Q4H PRN      Network Utilization Review Department  ATTENTION: Please call with any questions or concerns to [phone] and carefully listen to the prompts so that you are directed to the right person  All voicemails are confidential   Cosmo Sen all requests for admission clinical reviews, approved or denied determinations and any other requests to dedicated fax number below belonging to the campus where the patient is receiving treatment   List of dedicated fax numbers for the Facilities:  [address] [place] [address] DENIALS (Administrative/Medical Necessity) [phone]   [address] [place] [address] (Maternity/NICU/Pediatrics) [address] [place] [address] [address] [place] [address] Jose [address] [facility] 28 [facility] 310 [facility] [address] [phone]

## 2022-12-20 NOTE — PLAN OF CARE
Problem: MOBILITY - ADULT  Goal: Maintain or return to baseline ADL function  Description: INTERVENTIONS:  -  Assess patient's ability to carry out ADLs; assess patient's baseline for ADL function and identify physical deficits which impact ability to perform ADLs (bathing, care of mouth/teeth, toileting, grooming, dressing, etc )  - Assess/evaluate cause of self-care deficits   - Assess range of motion  - Assess patient's mobility; develop plan if impaired  - Assess patient's need for assistive devices and provide as appropriate  - Encourage maximum independence but intervene and supervise when necessary  - Involve family in performance of ADLs  - Assess for home care needs following discharge   - Consider OT consult to assist with ADL evaluation and planning for discharge  - Provide patient education as appropriate  Outcome: Progressing  Goal: Maintains/Returns to pre admission functional level  Description: INTERVENTIONS:  - Perform BMAT or MOVE assessment daily    - Set and communicate daily mobility goal to care team and patient/family/caregiver  - Collaborate with rehabilitation services on mobility goals if consulted  - Perform Range of Motion 3 times a day  - Reposition patient every 2 hours    - Dangle patient 3 times a day  - Stand patient 3 times a day  - Ambulate patient 3 times a day  - Out of bed to chair 3 times a day   - Out of bed for meals 3 times a day  - Out of bed for toileting  - Record patient progress and toleration of activity level   Outcome: Progressing     Problem: PAIN - ADULT  Goal: Verbalizes/displays adequate comfort level or baseline comfort level  Description: Interventions:  - Encourage patient to monitor pain and request assistance  - Assess pain using appropriate pain scale  - Administer analgesics based on type and severity of pain and evaluate response  - Implement non-pharmacological measures as appropriate and evaluate response  - Consider cultural and social influences on pain and pain management  - Notify physician/advanced practitioner if interventions unsuccessful or patient reports new pain  Outcome: Progressing     Problem: INFECTION - ADULT  Goal: Absence or prevention of progression during hospitalization  Description: INTERVENTIONS:  - Assess and monitor for signs and symptoms of infection  - Monitor lab/diagnostic results  - Monitor all insertion sites, i e  indwelling lines, tubes, and drains  - Monitor endotracheal if appropriate and nasal secretions for changes in amount and color  - [place] appropriate cooling/warming therapies per order  - Administer medications as ordered  - Instruct and encourage patient and family to use good hand hygiene technique  - Identify and instruct in appropriate isolation precautions for identified infection/condition  Outcome: Progressing  Goal: Absence of fever/infection during neutropenic period  Description: INTERVENTIONS:  - Monitor WBC    Outcome: Progressing     Problem: SAFETY ADULT  Goal: Maintain or return to baseline ADL function  Description: INTERVENTIONS:  -  Assess patient's ability to carry out ADLs; assess patient's baseline for ADL function and identify physical deficits which impact ability to perform ADLs (bathing, care of mouth/teeth, toileting, grooming, dressing, etc )  - Assess/evaluate cause of self-care deficits   - Assess range of motion  - Assess patient's mobility; develop plan if impaired  - Assess patient's need for assistive devices and provide as appropriate  - Encourage maximum independence but intervene and supervise when necessary  - Involve family in performance of ADLs  - Assess for home care needs following discharge   - Consider OT consult to assist with ADL evaluation and planning for discharge  - Provide patient education as appropriate  Outcome: Progressing  Goal: Maintains/Returns to pre admission functional level  Description: INTERVENTIONS:  - Perform BMAT or MOVE assessment daily    - Set and communicate daily mobility goal to care team and patient/family/caregiver  - Collaborate with rehabilitation services on mobility goals if consulted  - Perform Range of Motion 3 times a day  - Reposition patient every 2 hours    - Dangle patient 3 times a day  - Stand patient 3 times a day  - Ambulate patient 3 times a day  - Out of bed to chair 3 times a day   - Out of bed for meals 3 times a day  - Out of bed for toileting  - Record patient progress and toleration of activity level   Outcome: Progressing  Goal: Patient will remain free of falls  Description: INTERVENTIONS:  - Educate patient/family on patient safety including physical limitations  - Instruct patient to call for assistance with activity   - Consult OT/PT to assist with strengthening/mobility   - Keep Call bell within reach  - Keep bed low and locked with side rails adjusted as appropriate  - Keep care items and personal belongings within reach  - Initiate and maintain comfort rounds  - Make Fall Risk Sign visible to staff  - Offer Toileting every 2 Hours, in advance of need  - Initiate/Maintain on alarm  - Obtain necessary fall risk management equipment:  - Apply yellow socks and bracelet for high fall risk patients  - Consider moving patient to room near nurses station  Outcome: Progressing     Problem: DISCHARGE PLANNING  Goal: Discharge to home or other facility with appropriate resources  Description: INTERVENTIONS:  - Identify barriers to discharge w/patient and caregiver  - Arrange for needed discharge resources and transportation as appropriate  - Identify discharge learning needs (meds, wound care, etc )  - Arrange for interpretive services to assist at discharge as needed  - Refer to Case Management Department for coordinating discharge planning if the patient needs post-hospital services based on physician/advanced practitioner order or complex needs related to functional status, cognitive ability, or social support system  Outcome: Progressing     Problem: Knowledge Deficit  Goal: Patient/family/caregiver demonstrates understanding of disease process, treatment plan, medications, and discharge instructions  Description: Complete learning assessment and assess knowledge base    Interventions:  - Provide teaching at level of understanding  - Provide teaching via preferred learning methods  Outcome: Progressing     Problem: Potential for Falls  Goal: Patient will remain free of falls  Description: INTERVENTIONS:  - Educate patient/family on patient safety including physical limitations  - Instruct patient to call for assistance with activity   - Consult OT/PT to assist with strengthening/mobility   - Keep Call bell within reach  - Keep bed low and locked with side rails adjusted as appropriate  - Keep care items and personal belongings within reach  - Initiate and maintain comfort rounds  - Make Fall Risk Sign visible to staff  - Offer Toileting every 2 Hours, in advance of need  - Initiate/Maintain on alarm  - Obtain necessary fall risk management equipment:  - Apply yellow socks and bracelet for high fall risk patients  - Consider moving patient to room near nurses station  Outcome: Progressing

## 2022-12-20 NOTE — PHYSICAL THERAPY NOTE
PHYSICAL THERAPY EVALUATION  NAME:  Nat Jenkins  DATE: 12/20/22    AGE:   75 y o  Mrn:   [id number]  ADMIT DX:  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]  Lumbar spinal stenosis [M48 061]    Past Medical History:   Diagnosis Date    Anxiety     Arthritis     Bleeding tendency (HCC)     BPH (benign prostatic hyperplasia)     Broken bones     left leg    Cancer (HCC)     Cataract     Chronic neck pain     Epilepsy ([facility] )     Hypothyroidism     Neoplasm of unspecified behavior of bone, soft tissue, and skin     Osteomyelitis ([facility] )     PONV (postoperative nausea and vomiting)     Prostate cancer ([facility] )     Skin cancer     Squamous carcinoma 12/01/2014 12/2014    Thyroid condition     Thyroid disease        Past Surgical History:   Procedure Laterality Date    ARTHRODESIS      H/o Arthrodesis Cervical to C2;  last assessed [id number]    CERVICAL FUSION      COLONOSCOPY  01/22/2021    COLONOSCOPY      KNEE SURGERY Right 12/22/2017    LEG SURGERY Left 1999    Hardware placed in lower leg bone(s)    LEG SURGERY Left 01/01/2006 2006, 2012-Left leg vascular    [place] BX/EXCIS SPINAL TUMOR,XDURAL,LUMB N/A 12/19/2022    Procedure: L2/3 decompressive laminectomy and biopsy (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 BIOPSY  07/13/2021       Length Of Stay: 1    PHYSICAL THERAPY EVALUATION:        12/20/22 1030   Note Type   Note type Evaluation   Pain Assessment   Pain Assessment Tool 0-10   Pain Score 4   Pain Location/Orientation Location: Back   Pain Onset/Description Onset: Ongoing;Frequency: Constant/Continuous; Descriptor: Aching   Effect of Pain on Daily Activities increased pain with activity   Patient's Stated Pain Goal No pain   Hospital Pain Intervention(s) Ambulation/increased activity;Repositioned   Restrictions/Precautions   Weight Bearing Precautions Per Order No   Braces or Orthoses   (no back brace required per neurosx)   Other Precautions Multiple lines; Fall Risk;Pain;Spinal precautions   Home Living   Type of [address] Two level; Able to live on main level with bedroom/bathroom;Stairs to enter with rails  (3 LINDA)   Home Equipment Walker;Cane   Additional Comments Patient reports his cousin lives with him and will be able to assist him as needed   Prior Function   Level of [place] Independent with functional mobility   Lives With Family   Receives Help From [facility] ([place], [place]) in the last 6 months 0   Comments Pt denies the use of a RW for ambulation PTA   General   Family/Caregiver Present No   Cognition   Overall Cognitive Status WFL   Arousal/Participation Alert   Orientation Level Oriented X4   Memory Within functional limits   Following Commands Follows one step commands without difficulty   RUE Assessment   RUE Assessment WFL   LUE Assessment   LUE Assessment WFL   RLE Assessment   RLE Assessment WFL   Strength RLE   RLE Overall Strength 4/5   LLE Assessment   LLE Assessment WFL   Strength LLE   LLE Overall Strength 4/5   Bed Mobility   Supine to Sit 5  Supervision   Additional items Increased time required   Transfers   Sit to Stand 5  Supervision   Additional items Increased time required   Stand to Sit 5  Supervision   Additional items Increased time required   Additional Comments Cues needed for hand placement during transfers   Ambulation/Elevation   Gait pattern Short stride; Foward flexed   Gait Assistance 5  Supervision   Assistive Device Rolling walker   Distance 80ft x 2   Stair Management Assistance 5  Supervision   Stair Management Technique Two rails   Number of Stairs 3   Balance   Static Sitting Fair +   Static Standing Fair   Ambulatory Fair -   Endurance Deficit   Endurance Deficit Yes   Endurance Deficit Description pain   Activity Tolerance   Activity Tolerance Patient limited by pain   Medical Staff Made Aware Linda Man, OT; OT present for co evaluation due to pts current medical presentation   Nurse Made Aware Pt appropriate to be seen and mobilize per nsg   Assessment   Prognosis Good   Problem List Decreased strength;Decreased endurance;Decreased mobility;Pain;Orthopedic restrictions   Assessment Pt is 75 y o  male seen for PT evaluation s/p admit to [facility] on 12/19/2022  Two pt identifiers were used to confirm  Pt presented for scheduled L2/3 decompressive laminectomy and biopsy (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 (N/A) which was performed on 12/19/2022  Pt was admitted with a primary dx of: Nerve sheath tumor s/p L2/3 decompressive laminectomy and biopsy (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 (N/A)  PT now consulted for assessment of mobility and d/c needs  Pt with Out of bed orders  Pts current co morbidities affecting treatment include: anxiety, arthritis, cancer, epilepsy, and personal factors including steps to enter home  Pts current clinical presentation is Unstable/ Unpredictable (high complexity) due to Ongoing medical management for primary dx, Increased reliance on more restrictive AD compared to baseline, Decreased activity tolerance compared to baseline, Spinal precautions at current time, Continuous pulse oximetry monitoring , s/p surgical intervention    Upon evaluation, pt currently is requiring Supervision for bed mobility; Supervision for transfers and Supervision for ambulation w/ RW  Pt presents at PT eval functioning below baseline and currently w/ overall mobility deficits 2* to: BLE weakness, decreased endurance, pain, decreased activity tolerance compared to baseline, spinal precautions  At conclusion of PT session pt returned back in chair with phone and call bell within reach  Pt denies any further questions at this time  PT is currently recommending Home with increased family support and Outpatient PT when appropriate    D/C acute care PT at this time due to pt being supervision for all mobility and having supportive cousin who is able to assist as needed  Pt denies any mobility or safety concerns about returning home at d/c  Recommend pt continues to mobilize with nsg and restorative techs during hospital stay  Barriers to Discharge None   Barriers to Discharge Comments Patient denies any mobility or safety concerns about returning home at time of discharge   Goals   Patient Goals " to get better"   Plan   PT Frequency   (DC IPPT)   Recommendation   PT Discharge Recommendation Home with outpatient rehabilitation  (home with increased family support, OPPT when appropriate)   Equipment Recommended Walker   Walker Package Recommended Wheeled walker   Additional Comments Patient denies any mobility or safety concerns about returning home at time of discharge   AM-PAC Basic Mobility Inpatient   Turning in Bed Without Bedrails 4   Lying on Back to Sitting on Edge of Flat Bed 4   Moving Bed to Chair 4   Standing Up From Chair 4   Walk in Room 3   Climb 3-5 Stairs 3   Basic Mobility Inpatient Raw Score 22   Basic Mobility Standardized Score 47 4   Highest Level Of Mobility   JH-HLM Goal 7: Walk 25 feet or more   JH-HLM Achieved 7: Walk 25 feet or more   Modified Hardy Scale   Modified Hardy Scale 3   Barthel Index   Feeding 10   Bathing 0   Grooming Score 5   Dressing Score 5   Bladder Score 10   Bowels Score 10   Toilet Use Score 10   Transfers (Bed/Chair) Score 10   Mobility (Level Surface) Score 10   Stairs Score 5   Barthel Index Score 75   Portions of the documentation may have been created using voice recognition software  Occasional wrong word or sound alike substitutions may have occurred due to the inherent limitations of the voice recognition software  Read the chart carefully and recognize, using context, where substitutions have occurred      Robert Guo, PT, DPT

## 2022-12-20 NOTE — PLAN OF CARE
Problem: MOBILITY - ADULT  Goal: Maintain or return to baseline ADL function  Description: INTERVENTIONS:  -  Assess patient's ability to carry out ADLs; assess patient's baseline for ADL function and identify physical deficits which impact ability to perform ADLs (bathing, care of mouth/teeth, toileting, grooming, dressing, etc )  - Assess/evaluate cause of self-care deficits   - Assess range of motion  - Assess patient's mobility; develop plan if impaired  - Assess patient's need for assistive devices and provide as appropriate  - Encourage maximum independence but intervene and supervise when necessary  - Involve family in performance of ADLs  - Assess for home care needs following discharge   - Consider OT consult to assist with ADL evaluation and planning for discharge  - Provide patient education as appropriate  Outcome: Progressing  Goal: Maintains/Returns to pre admission functional level  Description: INTERVENTIONS:  - Perform BMAT or MOVE assessment daily    - Set and communicate daily mobility goal to care team and patient/family/caregiver  - Collaborate with rehabilitation services on mobility goals if consulted  - Perform Range of Motion 3 times a day  - Reposition patient every 2 hours    - Dangle patient 3 times a day  - Stand patient 3 times a day  - Ambulate patient 3 times a day  - Out of bed to chair 3 times a day   - Out of bed for meals 3 times a day  - Out of bed for toileting  - Record patient progress and toleration of activity level   Outcome: Progressing     Problem: PAIN - ADULT  Goal: Verbalizes/displays adequate comfort level or baseline comfort level  Description: Interventions:  - Encourage patient to monitor pain and request assistance  - Assess pain using appropriate pain scale  - Administer analgesics based on type and severity of pain and evaluate response  - Implement non-pharmacological measures as appropriate and evaluate response  - Consider cultural and social influences on pain and pain management  - Notify physician/advanced practitioner if interventions unsuccessful or patient reports new pain  Outcome: Progressing     Problem: INFECTION - ADULT  Goal: Absence or prevention of progression during hospitalization  Description: INTERVENTIONS:  - Assess and monitor for signs and symptoms of infection  - Monitor lab/diagnostic results  - Monitor all insertion sites, i e  indwelling lines, tubes, and drains  - Monitor endotracheal if appropriate and nasal secretions for changes in amount and color  - [place] appropriate cooling/warming therapies per order  - Administer medications as ordered  - Instruct and encourage patient and family to use good hand hygiene technique  - Identify and instruct in appropriate isolation precautions for identified infection/condition  Outcome: Progressing  Goal: Absence of fever/infection during neutropenic period  Description: INTERVENTIONS:  - Monitor WBC    Outcome: Progressing     Problem: SAFETY ADULT  Goal: Maintain or return to baseline ADL function  Description: INTERVENTIONS:  -  Assess patient's ability to carry out ADLs; assess patient's baseline for ADL function and identify physical deficits which impact ability to perform ADLs (bathing, care of mouth/teeth, toileting, grooming, dressing, etc )  - Assess/evaluate cause of self-care deficits   - Assess range of motion  - Assess patient's mobility; develop plan if impaired  - Assess patient's need for assistive devices and provide as appropriate  - Encourage maximum independence but intervene and supervise when necessary  - Involve family in performance of ADLs  - Assess for home care needs following discharge   - Consider OT consult to assist with ADL evaluation and planning for discharge  - Provide patient education as appropriate  Outcome: Progressing  Goal: Maintains/Returns to pre admission functional level  Description: INTERVENTIONS:  - Perform BMAT or MOVE assessment daily    - Set and communicate daily mobility goal to care team and patient/family/caregiver  - Collaborate with rehabilitation services on mobility goals if consulted  - Perform Range of Motion 3 times a day  - Reposition patient every 2 hours    - Dangle patient 3 times a day  - Stand patient 3 times a day  - Ambulate patient 3 times a day  - Out of bed to chair 3 times a day   - Out of bed for meals 3 times a day  - Out of bed for toileting  - Record patient progress and toleration of activity level   Outcome: Progressing  Goal: Patient will remain free of falls  Description: INTERVENTIONS:  - Educate patient/family on patient safety including physical limitations  - Instruct patient to call for assistance with activity   - Consult OT/PT to assist with strengthening/mobility   - Keep Call bell within reach  - Keep bed low and locked with side rails adjusted as appropriate  - Keep care items and personal belongings within reach  - Initiate and maintain comfort rounds  - Make Fall Risk Sign visible to staff  - Offer Toileting every 2 Hours, in advance of need  - Initiate/Maintain on alarm  - Obtain necessary fall risk management equipment:  - Apply yellow socks and bracelet for high fall risk patients  - Consider moving patient to room near nurses station  Outcome: Progressing     Problem: DISCHARGE PLANNING  Goal: Discharge to home or other facility with appropriate resources  Description: INTERVENTIONS:  - Identify barriers to discharge w/patient and caregiver  - Arrange for needed discharge resources and transportation as appropriate  - Identify discharge learning needs (meds, wound care, etc )  - Arrange for interpretive services to assist at discharge as needed  - Refer to Case Management Department for coordinating discharge planning if the patient needs post-hospital services based on physician/advanced practitioner order or complex needs related to functional status, cognitive ability, or social support system  Outcome: Progressing     Problem: Knowledge Deficit  Goal: Patient/family/caregiver demonstrates understanding of disease process, treatment plan, medications, and discharge instructions  Description: Complete learning assessment and assess knowledge base    Interventions:  - Provide teaching at level of understanding  - Provide teaching via preferred learning methods  Outcome: Progressing

## 2022-12-20 NOTE — OCCUPATIONAL THERAPY NOTE
Occupational Therapy Evaluation     Patient Name: Efren MCGUIRELSTEPHANIE Date: 12/20/2022  Problem List  Principal Problem:    Nerve sheath tumor  Active Problems:    Spinal stenosis    Abnormal MRI    Past Medical History  Past Medical History:   Diagnosis Date    Anxiety     Arthritis     Bleeding tendency (HCC)     BPH (benign prostatic hyperplasia)     Broken bones     left leg    Cancer (HCC)     Cataract     Chronic neck pain     Epilepsy ([facility] )     Hypothyroidism     Neoplasm of unspecified behavior of bone, soft tissue, and skin     Osteomyelitis ([facility] )     PONV (postoperative nausea and vomiting)     Prostate cancer ([facility] )     Skin cancer     Squamous carcinoma 12/01/2014 12/2014    Thyroid condition     Thyroid disease      Past Surgical History  Past Surgical History:   Procedure Laterality Date    ARTHRODESIS      H/o Arthrodesis Cervical to C2;  last assessed [id number]    CERVICAL FUSION      COLONOSCOPY  01/22/2021    COLONOSCOPY      KNEE SURGERY Right 12/22/2017    LEG SURGERY Left 1999    Hardware placed in lower leg bone(s)    LEG SURGERY Left 01/01/2006 2006, 2012-Left leg vascular    [place] BX/EXCIS SPINAL TUMOR,XDURAL,LUMB N/A 12/19/2022    Procedure: L2/3 decompressive laminectomy and biopsy (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;  Surgeon: Papa Lopez MD;  [facility];  Service: Neurosurgery    THYROID SURGERY Right 2013    US GUIDED PROSTATE BIOPSY  07/13/2021 12/20/22 1031   OT Last Visit   OT Visit Date 12/20/22   Note Type   Note type Evaluation   Pain Assessment   Pain Assessment Tool 0-10   Pain Score 6   Pain Location/Orientation Location: Back   Hospital Pain Intervention(s) Repositioned; Ambulation/increased activity; Emotional support;Relaxation technique   Restrictions/Precautions   Weight Bearing Precautions Per Order No   Home Living   Type of [address] [place] [address] Two level; Able to live on main level with bedroom/bathroom   Home Equipment Walker;Cane   Additional Comments reports his cousin is able to provide assist prn   Prior Function   Level of [place] Independent with ADLs; Independent with functional mobility; Independent with IADLS   Lives With [facility] Help From Family   IADLs Independent with driving; Independent with meal prep; Independent with medication management   Falls in the last 6 months 0   Lifestyle   Autonomy I adls and mobility - i iadls   Reciprocal Relationships supportive family - reports cousin is able to assist prn   Service to Others retired   Intrinsic Gratification mostly sedentary pta   Subjective   Subjective offers no c/o   ADL   Eating Assistance 7  Independent   Grooming Assistance 7  Independent   UB Bathing Assistance 5  Supervision/Setup   LB Bathing Assistance 5  Supervision/Setup   UB Dressing Assistance 5  Supervision/Setup   LB Dressing Assistance 5  Supervision/Setup   Toileting Assistance  5  Supervision/Setup   Bed Mobility   Supine to Sit 5  Supervision   Transfers   Sit to Stand 5  Supervision   Stand to Sit 5  Supervision   Functional Mobility   Functional Mobility 5  Supervision   Additional items Rolling walker   Balance   Static Sitting Fair +   Dynamic Sitting Fair   Static Standing Fair   Dynamic Standing [address],Flo[address],4, & 5 -   Activity Tolerance   Activity Tolerance Patient limited by fatigue;Patient limited by pain   RUE Assessment   RUE Assessment WFL   LUE Assessment   LUE Assessment WFL   Cognition   Overall Cognitive Status WFL   Assessment   Limitation Decreased endurance;Decreased self-care trans;Decreased high-level ADLs   Prognosis Good   Assessment Pt is a 75 y o  male who was admitted to [address] [place] [address] on 12/19/2022 with Nerve sheath tumor s/p  L2-3 decompression lami and biopsy   Pt's problem list also includes PMH of underlying neurological disorder, cancer history and anxiety, arthritis, BPH, LLE fx, chronic neck pain, epilepsy, hypothyroidism prostate ca, skin ca  At baseline pt was completing adls and mobility independently- I iadls  Pt lives with cousin in 2 story home with 3 LINDA and FFSU PRN  Currently pt requires sba for overall ADLS and sba for functional mobility/transfers  Pt currently presents with impairments in the following categories -steps to enter environment, difficulty performing IADLS  and environment activity tolerance, endurance and standing balance/tolerance  These impairments, as well as pt's fatigue, pain, spinal precautions, risk for falls and home environment  limit pt's ability to safely engage in all baseline areas of occupation, includingfunctional mobility/transfers, community mobility, laundry , driving, house maintenance, meal prep, cleaning, social participation  and leisure activities however has supportive cousin who is able to assist prn - reviewed spinal precautions and use of proper body mechanics during adls and iadl tasks with good understanding -  From OT standpoint, recommend home with family support upon D/C  No immediate OT needs indicated at this time- d/c from caseload   Goals   Patient Goals get better   Plan   OT Frequency Eval only   Recommendation   OT Discharge Recommendation No rehabilitation needs   AM-PAC Daily Activity Inpatient   Lower Body Dressing 3   Bathing 3   Toileting 3   Upper Body Dressing 4   Grooming 4   Eating 4   Daily Activity Raw Score 21   Daily Activity Standardized Score (Calc for Raw Score >=11) 44 27   AM-PAC Applied Cognition Inpatient   Following a Speech/Presentation 4   Understanding Ordinary Conversation 4   Taking Medications 4   Remembering Where Things Are Placed or Put Away 4   Remembering List of 4-5 Errands 4   Taking Care of Complicated Tasks 4   Applied Cognition Raw Score 24   Applied Cognition Standardized Score 62 21   End of Consult   Education Provided Yes   Patient Position at End of Consult Bedside chair; All needs within reach   Nurse Communication Nurse aware of consult The patient's raw score on the AM-PAC Daily Activity inpatient short form is 21, standardized score is 44 27, greater than 39 4  Patients at this level are likely to benefit from discharge to home  Please refer to the recommendation of the Occupational Therapist for safe discharge planning      [facility]

## 2022-12-20 NOTE — PROGRESS NOTES
[address] [facility]  Progress Note - Sean Husbands 1955, 75 y o  male MRN: [id number]  Unit/Bed#: [facility] 127-36 Encounter: [id number]  Primary Care Provider: Amina Curry MD   Date and time admitted to hospital: 12/19/2022  5:19 AM    * Nerve sheath tumor  Assessment & Plan  POD 1 from L2-3 laminectomy for resection of nerve sheath tumor    No imaging required postoperatively     Plan:   · Ongoing neurologic checks  · Ongoing medical management  · Home medication reordered  · Ongoing pain control  · Will DC IV narcotics  · Controlled on oral regimen currently with following adjustments   · Will schedule tylenol and robaxin  · Continue bowel regimen as ordered  · Colace  · Will add Senakot and miralax daily  · DVT ppx: SCD's, HSQ to start tonight  · Physical therapy/occupational therapy evaluation  · Case management following for disposition  · Rounds completed with ABDIRASHID Bowling  · Neurosurgery will continue to follow as primary team  Pending therapy evaluation with potential DC tomorrow  Call with questions or concerns  Subjective/Objective   Chief Complaint: Pain     Subjective: patient reports continued pain at this time but no significant radiculopathy  Pain incisional in nature  Preserved strength in the legs  No sensation changes     Objective: laying in bed in NAD    I/O       12/18 0701  12/19 0700 12/19 0701  12/20 0700 12/20 0701  12/21 0700    I V  (mL/kg)  1000 (11 9)     Total Intake(mL/kg)  1000 (11 9)     Urine (mL/kg/hr)  2265 (1 1) 350 (1 7)    Total Output  2265 350    Net  -1265 -350                 Invasive Devices     Peripheral Intravenous Line  Duration           Peripheral IV 12/19/22 Left;Medial Wrist 1 day                Physical Exam:  Vitals: Blood pressure 114/69, pulse 64, temperature 98 5 °F (36 9 °C), resp  rate 16, height 6' (1 829 m), weight 83 9 kg (185 lb), SpO2 94 %  ,Body mass index is 25 09 kg/m²      General appearance: alert, appears stated age, cooperative and no distress  Head: Normocephalic, without obvious abnormality  Eyes: EOMI, PERRL  Neck: supple, symmetrical, trachea midline  Back: no kyphosis present  Lungs: non labored breathing  Heart: regular heart rate  Neurologic:   Mental status: Alert, oriented x3, thought content appropriate  Cranial nerves: grossly intact (Cranial nerves II-XII)  Sensory: normal to light touch   Motor: moving all extremities without focal weakness 5/5 strength   Reflexes: 2+ and symmetric    Lab Results:  Results from last 7 days   Lab Units 12/20/22 0459 12/19/22  2350   WBC Thousand/uL 12 80*  --    HEMOGLOBIN g/dL 12 5  --    HEMATOCRIT % 37 7  --    PLATELETS Thousands/uL 166 159     Results from last 7 days   Lab Units 12/20/22 0459   POTASSIUM mmol/L 3 5   CHLORIDE mmol/L 110*   CO2 mmol/L 27   BUN mg/dL 14   CREATININE mg/dL 0 59*   CALCIUM mg/dL 8 9             Results from last 7 days   Lab Units 12/19/22  2350   INR  0 99   PTT seconds 25     No results found for: TROPONINT  ABG:No results found for: PHART, OFT8FVU, PO2ART, RTL5UKF, B9BJKUUS, BEART, SOURCE    Imaging Studies: I have personally reviewed pertinent reports  and I have personally reviewed pertinent films in PACS  XR spine lumbosacral 1 view    Result Date: 12/19/2022  Impression: Fluoroscopic guidance provided for surgical procedure  Please refer to the separate procedure notes for additional details  Localization procedure was performed, with the OR notified of the level at approximately 9:10 AM on 12/19/2022 via telephone conversation with the OR x-ray technologist   Workstation performed: [id number]       EKG, Pathology, and Other Studies: I have personally reviewed pertinent reports        VTE Pharmacologic Prophylaxis: Heparin    VTE Mechanical Prophylaxis: sequential compression device

## 2022-12-21 RX ORDER — METHYLPREDNISOLONE 4 MG/1
16 TABLET ORAL DAILY
Status: DISCONTINUED | OUTPATIENT
Start: 2022-12-23 | End: 2022-12-22 | Stop reason: HOSPADM

## 2022-12-21 RX ORDER — METHYLPREDNISOLONE 4 MG/1
8 TABLET ORAL DAILY
Status: DISCONTINUED | OUTPATIENT
Start: 2022-12-25 | End: 2022-12-22 | Stop reason: HOSPADM

## 2022-12-21 RX ORDER — METHYLPREDNISOLONE 4 MG/1
12 TABLET ORAL DAILY
Status: DISCONTINUED | OUTPATIENT
Start: 2022-12-24 | End: 2022-12-22 | Stop reason: HOSPADM

## 2022-12-21 RX ORDER — METHYLPREDNISOLONE 4 MG/1
4 TABLET ORAL DAILY
Status: DISCONTINUED | OUTPATIENT
Start: 2022-12-26 | End: 2022-12-22 | Stop reason: HOSPADM

## 2022-12-21 RX ADMIN — SENNOSIDES 8.6 MG: 8.6 TABLET, FILM COATED ORAL at 21:21

## 2022-12-21 RX ADMIN — OXYCODONE HYDROCHLORIDE 10 MG: 10 TABLET ORAL at 04:09

## 2022-12-21 RX ADMIN — METHOCARBAMOL TABLETS 750 MG: 750 TABLET, COATED ORAL at 23:21

## 2022-12-21 RX ADMIN — METHYLPREDNISOLONE 24 MG: 16 TABLET ORAL at 16:27

## 2022-12-21 RX ADMIN — HEPARIN SODIUM 5000 UNITS: 5000 INJECTION INTRAVENOUS; SUBCUTANEOUS at 13:20

## 2022-12-21 RX ADMIN — HEPARIN SODIUM 5000 UNITS: 5000 INJECTION INTRAVENOUS; SUBCUTANEOUS at 21:21

## 2022-12-21 RX ADMIN — LEVOTHYROXINE SODIUM 100 MCG: 100 TABLET ORAL at 05:11

## 2022-12-21 RX ADMIN — OXYCODONE HYDROCHLORIDE 10 MG: 10 TABLET ORAL at 19:17

## 2022-12-21 RX ADMIN — OXYCODONE HYDROCHLORIDE 10 MG: 10 TABLET ORAL at 13:16

## 2022-12-21 RX ADMIN — OXYCODONE HYDROCHLORIDE 10 MG: 10 TABLET ORAL at 08:56

## 2022-12-21 RX ADMIN — POLYETHYLENE GLYCOL 3350 17 G: 17 POWDER, FOR SOLUTION ORAL at 08:56

## 2022-12-21 RX ADMIN — ACETAMINOPHEN 975 MG: 325 TABLET, FILM COATED ORAL at 13:16

## 2022-12-21 RX ADMIN — METHOCARBAMOL TABLETS 750 MG: 750 TABLET, COATED ORAL at 05:11

## 2022-12-21 RX ADMIN — HEPARIN SODIUM 5000 UNITS: 5000 INJECTION INTRAVENOUS; SUBCUTANEOUS at 05:11

## 2022-12-21 RX ADMIN — METHOCARBAMOL TABLETS 750 MG: 750 TABLET, COATED ORAL at 00:09

## 2022-12-21 RX ADMIN — DOCUSATE SODIUM 100 MG: 100 CAPSULE, LIQUID FILLED ORAL at 17:23

## 2022-12-21 RX ADMIN — TAMSULOSIN HYDROCHLORIDE 0.4 MG: 0.4 CAPSULE ORAL at 08:56

## 2022-12-21 RX ADMIN — LORATADINE 10 MG: 10 TABLET ORAL at 08:56

## 2022-12-21 RX ADMIN — GLYCERIN, HYPROMELLOSE, POLYETHYLENE GLYCOL 1 DROP: .2; .2; 1 LIQUID OPHTHALMIC at 17:23

## 2022-12-21 RX ADMIN — METHOCARBAMOL TABLETS 750 MG: 750 TABLET, COATED ORAL at 17:23

## 2022-12-21 RX ADMIN — ACETAMINOPHEN 975 MG: 325 TABLET, FILM COATED ORAL at 05:11

## 2022-12-21 RX ADMIN — METHOCARBAMOL TABLETS 750 MG: 750 TABLET, COATED ORAL at 13:16

## 2022-12-21 RX ADMIN — TAMSULOSIN HYDROCHLORIDE 0.4 MG: 0.4 CAPSULE ORAL at 21:21

## 2022-12-21 RX ADMIN — DOCUSATE SODIUM 100 MG: 100 CAPSULE, LIQUID FILLED ORAL at 08:56

## 2022-12-21 RX ADMIN — ACETAMINOPHEN 975 MG: 325 TABLET, FILM COATED ORAL at 21:21

## 2022-12-21 NOTE — PROGRESS NOTES
[address] 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 (Dr Ld Jordan, 12/19/2022)    Imaging:  · No imaging required postoperatively     Plan:  • Continue to monitor neurological exam  • No drains  • Pain control - having some recurrence of his left leg pain after working with therapy the day prior but not as bad as prior to surgery, possible nerve inflammation  o Tylenol 975 mg q 8 hours scheduled  o Robaxin 750 mg q 6 hours scheduled  o Oxycodone 5 mg and 10 mg q 4 hours as needed for moderate and severe pain, respectively  o Will order medrol dose pack to see if this helps with inflammation  • Bowel regimen - passing gas, no BM yet  o Continue senna, colace and PRN suppository  • Labs / vitals  o WBC mildly elevated, likely reactional from surgery, remains afebrile, no additional work up for this  • Home meds reordered  • Postoperative antibiotics completed  • DVT ppx:  SCDs, heparin sq  • Mobilize as tolerated with assistance, PT / OT recommending home with outpatient therapy when appropriate with walker  • Patient is not quite ready for dc at this time, would like to see if steroids aid in his pain control    Neurosurgery will follow as primary team   [address] [place] [address] tomorrow if pain is better controlled  Please call with questions or concerns  Subjective/Objective   Chief Complaint: "I have pain"    Subjective:  Patient states after working with therapy yesterday he had some recurrence of his left leg pain, in the buttock down the thigh and into the calf  He states the pain is still there today but is not as bad as before surgery    He appears a bit anxious about this pain and is not sure if he pushed himself with therapy too much  He did ambulate again with therapy  Does not report any new weakness  Urinating per baseline, passing gas but no BM  Eating and drinking well  Having some back pain as well, worried about the care and maintenance of his incision  Does not want to go home today given left leg pain  Objective:  Sitting in bed, NAD    I/O       12/19 0701 12/20 0700 12/20 0701  12/21 0700 12/21 0701 12/22 0700    P  O   240     I V  (mL/kg) 1000 (11 9)      Total Intake(mL/kg) 1000 (11 9) 240 (2 9)     Urine (mL/kg/hr) 2265 (1 1) 3150 (1 6)     Total Output 2265 3150     Net -1263 -2910                  Invasive Devices     Peripheral Intravenous Line  Duration           Peripheral IV 12/19/22 Left;Medial Wrist 2 days                Physical Exam:  Vitals: Blood pressure 132/79, pulse 82, temperature 98 2 °F (36 8 °C), resp  rate 16, height 6' (1 829 m), weight 83 9 kg (185 lb), SpO2 93 %  ,Body mass index is 25 09 kg/m²        General appearance: alert, appears stated age, cooperative and no distress  Head: Normocephalic, without obvious abnormality, atraumatic  Eyes: conjugate gaze  Neck: supple, symmetrical, trachea midline  Back: dressing removed, incision clean dry intact  Lungs: non labored breathing  Heart: regular heart rate  Neurologic:   Mental status: Alert, oriented x3, follows commands, slightly anxious about pain, thought content appropriate  Cranial nerves: grossly intact (Cranial nerves II-XII)  Sensory: normal to LT  Motor: moving all extremities without focal weakness  Coordination: finger to nose normal bilaterally, no drift bilaterally      Lab Results:  Results from last 7 days   Lab Units 12/20/22  0459 12/19/22  2350   WBC Thousand/uL 12 80*  --    HEMOGLOBIN g/dL 12 5  --    HEMATOCRIT % 37 7  --    PLATELETS Thousands/uL 166 159     Results from last 7 days   Lab Units 12/20/22  0459   POTASSIUM mmol/L 3 5   CHLORIDE mmol/L 110*   CO2 mmol/L 27   BUN mg/dL 14 CREATININE mg/dL 0 59*   CALCIUM mg/dL 8 9             Results from last 7 days   Lab Units 12/19/22  2350   INR  0 99   PTT seconds 25       Imaging Studies: I have personally reviewed pertinent reports  and I have personally reviewed pertinent films in PACS    XR spine lumbosacral 1 view    Result Date: 12/19/2022  Impression: Fluoroscopic guidance provided for surgical procedure  Please refer to the separate procedure notes for additional details  Localization procedure was performed, with the OR notified of the level at approximately 9:10 AM on 12/19/2022 via telephone conversation with the OR x-ray technologist   Workstation performed: [id number]       EKG, Pathology, and Other Studies: I have personally reviewed pertinent reports        VTE Pharmacologic Prophylaxis: Heparin    VTE Mechanical Prophylaxis: sequential compression device

## 2022-12-21 NOTE — PLAN OF CARE
Problem: MOBILITY - ADULT  Goal: Maintain or return to baseline ADL function  Description: INTERVENTIONS:  -  Assess patient's ability to carry out ADLs; assess patient's baseline for ADL function and identify physical deficits which impact ability to perform ADLs (bathing, care of mouth/teeth, toileting, grooming, dressing, etc )  - Assess/evaluate cause of self-care deficits   - Assess range of motion  - Assess patient's mobility; develop plan if impaired  - Assess patient's need for assistive devices and provide as appropriate  - Encourage maximum independence but intervene and supervise when necessary  - Involve family in performance of ADLs  - Assess for home care needs following discharge   - Consider OT consult to assist with ADL evaluation and planning for discharge  - Provide patient education as appropriate  Outcome: Progressing  Goal: Maintains/Returns to pre admission functional level  Description: INTERVENTIONS:  - Perform BMAT or MOVE assessment daily    - Set and communicate daily mobility goal to care team and patient/family/caregiver  - Collaborate with rehabilitation services on mobility goals if consulted  - Perform Range of Motion 3 times a day  - Reposition patient every 2 hours    - Dangle patient 3 times a day  - Stand patient 3 times a day  - Ambulate patient 3 times a day  - Out of bed to chair 3 times a day   - Out of bed for meals 3 times a day  - Out of bed for toileting  - Record patient progress and toleration of activity level   Outcome: Progressing     Problem: PAIN - ADULT  Goal: Verbalizes/displays adequate comfort level or baseline comfort level  Description: Interventions:  - Encourage patient to monitor pain and request assistance  - Assess pain using appropriate pain scale  - Administer analgesics based on type and severity of pain and evaluate response  - Implement non-pharmacological measures as appropriate and evaluate response  - Consider cultural and social influences on pain and pain management  - Notify physician/advanced practitioner if interventions unsuccessful or patient reports new pain  Outcome: Progressing     Problem: INFECTION - ADULT  Goal: Absence or prevention of progression during hospitalization  Description: INTERVENTIONS:  - Assess and monitor for signs and symptoms of infection  - Monitor lab/diagnostic results  - Monitor all insertion sites, i e  indwelling lines, tubes, and drains  - Monitor endotracheal if appropriate and nasal secretions for changes in amount and color  - [place] appropriate cooling/warming therapies per order  - Administer medications as ordered  - Instruct and encourage patient and family to use good hand hygiene technique  - Identify and instruct in appropriate isolation precautions for identified infection/condition  Outcome: Progressing  Goal: Absence of fever/infection during neutropenic period  Description: INTERVENTIONS:  - Monitor WBC    Outcome: Progressing     Problem: SAFETY ADULT  Goal: Maintain or return to baseline ADL function  Description: INTERVENTIONS:  -  Assess patient's ability to carry out ADLs; assess patient's baseline for ADL function and identify physical deficits which impact ability to perform ADLs (bathing, care of mouth/teeth, toileting, grooming, dressing, etc )  - Assess/evaluate cause of self-care deficits   - Assess range of motion  - Assess patient's mobility; develop plan if impaired  - Assess patient's need for assistive devices and provide as appropriate  - Encourage maximum independence but intervene and supervise when necessary  - Involve family in performance of ADLs  - Assess for home care needs following discharge   - Consider OT consult to assist with ADL evaluation and planning for discharge  - Provide patient education as appropriate  Outcome: Progressing  Goal: Maintains/Returns to pre admission functional level  Description: INTERVENTIONS:  - Perform BMAT or MOVE assessment daily    - Set and communicate daily mobility goal to care team and patient/family/caregiver  - Collaborate with rehabilitation services on mobility goals if consulted  - Perform Range of Motion 3 times a day  - Reposition patient every 2 hours    - Dangle patient 3 times a day  - Stand patient 3 times a day  - Ambulate patient 3 times a day  - Out of bed to chair 3 times a day   - Out of bed for meals 3 times a day  - Out of bed for toileting  - Record patient progress and toleration of activity level   Outcome: Progressing  Goal: Patient will remain free of falls  Description: INTERVENTIONS:  - Educate patient/family on patient safety including physical limitations  - Instruct patient to call for assistance with activity   - Consult OT/PT to assist with strengthening/mobility   - Keep Call bell within reach  - Keep bed low and locked with side rails adjusted as appropriate  - Keep care items and personal belongings within reach  - Initiate and maintain comfort rounds  - Make Fall Risk Sign visible to staff  - Offer Toileting every 2 Hours, in advance of need  - Initiate/Maintain on alarm  - Obtain necessary fall risk management equipment:  - Apply yellow socks and bracelet for high fall risk patients  - Consider moving patient to room near nurses station  Outcome: Progressing     Problem: DISCHARGE PLANNING  Goal: Discharge to home or other facility with appropriate resources  Description: INTERVENTIONS:  - Identify barriers to discharge w/patient and caregiver  - Arrange for needed discharge resources and transportation as appropriate  - Identify discharge learning needs (meds, wound care, etc )  - Arrange for interpretive services to assist at discharge as needed  - Refer to Case Management Department for coordinating discharge planning if the patient needs post-hospital services based on physician/advanced practitioner order or complex needs related to functional status, cognitive ability, or social support system  Outcome: Progressing     Problem: Knowledge Deficit  Goal: Patient/family/caregiver demonstrates understanding of disease process, treatment plan, medications, and discharge instructions  Description: Complete learning assessment and assess knowledge base    Interventions:  - Provide teaching at level of understanding  - Provide teaching via preferred learning methods  Outcome: Progressing     Problem: Potential for Falls  Goal: Patient will remain free of falls  Description: INTERVENTIONS:  - Educate patient/family on patient safety including physical limitations  - Instruct patient to call for assistance with activity   - Consult OT/PT to assist with strengthening/mobility   - Keep Call bell within reach  - Keep bed low and locked with side rails adjusted as appropriate  - Keep care items and personal belongings within reach  - Initiate and maintain comfort rounds  - Make Fall Risk Sign visible to staff  - Offer Toileting every 2 Hours, in advance of need  - Initiate/Maintain on alarm  - Obtain necessary fall risk management equipment:  - Apply yellow socks and bracelet for high fall risk patients  - Consider moving patient to room near nurses station  Outcome: Progressing

## 2022-12-21 NOTE — PLAN OF CARE
Problem: MOBILITY - ADULT  Goal: Maintain or return to baseline ADL function  Description: INTERVENTIONS:  -  Assess patient's ability to carry out ADLs; assess patient's baseline for ADL function and identify physical deficits which impact ability to perform ADLs (bathing, care of mouth/teeth, toileting, grooming, dressing, etc )  - Assess/evaluate cause of self-care deficits   - Assess range of motion  - Assess patient's mobility; develop plan if impaired  - Assess patient's need for assistive devices and provide as appropriate  - Encourage maximum independence but intervene and supervise when necessary  - Involve family in performance of ADLs  - Assess for home care needs following discharge   - Consider OT consult to assist with ADL evaluation and planning for discharge  - Provide patient education as appropriate  Outcome: Progressing  Goal: Maintains/Returns to pre admission functional level  Description: INTERVENTIONS:  - Perform BMAT or MOVE assessment daily    - Set and communicate daily mobility goal to care team and patient/family/caregiver  - Collaborate with rehabilitation services on mobility goals if consulted  - Perform Range of Motion 3 times a day  - Reposition patient every 2 hours    - Dangle patient 3 times a day  - Stand patient 3 times a day  - Ambulate patient 3 times a day  - Out of bed to chair 3 times a day   - Out of bed for meals 3 times a day  - Out of bed for toileting  - Record patient progress and toleration of activity level   Outcome: Progressing     Problem: PAIN - ADULT  Goal: Verbalizes/displays adequate comfort level or baseline comfort level  Description: Interventions:  - Encourage patient to monitor pain and request assistance  - Assess pain using appropriate pain scale  - Administer analgesics based on type and severity of pain and evaluate response  - Implement non-pharmacological measures as appropriate and evaluate response  - Consider cultural and social influences on pain and pain management  - Notify physician/advanced practitioner if interventions unsuccessful or patient reports new pain  Outcome: Progressing     Problem: INFECTION - ADULT  Goal: Absence or prevention of progression during hospitalization  Description: INTERVENTIONS:  - Assess and monitor for signs and symptoms of infection  - Monitor lab/diagnostic results  - Monitor all insertion sites, i e  indwelling lines, tubes, and drains  - Monitor endotracheal if appropriate and nasal secretions for changes in amount and color  - [facility] appropriate cooling/warming therapies per order  - Administer medications as ordered  - Instruct and encourage patient and family to use good hand hygiene technique  - Identify and instruct in appropriate isolation precautions for identified infection/condition  Outcome: Progressing  Goal: Absence of fever/infection during neutropenic period  Description: INTERVENTIONS:  - Monitor WBC    Outcome: Progressing     Problem: SAFETY ADULT  Goal: Maintain or return to baseline ADL function  Description: INTERVENTIONS:  -  Assess patient's ability to carry out ADLs; assess patient's baseline for ADL function and identify physical deficits which impact ability to perform ADLs (bathing, care of mouth/teeth, toileting, grooming, dressing, etc )  - Assess/evaluate cause of self-care deficits   - Assess range of motion  - Assess patient's mobility; develop plan if impaired  - Assess patient's need for assistive devices and provide as appropriate  - Encourage maximum independence but intervene and supervise when necessary  - Involve family in performance of ADLs  - Assess for home care needs following discharge   - Consider OT consult to assist with ADL evaluation and planning for discharge  - Provide patient education as appropriate  Outcome: Progressing  Goal: Maintains/Returns to pre admission functional level  Description: INTERVENTIONS:  - Perform BMAT or MOVE assessment daily    - Set and communicate daily mobility goal to care team and patient/family/caregiver  - Collaborate with rehabilitation services on mobility goals if consulted  - Perform Range of Motion 3 times a day  - Reposition patient every 2 hours    - Dangle patient 3 times a day  - Stand patient 3 times a day  - Ambulate patient 3 times a day  - Out of bed to chair 3 times a day   - Out of bed for meals 3 times a day  - Out of bed for toileting  - Record patient progress and toleration of activity level   Outcome: Progressing  Goal: Patient will remain free of falls  Description: INTERVENTIONS:  - Educate patient/family on patient safety including physical limitations  - Instruct patient to call for assistance with activity   - Consult OT/PT to assist with strengthening/mobility   - Keep Call bell within reach  - Keep bed low and locked with side rails adjusted as appropriate  - Keep care items and personal belongings within reach  - Initiate and maintain comfort rounds  - Make Fall Risk Sign visible to staff  - Offer Toileting every 2 Hours, in advance of need  - Initiate/Maintain on alarm  - Obtain necessary fall risk management equipment:  - Apply yellow socks and bracelet for high fall risk patients  - Consider moving patient to room near nurses station  Outcome: Progressing     Problem: DISCHARGE PLANNING  Goal: Discharge to home or other facility with appropriate resources  Description: INTERVENTIONS:  - Identify barriers to discharge w/patient and caregiver  - Arrange for needed discharge resources and transportation as appropriate  - Identify discharge learning needs (meds, wound care, etc )  - Arrange for interpretive services to assist at discharge as needed  - Refer to Case Management Department for coordinating discharge planning if the patient needs post-hospital services based on physician/advanced practitioner order or complex needs related to functional status, cognitive ability, or social support system  Outcome: Progressing     Problem: Knowledge Deficit  Goal: Patient/family/caregiver demonstrates understanding of disease process, treatment plan, medications, and discharge instructions  Description: Complete learning assessment and assess knowledge base    Interventions:  - Provide teaching at level of understanding  - Provide teaching via preferred learning methods  Outcome: Progressing

## 2022-12-21 NOTE — ASSESSMENT & PLAN NOTE
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 (Dr Blair All, 12/19/2022)    Imaging:  · No imaging required postoperatively     Plan:  • Continue to monitor neurological exam  • No drains  • Pain control - having some recurrence of his left leg pain after working with therapy the day prior but not as bad as prior to surgery, possible nerve inflammation  o Tylenol 975 mg q 8 hours scheduled  o Robaxin 750 mg q 6 hours scheduled  o Oxycodone 5 mg and 10 mg q 4 hours as needed for moderate and severe pain, respectively  o Will order medrol dose pack to see if this helps with inflammation  • Bowel regimen - passing gas, no BM yet  o Continue senna, colace and PRN suppository  • Labs / vitals  o WBC mildly elevated, likely reactional from surgery, remains afebrile, no additional work up for this  • Home meds reordered  • Postoperative antibiotics completed  • DVT ppx:  SCDs, heparin sq  • Mobilize as tolerated with assistance, PT / OT recommending home with outpatient therapy when appropriate with walker  • Patient is not quite ready for dc at this time, would like to see if steroids aid in his pain control    Neurosurgery will follow as primary team   [address] [place] [address] tomorrow if pain is better controlled  Please call with questions or concerns

## 2022-12-21 NOTE — RESTORATIVE TECHNICIAN NOTE
Restorative Technician Note      Patient Name: Maryanne Delaney     [facility] Tech Visit Date: 12/21/22  Note Type: Mobility  Patient Position Upon Consult: Supine  Activity Performed: Ambulated  Assistive Device: Roller walker  Patient Position at End of Consult: Bedside chair;  All needs within reach      Jesse Gomez Restorative Technician

## 2022-12-22 VITALS
TEMPERATURE: 98.4 F | DIASTOLIC BLOOD PRESSURE: 79 MMHG | SYSTOLIC BLOOD PRESSURE: 140 MMHG | WEIGHT: 185 LBS | RESPIRATION RATE: 16 BRPM | OXYGEN SATURATION: 95 % | BODY MASS INDEX: 25.06 KG/M2 | HEART RATE: 70 BPM | HEIGHT: 72 IN

## 2022-12-22 RX ORDER — METHYLPREDNISOLONE 4 MG/1
TABLET ORAL
Qty: 21 TABLET | Refills: 0 | Status: SHIPPED | OUTPATIENT
Start: 2022-12-22 | End: 2023-01-03 | Stop reason: ALTCHOICE

## 2022-12-22 RX ADMIN — TAMSULOSIN HYDROCHLORIDE 0.4 MG: 0.4 CAPSULE ORAL at 08:40

## 2022-12-22 RX ADMIN — OXYCODONE HYDROCHLORIDE 10 MG: 10 TABLET ORAL at 01:39

## 2022-12-22 RX ADMIN — HEPARIN SODIUM 5000 UNITS: 5000 INJECTION INTRAVENOUS; SUBCUTANEOUS at 13:30

## 2022-12-22 RX ADMIN — HEPARIN SODIUM 5000 UNITS: 5000 INJECTION INTRAVENOUS; SUBCUTANEOUS at 05:18

## 2022-12-22 RX ADMIN — POLYETHYLENE GLYCOL 3350 17 G: 17 POWDER, FOR SOLUTION ORAL at 08:40

## 2022-12-22 RX ADMIN — METHOCARBAMOL TABLETS 750 MG: 750 TABLET, COATED ORAL at 13:30

## 2022-12-22 RX ADMIN — DOCUSATE SODIUM 100 MG: 100 CAPSULE, LIQUID FILLED ORAL at 08:40

## 2022-12-22 RX ADMIN — ACETAMINOPHEN 975 MG: 325 TABLET, FILM COATED ORAL at 05:18

## 2022-12-22 RX ADMIN — METHYLPREDNISOLONE 20 MG: 16 TABLET ORAL at 08:40

## 2022-12-22 RX ADMIN — METHOCARBAMOL TABLETS 750 MG: 750 TABLET, COATED ORAL at 05:18

## 2022-12-22 RX ADMIN — LEVOTHYROXINE SODIUM 100 MCG: 100 TABLET ORAL at 05:18

## 2022-12-22 RX ADMIN — ACETAMINOPHEN 975 MG: 325 TABLET, FILM COATED ORAL at 13:30

## 2022-12-22 RX ADMIN — OXYCODONE HYDROCHLORIDE 10 MG: 10 TABLET ORAL at 08:40

## 2022-12-22 RX ADMIN — LORATADINE 10 MG: 10 TABLET ORAL at 08:40

## 2022-12-22 NOTE — RESTORATIVE TECHNICIAN NOTE
Restorative Technician Note      Patient Name: Dieudonne Ty     Restorative Tech Visit Date: 12/22/22  Note Type: Mobility  Patient Position Upon Consult: Supine  Activity Performed: Ambulated  Assistive Device: Roller walker  Patient Position at End of Consult: Supine;  All needs within reach    Dorian Villasenor Restorative Technician

## 2022-12-22 NOTE — ASSESSMENT & PLAN NOTE
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 (Dr Adele Yepez, 12/19/2022)    Imaging:  · No imaging required postoperatively     Plan:  • Continue to monitor neurological exam  • No drains  • Pain control - having some recurrence of his left leg pain however improved with steroids  o Tylenol 975 mg q 8 hours scheduled  o Robaxin 750 mg q 6 hours scheduled  o Oxycodone 5 mg and 10 mg q 4 hours as needed for moderate and severe pain, respectively  o Medrol dose pack  • Bowel regimen - passing gas, no BM yet  o Continue senna, colace and PRN suppository  • Labs / vitals  o WBC mildly elevated (12/20), likely reactional from surgery, remains afebrile, no additional work up for this  • Home meds reordered  • Postoperative antibiotics completed  • DVT ppx:  SCDs, heparin sq  • Mobilize as tolerated with assistance, PT / OT recommending home with outpatient therapy when appropriate with walker which has been ordered  • Discharge instructions discussed and all questions answered    Neurosurgery will follow as primary team   Plan for dc to home this afternoon  Please call with questions or concerns

## 2022-12-22 NOTE — CASE MANAGEMENT
Case Management Discharge Planning Note    Patient name Micheal Garrison  Location [facility] 608/[facility] 853-35 MRN [id number]  : 1955 Date 2022       Current Admission Date: 2022  Current Admission Diagnosis:Nerve sheath tumor   Patient Active Problem List    Diagnosis Date Noted   • CRP elevated 2022   • Nerve sheath tumor 10/15/2022   • Carpal tunnel syndrome on right    • Primary hypertension 2022   • Left otitis media 2022   • Scab 2022   • Elevated blood sugar 2022   • Aspirin long-term use 2022   • Aortic ectasia, thoracic ([address] ) 2022   • Abdominal lymphadenopathy 2022   • Renal cysts, acquired, bilateral 2022   • Pain of left calf 2022   • History of osteomyelitis 2022   • Macrocytosis 2022   • Mass of right parotid gland 2022   • Tingling 2021   • Blood pressure elevated without history of HTN 2021   • Immunization counseling 2021   • Anger 10/13/2021   • Elevated LDL cholesterol level 10/13/2021   • Encounter for immunization 10/13/2021   • Osteopenia 2021   • Decreased testosterone level in male 2021   • Thoracic aortic aneurysm without rupture 2021   • Nonrheumatic mitral valve regurgitation 2021   • Bilateral enlargement of atria 2021   • Abnormal MRI 2021   • Coronary artery calcification 2021   • Abnormal CT scan of lung 2021   • Elevated EBV antibody titer 2021   • Fatigue 2021   • Sugar blood level increased 2021   • Osteoporosis without current pathological fracture 2021   • Bilateral carotid artery stenosis < 50% 2021   • PAC (premature atrial contraction) 2021   • PVC (premature ventricular contraction) 2021   • Elevated blood pressure reading without diagnosis of hypertension 2021   • Primary hypercholesterolemia 2021   • Macrocytosis without anemia 2021   • Leukocytosis 2021 • Abnormal blood chemistry 05/19/2021   • Palpitation 05/19/2021   • Chronic neck pain 05/19/2021   • Prostate cancer ([facility] ) 2021   • Varicose veins of bilateral lower extremities with other complications 67/09/7702   • Chronic pain syndrome 02/03/2020   • Vitamin D deficiency 08/27/2019   • Vertigo 08/26/2019   • Post traumatic stress disorder (PTSD)    • BPH without obstruction/lower urinary tract symptoms 05/03/2019   • Anxiety 12/18/2018   • Reactive depression 12/18/2018   • Abnormal gait 08/24/2018   • Acquired trigger finger 08/24/2018   • Chondromalacia patellae 08/24/2018   • Chronic osteomyelitis of lower leg ([facility] ) 08/24/2018   • Generalized osteoarthritis 08/24/2018   • Knee joint effusion 08/24/2018   • Knee pain 08/24/2018   • Localized, primary osteoarthritis 08/24/2018   • Localized primary osteoarthritis of wrist 08/24/2018   • Loose body in knee, right 12/12/2017   • Osteoarthritis of knee 12/12/2017   • Spider veins of both lower extremities 04/11/2016   • Neuropathy 01/20/2016   • Hypothyroidism due to acquired atrophy of thyroid 01/20/2016   • Muscle pain, myofacial 10/16/2015   • Cervical spondylosis 09/11/2015   • Occipital neuralgia 07/14/2015   • Pruritus 06/03/2015   • Neuralgia 05/13/2015   • Post-traumatic osteoarthritis of one knee 04/07/2015   • Odontoid fracture ([facility] ) 01/19/2015   • Cervical disc herniation 12/15/2014   • Spinal stenosis 12/15/2014   • Nontoxic single thyroid nodule 09/15/2014   • Left arm pain 06/18/2014   • Acquired unequal leg length 03/03/2014   • Venous insufficiency 03/03/2014   • Degeneration of intervertebral disc of cervical region 07/29/2013   • Nonunion of fracture 07/29/2013   • Allergic rhinitis 05/06/2013   • Hypothyroidism 05/06/2013   • Nontoxic multinodular goiter 01/07/2013   • Neck pain 10/02/2012   • Episodic lightheadedness 10/02/2012      LOS (days): 3  Geometric Mean LOS (GMLOS) (days): 2 80  Days to GMLOS:-0 2     OBJECTIVE:  Risk of Unplanned Readmission Score: 9 42         Current admission status: Inpatient   Preferred Pharmacy:   [facility] , [facility] [address]  Phone: [phone] Fax: [phone] [facility], [address] [place] [address]  [place] [address]  Phone: [phone] Fax: [phone]    Primary Care Provider: Sonia Carrion MD    Primary Insurance: [address] [place] [address]  Secondary Insurance:     DISCHARGE DETAILS:    Discharge planning discussed with[de-identified] Patient  Freedom of Choice: Yes    DME Referral Provided  Referral made for DME?: Yes  DME referral completed for the following items[de-identified] Geeta Calabrese  DME Supplier Name[de-identified] [employer]    Other Referral/Resources/Interventions Provided:  Referral Comments: Patient requesting walker to be ordered, ordered via parachute, through [employer]    IMM Given (Date):: 12/22/22  IMM Given to[de-identified] Patient      IMM reviewed with patient, patient agrees with discharge determination

## 2022-12-22 NOTE — PLAN OF CARE
Problem: MOBILITY - ADULT  Goal: Maintain or return to baseline ADL function  Description: INTERVENTIONS:  -  Assess patient's ability to carry out ADLs; assess patient's baseline for ADL function and identify physical deficits which impact ability to perform ADLs (bathing, care of mouth/teeth, toileting, grooming, dressing, etc )  - Assess/evaluate cause of self-care deficits   - Assess range of motion  - Assess patient's mobility; develop plan if impaired  - Assess patient's need for assistive devices and provide as appropriate  - Encourage maximum independence but intervene and supervise when necessary  - Involve family in performance of ADLs  - Assess for home care needs following discharge   - Consider OT consult to assist with ADL evaluation and planning for discharge  - Provide patient education as appropriate  Outcome: Progressing  Goal: Maintains/Returns to pre admission functional level  Description: INTERVENTIONS:  - Perform BMAT or MOVE assessment daily    - Set and communicate daily mobility goal to care team and patient/family/caregiver  - Collaborate with rehabilitation services on mobility goals if consulted  - Perform Range of Motion 3 times a day  - Reposition patient every 2 hours    - Dangle patient 3 times a day  - Stand patient 3 times a day  - Ambulate patient 3 times a day  - Out of bed to chair 3 times a day   - Out of bed for meals 3 times a day  - Out of bed for toileting  - Record patient progress and toleration of activity level   Outcome: Progressing     Problem: PAIN - ADULT  Goal: Verbalizes/displays adequate comfort level or baseline comfort level  Description: Interventions:  - Encourage patient to monitor pain and request assistance  - Assess pain using appropriate pain scale  - Administer analgesics based on type and severity of pain and evaluate response  - Implement non-pharmacological measures as appropriate and evaluate response  - Consider cultural and social influences on pain and pain management  - Notify physician/advanced practitioner if interventions unsuccessful or patient reports new pain  Outcome: Progressing     Problem: INFECTION - ADULT  Goal: Absence or prevention of progression during hospitalization  Description: INTERVENTIONS:  - Assess and monitor for signs and symptoms of infection  - Monitor lab/diagnostic results  - Monitor all insertion sites, i e  indwelling lines, tubes, and drains  - Monitor endotracheal if appropriate and nasal secretions for changes in amount and color  - Sargeant appropriate cooling/warming therapies per order  - Administer medications as ordered  - Instruct and encourage patient and family to use good hand hygiene technique  - Identify and instruct in appropriate isolation precautions for identified infection/condition  Outcome: Progressing  Goal: Absence of fever/infection during neutropenic period  Description: INTERVENTIONS:  - Monitor WBC    Outcome: Progressing     Problem: SAFETY ADULT  Goal: Maintain or return to baseline ADL function  Description: INTERVENTIONS:  -  Assess patient's ability to carry out ADLs; assess patient's baseline for ADL function and identify physical deficits which impact ability to perform ADLs (bathing, care of mouth/teeth, toileting, grooming, dressing, etc )  - Assess/evaluate cause of self-care deficits   - Assess range of motion  - Assess patient's mobility; develop plan if impaired  - Assess patient's need for assistive devices and provide as appropriate  - Encourage maximum independence but intervene and supervise when necessary  - Involve family in performance of ADLs  - Assess for home care needs following discharge   - Consider OT consult to assist with ADL evaluation and planning for discharge  - Provide patient education as appropriate  Outcome: Progressing  Goal: Maintains/Returns to pre admission functional level  Description: INTERVENTIONS:  - Perform BMAT or MOVE assessment daily    - Set and communicate daily mobility goal to care team and patient/family/caregiver  - Collaborate with rehabilitation services on mobility goals if consulted  - Perform Range of Motion 3 times a day  - Reposition patient every 2 hours    - Dangle patient 3 times a day  - Stand patient 3 times a day  - Ambulate patient 3 times a day  - Out of bed to chair 3 times a day   - Out of bed for meals 3 times a day  - Out of bed for toileting  - Record patient progress and toleration of activity level   Outcome: Progressing  Goal: Patient will remain free of falls  Description: INTERVENTIONS:  - Educate patient/family on patient safety including physical limitations  - Instruct patient to call for assistance with activity   - Consult OT/PT to assist with strengthening/mobility   - Keep Call bell within reach  - Keep bed low and locked with side rails adjusted as appropriate  - Keep care items and personal belongings within reach  - Initiate and maintain comfort rounds  - Make Fall Risk Sign visible to staff  - Offer Toileting every 2 Hours, in advance of need  - Initiate/Maintain on alarm  - Obtain necessary fall risk management equipment:  - Apply yellow socks and bracelet for high fall risk patients  - Consider moving patient to room near nurses station  Outcome: Progressing     Problem: DISCHARGE PLANNING  Goal: Discharge to home or other facility with appropriate resources  Description: INTERVENTIONS:  - Identify barriers to discharge w/patient and caregiver  - Arrange for needed discharge resources and transportation as appropriate  - Identify discharge learning needs (meds, wound care, etc )  - Arrange for interpretive services to assist at discharge as needed  - Refer to Case Management Department for coordinating discharge planning if the patient needs post-hospital services based on physician/advanced practitioner order or complex needs related to functional status, cognitive ability, or social support system  Outcome: Progressing     Problem: Knowledge Deficit  Goal: Patient/family/caregiver demonstrates understanding of disease process, treatment plan, medications, and discharge instructions  Description: Complete learning assessment and assess knowledge base    Interventions:  - Provide teaching at level of understanding  - Provide teaching via preferred learning methods  Outcome: Progressing     Problem: Potential for Falls  Goal: Patient will remain free of falls  Description: INTERVENTIONS:  - Educate patient/family on patient safety including physical limitations  - Instruct patient to call for assistance with activity   - Consult OT/PT to assist with strengthening/mobility   - Keep Call bell within reach  - Keep bed low and locked with side rails adjusted as appropriate  - Keep care items and personal belongings within reach  - Initiate and maintain comfort rounds  - Make Fall Risk Sign visible to staff  - Offer Toileting every 2 Hours, in advance of need  - Initiate/Maintain on alarm  - Obtain necessary fall risk management equipment:  - Apply yellow socks and bracelet for high fall risk patients  - Consider moving patient to room near nurses station  Outcome: Progressing

## 2022-12-22 NOTE — DISCHARGE SUMMARY
[facility]  Discharge- Micheal Garrison 1955, 75 y o  male MRN: [id number]  Unit/Bed#: [facility] 985-51 Encounter: [id number]  Primary Care Provider: Sonia Carrion MD   Date and time admitted to hospital: 12/19/2022  5:19 AM    * Nerve sheath tumor  Assessment & Plan  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 (Dr Ran Major, 12/19/2022)    Imaging:  · No imaging required postoperatively     Plan:  • Continue to monitor neurological exam  • No drains  • Pain control - having some recurrence of his left leg pain however improved with steroids  o Tylenol 975 mg q 8 hours scheduled  o Robaxin 750 mg q 6 hours scheduled  o Oxycodone 5 mg and 10 mg q 4 hours as needed for moderate and severe pain, respectively  o Medrol dose pack  • Bowel regimen - passing gas, no BM yet  o Continue senna, colace and PRN suppository  • Labs / vitals  o WBC mildly elevated (12/20), likely reactional from surgery, remains afebrile, no additional work up for this  • Home meds reordered  • Postoperative antibiotics completed  • DVT ppx:  SCDs, heparin sq  • Mobilize as tolerated with assistance, PT / OT recommending home with outpatient therapy when appropriate with walker which has been ordered  • Discharge instructions discussed and all questions answered    Neurosurgery will follow as primary team   Plan for dc to home this afternoon  Please call with questions or concerns  Subjective/Objective   Chief Complaint: "I am okay"    Subjective:  Patient states today he is better  The left leg pain has improved and is okay when he is sitting in bed, a bit worse when standing but overall he notes improvement  Thinks the steroids have helped a lot  Urinating well, passing gas but no BM, feels this will happen when he goes home  No new or worsening symptoms  Has some back / incisional pain  Would like to shower  Ready to go home      Objective:  Sitting in bed, NAD    I/O       12/20 0701  12/21 0700 12/21 0701  12/22 0700 12/22 0701  12/23 0700    P  O  240 720     I V  (mL/kg)       Total Intake(mL/kg) 240 (2 9) 720 (8 6)     Urine (mL/kg/hr) 3150 (1 6) 3075 (1 5) 380 (1 3)    Total Output 3150 3075 380    Net -2910 -2355 -380                 Invasive Devices     Peripheral Intravenous Line  Duration           Peripheral IV 12/19/22 Left;Medial Wrist 3 days                Physical Exam:  Vitals: Blood pressure 142/68, pulse 76, temperature 97 8 °F (36 6 °C), resp  rate 18, height 6' (1 829 m), weight 83 9 kg (185 lb), SpO2 92 %  ,Body mass index is 25 09 kg/m²  General appearance: alert, appears stated age, cooperative and no distress  Head: Normocephalic, without obvious abnormality, atraumatic  Eyes: conjugate gaze  Neck: supple, symmetrical, trachea midline  Back: incision clean dry intact  Lungs: non labored breathing  Heart: regular heart rate  Neurologic:   Mental status: Alert, oriented x 3, follows commands, thought content appropriate  Cranial nerves: grossly intact (Cranial nerves II-XII)  Sensory: normal to LT  Motor: moving all extremities without focal weakness strength 5/5 throughout  Coordination: finger to nose normal bilaterally, no drift bilaterally      Lab Results:  Results from last 7 days   Lab Units 12/20/22 0459 12/19/22  2350   WBC Thousand/uL 12 80*  --    HEMOGLOBIN g/dL 12 5  --    HEMATOCRIT % 37 7  --    PLATELETS Thousands/uL 166 159     Results from last 7 days   Lab Units 12/20/22  0459   POTASSIUM mmol/L 3 5   CHLORIDE mmol/L 110*   CO2 mmol/L 27   BUN mg/dL 14   CREATININE mg/dL 0 59*   CALCIUM mg/dL 8 9             Results from last 7 days   Lab Units 12/19/22  2350   INR  0 99   PTT seconds 25       Imaging Studies: I have personally reviewed pertinent reports  and I have personally reviewed pertinent films in PACS    XR spine lumbosacral 1 view    Result Date: 12/19/2022  Impression: Fluoroscopic guidance provided for surgical procedure    Please refer to the separate procedure notes for additional details  Localization procedure was performed, with the OR notified of the level at approximately 9:10 AM on 12/19/2022 via telephone conversation with the OR x-ray technologist   Workstation performed: [id number]       EKG, Pathology, and Other Studies: I have personally reviewed pertinent reports  VTE Pharmacologic Prophylaxis: Heparin    VTE Mechanical Prophylaxis: sequential compression device         Medical Problems     Resolved Problems  Date Reviewed: 12/19/2022   None         Discharge Date: 12/22/2022    Admitting Diagnosis: 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]  Lumbar spinal stenosis [M48 061]    Discharge Diagnosis:   · S/p L2-3 laminectomy for resection of nerve sheath tumor  · Nerve sheath tumor    Attending: Kevin Mcgill    Consulting Physician(s): anesthesiology    Procedures Performed: 3 Days Post-Op Procedure(s):  L2/3 decompressive laminectomy and biopsy (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    Pathology:  Preliminary schwannoma    Hospital Course: Ayla Acosta is a 76 y/o male who presented to the outpatient office complaining of low back pain with radiation into LLE  Imaging revealed L3 nerve root sheath mass  Patient underwent L2-3 laminectomy for nerve sheath tumor resection under general anesthesia with minimal blood loss and no complications  Patient was kept in the PACU until stable and then moved to the floor  PT and OT were consulted and recommend home with walker and outpatient therapy  He has some recurrence of his leg pain after surgery which has improved with steroids  Patient was cleared medically for discharge on POD#3  Prior to discharge, postoperative instructions were discussed with patient  During that time, all questions and concerns were addressed  Patient will follow up outpatient in 2 weeks for an incision check with pathology review      Condition at Discharge: good     Discharge instructions/Information to patient and family:   See after visit summary for information provided to patient and family  Provisions for Follow-Up Care:  See after visit summary for information related to follow-up care and any pertinent home health orders  Disposition: Home        Planned Readmission: No    Discharge Statement   I spent 25 minutes discharging the patient  This time was spent on the day of discharge  I had direct contact with the patient on the day of discharge  Additional documentation is required if more than 30 minutes were spent on discharge  Discharge Medications:  See after visit summary for reconciled discharge medications provided to patient and family

## 2022-12-23 LAB
DME PARACHUTE DELIVERY DATE ACTUAL: NORMAL
DME PARACHUTE DELIVERY DATE REQUESTED: NORMAL
DME PARACHUTE ITEM DESCRIPTION: NORMAL
DME PARACHUTE ORDER STATUS: NORMAL
DME PARACHUTE SUPPLIER NAME: NORMAL
DME PARACHUTE SUPPLIER PHONE: NORMAL

## 2022-12-23 NOTE — UTILIZATION REVIEW
NOTIFICATION OF ADMISSION DISCHARGE   This is a Notification of Discharge from [address]  Please be advised that this patient has been discharge from our facility  Below you will find the admission and discharge date and time including the patient’s disposition  UTILIZATION REVIEW CONTACT:  Kelley Johnson  Utilization   Network Utilization Review Department  Phone: [phone] x carefully listen to the prompts  All voicemails are confidential   Email: [email or web address]     ADMISSION INFORMATION  PRESENTATION DATE: 12/19/2022  5:19 AM  OBERVATION ADMISSION DATE:   INPATIENT ADMISSION DATE: 12/19/22  8:23 AM   DISCHARGE DATE: 12/22/2022  6:15 PM   DISPOSITION:Home/Self Care    IMPORTANT INFORMATION:  Send all requests for admission clinical reviews, approved or denied determinations and any other requests to dedicated fax number below belonging to the campus where the patient is receiving treatment   List of dedicated fax numbers:  [address] DENIALS (Administrative/Medical Necessity) [phone]   [address] (Maternity/NICU/Pediatrics) [phone]   [facility] [phone]   [place] [address] [phone]   [address] [phone]   220 [facility] [phone] 90 [facility] [phone]   [address] [place], [place] [address]marisa\[facility]\"" 119 [phone]   [facility]  [phone]   [facility] [phone]   412 [facility] [address] [facility] [phone]

## 2022-12-26 ENCOUNTER — TELEPHONE (OUTPATIENT)
Dept: NEUROSURGERY | Facility: CLINIC | Age: 67
End: 2022-12-26

## 2022-12-26 ENCOUNTER — TELEPHONE (OUTPATIENT)
Dept: OTHER | Facility: HOSPITAL | Age: 67
End: 2022-12-26

## 2022-12-26 NOTE — TELEPHONE ENCOUNTER
Patient called in stating that his pain medication is not working and would like to know if he can be prescribed something for inflammation  Paged on call Paresh Santana via TC

## 2022-12-26 NOTE — TELEPHONE ENCOUNTER
Patient phoned relating that he is still experiencing significant left buttock, hip and leg pain  He is s/p L2/3 decompressive laminectomy and biopsy (resection of L3 nerve root sheath tumor) with Dr Jeniffer Rizvi on 12/19/2022  He was discharged home on a steroid taper and states this hasn't helped his pain at all  He has his last dose tomorrow  HE states that prior to surgery he was taking Aleve and this helped his symptoms  I discussed that since he has his last dose of steroid tomorrow he could try some Aleve in the evening to see if that helps his symptoms  We can phone him tomorrow to see how he's doing  His 2-week POV is scheduled for 1/3

## 2022-12-27 NOTE — TELEPHONE ENCOUNTER
Called patient to follow up with his pain symptoms  He is happy to be out of the hospital and did report taking his last steroid pill  He said his pain is manageable at this time but he is glad to know that he can take aleve along with his extra strength  Tylenol should he need it  He denies any bleeding/drainage from the incision site at this time  Confirmed his follow up appointment with [facility]KO and encouraged him to reach out in the interim with any questions or concerns  He was very appreciative of the call

## 2023-01-03 ENCOUNTER — OFFICE VISIT (OUTPATIENT)
Dept: NEUROSURGERY | Facility: CLINIC | Age: 68
End: 2023-01-03

## 2023-01-03 VITALS
TEMPERATURE: 97.8 F | RESPIRATION RATE: 16 BRPM | HEIGHT: 72 IN | DIASTOLIC BLOOD PRESSURE: 98 MMHG | HEART RATE: 63 BPM | BODY MASS INDEX: 25.06 KG/M2 | WEIGHT: 185 LBS | SYSTOLIC BLOOD PRESSURE: 151 MMHG

## 2023-01-03 DIAGNOSIS — Z09 POSTOPERATIVE EXAMINATION: Primary | ICD-10-CM

## 2023-01-03 DIAGNOSIS — D36.10 SCHWANNOMA: ICD-10-CM

## 2023-01-03 NOTE — PROGRESS NOTES
DISCUSSION SUMMARY  This is a 75 y o  male status post a subtotal resection of an L3 schwannoma  I recommended that he had a repeat MRI done in 3 months with consideration for stereotactic radiosurgery for any residual   In the meantime physical therapy should commence  We will see him back following the completion of that MRI    Return in 3 months (on 4/3/2023)  Diagnosis ICD-10-CM Associated Orders   1  Postoperative examination  Z09 MRI lumbar spine with and without contrast     XR lumbar sp/bending only min 2-3 v     Ambulatory referral to Physical Therapy      2  Schwannoma  D36 10 XR lumbar sp/bending only min 2-3 v     Ambulatory referral to Physical Therapy             Chief Complaint    Post-op (2 weeks path discussion with no imaging, s/p lami for res /open bx nerve sheath tumor [12/19/22 DKO] - Schwannoma)       HPI  This patient is known to our office for prior surgical history:  "C1-C2 lateral mass fixation and sublaminar Gallie cable fixation with demineralized bone matrix and cadaveric bone arthrodesis"    Performed by Dr Prasanna Amin, 2/20/15  Recently patient was referred back to the office by PCP for 2nd opinion of Nerve sheath tumor; seen by Dr Carolyn Smith at [facility]  Found to have an L3 nerve root sheath mass  I recommended that this be debulked  Patient is s/p L2/3 decompressive laminectomy and biopsy (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 [12/19/22 DKO]    His incision has had no problems for him he has had no fluid collection he denies having headaches  His degree of weakness is approximately the same as what it was as are his symptoms      Review of Systems   Constitutional: Negative  HENT: Negative  Eyes: Negative  Respiratory: Negative  Cardiovascular: Negative  Gastrointestinal: Negative  Endocrine: Negative  Genitourinary: Negative      Musculoskeletal: Negative for back pain (but does have pain which starts in the left buttocks down the leg to the calf area)    Skin: Negative  Allergic/Immunologic: Negative  Neurological: Positive for weakness (30% weakness in b/l legs) and numbness (and tingling in left buttocks down the leg)  Hematological: Negative  Psychiatric/Behavioral: Negative  All other systems reviewed and are negative    I reviewed the ROS      Vitals:    /98 (BP Location: Left arm, Patient Position: Sitting, Cuff Size: Standard)   Pulse 63   Temp 97 8 °F (36 6 °C) (Temporal)   Resp 16   Ht 6' (1 829 m)   Wt 83 9 kg (185 lb)   BMI 25 09 kg/m²       MEDICAL HISTORY  Past Medical History:   Diagnosis Date   • Anxiety    • Arthritis    • Bleeding tendency (HCC)    • BPH (benign prostatic hyperplasia)    • Broken bones     left leg   • Cancer (HCC)    • Cataract    • Chronic neck pain    • Epilepsy (HCC)    • Hypothyroidism    • Neoplasm of unspecified behavior of bone, soft tissue, and skin    • Osteomyelitis (HCC)    • PONV (postoperative nausea and vomiting)    • Prostate cancer ([facility] [address] )    • Skin cancer    • Squamous carcinoma 2014   • Thyroid condition    • Thyroid disease      Past Surgical History:   Procedure Laterality Date   • ARTHRODESIS      H/o Arthrodesis Cervical to C2;  last assessed 2015   • CERVICAL FUSION     • COLONOSCOPY  2021   • COLONOSCOPY     • KNEE SURGERY Right 2017   • LEG SURGERY Left     Hardware placed in lower leg bone(s)   • LEG SURGERY Left 2006, -Left leg vascular   • CA LAMINECTOMY BX/EXC ISPI DAKOTAH XDRL LUMBAR N/A 2022    Procedure: L2/3 decompressive laminectomy and biopsy (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;  Surgeon: Thom Gonzalez MD;  Location: [facility];  Service: Neurosurgery   • THYROID SURGERY Right    • US GUIDED PROSTATE BIOPSY  2021     Social History     Tobacco Use   • Smoking status: Former     Types: Cigarettes     Quit date:      Years since quittin 0   • Smokeless tobacco: Never Vaping Use   • Vaping Use: Never used   Substance Use Topics   • Alcohol use: Yes     Comment: being a social drinker   • Drug use: Not Currently     Types: Marijuana     Comment: Medical marijuana        Current Outpatient Medications:   •  acetaminophen (TYLENOL) 500 mg tablet, Take 2 tablets (1,000 mg total) by mouth every 8 (eight) hours as needed for moderate pain, mild pain or fever, Disp: , Rfl: 0  •  alendronate (FOSAMAX) 35 mg tablet, Take 1 tablet (35 mg total) by mouth every 7 days, Disp: 4 tablet, Rfl: 2  •  desloratadine (CLARINEX) 5 MG tablet, Take 1 tablet (5 mg total) by mouth daily, Disp: 30 tablet, Rfl: 2  •  doxycycline (ADOXA) 50 MG tablet, TAKE 1 TABLET BY ORAL ROUTE EVERY DAY WITH MEAL X3 MONTHS THEN [facility], Disp: , Rfl:   •  fluticasone (FLONASE) 50 mcg/act nasal spray, 2 sprays into each nostril daily, Disp: 3 g, Rfl: 1  •  levothyroxine 100 mcg tablet, daily, Disp: , Rfl:   •  metroNIDAZOLE (METROGEL) 0 75 % gel, Apply topically 2 (two) times a day, Disp: , Rfl:   •  RESTASIS MULTIDOSE 0 05 % ophthalmic emulsion, INSTILL 1 DROP INTO BOTH EYES TWICE A DAY, Disp: , Rfl: 3  •  tamsulosin (FLOMAX) 0 4 mg, Take 0 4 mg by mouth 2 (two) times a day, Disp: , Rfl:   •  tazarotene (AVAGE) 0 1 % cream, Apply topically daily at bedtime, Disp: , Rfl:    Allergies   Allergen Reactions   • Amoxicillin-Pot Clavulanate Rash, Vomiting and Nausea Only        The following portions of the patient's history were updated by MA and reviewed by MD: allergies, current medications, past family history, past medical history, past social history, past surgical history and problem list       Physical Exam  Awake and alert  Station and gait are normal  Incision is clean and dry and there is no erythema or edema or fluctuance underneath of it    Incision is intact

## 2023-01-06 ENCOUNTER — TELEPHONE (OUTPATIENT)
Dept: NEUROSURGERY | Facility: CLINIC | Age: 68
End: 2023-01-06

## 2023-01-06 NOTE — TELEPHONE ENCOUNTER
Received a call from patient questioning if there are any contraindications to him having an epidural steroid injection  Returned call to patient who stated the insurance authorization on the [facility] with [facility] is good until May and he is questioning if there would be any contraindications for him to have this done  Advised this RN will route to Dr Myron Aviles for his suggestion  Patient stated an understanding and was appreciative of the call back

## 2023-01-10 ENCOUNTER — EVALUATION (OUTPATIENT)
Dept: PHYSICAL THERAPY | Facility: CLINIC | Age: 68
End: 2023-01-10

## 2023-01-10 DIAGNOSIS — Z09 POSTOPERATIVE EXAMINATION: ICD-10-CM

## 2023-01-10 DIAGNOSIS — D36.10 SCHWANNOMA: ICD-10-CM

## 2023-01-10 DIAGNOSIS — M54.50 CHRONIC BILATERAL LOW BACK PAIN, UNSPECIFIED WHETHER SCIATICA PRESENT: ICD-10-CM

## 2023-01-10 DIAGNOSIS — Z48.89 AFTERCARE FOLLOWING SURGERY: Primary | ICD-10-CM

## 2023-01-10 DIAGNOSIS — J30.1 ALLERGIC RHINITIS DUE TO POLLEN, UNSPECIFIED SEASONALITY: ICD-10-CM

## 2023-01-10 DIAGNOSIS — G89.29 CHRONIC BILATERAL LOW BACK PAIN, UNSPECIFIED WHETHER SCIATICA PRESENT: ICD-10-CM

## 2023-01-10 DIAGNOSIS — M85.80 OSTEOPENIA, UNSPECIFIED LOCATION: ICD-10-CM

## 2023-01-10 RX ORDER — ALENDRONATE SODIUM 35 MG/1
35 TABLET ORAL
Qty: 4 TABLET | Refills: 2 | Status: SHIPPED | OUTPATIENT
Start: 2023-01-10

## 2023-01-10 RX ORDER — DESLORATADINE 5 MG/1
5 TABLET ORAL DAILY
Qty: 90 TABLET | Refills: 0 | Status: SHIPPED | OUTPATIENT
Start: 2023-01-10
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
## 2023-01-12 ENCOUNTER — OFFICE VISIT (OUTPATIENT)
Dept: PHYSICAL THERAPY | Facility: CLINIC | Age: 68
End: 2023-01-12

## 2023-01-12 DIAGNOSIS — Z48.89 AFTERCARE FOLLOWING SURGERY: ICD-10-CM

## 2023-01-12 DIAGNOSIS — D36.10 SCHWANNOMA: ICD-10-CM

## 2023-01-12 DIAGNOSIS — M54.50 CHRONIC BILATERAL LOW BACK PAIN, UNSPECIFIED WHETHER SCIATICA PRESENT: ICD-10-CM

## 2023-01-12 DIAGNOSIS — Z09 POSTOPERATIVE EXAMINATION: Primary | ICD-10-CM

## 2023-01-12 DIAGNOSIS — G89.29 CHRONIC BILATERAL LOW BACK PAIN, UNSPECIFIED WHETHER SCIATICA PRESENT: ICD-10-CM

## 2023-01-12 NOTE — PROGRESS NOTES
Daily Note     Today's date: 2023  Patient name: Derian Bearden  : 1955  MRN: [id number]  Referring provider: Forrest Ware  Dx:   Encounter Diagnosis     ICD-10-CM    1  Postoperative examination  Z09       2  Schwannoma  D36 10       3  Aftercare following surgery  Z48 89       4  Chronic bilateral low back pain, unspecified whether sciatica present  M54 50     G89 29                      Subjective: Patient notes having some B LE calf discomfort  Notes was numb before so now feels like radiating pain  Objective: See treatment diary below      Assessment: Tolerated treatment well  Patient would benefit from continued PT  Treatment plan initiated  Follow up with soreness to know how to further progress  Plan: Progress treatment as tolerated         Precautions: upper cervical fusion in 2015 (needs to lie with folded pillow and needs pillow behind head ot sit up), B knee surgeries when he was 11and 15years old and is cautious of them to not make them hurt with exercises as can only bend knees to about 90 deg, L shoe lift for 3/4 inch shorter L LE, active surveillance of prostate cancer       Manuals 1/10 1/12                                                               Neuro Re-Ed             SLR r            Tandem stance r            Tandem walking  3 laps fwd, 2 laps bwd           Supine ab marching             bridges If able            Standing band anti-rotations  12x B 8 lbs            SLS  3x20" each           Walking w/ altern hit marching If able 3 laps           Hurdles stance/swing or multiple  multiple 4 laps fwd, 4 laps sidesteps            Cone taps  20x B           Ther Ex             treadmill  5 min                         Lateral walks                                                                              Ther Activity             STS from higher surface to not hurt knees r 2x10 hi/lo                        Gait Training Modalities

## 2023-01-13 NOTE — TELEPHONE ENCOUNTER
Adenike Dalal MD  to Me  4:40 PM  No restrictions      Reached out to patient and notified him of the above response to his SHELDON  He stated an understanding and was appreciative of the call back

## 2023-01-17 ENCOUNTER — OFFICE VISIT (OUTPATIENT)
Dept: PHYSICAL THERAPY | Facility: CLINIC | Age: 68
End: 2023-01-17

## 2023-01-17 DIAGNOSIS — Z48.89 AFTERCARE FOLLOWING SURGERY: ICD-10-CM

## 2023-01-17 DIAGNOSIS — M54.50 CHRONIC BILATERAL LOW BACK PAIN, UNSPECIFIED WHETHER SCIATICA PRESENT: ICD-10-CM

## 2023-01-17 DIAGNOSIS — G89.29 CHRONIC BILATERAL LOW BACK PAIN, UNSPECIFIED WHETHER SCIATICA PRESENT: ICD-10-CM

## 2023-01-17 DIAGNOSIS — Z09 POSTOPERATIVE EXAMINATION: Primary | ICD-10-CM

## 2023-01-17 DIAGNOSIS — D36.10 SCHWANNOMA: ICD-10-CM

## 2023-01-17 NOTE — PROGRESS NOTES
Daily Note     Today's date: 2023  Patient name: Candie Souza  : 1955  MRN: [id number]  Referring provider: Lamberto Celis  Dx:   Encounter Diagnosis     ICD-10-CM    1  Postoperative examination  Z09       2  Schwannoma  D36 10       3  Aftercare following surgery  Z48 89       4  Chronic bilateral low back pain, unspecified whether sciatica present  M54 50     G89 29                      Subjective: Patient notes having some B LE calf discomfort  Notes was numb before so now feels like radiating pain  Objective: See treatment diary below      Assessment: Tolerated treatment well  Patient would benefit from continued PT  Treatment plan progressed  Follow up with soreness to know how to further progress  Plan: Progress treatment as tolerated         Precautions: upper cervical fusion in  (needs to lie with folded pillow and needs pillow behind head ot sit up), B knee surgeries when he was 11and 15years old and is cautious of them to not make them hurt with exercises as can only bend knees to about 90 deg, L shoe lift for 3/4 inch shorter L LE, active surveillance of prostate cancer       Manuals 1/10 1/12 1/17                                                              Neuro Re-Ed             SLR r            Tandem stance r            Tandem walking  3 laps fwd, 2 laps bwd 4 laps fwd/bwd          Supine ab marching             bridges If able            Standing band anti-rotations  12x B 8 lbs  2x8 B 9 lbs           SLS  3x20" each 3x20" grn           Walking w/ altern hit marching If able 3 laps 4 laps           Hurdles stance/swing or multiple  multiple 4 laps fwd, 4 laps sidesteps  multiple 4 laps fwd, 4 laps sidesteps           LAQ   12x 5" holds each           Cone taps  20x B 25x B          Ther Ex             treadmill  5 min  5 min                        Lateral walks                                                                              Ther Activity STS from higher surface to not hurt knees r 2x10 hi/lo 2x10 hi/lo                        Gait Training                                       Modalities

## 2023-01-19 ENCOUNTER — OFFICE VISIT (OUTPATIENT)
Dept: PHYSICAL THERAPY | Facility: CLINIC | Age: 68
End: 2023-01-19

## 2023-01-19 DIAGNOSIS — G89.29 CHRONIC BILATERAL LOW BACK PAIN, UNSPECIFIED WHETHER SCIATICA PRESENT: ICD-10-CM

## 2023-01-19 DIAGNOSIS — Z09 POSTOPERATIVE EXAMINATION: Primary | ICD-10-CM

## 2023-01-19 DIAGNOSIS — D36.10 SCHWANNOMA: ICD-10-CM

## 2023-01-19 DIAGNOSIS — Z48.89 AFTERCARE FOLLOWING SURGERY: ICD-10-CM

## 2023-01-19 DIAGNOSIS — M54.50 CHRONIC BILATERAL LOW BACK PAIN, UNSPECIFIED WHETHER SCIATICA PRESENT: ICD-10-CM

## 2023-01-19 NOTE — PROGRESS NOTES
Daily Note     Today's date: 2023  Patient name: Batsheva Laguna  : 1955  MRN: [id number]  Referring provider: Lyric Bradshaw  Dx:   Encounter Diagnosis     ICD-10-CM    1  Postoperative examination  Z09       2  Schwannoma  D36 10       3  Aftercare following surgery  Z48 89       4  Chronic bilateral low back pain, unspecified whether sciatica present  M54 50     G89 29                      Subjective: Patient notes pain is worse today especially in L LE  Objective: See treatment diary below      Assessment: Tolerated treatment well  Patient would benefit from continued PT  Treatment plan progressed  Follow up with soreness to know how to further progress  Plan: Progress treatment as tolerated         Precautions: upper cervical fusion in  (needs to lie with folded pillow and needs pillow behind head ot sit up), B knee surgeries when he was 11and 15years old and is cautious of them to not make them hurt with exercises as can only bend knees to about 90 deg, L shoe lift for 3/4 inch shorter L LE, active surveillance of prostate cancer       Manuals 1/10 1/12 1/17 1/19                                                             Neuro Re-Ed             SLR r            Tandem stance r            Tandem walking  3 laps fwd, 2 laps bwd 4 laps fwd/bwd 4 laps fwd on blue foams          Supine ab marching             bridges If able            Standing band anti-rotations  12x B 8 lbs  2x8 B 9 lbs           SLS  3x20" each 3x20" grn  3x20" holds grn          Walking w/ altern hit marching If able 3 laps 4 laps  -         Hurdles stance/swing or multiple  multiple 4 laps fwd, 4 laps sidesteps  multiple 4 laps fwd, 4 laps sidesteps  2 long foams 4 hurdles lateral and fwd 4 laps         LAQ   12x 5" holds each           Cone taps  20x B 25x B 4 cones 4 laps on 2 blue long foams          Ther Ex             treadmill  5 min  5 min  5 min                       Lateral walks Heel raises     20x          Seated lumbar flexion w/ tball    15x 5" holds                                                 Ther Activity             STS from higher surface to not hurt knees r 2x10 hi/lo 2x10 hi/lo  2x12                       Gait Training                                       Modalities

## 2023-01-24 ENCOUNTER — OFFICE VISIT (OUTPATIENT)
Dept: PHYSICAL THERAPY | Facility: CLINIC | Age: 68
End: 2023-01-24

## 2023-01-24 DIAGNOSIS — G89.29 CHRONIC BILATERAL LOW BACK PAIN, UNSPECIFIED WHETHER SCIATICA PRESENT: ICD-10-CM

## 2023-01-24 DIAGNOSIS — Z48.89 AFTERCARE FOLLOWING SURGERY: ICD-10-CM

## 2023-01-24 DIAGNOSIS — M54.50 CHRONIC BILATERAL LOW BACK PAIN, UNSPECIFIED WHETHER SCIATICA PRESENT: ICD-10-CM

## 2023-01-24 DIAGNOSIS — D36.10 SCHWANNOMA: ICD-10-CM

## 2023-01-24 DIAGNOSIS — Z09 POSTOPERATIVE EXAMINATION: Primary | ICD-10-CM

## 2023-01-24 NOTE — PROGRESS NOTES
Daily Note     Today's date: 2023  Patient name: Maria Del Rosario Romero  : 1955  MRN: [id number]  Referring provider: Josefa Arguello  Dx:   Encounter Diagnosis     ICD-10-CM    1  Postoperative examination  Z09       2  Schwannoma  D36 10       3  Aftercare following surgery  Z48 89       4  Chronic bilateral low back pain, unspecified whether sciatica present  M54 50     G89 29                      Subjective: Patient notes had some neck pain doing seated lumbar flexion last visit  Objective: See treatment diary below      Assessment: Tolerated treatment well  Patient would benefit from continued PT  Treatment plan progressed  Follow up with soreness to know how to further progress  Avoided seated lumbar flexion since aggravated neck pain  Plan: Progress treatment as tolerated         Precautions: upper cervical fusion in  (needs to lie with folded pillow and needs pillow behind head ot sit up), B knee surgeries when he was 11and 15years old and is cautious of them to not make them hurt with exercises as can only bend knees to about 90 deg, L shoe lift for 3/4 inch shorter L LE, active surveillance of prostate cancer       Manuals 1/10 1/12 1/17 1/19 1/24                                                            Neuro Re-Ed             SLR r            Tandem stance r            Tandem walking  3 laps fwd, 2 laps bwd 4 laps fwd/bwd 4 laps fwd on blue foams  4 laps fwd on blue foam         Supine ab marching             bridges If able            Standing band anti-rotations  12x B 8 lbs  2x8 B 9 lbs           SLS  3x20" each 3x20" grn  3x20" holds grn  3x30" holds grn         Walking w/ altern hit marching If able 3 laps 4 laps  -         Hurdles stance/swing or multiple  multiple 4 laps fwd, 4 laps sidesteps  multiple 4 laps fwd, 4 laps sidesteps  2 long foams 4 hurdles lateral and fwd 4 laps         LAQ   12x 5" holds each   20x 5" holds         Cone taps  20x B 25x B 4 cones 4 laps on 2 blue long foams  30x B on blue sq foam         Ther Ex             treadmill  5 min  5 min  5 min  5 min                      Lateral walks             Heel raises     20x  3x10        Seated lumbar flexion w/ tball    15x 5" holds                                                 Ther Activity             STS from higher surface to not hurt knees r 2x10 hi/lo 2x10 hi/lo  2x12  3x10                     Gait Training                                       Modalities

## 2023-01-26 ENCOUNTER — OFFICE VISIT (OUTPATIENT)
Dept: PHYSICAL THERAPY | Facility: CLINIC | Age: 68
End: 2023-01-26

## 2023-01-26 DIAGNOSIS — Z48.89 AFTERCARE FOLLOWING SURGERY: ICD-10-CM

## 2023-01-26 DIAGNOSIS — G89.29 CHRONIC BILATERAL LOW BACK PAIN, UNSPECIFIED WHETHER SCIATICA PRESENT: ICD-10-CM

## 2023-01-26 DIAGNOSIS — M54.50 CHRONIC BILATERAL LOW BACK PAIN, UNSPECIFIED WHETHER SCIATICA PRESENT: ICD-10-CM

## 2023-01-26 DIAGNOSIS — Z09 POSTOPERATIVE EXAMINATION: Primary | ICD-10-CM

## 2023-01-26 DIAGNOSIS — D36.10 SCHWANNOMA: ICD-10-CM

## 2023-01-26 NOTE — PROGRESS NOTES
Daily Note     Today's date: 2023  Patient name: Hemalatha Melendez  : 1955  MRN: [id number]  Referring provider: Sin Spencer  Dx:   Encounter Diagnosis     ICD-10-CM    1  Postoperative examination  Z09       2  Schwannoma  D36 10       3  Aftercare following surgery  Z48 89       4  Chronic bilateral low back pain, unspecified whether sciatica present  M54 50     G89 29                      Subjective: Patient notes sore from doing some shoveling  Objective: See treatment diary below      Assessment: Tolerated treatment well  Patient would benefit from continued PT  Treatment plan progressed  Follow up with soreness to know how to further progress  Avoided seated lumbar flexion since aggravated neck pain  Plan: Progress treatment as tolerated         Precautions: upper cervical fusion in  (needs to lie with folded pillow and needs pillow behind head ot sit up), B knee surgeries when he was 11and 15years old and is cautious of them to not make them hurt with exercises as can only bend knees to about 90 deg, L shoe lift for 3/4 inch shorter L LE, active surveillance of prostate cancer       Manuals 1/10 1/12 1/17 1/19 1/24 1/26                                                           Neuro Re-Ed             SLR r            Tandem stance r            Tandem walking  3 laps fwd, 2 laps bwd 4 laps fwd/bwd 4 laps fwd on blue foams  4 laps fwd on blue foam  4 laps fwd/bwd on blue foam        Supine ab marching             bridges If able            Standing band anti-rotations  12x B 8 lbs  2x8 B 9 lbs           SLS  3x20" each 3x20" grn  3x20" holds grn  3x30" holds grn  3x20" holds blue        Walking w/ altern hit marching If able 3 laps 4 laps  -         Hurdles stance/swing or multiple  multiple 4 laps fwd, 4 laps sidesteps  multiple 4 laps fwd, 4 laps sidesteps  2 long foams 4 hurdles lateral and fwd 4 laps  4 hurdles on long foams lateral 4 laps        LAQ   12x 5" holds each   20x 5" holds  20x 5" holds 3 lbs        Cone taps  20x B 25x B 4 cones 4 laps on 2 blue long foams  30x B on blue sq foam  30x B on blue sq foam        Ther Ex             treadmill  5 min  5 min  5 min  5 min  5 min                     Lateral walks             Heel raises     20x  3x10 3x10        Seated lumbar flexion w/ tball    15x 5" holds                                                 Ther Activity             STS from higher surface to not hurt knees r 2x10 hi/lo 2x10 hi/lo  2x12  3x10 3x10        Step ups              Gait Training                                       Modalities

## 2023-01-30 ENCOUNTER — APPOINTMENT (OUTPATIENT)
Dept: PHYSICAL THERAPY | Facility: CLINIC | Age: 68
End: 2023-01-30

## 2023-01-31 ENCOUNTER — OFFICE VISIT (OUTPATIENT)
Dept: PHYSICAL THERAPY | Facility: CLINIC | Age: 68
End: 2023-01-31

## 2023-01-31 DIAGNOSIS — D36.10 SCHWANNOMA: ICD-10-CM

## 2023-01-31 DIAGNOSIS — Z48.89 AFTERCARE FOLLOWING SURGERY: ICD-10-CM

## 2023-01-31 DIAGNOSIS — M54.50 CHRONIC BILATERAL LOW BACK PAIN, UNSPECIFIED WHETHER SCIATICA PRESENT: ICD-10-CM

## 2023-01-31 DIAGNOSIS — G89.29 CHRONIC BILATERAL LOW BACK PAIN, UNSPECIFIED WHETHER SCIATICA PRESENT: ICD-10-CM

## 2023-01-31 DIAGNOSIS — Z09 POSTOPERATIVE EXAMINATION: Primary | ICD-10-CM

## 2023-01-31 NOTE — PROGRESS NOTES
Daily Note     Today's date: 2023  Patient name: Lila Youssef  : 1955  MRN: [id number]  Referring provider: Robinson Hunt  Dx:   Encounter Diagnosis     ICD-10-CM    1  Postoperative examination  Z09       2  Schwannoma  D36 10       3  Aftercare following surgery  Z48 89       4  Chronic bilateral low back pain, unspecified whether sciatica present  M54 50     G89 29           Start Time: 1445  Stop Time: 1525  Total time in clinic (min): 40 minutes    Subjective: Pt states he did a lot of walking around today for appointments earlier  Back is doing okay, despite all of the activity  Pt's greatest concern at this time is still that he feels like he cannot walk normally  Objective: See treatment diary below    Assessment: Tolerated treatment well  Modified LAQ to lighter weight today, since pt notes some LE fatigue after a lot of walking today  Able to tolerate all WB activities, including progressions made to cone taps  Patient demonstrated fatigue post treatment and would benefit from continued PT    Plan: Continue per plan of care  Progress treatment as tolerated         Precautions: upper cervical fusion in  (needs to lie with folded pillow and needs pillow behind head ot sit up), B knee surgeries when he was 11and 15years old and is cautious of them to not make them hurt with exercises as can only bend knees to about 90 deg, L shoe lift for 3/4 inch shorter L LE, active surveillance of prostate cancer       Manuals 1/10 1/12 1/17 1/19 1/24 1/26 1/31                                                          Neuro Re-Ed             SLR r            Tandem stance r            Tandem walking  3 laps fwd, 2 laps bwd 4 laps fwd/bwd 4 laps fwd on blue foams  4 laps fwd on blue foam  4 laps fwd/bwd on blue foam  3 laps fwd/bwd on blue foam      Supine ab marching             bridges If able            Standing band anti-rotations  12x B 8 lbs  2x8 B 9 lbs           SLS  3x20" each 3x20" grn  3x20" holds grn  3x30" holds grn  3x20" holds blue  3x20" holds blue      Walking w/ altern hit marching If able 3 laps 4 laps  -         Hurdles stance/swing or multiple  multiple 4 laps fwd, 4 laps sidesteps  multiple 4 laps fwd, 4 laps sidesteps  2 long foams 4 hurdles lateral and fwd 4 laps  4 hurdles on long foams lateral 4 laps  4 hurdles on long foams lateral 4 laps       LAQ   12x 5" holds each   20x 5" holds  20x 5" holds 3 lbs  20x 5" hold 2#      Cone taps  20x B 25x B 4 cones 4 laps on 2 blue long foams  30x B on blue sq foam  30x B on blue sq foam  30x B on blue sq foam 2 cones      Ther Ex             treadmill  5 min  5 min  5 min  5 min  5 min  5 min                   Lateral walks             Heel raises     20x  3x10 3x10  3x10      Seated lumbar flexion w/ tball    15x 5" holds                                                 Ther Activity             STS from higher surface to not hurt knees r 2x10 hi/lo 2x10 hi/lo  2x12  3x10 3x10  3x10 2 airex      Step ups              Gait Training                                       Modalities

## 2023-02-02 ENCOUNTER — OFFICE VISIT (OUTPATIENT)
Dept: PHYSICAL THERAPY | Facility: CLINIC | Age: 68
End: 2023-02-02

## 2023-02-02 DIAGNOSIS — M54.50 CHRONIC BILATERAL LOW BACK PAIN, UNSPECIFIED WHETHER SCIATICA PRESENT: Primary | ICD-10-CM

## 2023-02-02 DIAGNOSIS — G89.29 CHRONIC BILATERAL LOW BACK PAIN, UNSPECIFIED WHETHER SCIATICA PRESENT: Primary | ICD-10-CM

## 2023-02-02 DIAGNOSIS — Z48.89 AFTERCARE FOLLOWING SURGERY: ICD-10-CM

## 2023-02-02 NOTE — PROGRESS NOTES
Daily Note     Today's date: 2023  Patient name: Anyi Chacon  : 1955  MRN: [id number]  Referring provider: Jessica Candelaria  Dx:   Encounter Diagnosis     ICD-10-CM    1  Chronic bilateral low back pain, unspecified whether sciatica present  M54 50     G89 29       2  Aftercare following surgery  Z48 89                      Subjective: Pt notes that he had fluid drained from his knees today      Objective: See treatment diary below     Precautions: upper cervical fusion in  (needs to lie with folded pillow and needs pillow behind head ot sit up), B knee surgeries when he was 11and 15years old and is cautious of them to not make them hurt with exercises as can only bend knees to about 90 deg, L shoe lift for 3/4 inch shorter L LE, active surveillance of prostate cancer     Neuro Re-Ed 1/10 1/12 1/17 1/19 1/24 1/26 1/31 2/     SLR r            Tandem stance r            Tandem walking  3 laps fwd, 2 laps bwd 4 laps fwd/bwd 4 laps fwd on blue foams  4 laps fwd on blue foam  4 laps fwd/bwd on blue foam  3 laps fwd/bwd on blue foam 4 laps fwd on blue foam     bridges If able            Standing band anti-rotations  12x B 8 lbs  2x8 B 9 lbs           SLS  3x20" each 3x20" grn  3x20" holds grn  3x30" holds grn  3x20" holds blue  3x20" holds blue Blue oval 20"x3     Walking w/ altern hit marching If able 3 laps 4 laps  -         Hurdles stance/swing or multiple  multiple 4 laps fwd, 4 laps sidesteps  multiple 4 laps fwd, 4 laps sidesteps  2 long foams 4 hurdles lateral and fwd 4 laps  4 hurdles on long foams lateral 4 laps  4 hurdles on long foams lateral 4 laps  4 hurdles on long foams lateral 4 laps      LAQ   12x 5" holds each   20x 5" holds  20x 5" holds 3 lbs  20x 5" hold 2# held     Cone taps  20x B 25x B 4 cones 4 laps on 2 blue long foams  30x B on blue sq foam  30x B on blue sq foam  30x B on blue sq foam 2 cones 30x B on sq foam  2 cones     Ther Ex        2/2     treadmill  5 min  5 min  5 min  5 min  5 min  5 min 5'                  Lateral walks             Heel raises     20x  3x10 3x10  3x10 3x10     Seated lumbar flexion w/ tball    15x 5" holds                                                 Ther Activity        2/2     STS from higher surface to not hurt knees r 2x10 hi/lo 2x10 hi/lo  2x12  3x10 3x10  3x10 2 airex held     Step ups                                                                      Assessment: Tolerated treatment well  Patient demonstrated fatigue post treatment, exhibited good technique with therapeutic exercises and would benefit from continued PT    Plan: Continue per plan of care  Progress treatment as tolerated      Daria Bhat

## 2023-02-06 ENCOUNTER — OFFICE VISIT (OUTPATIENT)
Dept: PHYSICAL THERAPY | Facility: CLINIC | Age: 68
End: 2023-02-06

## 2023-02-06 DIAGNOSIS — D36.10 SCHWANNOMA: ICD-10-CM

## 2023-02-06 DIAGNOSIS — Z48.89 AFTERCARE FOLLOWING SURGERY: Primary | ICD-10-CM

## 2023-02-06 DIAGNOSIS — G89.29 CHRONIC BILATERAL LOW BACK PAIN, UNSPECIFIED WHETHER SCIATICA PRESENT: ICD-10-CM

## 2023-02-06 DIAGNOSIS — M54.50 CHRONIC BILATERAL LOW BACK PAIN, UNSPECIFIED WHETHER SCIATICA PRESENT: ICD-10-CM

## 2023-02-06 NOTE — PROGRESS NOTES
Daily Note     Today's date: 2023  Patient name: Dieudonne Ty  : 1955  MRN: [id number]  Referring provider: Elfego Ward  Dx:   Encounter Diagnosis     ICD-10-CM    1  Aftercare following surgery  Z48 89       2  Chronic bilateral low back pain, unspecified whether sciatica present  M54 50     G89 29       3  Schwannoma  D36 10                      Subjective: Pt notes talked to MD, has to wait to get his knees done until after he's done with his radiation per his MD  Wants to hold off on knee exercises for now       Objective: See treatment diary below     Precautions: upper cervical fusion in 2015 (needs to lie with folded pillow and needs pillow behind head ot sit up), B knee surgeries when he was 11and 15years old and is cautious of them to not make them hurt with exercises as can only bend knees to about 90 deg, L shoe lift for 3/4 inch shorter L LE, active surveillance of prostate cancer     Neuro Re-Ed  2/ 2/6    SLR          Tandem stance          Tandem walking 4 laps fwd on blue foams  4 laps fwd on blue foam  4 laps fwd/bwd on blue foam  3 laps fwd/bwd on blue foam 4 laps fwd on blue foam 4 laps fwd on blue foam     bridges          Standing band anti-rotations          SLS 3x20" holds grn  3x30" holds grn  3x20" holds blue  3x20" holds blue Blue oval 20"x3 3x30" blue foam     Walking w/ altern hit marching -         Hurdles stance/swing or multiple 2 long foams 4 hurdles lateral and fwd 4 laps  4 hurdles on long foams lateral 4 laps  4 hurdles on long foams lateral 4 laps  4 hurdles on long foams lateral 4 laps  4 hurdles on long foams fwd and lateral     LAQ  20x 5" holds  20x 5" holds 3 lbs  20x 5" hold 2# held     Cone taps 4 cones 4 laps on 2 blue long foams  30x B on blue sq foam  30x B on blue sq foam  30x B on blue sq foam 2 cones 30x B on sq foam  2 cones 30x B on sq foam 2 cones     Ther Ex     2/2     treadmill 5 min  5 min  5 min  5 min 5' 5 min Lateral walks          Heel raises  20x  3x10 3x10  3x10 3x10 3x10    Seated lumbar flexion w/ tball 15x 5" holds                                        Ther Activity     2/2     STS from higher surface to not hurt knees 2x12  3x10 3x10  3x10 2 airex held     Step ups                                                       Assessment: Tolerated treatment well  Patient demonstrated fatigue post treatment, exhibited good technique with therapeutic exercises and would benefit from continued PT    Plan: Continue per plan of care  Progress treatment as tolerated      Ann Marie Gilmore

## 2023-02-07 ENCOUNTER — OFFICE VISIT (OUTPATIENT)
Dept: PHYSICAL THERAPY | Facility: CLINIC | Age: 68
End: 2023-02-07

## 2023-02-07 DIAGNOSIS — M54.50 CHRONIC BILATERAL LOW BACK PAIN, UNSPECIFIED WHETHER SCIATICA PRESENT: Primary | ICD-10-CM

## 2023-02-07 DIAGNOSIS — G89.29 CHRONIC BILATERAL LOW BACK PAIN, UNSPECIFIED WHETHER SCIATICA PRESENT: Primary | ICD-10-CM

## 2023-02-07 NOTE — PROGRESS NOTES
Daily Note     Today's date: 2023  Patient name: Li Antonio  : 1955  MRN: [id number]  Referring provider: Muriel Aguilar  Dx:   Encounter Diagnosis     ICD-10-CM    1  Chronic bilateral low back pain, unspecified whether sciatica present  M54 50     G89 29                      Subjective: Pt notes that he is "feeling good today"      Objective: See treatment diary below     Precautions: upper cervical fusion in 2015 (needs to lie with folded pillow and needs pillow behind head ot sit up), B knee surgeries when he was 11and 15years old and is cautious of them to not make them hurt with exercises as can only bend knees to about 90 deg, L shoe lift for 3/4 inch shorter L LE, active surveillance of prostate cancer     Neuro Re-Ed    SLR         Tandem stance         Tandem walking 4 laps fwd on blue foam  4 laps fwd/bwd on blue foam  3 laps fwd/bwd on blue foam 4 laps fwd on blue foam 4 laps fwd on blue foam  4 laps fwd on blue foam   bridges         Standing band anti-rotations         SLS 3x30" holds grn  3x20" holds blue  3x20" holds blue Blue oval 20"x3 3x30" blue foam  30"x3 ea grn foam   Walking w/ altern hit marching         Hurdles stance/swing or multiple  4 hurdles on long foams lateral 4 laps  4 hurdles on long foams lateral 4 laps  4 hurdles on long foams lateral 4 laps  4 hurdles on long foams fwd and lateral  4 hurdles on long foams fwd and lateral   LAQ 20x 5" holds  20x 5" holds 3 lbs  20x 5" hold 2# held     Cone taps 30x B on blue sq foam  30x B on blue sq foam  30x B on blue sq foam 2 cones 30x B on sq foam  2 cones 30x B on sq foam 2 cones  30x B on sq foam 2 cones    Ther Ex    /  2   treadmill 5 min  5 min  5 min 5' 5 min  5'            Lateral walks         Heel raises  3x10 3x10  3x10 3x10 3x10 3x10   Seated lumbar flexion w/ tball                                    Ther Activity    /  2   STS from higher surface to not hurt knees 3x10 3x10  3x10 2 airex held  3x10   Step ups                                                  Assessment: Tolerated treatment well   Patient demonstrated fatigue post treatment, exhibited good technique with therapeutic exercises and would benefit from continued PT    Plan: Continue per plan of care    Cordelia Christy

## 2023-02-16 ENCOUNTER — OFFICE VISIT (OUTPATIENT)
Dept: CARDIOLOGY CLINIC | Facility: CLINIC | Age: 68
End: 2023-02-16

## 2023-02-16 VITALS
DIASTOLIC BLOOD PRESSURE: 70 MMHG | OXYGEN SATURATION: 97 % | HEART RATE: 54 BPM | BODY MASS INDEX: 24.87 KG/M2 | HEIGHT: 72 IN | WEIGHT: 183.6 LBS | SYSTOLIC BLOOD PRESSURE: 140 MMHG

## 2023-02-16 DIAGNOSIS — I25.10 CORONARY ARTERY CALCIFICATION: Primary | ICD-10-CM

## 2023-02-16 DIAGNOSIS — I71.21 ANEURYSM OF ASCENDING AORTA WITHOUT RUPTURE: ICD-10-CM

## 2023-02-16 DIAGNOSIS — I65.23 BILATERAL CAROTID ARTERY STENOSIS: ICD-10-CM

## 2023-02-16 DIAGNOSIS — I10 PRIMARY HYPERTENSION: ICD-10-CM

## 2023-02-16 DIAGNOSIS — E78.00 PRIMARY HYPERCHOLESTEROLEMIA: ICD-10-CM

## 2023-02-16 DIAGNOSIS — I25.84 CORONARY ARTERY CALCIFICATION: Primary | ICD-10-CM

## 2023-02-16 DIAGNOSIS — I77.810 AORTIC ECTASIA, THORACIC (HCC): ICD-10-CM

## 2023-02-16 NOTE — PROGRESS NOTES
[facility]  [address] [facility], [place]   [address]  [address]  Phone#  [phone]   Fax#  [phone]  *-*-*-*-*-*-*-*-*-*-*-*-*-*-*-*-*-*-*-*-*-*-*-*-*-*-*-*-*-*-*-*-*-*-*-*-*-*-*-*-*-*-*-*-*-*-*-*-*-*-*-*-*-*                                   Cardiology Follow Up      ENCOUNTER DATE: 23 11:16 AM  PATIENT NAME: Terry Simon   : 1955    MRN: [id number]  AGE:68 y o  SEX: male  [address] Rebeca White MD     PRIMARY CARE PHYSICIAN: Ignacio Molina MD    ACTIVE DIAGNOSIS THIS VISIT  1  Coronary artery calcification        2  Primary hypercholesterolemia        3  Bilateral carotid artery stenosis < 50%        4  Aortic ectasia, thoracic (HCC)        5  Aneurysm of ascending aorta without rupture        6   Primary hypertension          ACTIVE PROBLEM LIST  Patient Active Problem List   Diagnosis   • Allergic rhinitis   • Odontoid fracture ([facility])   • Cervical disc herniation   • Spinal stenosis   • Cervical spondylosis   • Neck pain   • Degeneration of intervertebral disc of cervical region   • Hypothyroidism   • Left arm pain   • Loose body in knee, right   • Muscle pain, myofacial   • Neuralgia   • Neuropathy   • Nontoxic multinodular goiter   • Nonunion of fracture   • Occipital neuralgia   • Post-traumatic osteoarthritis of one knee   • Osteoarthritis of knee   • Pruritus   • Episodic lightheadedness   • Spider veins of both lower extremities   • Acquired unequal leg length   • Venous insufficiency   • Abnormal gait   • Acquired trigger finger   • Chondromalacia patellae   • Chronic osteomyelitis of lower leg (HCC)   • Generalized osteoarthritis   • Knee joint effusion   • Knee pain   • Localized, primary osteoarthritis   • Localized primary osteoarthritis of wrist   • Nontoxic single thyroid nodule   • Anxiety   • Reactive depression   • BPH without obstruction/lower urinary tract symptoms   • Hypothyroidism due to acquired atrophy of thyroid   • Post traumatic stress disorder (PTSD)   • Vertigo   • Vitamin D deficiency   • Chronic pain syndrome   • Varicose veins of bilateral lower extremities with other complications   • Elevated EBV antibody titer   • Fatigue   • Sugar blood level increased   • Osteoporosis without current pathological fracture   • Bilateral carotid artery stenosis < 50%   • PAC (premature atrial contraction)   • PVC (premature ventricular contraction)   • Elevated blood pressure reading without diagnosis of hypertension   • Primary hypercholesterolemia   • Macrocytosis without anemia   • Leukocytosis   • Abnormal blood chemistry   • Palpitation   • Chronic neck pain   • Thoracic aortic aneurysm without rupture   • Nonrheumatic mitral valve regurgitation   • Bilateral enlargement of atria   • Abnormal MRI   • Coronary artery calcification   • Abnormal CT scan of lung   • Osteopenia   • Decreased testosterone level in male   • Prostate cancer (HCC)   • Anger   • Elevated LDL cholesterol level   • Encounter for immunization   • Tingling   • Blood pressure elevated without history of HTN   • Immunization counseling   • Macrocytosis   • Mass of right parotid gland   • Pain of left calf   • History of osteomyelitis   • Abdominal lymphadenopathy   • Renal cysts, acquired, bilateral   • Left otitis media   • Scab   • Elevated blood sugar   • Aspirin long-term use   • Aortic ectasia, thoracic (HCC)   • Primary hypertension   • Carpal tunnel syndrome on right   • Nerve sheath tumor   • CRP elevated       CARDIOLOGY SPECIALTY COMMENTS  Patient 1st seen on 07/30/2021   Recently he has been going for frequent walks for about 45 minutes at a leisurely pace   When he comes home, he lays on the floor to cool down and relax  Ashleigheli Florencio becomes nauseous but does not vomit although he often feels like he might vomit   He becomes diaphoretic and his heart races and pounds    He has no specific chest discomfort          He has a history of smoking up to a pack a day for 2-3 years in the early [de-identified] and has not smoked since  [facility] father may have had a heart attack  Dawn Guerrero had peripheral vascular disease and fell down a flight of stairs and was noted to be   Dawn Guerrero had an uncle who had CABG  Dawn Guerrero is a retired  from [employer]         2021 echocardiogram:  Normal LV systolic and diastolic function EF 55 percent  Mild biatrial dilatation  Mild-to-moderate mitral regurgitation  Normal pulmonary artery pressures  Ascending aortic aneurysm       2021 Carotid ultrasound demonstrated less than 50% stenosis bilaterally        2021 CTA of the chest demonstrated enlarged ascending aorta at 41 mm   There was evidence of coronary artery calcification   Patient was advised not to lift more than 25 lb due to the ascending aortic aneurysm  2021 nuclear stress test:  Ventricular ectopy as described with exercise   Low normal left ventricular systolic function, EF 06% with mild left ventricular cavity enlargement   Normal tomographic perfusion series with inferior diaphragmatic attenuation  10/07/2021 Holter monitor: Heart rate  beats per minute averaging 66 bpm  Rare ventricular ectopy  Occasional supraventricular ectopy  Two 3 beat runs of SVT, fastest 152 beats per minute  Intermittent 1st degree AV block and 1 episode of second-degree AV block Mobitz type 1  Longest RR 1 9 seconds    2021 MRA of the chest: Mildly ectatic ascending aorta maximum diameter 40 mm  Remainder the aorta normal in caliber  INTERVAL HISTORY:        Patient with a ascending aortic aneurysm of 40 mm returns  He is on no blood pressure medications but does monitor his blood pressure closely  Most the time his systolic runs between 407 and 130  More recently he had a steroid injection of his knee and has had blood pressures in the 140s to 150s  This should just be transient    I would prefer patient be on some antihypertensives and particularly beta-blockers but patient does not want to take medication unless he absolutely has to  He is 1 year and 3 months since he last had his aneurysm checked  He is in a situation where he has a tumor on his back and is going to be getting radiation    Will wait until he is finished with his radiation treatment and then arrange for a CT of the chest     DISCUSSION/PLAN:          · Lipid profile and HS CRP  · Return in July 2023    Lab Studies:    Lab Results   Component Value Date    CHOLESTEROL 170 11/02/2021    CHOLESTEROL 189 04/27/2021    CHOLESTEROL 191 10/09/2020     Lab Results   Component Value Date    TRIG 70 11/02/2021    TRIG 63 04/27/2021    TRIG 70 10/09/2020     Lab Results   Component Value Date    HDL 56 11/02/2021    HDL 52 04/27/2021    HDL 57 10/09/2020     Lab Results   Component Value Date    LDLCALC 100 11/02/2021    LDLCALC 124 (H) 04/27/2021    LDLCALC 120 (H) 10/09/2020     Lab Results   Component Value Date    HGBA1C 5 4 12/07/2022      Lab Results   Component Value Date    EGFR 104 12/20/2022    EGFR 104 12/07/2022    EGFR 98 09/28/2022    SODIUM 141 12/20/2022    SODIUM 139 12/07/2022    SODIUM 140 09/28/2022    K 3 5 12/20/2022    K 3 7 12/07/2022    K 4 1 09/28/2022     (H) 12/20/2022     12/07/2022     (H) 09/28/2022    CO2 27 12/20/2022    CO2 30 12/07/2022    CO2 27 09/28/2022    ANIONGAP 7 07/31/2015    ANIONGAP 9 02/24/2015    ANIONGAP 9 02/06/2015    BUN 14 12/20/2022    BUN 19 12/07/2022    BUN 16 09/28/2022    CREATININE 0 59 (L) 12/20/2022    CREATININE 0 58 (L) 12/07/2022    CREATININE 0 67 09/28/2022     Lab Results   Component Value Date    WBC 12 80 (H) 12/20/2022    WBC 8 57 12/07/2022    WBC 8 92 09/28/2022    HGB 12 5 12/20/2022    HGB 14 2 12/07/2022    HGB 14 0 09/28/2022    HCT 37 7 12/20/2022    HCT 44 3 12/07/2022    HCT 45 4 09/28/2022     (H) 12/20/2022     (H) 12/07/2022     (H) 09/28/2022    MCH 33 0 12/20/2022    MCH 32 3 12/07/2022 MCH 32 0 09/28/2022    MCHC 33 2 12/20/2022    MCHC 32 1 12/07/2022    MCHC 30 8 (L) 09/28/2022     12/20/2022     12/19/2022     12/07/2022      Lab Results   Component Value Date    GLUCOSE 106 07/31/2015    GLUCOSE 119 02/24/2015    GLUCOSE 105 02/06/2015    CALCIUM 8 9 12/20/2022    CALCIUM 9 6 12/07/2022    CALCIUM 9 1 09/28/2022    AST 22 12/07/2022    AST 18 09/28/2022    AST 19 11/02/2021    ALT 29 12/07/2022    ALT 26 09/28/2022    ALT 27 11/02/2021    ALKPHOS 57 12/07/2022    ALKPHOS 60 09/28/2022    ALKPHOS 57 11/02/2021    PROT 7 2 07/31/2015    PROT 6 7 12/19/2014    BILITOT 0 60 07/31/2015    BILITOT 0 49 12/19/2014    MG 2 3 02/17/2022     Lab Results   Component Value Date    FREET4 1 4 07/31/2015     No results found for this visit on 02/16/23        Current Outpatient Medications:   •  acetaminophen (TYLENOL) 500 mg tablet, Take 2 tablets (1,000 mg total) by mouth every 8 (eight) hours as needed for moderate pain, mild pain or fever, Disp: , Rfl: 0  •  alendronate (FOSAMAX) 35 mg tablet, Take 1 tablet (35 mg total) by mouth every 7 days, Disp: 4 tablet, Rfl: 2  •  desloratadine (CLARINEX) 5 MG tablet, Take 1 tablet (5 mg total) by mouth daily, Disp: 90 tablet, Rfl: 0  •  doxycycline (ADOXA) 50 MG tablet, TAKE 1 TABLET BY ORAL ROUTE EVERY DAY WITH MEAL X3 MONTHS THEN [facility], Disp: , Rfl:   •  fluticasone (FLONASE) 50 mcg/act nasal spray, 2 sprays into each nostril daily, Disp: 3 g, Rfl: 1  •  levothyroxine 100 mcg tablet, daily, Disp: , Rfl:   •  metroNIDAZOLE (METROGEL) 0 75 % gel, Apply topically 2 (two) times a day, Disp: , Rfl:   •  RESTASIS MULTIDOSE 0 05 % ophthalmic emulsion, INSTILL 1 DROP INTO BOTH EYES TWICE A DAY, Disp: , Rfl: 3  •  tamsulosin (FLOMAX) 0 4 mg, Take 0 4 mg by mouth 2 (two) times a day, Disp: , Rfl:   •  tazarotene (AVAGE) 0 1 % cream, Apply topically daily at bedtime, Disp: , Rfl:   Allergies   Allergen Reactions   • Amoxicillin-Pot Clavulanate Rash, Vomiting and Nausea Only       Past Medical History:   Diagnosis Date   • Anxiety    • Arthritis    • Bleeding tendency (HCC)    • BPH (benign prostatic hyperplasia)    • Broken bones     left leg   • Cancer [facility])    • Cataract    • Chronic neck pain    • Epilepsy (HCC)    • Hypothyroidism    • Neoplasm of unspecified behavior of bone, soft tissue, and skin    • Osteomyelitis (HCC)    • PONV (postoperative nausea and vomiting)    • Prostate cancer ([facility] [address] )    • Skin cancer    • Squamous carcinoma 2014   • Thyroid condition    • Thyroid disease      Social History     Socioeconomic History   • Marital status: Legally      Spouse name: Not on file   • Number of children: Not on file   • Years of education: Not on file   • Highest education level: Not on file   Occupational History   • Occupation: Teacher   • Occupation: retired   Tobacco Use   • Smoking status: Former     Types: Cigarettes     Quit date:      Years since quittin 1   • Smokeless tobacco: Never   Vaping Use   • Vaping Use: Never used   Substance and Sexual Activity   • Alcohol use: Yes     Comment: being a social drinker   • Drug use: Not Currently     Types: Marijuana     Comment: Medical marijuana   • Sexual activity: Yes     Comment: no known std risk factors   Other Topics Concern   • Not on file   Social History Narrative    Daily coffee consumption (3 cups/day)     Social Determinants of Health     Financial Resource Strain: Low Risk    • Difficulty of Paying Living Expenses: Not hard at all   Food Insecurity: Not on file   Transportation Needs: No Transportation Needs   • Lack of Transportation (Medical): No   • Lack of Transportation (Non-Medical):  No   Physical Activity: Not on file   Stress: Not on file   Social Connections: Not on file   Intimate Partner Violence: Not on file   Housing Stability: Not on file      Family History   Problem Relation Age of Onset   • Bone cancer Mother    • Other Father Accident   • Dementia Other    • Cancer Other      Past Surgical History:   Procedure Laterality Date   • ARTHRODESIS      H/o Arthrodesis Cervical to C2;  last assessed 13may2015   • CERVICAL FUSION     • COLONOSCOPY  01/22/2021   • COLONOSCOPY     • KNEE SURGERY Right 12/22/2017   • LEG SURGERY Left 1999    Hardware placed in lower leg bone(s)   • LEG SURGERY Left 01/01/2006 2006, 2012-Left leg vascular   • [place] LAMINECTOMY BX/EXC ISPI DAKOTAH XDRL LUMBAR N/A 12/19/2022    Procedure: L2/3 decompressive laminectomy and biopsy (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;  Surgeon: Mandie Black MD;  [facility];  Service: Neurosurgery   • THYROID SURGERY Right 2013   [facility]       PREVIOUS WEIGHTS:   Wt Readings from Last 10 Encounters:   02/16/23 83 3 kg (183 lb 9 6 oz)   01/03/23 83 9 kg (185 lb)   12/19/22 83 9 kg (185 lb)   11/22/22 84 1 kg (185 lb 8 oz)   11/16/22 85 1 kg (187 lb 9 6 oz)   10/14/22 84 6 kg (186 lb 9 6 oz)   09/27/22 84 1 kg (185 lb 6 4 oz)   09/06/22 83 5 kg (184 lb)   07/22/22 82 2 kg (181 lb 3 2 oz)   07/11/22 82 9 kg (182 lb 12 8 oz)        Review of Systems:  Review of Systems   Constitutional: Negative for activity change  Respiratory: Negative for cough, chest tightness, shortness of breath and wheezing  Cardiovascular: Negative for chest pain, palpitations and leg swelling  Musculoskeletal: Negative for gait problem  Skin: Negative for color change  Neurological: Negative for dizziness, tremors, syncope, weakness, light-headedness and headaches  Psychiatric/Behavioral: Negative for agitation and confusion  Physical Exam:  /70   Pulse (!) 54   Ht 6' (1 829 m)   Wt 83 3 kg (183 lb 9 6 oz)   SpO2 97%   BMI 24 90 kg/m²     Physical Exam  Vitals reviewed  Constitutional:       General: He is not in acute distress  Appearance: He is well-developed  HENT:      Head: Normocephalic and atraumatic     Neck: Thyroid: No thyromegaly  Vascular: No carotid bruit or JVD  Trachea: No tracheal deviation  Cardiovascular:      Rate and Rhythm: Normal rate and regular rhythm  Pulses: Normal pulses  Heart sounds: Normal heart sounds  No murmur heard  No friction rub  No gallop  Pulmonary:      Effort: Pulmonary effort is normal  No respiratory distress  Breath sounds: Normal breath sounds  No wheezing, rhonchi or rales  Chest:      Chest wall: No tenderness  Musculoskeletal:      Cervical back: Normal range of motion and neck supple  Right lower leg: No edema  Left lower leg: No edema  Skin:     General: Skin is warm and dry  Neurological:      General: No focal deficit present  Mental Status: He is alert and oriented to person, place, and time  Psychiatric:         Mood and Affect: Mood normal          Behavior: Behavior normal          Thought Content: Thought content normal          Judgment: Judgment normal        ======================================================  Imaging:   I have personally reviewed pertinent reports  Portions of the record may have been created with voice recognition software  Occasional wrong word or "sound a like" substitutions may have occurred due to the inherent limitations of voice recognition software  Read the chart carefully and recognize, using context, where substitutions have occurred      SIGNATURES:   Amanda Mcnamara MD

## 2023-02-24 ENCOUNTER — LAB (OUTPATIENT)
Dept: LAB | Facility: CLINIC | Age: 68
End: 2023-02-24

## 2023-02-24 DIAGNOSIS — E78.00 PRIMARY HYPERCHOLESTEROLEMIA: ICD-10-CM

## 2023-02-24 LAB
CHOLEST SERPL-MCNC: 196 MG/DL
CRP SERPL HS-MCNC: <0.9 MG/L
HDLC SERPL-MCNC: 60 MG/DL
LDLC SERPL CALC-MCNC: 122 MG/DL (ref 0–100)
TRIGL SERPL-MCNC: 69 MG/DL

## 2023-02-26 NOTE — RESULT ENCOUNTER NOTE
Your cholesterol is up  Your total cholesterol is the highest its been in 5 years 196 with desirable being less than 170  In addition your LDL is up  Its been this high before but normal is 100 or less  Hypertension, cigarette smoking and high cholesterol are the biggest risk factors for aortic aneurysms  If you do not want to take medications for your cholesterol, I suggest you be more careful about your diet, lose weight and exercise more  Exercise should be cardiovascular rather than weight lifting

## 2023-02-26 NOTE — RESULT ENCOUNTER NOTE
Your HS CRP is low means from an inflammatory perspective you are at a low risk for a cardiac event  This does not mean that you do not need good cholesterol control

## 2023-03-23 ENCOUNTER — HOSPITAL ENCOUNTER (OUTPATIENT)
Facility: MEDICAL CENTER | Age: 68
Discharge: HOME | End: 2023-03-23

## 2023-03-23 DIAGNOSIS — C61 MALIGNANT NEOPLASM OF PROSTATE (HCC): ICD-10-CM

## 2023-03-23 RX ADMIN — GADOBUTROL 7.5 ML: 604.72 INJECTION INTRAVENOUS at 13:18

## 2023-03-27 ENCOUNTER — TELEPHONE (OUTPATIENT)
Dept: NEUROSURGERY | Facility: CLINIC | Age: 68
End: 2023-03-27

## 2023-03-27 NOTE — TELEPHONE ENCOUNTER
Received a call from Joyce Juan requesting lab work be completed prior to MRI scheduled for 4/3  Placed orders attached to Dr Aakash Mccloud and contacted the patient to advise  He also has questions regarding SRS  Transferred to the neurooncology nurse navigator [id number] Hernandez who advised patient the [facility] is a one time treatment done on Wednesday at [facility] and [address]  He was appreciative

## 2023-03-29 ENCOUNTER — APPOINTMENT (OUTPATIENT)
Dept: LAB | Facility: CLINIC | Age: 68
End: 2023-03-29

## 2023-03-29 ENCOUNTER — TELEPHONE (OUTPATIENT)
Dept: FAMILY MEDICINE CLINIC | Facility: CLINIC | Age: 68
End: 2023-03-29

## 2023-03-29 DIAGNOSIS — Z01.812 PRE-PROCEDURAL LABORATORY EXAMINATIONS: ICD-10-CM

## 2023-03-29 LAB
BUN SERPL-MCNC: 14 MG/DL (ref 5–25)
CREAT SERPL-MCNC: 0.67 MG/DL (ref 0.6–1.3)
GFR SERPL CREATININE-BSD FRML MDRD: 98 ML/MIN/1.73SQ M

## 2023-03-29 NOTE — TELEPHONE ENCOUNTER
I called and left a message to see if the patient was planning on staying with us as a PCP or did he find a new PCP

## 2023-04-03 ENCOUNTER — HOSPITAL ENCOUNTER (OUTPATIENT)
Dept: MRI IMAGING | Facility: HOSPITAL | Age: 68
Discharge: HOME | End: 2023-04-03
Attending: NEUROLOGICAL SURGERY

## 2023-04-03 DIAGNOSIS — Z09 POSTOPERATIVE EXAMINATION: ICD-10-CM

## 2023-04-03 RX ADMIN — GADOBUTROL 8 ML: 604.72 INJECTION INTRAVENOUS at 20:28

## 2023-04-05 ENCOUNTER — HOSPITAL ENCOUNTER (OUTPATIENT)
Dept: RADIOLOGY | Facility: HOSPITAL | Age: 68
Discharge: HOME | End: 2023-04-05

## 2023-04-05 DIAGNOSIS — D36.10 SCHWANNOMA: ICD-10-CM

## 2023-04-05 DIAGNOSIS — Z09 POSTOPERATIVE EXAMINATION: ICD-10-CM

## 2023-04-06 ENCOUNTER — RA CDI HCC (OUTPATIENT)
Dept: OTHER | Facility: HOSPITAL | Age: 68
End: 2023-04-06

## 2023-04-06 ENCOUNTER — OFFICE VISIT (OUTPATIENT)
Dept: NEUROSURGERY | Facility: CLINIC | Age: 68
End: 2023-04-06

## 2023-04-06 VITALS
BODY MASS INDEX: 24.52 KG/M2 | DIASTOLIC BLOOD PRESSURE: 78 MMHG | RESPIRATION RATE: 16 BRPM | HEIGHT: 73 IN | WEIGHT: 185 LBS | TEMPERATURE: 98.5 F | HEART RATE: 57 BPM | SYSTOLIC BLOOD PRESSURE: 131 MMHG

## 2023-04-06 DIAGNOSIS — D49.2 LUMBAR SPINE TUMOR: Primary | ICD-10-CM

## 2023-04-06 DIAGNOSIS — Z48.811 ENCOUNTER FOR SURGICAL AFTERCARE FOLLOWING SURGERY OF NERVOUS SYSTEM: ICD-10-CM

## 2023-04-06 NOTE — PROGRESS NOTES
"DISCUSSION SUMMARY   This is a 70 y o  male who had an L2-3 decompressive laminectomy and biopsy of a nerve sheath tumor  It likely does need to be treated with stereotactic radiosurgery and I have forwarded this case to the spine brain tumor clinic for consideration of stereotactic radiotherapy  Return for patient to be presented at tumor conference - we will get back in touch after completion  Diagnosis ICD-10-CM Associated Orders   1  Lumbar spine tumor  D49 2       2  Encounter for surgical aftercare following surgery of nervous system  Z48 811            Chief Complaint   Follow-up (3 month f/u with MRI Lsp 4/3/23 SL, XR Lsp, and PT 1/2023-2/2023)       History of Present Illness   This patient is known to our office for prior surgical history:   \"C1-C2 lateral mass fixation and sublaminar Gallie cable fixation with demineralized bone matrix and cadaveric bone arthrodesis\"   Performed by Dr Carol Ann Castro, 2/20/15  Patient is s/p L2/3 decompressive laminectomy and biopsy (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 [12/19/22 DKO]        Review of Systems   Constitutional: Negative  HENT: Negative  Eyes: Negative  Respiratory: Negative  Cardiovascular: Negative  Gastrointestinal: Negative  Endocrine: Negative  Genitourinary: Negative  Musculoskeletal: Positive for gait problem and myalgias (pain down the posterior left leg with cramping)  Skin: Negative  Allergic/Immunologic: Negative  Neurological: Positive for numbness (and tingling in arms and fingers)  Negative for weakness  Hematological: Negative  Psychiatric/Behavioral: Positive for sleep disturbance  All other systems reviewed and are negative        Vitals:    /78 (BP Location: Left arm, Patient Position: Sitting, Cuff Size: Standard)   Pulse 57   Temp 98 5 °F (36 9 °C) (Temporal)   Resp 16   Ht 6' 1\" (1 854 m)   Wt 83 9 kg (185 lb)   BMI 24 41 kg/m²     MEDICAL HISTORY  Past Medical History: " Diagnosis Date   • Anxiety    • Arthritis    • Bleeding tendency (HCC)    • BPH (benign prostatic hyperplasia)    • Broken bones     left leg   • Cancer [facility])    • Cataract    • Chronic neck pain    • [facility]    • Hypothyroidism    • Neoplasm of unspecified behavior of bone, soft tissue, and skin    • Osteomyelitis (HCC)    • PONV (postoperative nausea and vomiting)    • Pro[facility] )    • Skin cancer    • Squamous carcinoma 2014   • Thyroid condition    • Thyroid disease      Past Surgical History:   Procedure Laterality Date   • ARTHRODESIS      H/o Arthrodesis Cervical to C2;  last assessed 2015   • CERVICAL FUSION     • COLONOSCOPY  2021   • COLONOSCOPY     • KNEE SURGERY Right 2017   • LEG SURGERY Left     Hardware placed in lower leg bone(s)   • LEG SURGERY Left 2006, -Left leg vascular   • [place] LAMINECTOMY BX/EXC ISPI DAKOTAH XDRL LUMBAR N/A 2022    Procedure: L2/3 decompressive laminectomy and biopsy (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;  Surgeon: Lamin Sanchez MD;  Location[facility];  Service: Neurosurgery   • THYROID SURGERY Right 2013   • [facility] BIOPSY  2021     Social History     Tobacco Use   • Smoking status: Former     Types: Cigarettes     Quit date:      Years since quittin 2   • Smokeless tobacco: Never   Vaping Use   • Vaping Use: Never used   Substance Use Topics   • Alcohol use: Yes     Comment: being a social drinker   • Drug use: Not Currently     Types: Marijuana     Comment: Medical marijuana      Family History   Problem Relation Age of Onset   • Bone cancer Mother    • Other Father         Accident   • Dementia Other    • Cancer Other         Current Outpatient Medications:   •  acetaminophen (TYLENOL) 500 mg tablet, Take 2 tablets (1,000 mg total) by mouth every 8 (eight) hours as needed for moderate pain, mild pain or fever, Disp: , Rfl: 0  •  alendronate (FOSAMAX) 35 mg tablet, Take 1 tablet (35 mg total) by mouth every 7 days, Disp: 4 tablet, Rfl: 2  •  desloratadine (CLARINEX) 5 MG tablet, Take 1 tablet (5 mg total) by mouth daily, Disp: 90 tablet, Rfl: 0  •  doxycycline (ADOXA) 50 MG tablet, TAKE 1 TABLET BY ORAL ROUTE EVERY DAY WITH MEAL X3 MONTHS THEN MWF, Disp: , Rfl:   •  fluticasone (FLONASE) 50 mcg/act nasal spray, 2 sprays into each nostril daily, Disp: 3 g, Rfl: 1  •  levothyroxine 100 mcg tablet, daily, Disp: , Rfl:   •  metroNIDAZOLE (METROGEL) 0 75 % gel, Apply topically 2 (two) times a day, Disp: , Rfl:   •  RESTASIS MULTIDOSE 0 05 % ophthalmic emulsion, INSTILL 1 DROP INTO BOTH EYES TWICE A DAY, Disp: , Rfl: 3  •  tamsulosin (FLOMAX) 0 4 mg, Take 0 4 mg by mouth 2 (two) times a day, Disp: , Rfl:   •  tazarotene (AVAGE) 0 1 % cream, Apply topically daily at bedtime, Disp: , Rfl:      Allergies   Allergen Reactions   • Amoxicillin-Pot Clavulanate Rash, Vomiting and Nausea Only        The following portions of the patient's history were updated by MA and reviewed by MD: allergies, current medications, past family history, past medical history, past social history, past surgical history and problem list     Physical Exam  Vitals and nursing note reviewed  Constitutional:       General: He is not in acute distress  Appearance: Normal appearance  He is normal weight  He is not ill-appearing, toxic-appearing or diaphoretic  HENT:      Head: Normocephalic and atraumatic  Nose: Nose normal    Eyes:      Extraocular Movements: Extraocular movements intact  Pupils: Pupils are equal, round, and reactive to light  Musculoskeletal:         General: No swelling, tenderness, deformity or signs of injury  Normal range of motion  Cervical back: Normal range of motion and neck supple  Right lower leg: No edema  Left lower leg: No edema  Skin:     General: Skin is warm and dry  Neurological:      General: No focal deficit present  Mental Status: He is alert and oriented to person, place, and time  Mental status is at baseline  Cranial Nerves: No cranial nerve deficit  Sensory: No sensory deficit  Motor: No weakness  Coordination: Coordination normal       Gait: Gait ( ) normal       Deep Tendon Reflexes: Reflexes normal    Psychiatric:         Mood and Affect: Mood normal          Behavior: Behavior normal          Thought Content: Thought content normal          Judgment: Judgment normal          RESULTS/DATA  I have personally reviewed pertinent films in PACS    MRI of the LS spine is carefully reviewed  This demonstrates the decompression and subtotal resection of the neurofibroma  The spinal stenosis is now absent  Surgical resection looks appropriate  Foramen which has not been resected  To resect this would require a fusion and the patient was adamant about not wanting to have a fusion performed

## 2023-04-06 NOTE — PROGRESS NOTES
[id number]  [facility] 75  coding opportunities          Chart Reviewed number of suggestions sent to Provider: 1     Patients Insurance     Medicare Insurance: Rosanna Phelan

## 2023-04-11 ENCOUNTER — TELEPHONE (OUTPATIENT)
Dept: INFUSION CENTER | Facility: CLINIC | Age: 68
End: 2023-04-11

## 2023-04-15 PROBLEM — R73.9 SUGAR BLOOD LEVEL INCREASED: Status: RESOLVED | Noted: 2021-05-19 | Resolved: 2023-04-15

## 2023-04-15 PROBLEM — M85.80 OSTEOPENIA: Status: RESOLVED | Noted: 2021-07-27 | Resolved: 2023-04-15

## 2023-04-15 PROBLEM — R42 VERTIGO: Status: RESOLVED | Noted: 2019-08-26 | Resolved: 2023-04-15

## 2023-04-15 PROBLEM — Z71.85 IMMUNIZATION COUNSELING: Status: RESOLVED | Noted: 2021-11-25 | Resolved: 2023-04-15

## 2023-04-15 PROBLEM — R73.09 SUGAR BLOOD LEVEL INCREASED: Status: RESOLVED | Noted: 2021-05-19 | Resolved: 2023-04-15

## 2023-04-15 PROBLEM — Z79.82 ASPIRIN LONG-TERM USE: Status: RESOLVED | Noted: 2022-04-22 | Resolved: 2023-04-15

## 2023-04-15 PROBLEM — R23.4 SCAB: Status: RESOLVED | Noted: 2022-04-22 | Resolved: 2023-04-15

## 2023-04-15 PROBLEM — M23.41 LOOSE BODY IN KNEE, RIGHT: Status: RESOLVED | Noted: 2017-12-12 | Resolved: 2023-04-15

## 2023-04-15 PROBLEM — R91.8 ABNORMAL CT SCAN OF LUNG: Status: RESOLVED | Noted: 2021-06-22 | Resolved: 2023-04-15

## 2023-04-15 PROBLEM — R03.0 BLOOD PRESSURE ELEVATED WITHOUT HISTORY OF HTN: Status: RESOLVED | Noted: 2021-11-25 | Resolved: 2023-04-15

## 2023-04-15 PROBLEM — M25.569 KNEE PAIN: Status: RESOLVED | Noted: 2018-08-24 | Resolved: 2023-04-15

## 2023-04-15 PROBLEM — R03.0 ELEVATED BLOOD PRESSURE READING WITHOUT DIAGNOSIS OF HYPERTENSION: Status: RESOLVED | Noted: 2021-05-19 | Resolved: 2023-04-15

## 2023-04-15 PROBLEM — R73.9 ELEVATED BLOOD SUGAR: Status: RESOLVED | Noted: 2022-04-22 | Resolved: 2023-04-15

## 2023-04-15 PROBLEM — M19.91 LOCALIZED, PRIMARY OSTEOARTHRITIS: Status: RESOLVED | Noted: 2018-08-24 | Resolved: 2023-04-15

## 2023-04-15 PROBLEM — R79.82 CRP ELEVATED: Status: RESOLVED | Noted: 2022-11-17 | Resolved: 2023-04-15

## 2023-04-15 PROBLEM — R76.0 ELEVATED EBV ANTIBODY TITER: Status: RESOLVED | Noted: 2021-05-19 | Resolved: 2023-04-15

## 2023-04-15 PROBLEM — M17.9 OSTEOARTHRITIS OF KNEE: Status: RESOLVED | Noted: 2017-12-12 | Resolved: 2023-04-15

## 2023-04-15 PROBLEM — R20.2 TINGLING: Status: RESOLVED | Noted: 2021-11-25 | Resolved: 2023-04-15

## 2023-04-15 PROBLEM — R79.9 ABNORMAL BLOOD CHEMISTRY: Status: RESOLVED | Noted: 2021-05-19 | Resolved: 2023-04-15

## 2023-04-15 PROBLEM — Z48.811 ENCOUNTER FOR SURGICAL AFTERCARE FOLLOWING SURGERY OF NERVOUS SYSTEM: Status: RESOLVED | Noted: 2023-04-06 | Resolved: 2023-04-15

## 2023-04-15 PROBLEM — R59.0 ABDOMINAL LYMPHADENOPATHY: Status: RESOLVED | Noted: 2022-02-23 | Resolved: 2023-04-15

## 2023-04-15 PROBLEM — Z23 ENCOUNTER FOR IMMUNIZATION: Status: RESOLVED | Noted: 2021-10-13 | Resolved: 2023-04-15

## 2023-04-15 PROBLEM — M65.30 ACQUIRED TRIGGER FINGER: Status: RESOLVED | Noted: 2018-08-24 | Resolved: 2023-04-15

## 2023-04-15 PROBLEM — M25.469 KNEE JOINT EFFUSION: Status: RESOLVED | Noted: 2018-08-24 | Resolved: 2023-04-15

## 2023-04-15 PROBLEM — E78.00 ELEVATED LDL CHOLESTEROL LEVEL: Status: RESOLVED | Noted: 2021-10-13 | Resolved: 2023-04-15

## 2023-04-15 PROBLEM — D75.89 MACROCYTOSIS: Status: RESOLVED | Noted: 2022-01-07 | Resolved: 2023-04-15

## 2023-04-15 PROBLEM — R93.89 ABNORMAL MRI: Status: RESOLVED | Noted: 2021-06-22 | Resolved: 2023-04-15

## 2023-04-15 PROBLEM — G89.4 CHRONIC PAIN SYNDROME: Status: RESOLVED | Noted: 2020-02-03 | Resolved: 2023-04-15

## 2023-04-15 PROBLEM — R45.4 ANGER: Status: RESOLVED | Noted: 2021-10-13 | Resolved: 2023-04-15

## 2023-04-15 PROBLEM — R53.83 FATIGUE: Status: RESOLVED | Noted: 2021-05-19 | Resolved: 2023-04-15

## 2023-04-15 PROBLEM — R00.2 PALPITATION: Status: RESOLVED | Noted: 2021-05-19 | Resolved: 2023-04-15

## 2023-04-15 PROBLEM — H66.92 LEFT OTITIS MEDIA: Status: RESOLVED | Noted: 2022-04-22 | Resolved: 2023-04-15

## 2023-04-15 PROBLEM — M19.039 LOCALIZED PRIMARY OSTEOARTHRITIS OF WRIST: Status: RESOLVED | Noted: 2018-08-24 | Resolved: 2023-04-15

## 2023-04-15 PROBLEM — R26.9 ABNORMAL GAIT: Status: RESOLVED | Noted: 2018-08-24 | Resolved: 2023-04-15

## 2023-04-15 PROBLEM — R79.89 DECREASED TESTOSTERONE LEVEL IN MALE: Status: RESOLVED | Noted: 2021-07-27 | Resolved: 2023-04-15

## 2023-04-15 PROBLEM — D72.829 LEUKOCYTOSIS: Status: RESOLVED | Noted: 2021-05-19 | Resolved: 2023-04-15

## 2023-04-15 PROBLEM — M79.662 PAIN OF LEFT CALF: Status: RESOLVED | Noted: 2022-01-18 | Resolved: 2023-04-15

## 2023-04-27 PROBLEM — D36.10 SCHWANNOMA: Status: ACTIVE | Noted: 2023-04-27

## 2023-05-03 ENCOUNTER — RADIATION ONCOLOGY CONSULT (OUTPATIENT)
Dept: RADIATION ONCOLOGY | Facility: HOSPITAL | Age: 68
End: 2023-05-03
Attending: STUDENT IN AN ORGANIZED HEALTH CARE EDUCATION/TRAINING PROGRAM

## 2023-05-03 VITALS
BODY MASS INDEX: 24.2 KG/M2 | HEIGHT: 73 IN | HEART RATE: 57 BPM | SYSTOLIC BLOOD PRESSURE: 138 MMHG | DIASTOLIC BLOOD PRESSURE: 78 MMHG | OXYGEN SATURATION: 100 % | RESPIRATION RATE: 16 BRPM | TEMPERATURE: 99 F

## 2023-05-03 DIAGNOSIS — D49.2 LUMBAR SPINE TUMOR: Primary | ICD-10-CM

## 2023-05-03 DIAGNOSIS — D36.10 SCHWANNOMA: Primary | ICD-10-CM
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
## 2023-05-08 ENCOUNTER — TELEPHONE (OUTPATIENT)
Dept: FAMILY MEDICINE CLINIC | Facility: CLINIC | Age: 68
End: 2023-05-08

## 2023-05-08 PROCEDURE — 77263 THER RADIOLOGY TX PLNG CPLX: CPT | Performed by: STUDENT IN AN ORGANIZED HEALTH CARE EDUCATION/TRAINING PROGRAM

## 2023-05-08 NOTE — TELEPHONE ENCOUNTER
Called patient to set up pre op apt  The ortho office called us to get this scheduled for him  He is getting right knee surgery on 7/25 at [facility] with Doctor Jordy Devine  His pre-op apt needs to be sometime after 6/25  I left a message for patient to call back

## 2023-05-10 ENCOUNTER — HOSPITAL ENCOUNTER (OUTPATIENT)
Dept: MRI IMAGING | Facility: HOSPITAL | Age: 68
Discharge: HOME | End: 2023-05-10
Attending: STUDENT IN AN ORGANIZED HEALTH CARE EDUCATION/TRAINING PROGRAM

## 2023-05-10 DIAGNOSIS — D49.2 LUMBAR SPINE TUMOR: ICD-10-CM

## 2023-05-10 RX ADMIN — GADOBUTROL 8 ML: 604.72 INJECTION INTRAVENOUS at 20:30

## 2023-05-12 ENCOUNTER — TELEPHONE (OUTPATIENT)
Dept: RADIATION ONCOLOGY | Facility: CLINIC | Age: 68
End: 2023-05-12

## 2023-05-12 NOTE — TELEPHONE ENCOUNTER
Returned call to patient after receiving message from him earlier today  Teams message:   Good morning, Yue Bhat  My name is Remsendawit Ervinlona  I met you on May 3rd  I'm a patient now of Doctor Sarah and I just had one or two quick questions  I have my first, I'll be tattooed I guess on Monday and I just had one or two quick questions prior to that and maybe give you a heads up on something  Thank you so much again  Marisel Baltazar [phone]  Right  Patient verbalizing frustration with having to drive/travel to [facility] for radiation therapy treatments  He reports being frustrated with the whole organization, he states he was told he could get his treatments in [facility] or [place]  Patient verbalized anxiety about not being able to fully turn his head/neck while driving and anxiety with driving on the highways  [employer] offered at initial outreach on 4/19/23, but patient declined  [employer] services was again offered, patient receptive to review forms  Patient asked if he could be transported during the times of the day with the least amount of traffic  Patient informed that SBRT treatments are usually given mid-day to mid-afternoon  [employer] forms emailed to patient for review  Support given  Requested call back if he has any further questions

## 2023-05-15 ENCOUNTER — RADIATION THERAPY TREATMENT (OUTPATIENT)
Dept: RADIATION ONCOLOGY | Facility: HOSPITAL | Age: 68
End: 2023-05-15
Attending: STUDENT IN AN ORGANIZED HEALTH CARE EDUCATION/TRAINING PROGRAM
Payer: COMMERCIAL

## 2023-05-15 PROCEDURE — 77399 UNLISTED PX MED RADJ PHYSICS: CPT | Performed by: STUDENT IN AN ORGANIZED HEALTH CARE EDUCATION/TRAINING PROGRAM

## 2023-05-15 PROCEDURE — 77334 RADIATION TREATMENT AID(S): CPT | Performed by: RADIOLOGY

## 2023-05-16 ENCOUNTER — HOSPITAL ENCOUNTER (OUTPATIENT)
Dept: MRI IMAGING | Facility: HOSPITAL | Age: 68
Discharge: HOME | End: 2023-05-16

## 2023-05-16 DIAGNOSIS — D49.2 LUMBAR SPINE TUMOR: ICD-10-CM

## 2023-05-22 PROCEDURE — 77301 RADIOTHERAPY DOSE PLAN IMRT: CPT | Performed by: STUDENT IN AN ORGANIZED HEALTH CARE EDUCATION/TRAINING PROGRAM

## 2023-05-22 PROCEDURE — 77300 RADIATION THERAPY DOSE PLAN: CPT | Performed by: STUDENT IN AN ORGANIZED HEALTH CARE EDUCATION/TRAINING PROGRAM

## 2023-05-22 PROCEDURE — 77338 DESIGN MLC DEVICE FOR IMRT: CPT | Performed by: STUDENT IN AN ORGANIZED HEALTH CARE EDUCATION/TRAINING PROGRAM

## 2023-05-24 ENCOUNTER — APPOINTMENT (OUTPATIENT)
Dept: RADIATION ONCOLOGY | Facility: HOSPITAL | Age: 68
End: 2023-05-24
Attending: STUDENT IN AN ORGANIZED HEALTH CARE EDUCATION/TRAINING PROGRAM
Payer: COMMERCIAL

## 2023-05-24 ENCOUNTER — TELEPHONE (OUTPATIENT)
Dept: FAMILY MEDICINE CLINIC | Facility: CLINIC | Age: 68
End: 2023-05-24

## 2023-05-24 ENCOUNTER — PROCEDURE VISIT (OUTPATIENT)
Dept: NEUROSURGERY | Facility: CLINIC | Age: 68
End: 2023-05-24

## 2023-05-24 DIAGNOSIS — D33.4 SCHWANNOMA OF SPINAL CORD (HCC): Primary | ICD-10-CM

## 2023-05-24 DIAGNOSIS — E03.9 ACQUIRED HYPOTHYROIDISM: Primary | ICD-10-CM

## 2023-05-24 DIAGNOSIS — E55.9 VITAMIN D DEFICIENCY: ICD-10-CM

## 2023-05-24 PROCEDURE — 77373 STRTCTC BDY RAD THER TX DLVR: CPT | Performed by: RADIOLOGY

## 2023-05-24 NOTE — PROGRESS NOTES
PATIENT NAME: Amaris Inman  : 1955  MRN: [id number]  PROCEDURE DATE: 2023    Stereotactic Body Radiotherapy (SBRT) Operative Note    Preop Diagnosis: Schwannoma    Postop Diagnosis: Same    Procedure Details: Stereotactic Body Radiotherapy for left L2-3 schwannoma    Surgeon: Linda Kern MD, PhD     Assistants: none    No qualified resident was available to assist with this case  Radiation Oncologist(s): Dr Sadie Carlson for planning, Dr Santos Le for treatment    Estimated Blood Loss:  None           Specimens: None    Drains: None           Total IV Fluids: None              Findings: As above  Complications:  None    Anesthesia: None      INDICATIONS    70 y o  male with a history of left L2-3 schwannoma  This was diagnosed by biopsy by Dr Jd Lechuga during a combined decompression and biopsy procedure  Matheus Chowdhury DETAILS OF PROCEDURE    The patient presented to the outpatient area of the Department of Radiation Oncology where a body fix immobilization device was created  The patient then underwent a CT while immobilized in the body fix device  The patient was then released from the Department  The patient’s stereotactic CT and preoperative MRI scans were fused in the SBRT planning software, which was used to develop the SBRT plan  Inverse planning was used with certain organs at risk (OARs), such as the spinal cord  The SBRT prescription called for a dose of 25 Gray to be delivered to the PTV in 5 fractions  The PTV was created by expanding the CTV except where it incurred organs at risk  The CTV was generated by expanding the GTV, to include contiguous bony structures, utilizing spine SRT guidelines  The GTV and CTV were contoured by myself and/or Radiation Oncologist  The SBRT plan utilized the mini-multileaf columnator on the TrueBeam machine at the [employer]        When the final treatment plan had been developed and approved, the patient returned to the Department for their first frameless SBRT treatment  The patient was positioned on the treatment couch  The patient was immobilized in their body fix device  kV and then cone beam CT imaging was used to align the patient  Once the radiation oncologist, physicist and I agreed the patient was in correct position, the fields were treated sequentially without complications  When the first SBRT treatment had been delivered, the patient was recovered from the treatment room, scheduled for their remaining SBRT treatments, and was discharged from the department  There was no blood loss and no specimen        SIGNATURE: Lora Han MD, PhD  DATE: 5/24/2023   TIME: 12:59 PM

## 2023-05-24 NOTE — TELEPHONE ENCOUNTER
Patient asking for labs to determine vitamin D level and TSH for levothyroxine levels  He is fatigued frequently and wants to check his thyroid and vit-D Patient has follow up visit on July

## 2023-05-26 ENCOUNTER — APPOINTMENT (OUTPATIENT)
Dept: RADIATION ONCOLOGY | Facility: HOSPITAL | Age: 68
End: 2023-05-26
Attending: STUDENT IN AN ORGANIZED HEALTH CARE EDUCATION/TRAINING PROGRAM
Payer: COMMERCIAL

## 2023-05-26 PROCEDURE — 77373 STRTCTC BDY RAD THER TX DLVR: CPT | Performed by: STUDENT IN AN ORGANIZED HEALTH CARE EDUCATION/TRAINING PROGRAM

## 2023-05-30 ENCOUNTER — APPOINTMENT (OUTPATIENT)
Dept: RADIATION ONCOLOGY | Facility: HOSPITAL | Age: 68
End: 2023-05-30
Attending: STUDENT IN AN ORGANIZED HEALTH CARE EDUCATION/TRAINING PROGRAM
Payer: COMMERCIAL

## 2023-05-30 PROCEDURE — 77373 STRTCTC BDY RAD THER TX DLVR: CPT | Performed by: RADIOLOGY

## 2023-06-01 ENCOUNTER — APPOINTMENT (OUTPATIENT)
Dept: RADIATION ONCOLOGY | Facility: HOSPITAL | Age: 68
End: 2023-06-01
Attending: STUDENT IN AN ORGANIZED HEALTH CARE EDUCATION/TRAINING PROGRAM
Payer: COMMERCIAL

## 2023-06-01 PROCEDURE — 77373 STRTCTC BDY RAD THER TX DLVR: CPT | Performed by: STUDENT IN AN ORGANIZED HEALTH CARE EDUCATION/TRAINING PROGRAM

## 2023-06-05 ENCOUNTER — APPOINTMENT (OUTPATIENT)
Dept: RADIATION ONCOLOGY | Facility: HOSPITAL | Age: 68
End: 2023-06-05
Attending: STUDENT IN AN ORGANIZED HEALTH CARE EDUCATION/TRAINING PROGRAM
Payer: COMMERCIAL

## 2023-06-05 ENCOUNTER — APPOINTMENT (OUTPATIENT)
Dept: RADIATION ONCOLOGY | Facility: HOSPITAL | Age: 68
End: 2023-06-05
Payer: COMMERCIAL

## 2023-06-05 DIAGNOSIS — D49.2 LUMBAR SPINE TUMOR: Primary | ICD-10-CM

## 2023-06-05 PROCEDURE — 77373 STRTCTC BDY RAD THER TX DLVR: CPT | Performed by: STUDENT IN AN ORGANIZED HEALTH CARE EDUCATION/TRAINING PROGRAM

## 2023-06-05 PROCEDURE — 77435 SBRT MANAGEMENT: CPT | Performed by: STUDENT IN AN ORGANIZED HEALTH CARE EDUCATION/TRAINING PROGRAM

## 2023-06-05 PROCEDURE — 77336 RADIATION PHYSICS CONSULT: CPT | Performed by: STUDENT IN AN ORGANIZED HEALTH CARE EDUCATION/TRAINING PROGRAM

## 2023-06-05 RX ORDER — DEXAMETHASONE 1 MG
TABLET ORAL
Qty: 60 TABLET | Refills: 0 | Status: SHIPPED | OUTPATIENT
Start: 2023-06-05 | End: 2023-06-21

## 2023-06-09 ENCOUNTER — TELEPHONE (OUTPATIENT)
Dept: RADIATION ONCOLOGY | Facility: HOSPITAL | Age: 68
End: 2023-06-09

## 2023-06-09 DIAGNOSIS — D36.10 SCHWANNOMA: Primary | ICD-10-CM

## 2023-06-09 RX ORDER — PANTOPRAZOLE SODIUM 40 MG/1
40 TABLET, DELAYED RELEASE ORAL DAILY
Qty: 30 TABLET | Refills: 2 | Status: SHIPPED | OUTPATIENT
Start: 2023-06-09

## 2023-06-09 NOTE — TELEPHONE ENCOUNTER
Patient notified that a script for Protonix 40 mg, take 1 tablet daily was sent to his [facility] in [place] by Dr Sindi Byrne  Patient instructed to call us if he has any further questions/concerns  Patient was appreciative of call

## 2023-06-09 NOTE — TELEPHONE ENCOUNTER
Patient called requesting something for indigestion  He is on dexamethasone taper and still has 10 more days  He has been taking OTC Chewable Pepcid TID without relief  Patient informed that Dr Marguerite Mosher will be notified  Pharmacy: [facility] in [place]    Routed to provider

## 2023-07-03 ENCOUNTER — OFFICE VISIT (OUTPATIENT)
Dept: FAMILY MEDICINE CLINIC | Facility: CLINIC | Age: 68
End: 2023-07-03
Payer: COMMERCIAL

## 2023-07-03 VITALS
BODY MASS INDEX: 25.31 KG/M2 | TEMPERATURE: 97.6 F | HEART RATE: 73 BPM | SYSTOLIC BLOOD PRESSURE: 122 MMHG | OXYGEN SATURATION: 96 % | WEIGHT: 191 LBS | DIASTOLIC BLOOD PRESSURE: 74 MMHG | HEIGHT: 73 IN

## 2023-07-03 DIAGNOSIS — M85.80 OSTEOPENIA, UNSPECIFIED LOCATION: ICD-10-CM

## 2023-07-03 DIAGNOSIS — J40 BRONCHITIS: Primary | ICD-10-CM

## 2023-07-03 PROCEDURE — 1160F RVW MEDS BY RX/DR IN RCRD: CPT | Performed by: FAMILY MEDICINE

## 2023-07-03 PROCEDURE — 3074F SYST BP LT 130 MM HG: CPT | Performed by: FAMILY MEDICINE

## 2023-07-03 PROCEDURE — 3078F DIAST BP <80 MM HG: CPT | Performed by: FAMILY MEDICINE

## 2023-07-03 PROCEDURE — 1159F MED LIST DOCD IN RCRD: CPT | Performed by: FAMILY MEDICINE

## 2023-07-03 PROCEDURE — 99213 OFFICE O/P EST LOW 20 MIN: CPT | Performed by: FAMILY MEDICINE

## 2023-07-03 RX ORDER — ALENDRONATE SODIUM 35 MG/1
35 TABLET ORAL
Qty: 12 TABLET | Refills: 1 | Status: SHIPPED | OUTPATIENT
Start: 2023-07-03

## 2023-07-03 RX ORDER — AZITHROMYCIN 250 MG/1
TABLET, FILM COATED ORAL
Qty: 6 TABLET | Refills: 0 | Status: SHIPPED | OUTPATIENT
Start: 2023-07-03 | End: 2023-07-08

## 2023-07-03 NOTE — PROGRESS NOTES
Assessment/Plan:    No problem-specific Assessment & Plan notes found for this encounter. Diagnoses and all orders for this visit:    Bronchitis  Comments:  No improvement with conservative measures; start 5 day course of Azithromycin and continue supportive care with OTC mucinex, plenty of hydration etc.   Orders:  -     azithromycin (Zithromax) 250 mg tablet; Take 2 tablets (500 mg total) by mouth daily for 1 day, THEN 1 tablet (250 mg total) daily for 4 days. Osteopenia, unspecified location  Comments:  Take Fosamax as directed  Orders:  -     alendronate (FOSAMAX) 35 mg tablet; Take 1 tablet (35 mg total) by mouth every 7 days    Other orders  -     mupirocin (BACTROBAN) 2 % ointment; apply to NOSTRILS twice a day - BEGIN 5 DAYS PRIOR TO SURGERY          Subjective:      Patient ID: Jacquelin Burrell is a 70 y.o. male. Cough  This is a new problem. Episode onset: 2 weeks ago. The problem has been unchanged. The cough is productive of sputum. Associated symptoms include nasal congestion and wheezing. Pertinent negatives include no chills, ear congestion, fever, sore throat or weight loss. Nothing aggravates the symptoms. Treatments tried: Hydration, mucinex, flonase. The treatment provided no relief. Of note patient just finished a course of steroids whilst undergoing radiation. The following portions of the patient's history were reviewed and updated as appropriate: allergies, current medications, past family history, past medical history, past social history, past surgical history and problem list.    Review of Systems   Constitutional: Negative for chills, fever and weight loss. HENT: Positive for congestion. Negative for sore throat. Eyes: Negative. Respiratory: Positive for cough and wheezing. Gastrointestinal: Negative. Genitourinary: Negative. Musculoskeletal: Negative. Neurological: Negative. Psychiatric/Behavioral: Negative.           Objective:      /74 (BP Location: Left arm, Patient Position: Sitting, Cuff Size: Adult)   Pulse 73   Temp 97.6 °F (36.4 °C) (Temporal)   Ht 6' 1" (1.854 m)   Wt 86.6 kg (191 lb)   SpO2 96%   BMI 25.20 kg/m²          Physical Exam  Constitutional:       General: He is not in acute distress. Appearance: He is not ill-appearing. HENT:      Head: Normocephalic and atraumatic. Eyes:      General:         Right eye: No discharge. Left eye: No discharge. Extraocular Movements: Extraocular movements intact. Cardiovascular:      Rate and Rhythm: Normal rate. Pulmonary:      Effort: Pulmonary effort is normal. No respiratory distress. Breath sounds: No wheezing or rales. Neurological:      General: No focal deficit present. Mental Status: He is alert.    Psychiatric:         Mood and Affect: Mood normal.         Behavior: Behavior normal.

## 2023-07-07 ENCOUNTER — APPOINTMENT (OUTPATIENT)
Dept: LAB | Facility: CLINIC | Age: 68
End: 2023-07-07
Payer: COMMERCIAL

## 2023-07-07 DIAGNOSIS — E03.9 ACQUIRED HYPOTHYROIDISM: ICD-10-CM

## 2023-07-07 DIAGNOSIS — E55.9 VITAMIN D DEFICIENCY: ICD-10-CM

## 2023-07-07 LAB
25(OH)D3 SERPL-MCNC: 51.3 NG/ML (ref 30–100)
TSH SERPL DL<=0.05 MIU/L-ACNC: 1.05 UIU/ML (ref 0.45–4.5)

## 2023-07-07 PROCEDURE — 82306 VITAMIN D 25 HYDROXY: CPT

## 2023-07-07 PROCEDURE — 84443 ASSAY THYROID STIM HORMONE: CPT

## 2023-07-07 PROCEDURE — 36415 COLL VENOUS BLD VENIPUNCTURE: CPT

## 2023-07-11 ENCOUNTER — TELEMEDICINE (OUTPATIENT)
Dept: RADIATION ONCOLOGY | Facility: CLINIC | Age: 68
End: 2023-07-11
Attending: STUDENT IN AN ORGANIZED HEALTH CARE EDUCATION/TRAINING PROGRAM

## 2023-07-11 DIAGNOSIS — D36.10 SCHWANNOMA: Primary | ICD-10-CM

## 2023-07-11 PROCEDURE — 99024 POSTOP FOLLOW-UP VISIT: CPT | Performed by: STUDENT IN AN ORGANIZED HEALTH CARE EDUCATION/TRAINING PROGRAM

## 2023-07-11 NOTE — PROGRESS NOTES
Telephone Follow-up - Radiation Oncology   Vannessa Atkins 1955 70 y.o. male [id number]      History of Present Illness   Cancer Staging   Prostate cancer [facility])  Staging form: Prostate, AJCC 8th Edition  - Clinical stage from 7/13/2021: Stage I (cT1a, cN0, cM0, PSA: 3.1, Grade Group: 1) - Signed by Chichi Chiang MD on 1/25/2022  Stage prefix: Initial diagnosis  Prostate specific antigen (PSA) range: Less than 10  [place] primary pattern: 3  [place] secondary pattern: 3  [place] score: 6  Histologic grading system: 5 grade system  Number of biopsy cores examined: 2  Number of biopsy cores positive: 2    Mr. Ganesh Amado is a 70year old man with a symptomatic schwannoma at L3/4 s/p decompressive laminectomy and STR. MRI surveillance demonstrated interval enlargement. On 6/5/23 he completed a course of SBRT to a dose of 2500 cGy in 5 fractions. He returns today for telephone follow-up. Interval History:  The patient tolerated treatment well overall though did experience some worsening parasthesias in his left leg and was started on a steroid taper to address this. Following initiating of steroids he had improvement of symptoms however they had since returned. He describes a sleeve, band-like sensation down his left leg. He remains active with yard work and otherwise. He is scheduled for right sided TKA at [facility] on 7/25/23.      Historical Information   Oncology History   Prostate cancer ([address])   2021 Initial Diagnosis    Prostate cancer ([address] )      Biopsy    - Prostatic adenocarcinoma, acinar type, [place] score 3+3=6 (Grade Group 1) involving 2 cores   - Percentage of [place] pattern 4: N/A   - Percentage of [place] pattern 5: N/A   - Perineural invasion: Not identified   - Periprostatic fat invasion: Not identified   - Lymph-vascular invasion: Not identified   - Seminal vesicle/ejaculatory duct invasion: Not identified   - Additional pathologic findings: None   - Best representative block if additional studies are needed: B2 and B6      A. Right prostate (random biopsy): - Benign prostatic tissue. B. Left prostate (random biopsy):      - Small foci of prostatic adenocarcinoma, [place] score 3+3=6 (Grade Group 1), involving less than 5% of two (2) cores. C.  Right mid prostate (biopsy): - Benign prostatic tissue. 7/13/2021 -  Cancer Staged    Staging form: Prostate, AJCC 8th Edition  - Clinical stage from 7/13/2021: Stage I (cT1a, cN0, cM0, PSA: 3.1, Grade Group: 1) - Signed by Karo Castaneda MD on 1/25/2022  Stage prefix: Initial diagnosis  Prostate specific antigen (PSA) range: Less than 10  [place] primary pattern: 3  [place] secondary pattern: 3  [place] score: 6  Histologic grading system: 5 grade system  Number of biopsy cores examined: 2  Number of biopsy cores positive: 2       Schwannoma   2015 Surgery    "C1-C2 lateral mass fixation and sublaminar Gallie cable fixation with demineralized bone matrix and cadaveric bone arthrodesis". Performed by Dr. Billie Ritchie, 2/20/15.        12/19/2022 Surgery    A-B. Spine, "Left L3 nerve sheath tumor," Resection:  - Schwannoma     2/20/2023 Surgery    Patient is s/p L2/3 decompressive laminectomy and biopsy (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 [12/19/22 DKO]     4/27/2023 Initial Diagnosis    Schwannoma           OBJECTIVE:   There were no vitals taken for this visit. No exam performed for this telephone conversation. Assessment/Plan:  Orders Placed This Encounter   Procedures   • MRI lumbar spine w wo contrast      Approximately 1 month following completion of SRT, the patient is doing fair overall. He has had some persistence of radicular symptoms following completion of RT, which improved with steroids but have since returned. He remains functional at present and I do not feel additional steroids are warranted at present given the long term risk these impose.  I will plan for repeat MRI Lumbar spine 3 months after treatment completion with RTC thereafter. Buck Palomares MD  3/47/1832,3:73 PM    Portions of the record may have been created with voice recognition software. Occasional wrong word or "sound a like" substitutions may have occurred due to the inherent limitations of voice recognition software. Read the chart carefully and recognize, using context, where substitutions have occurred.

## 2023-07-12 ENCOUNTER — OFFICE VISIT (OUTPATIENT)
Dept: FAMILY MEDICINE CLINIC | Facility: CLINIC | Age: 68
End: 2023-07-12
Payer: COMMERCIAL

## 2023-07-12 VITALS
TEMPERATURE: 97.3 F | DIASTOLIC BLOOD PRESSURE: 68 MMHG | BODY MASS INDEX: 25.47 KG/M2 | HEIGHT: 73 IN | HEART RATE: 76 BPM | WEIGHT: 192.2 LBS | OXYGEN SATURATION: 96 % | SYSTOLIC BLOOD PRESSURE: 102 MMHG

## 2023-07-12 DIAGNOSIS — E78.00 PRIMARY HYPERCHOLESTEROLEMIA: ICD-10-CM

## 2023-07-12 DIAGNOSIS — Z01.818 PREOP EXAMINATION: Primary | ICD-10-CM

## 2023-07-12 PROCEDURE — 99214 OFFICE O/P EST MOD 30 MIN: CPT | Performed by: FAMILY MEDICINE

## 2023-07-12 NOTE — PROGRESS NOTES
[facility] PRE-OPERATIVE EVALUATION  [facility]    NAME: Ana Robert  AGE: 70 y.o. SEX: male  : 1955   DATE: 2023    [facility] Pre-Operative Evaluation      Chief Complaint: Pre-operative Evaluation  Surgery: 2023  Referring Provider: Dr. Gisell Martin MD     History of Present Illness:     Ana Robert is a 70 y.o. male who presents to the office today for a preoperative consultation at the request of surgeon, Dr. Gisell Martin MD, who plans on performing right total knee replacement  on 2023. Planned anesthesia is general. Patient has a bleeding risk of: no recent abnormal bleeding. Patient does not have objections to receiving blood products if needed. Current anti-platelet/anti-coagulation medications that the patient is prescribed includes: None. Assessment of Chronic Conditions:   Hypertension- Well controlled without any medications  Hypothyroidism- TSH within normal limits; continue levothyroxine 100mcg daily  BPH managed with flomax 0.4mg BID  GERD- Manage with protonix 40mg   Osteoporosis: Currently on Fosamax but requesting to switch to an alternative due to side effects. Will order Prolia. Ascending aortic aneurysm;  Follows with cardiology, last CT measured at 4.1cm     Assessment of Cardiac Risk:  · Denies unstable or severe angina or MI in the last 6 weeks or history of stent placement in the last year   · Denies decompensated heart failure (e.g. New onset heart failure, NYHA functional class IV heart failure, or worsening existing heart failure)  · Denies significant arrhythmias such as high grade AV block, symptomatic ventricular arrhythmia, newly recognized ventricular tachycardia, supraventricular tachycardia with resting heart rate >100, or symptomatic bradycardia  · Denies severe heart valve disease including aortic stenosis or symptomatic mitral stenosis     Exercise Capacity:  · Able to walk 4 blocks without symptoms?: Yes  · Able to walk 2 flights without symptoms?: Yes    Prior Anesthesia Reactions: No     Personal history of venous thromboembolic disease? No    History of steroid use for >2 weeks within last year? Yes         Review of Systems:     Review of Systems   Constitutional: Negative. HENT: Negative. Eyes: Negative. Respiratory: Negative. Cardiovascular: Negative. Gastrointestinal: Negative. Musculoskeletal: Positive for arthralgias. Skin: Negative. Neurological: Negative. Psychiatric/Behavioral: Negative. Current Problem List:     Patient Active Problem List   Diagnosis   • Allergic rhinitis   • Spinal stenosis   • Degeneration of intervertebral disc of cervical region   • Hypothyroidism   • Muscle pain, myofacial   • Neuropathy   • Nontoxic multinodular goiter   • Occipital neuralgia   • Venous insufficiency   • Chondromalacia patellae   • Chronic osteomyelitis of lower leg (HCC)   • Generalized osteoarthritis   • Anxiety   • Reactive depression   • BPH without obstruction/lower urinary tract symptoms   • Post traumatic stress disorder (PTSD)   • Vitamin D deficiency   • Varicose veins of bilateral lower extremities with other complications   • Osteoporosis without current pathological fracture   • Bilateral carotid artery stenosis < 50%   • PAC (premature atrial contraction)   • PVC (premature ventricular contraction)   • Primary hypercholesterolemia   • Macrocytosis without anemia   • Chronic neck pain   • Thoracic aortic aneurysm without rupture (HCC)   • Nonrheumatic mitral valve regurgitation   • Bilateral enlargement of atria   • Coronary artery calcification   • Prostate cancer (HCC)   • Mass of right parotid gland   • History of osteomyelitis   • Renal cysts, acquired, bilateral   • Aortic ectasia, thoracic (HCC)   • Primary hypertension   • Lumbar spine tumor   • Schwannoma       Allergies:      Allergies   Allergen Reactions   • Amoxicillin-Pot Clavulanate Rash, Vomiting and Nausea Only       Current Medications:       Current Outpatient Medications:   •  acetaminophen (TYLENOL) 500 mg tablet, Take 2 tablets (1,000 mg total) by mouth every 8 (eight) hours as needed for moderate pain, mild pain or fever, Disp: , Rfl: 0  •  alendronate (FOSAMAX) 35 mg tablet, Take 1 tablet (35 mg total) by mouth every 7 days, Disp: 12 tablet, Rfl: 1  •  desloratadine (CLARINEX) 5 MG tablet, Take 1 tablet (5 mg total) by mouth daily, Disp: 90 tablet, Rfl: 3  •  doxycycline (ADOXA) 50 MG tablet, TAKE 1 TABLET BY ORAL ROUTE EVERY DAY WITH MEAL X3 MONTHS THEN [facility], Disp: , Rfl:   •  fluticasone (FLONASE) 50 mcg/act nasal spray, 2 sprays into each nostril daily, Disp: 16 g, Rfl: 3  •  levothyroxine 100 mcg tablet, daily, Disp: , Rfl:   •  metroNIDAZOLE (METROGEL) 0.75 % gel, Apply topically 2 (two) times a day, Disp: , Rfl:   •  mupirocin (BACTROBAN) 2 % ointment, apply to NOSTRILS twice a day - BEGIN 5 DAYS PRIOR TO SURGERY, Disp: , Rfl:   •  pantoprazole (PROTONIX) 40 mg tablet, Take 1 tablet (40 mg total) by mouth daily, Disp: 30 tablet, Rfl: 2  •  RESTASIS MULTIDOSE 0.05 % ophthalmic emulsion, INSTILL 1 DROP INTO BOTH EYES TWICE A DAY, Disp: , Rfl: 3  •  tamsulosin (FLOMAX) 0.4 mg, Take 0.4 mg by mouth 2 (two) times a day, Disp: , Rfl:   •  tazarotene (AVAGE) 0.1 % cream, Apply topically daily at bedtime, Disp: , Rfl:     Past Medical History:       Past Medical History:   Diagnosis Date   • Anxiety    • Arthritis    • Bleeding tendency (HCC)    • BPH (benign prostatic hyperplasia)    • Broken bones     left leg   • Cancer (HCC)    • Cataract    • Chronic neck pain    • Epilepsy (HCC)    • Hypothyroidism    • Neoplasm of unspecified behavior of bone, soft tissue, and skin    • Osteomyelitis (HCC)    • PONV (postoperative nausea and vomiting)    • Prostate cancer (HCC)    • Skin cancer    • Squamous carcinoma 12/01/2014 12/2014   • Thyroid condition    • Thyroid disease         Past Surgical History:   Procedure Laterality Date   • ARTHRODESIS      H/o Arthrodesis Cervical to C2;  last assessed 2015   • CERVICAL FUSION     • COLONOSCOPY  2021   • COLONOSCOPY     • KNEE SURGERY Right 2017   • LEG SURGERY Left     Hardware placed in lower leg bone(s)   • LEG SURGERY Left 2006, -Left leg vascular   • MS LAMINECTOMY BX/EXC ISPI DAKOTAH XDRL LUMBAR N/A 2022    Procedure: L2/3 decompressive laminectomy and biopsy (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;  Surgeon: Ney Rodriguez MD;  Location: [facility];  Service: Neurosurgery   • THYROID SURGERY Right    • [address] BIOPSY  2021        Family History   Problem Relation Age of Onset   • Bone cancer Mother    • Other Father         Accident   • Dementia Other    • Cancer Other         Social History     Socioeconomic History   • Marital status: Legally      Spouse name: Not on file   • Number of children: Not on file   • Years of education: Not on file   • Highest education level: Not on file   Occupational History   • Occupation: Teacher   • Occupation: retired   Tobacco Use   • Smoking status: Former     Types: Cigarettes     Quit date:      Years since quittin.5   • Smokeless tobacco: Never   Vaping Use   • Vaping Use: Never used   Substance and Sexual Activity   • Alcohol use: Yes     Comment: being a social drinker   • Drug use: Not Currently     Types: Marijuana     Comment: Medical marijuana   • Sexual activity: Yes     Comment: no known std risk factors   Other Topics Concern   • Not on file   Social History Narrative    Daily coffee consumption (3 cups/day)     Social Determinants of Health     Financial Resource Strain: Low Risk  (10/14/2022)    Overall Financial Resource Strain (CARDIA)    • Difficulty of Paying Living Expenses: Not hard at all   Food Insecurity: Not on file   Transportation Needs: No Transportation Needs (10/14/2022)    PRAPARE - Transportation    • Lack of Transportation (Medical): No    • Lack of Transportation (Non-Medical): No   Physical Activity: Not on file   Stress: Not on file   Social Connections: Not on file   Intimate Partner Violence: Not on file   Housing Stability: Not on file        Physical Exam:     /68 (BP Location: Left arm, Patient Position: Sitting, Cuff Size: Large)   Pulse 76   Temp (!) 97.3 °F (36.3 °C) (Temporal)   Ht 6' 1" (1.854 m)   Wt 87.2 kg (192 lb 3.2 oz)   SpO2 96%   BMI 25.36 kg/m²     Physical Exam  Constitutional:       General: He is not in acute distress. Appearance: He is not ill-appearing. HENT:      Head: Normocephalic and atraumatic. Eyes:      General:         Right eye: No discharge. Left eye: No discharge. Extraocular Movements: Extraocular movements intact. Pupils: Pupils are equal, round, and reactive to light. Cardiovascular:      Rate and Rhythm: Normal rate. Pulses: Normal pulses. Pulmonary:      Effort: Pulmonary effort is normal. No respiratory distress. Abdominal:      General: Bowel sounds are normal. There is no distension. Palpations: Abdomen is soft. Tenderness: There is no abdominal tenderness. Musculoskeletal:      Right lower leg: No edema. Left lower leg: No edema. Neurological:      General: No focal deficit present. Mental Status: He is alert. Psychiatric:         Mood and Affect: Mood normal.         Behavior: Behavior normal.          Data:     Pre-operative work-up    Laboratory Results: I have personally reviewed the pertinent laboratory results/reports      EKG: I have personally reviewed pertinent reports. Previous cardiopulmonary studies within the past year:  · Echocardiogram: None  · Cardiac Catheterization: None  · Stress Test: None  · Pulmonary Function Testing: None      Assessment & Recommendations:     1. Preop examination        2.  Primary hypercholesterolemia  Lipid Panel with Direct LDL reflex          Pre-Op Evaluation Assessment  70 y.o. male with planned surgery: Right total knee replacement    Known risk factors for perioperative complications: None. Current medications which may produce withdrawal symptoms if withheld perioperatively: None    Pre-Op Evaluation Plan  1. Further preoperative workup as follows:   - None; no further preoperative work-up is required    2. Medication Management/Recommendations:   - None, continue medication regimen including morning of surgery, with sip of water    3. Prophylaxis for cardiac events with perioperative beta-blockers: not indicated.     4. Patient requires further consultation with: None    Clearance  Patient is medically optimized for surgery      Karis Henderson MD  [place] [address]  Phone#  [phone]  Fax#  [phone]

## 2023-07-19 ENCOUNTER — APPOINTMENT (OUTPATIENT)
Dept: LAB | Facility: CLINIC | Age: 68
End: 2023-07-19
Payer: COMMERCIAL

## 2023-07-19 ENCOUNTER — TELEPHONE (OUTPATIENT)
Dept: FAMILY MEDICINE CLINIC | Facility: CLINIC | Age: 68
End: 2023-07-19

## 2023-07-19 DIAGNOSIS — E78.00 PRIMARY HYPERCHOLESTEROLEMIA: ICD-10-CM

## 2023-07-19 LAB
CHOLEST SERPL-MCNC: 175 MG/DL
HDLC SERPL-MCNC: 54 MG/DL
LDLC SERPL CALC-MCNC: 102 MG/DL (ref 0–100)
TRIGL SERPL-MCNC: 94 MG/DL

## 2023-07-19 PROCEDURE — 36415 COLL VENOUS BLD VENIPUNCTURE: CPT

## 2023-07-19 PROCEDURE — 80061 LIPID PANEL: CPT

## 2023-07-19 NOTE — TELEPHONE ENCOUNTER
Patient called he is asking if there is anything that you could advise for him to take for energy he is having surgery for his knee soon and he stopped all the vitamins and with the radiation therapy he feels exhausted if you have any idea on what can patient use to help please advise.  Thanks

## 2023-07-21 ENCOUNTER — OFFICE VISIT (OUTPATIENT)
Dept: CARDIOLOGY CLINIC | Facility: CLINIC | Age: 68
End: 2023-07-21
Payer: COMMERCIAL

## 2023-07-21 ENCOUNTER — DOCUMENTATION (OUTPATIENT)
Dept: CARDIOLOGY CLINIC | Facility: CLINIC | Age: 68
End: 2023-07-21

## 2023-07-21 VITALS
DIASTOLIC BLOOD PRESSURE: 66 MMHG | WEIGHT: 187.8 LBS | SYSTOLIC BLOOD PRESSURE: 124 MMHG | HEART RATE: 66 BPM | HEIGHT: 73 IN | BODY MASS INDEX: 24.89 KG/M2

## 2023-07-21 DIAGNOSIS — I25.84 CORONARY ARTERY CALCIFICATION: ICD-10-CM

## 2023-07-21 DIAGNOSIS — Z01.810 PREOPERATIVE CARDIOVASCULAR EXAMINATION: Primary | ICD-10-CM

## 2023-07-21 DIAGNOSIS — I71.21 ANEURYSM OF ASCENDING AORTA WITHOUT RUPTURE (HCC): ICD-10-CM

## 2023-07-21 DIAGNOSIS — I10 PRIMARY HYPERTENSION: ICD-10-CM

## 2023-07-21 DIAGNOSIS — E78.00 PRIMARY HYPERCHOLESTEROLEMIA: ICD-10-CM

## 2023-07-21 DIAGNOSIS — I25.10 CORONARY ARTERY CALCIFICATION: ICD-10-CM

## 2023-07-21 DIAGNOSIS — I65.23 BILATERAL CAROTID ARTERY STENOSIS: ICD-10-CM

## 2023-07-21 PROCEDURE — 99214 OFFICE O/P EST MOD 30 MIN: CPT | Performed by: INTERNAL MEDICINE

## 2023-07-21 NOTE — PROGRESS NOTES
[facility]  [address] [place] [address] [address] [place] [address]  Phone#  [phone]   Fax#  [phone]  *-*-*-*-*-*-*-*-*-*-*-*-*-*-*-*-*-*-*-*-*-*-*-*-*-*-*-*-*-*-*-*-*-*-*-*-*-*-*-*-*-*-*-*-*-*-*-*-*-*-*-*-*-*                                   Cardiology Follow Up      ENCOUNTER DATE: 23 9:58 AM  PATIENT NAME: Joan Roberts   : 1955    MRN: [id number]  AGE:68 y.o. SEX: male  [facility] MD [place]     PRIMARY CARE PHYSICIAN: Hunter Plata MD    ACTIVE DIAGNOSIS THIS VISIT  1. Preoperative cardiovascular examination        2. Coronary artery calcification        3. Primary hypertension        4. Primary hypercholesterolemia        5. Aneurysm of ascending aorta without rupture ([address])        6.  Bilateral carotid artery stenosis < 50%          ACTIVE PROBLEM LIST  Patient Active Problem List   Diagnosis   • Allergic rhinitis   • Spinal stenosis   • Degeneration of intervertebral disc of cervical region   • Hypothyroidism   • Muscle pain, myofacial   • Neuropathy   • Nontoxic multinodular goiter   • Occipital neuralgia   • Venous insufficiency   • Chondromalacia patellae   • Chronic osteomyelitis of lower leg (HCC)   • Generalized osteoarthritis   • Anxiety   • Reactive depression   • BPH without obstruction/lower urinary tract symptoms   • Post traumatic stress disorder (PTSD)   • Vitamin D deficiency   • Varicose veins of bilateral lower extremities with other complications   • Osteoporosis without current pathological fracture   • Bilateral carotid artery stenosis < 50%   • PAC (premature atrial contraction)   • PVC (premature ventricular contraction)   • Primary hypercholesterolemia   • Macrocytosis without anemia   • Chronic neck pain   • Thoracic aortic aneurysm without rupture (HCC)   • Nonrheumatic mitral valve regurgitation   • Bilateral enlargement of atria   • Coronary artery calcification   • Prostate cancer [facility])   • Preoperative cardiovascular examination   • Mass of right parotid gland   • History of osteomyelitis   • Renal cysts, acquired, bilateral   • Primary hypertension   • Lumbar spine tumor   • Schwannoma       CARDIOLOGY SPECIALTY COMMENTS  Patient 1st seen on 2021. Recently he has been going for frequent walks for about 45 minutes at a leisurely pace.  When he comes home, he lays on the floor to cool down and relax. Rashad Genao becomes nauseous but does not vomit although he often feels like he might vomit.  He becomes diaphoretic and his heart races and pounds.   He has no specific chest discomfort.         He has a history of smoking up to a pack a day for 2-3 years in the early 80s and has not smoked since. [facility] father may have had a heart attack. Rashad Genao had peripheral vascular disease and fell down a flight of stairs and was noted to be . Rashad Genao had an uncle who had CABG. Rashad Genao is a retired  from [employer].        2021 echocardiogram:  Normal LV systolic and diastolic function EF 55 percent. Mild biatrial dilatation. Mild-to-moderate mitral regurgitation. Normal pulmonary artery pressures. Ascending aortic aneurysm.      2021 Carotid ultrasound demonstrated less than 50% stenosis bilaterally.       2021 CTA of the chest demonstrated enlarged ascending aorta at 41 mm.  There was evidence of coronary artery calcification.  Patient was advised not to lift more than 25 lb due to the ascending aortic aneurysm. 2021 nuclear stress test:. Ventricular ectopy as described with exercise   Low normal left ventricular systolic function, EF 81% with mild left ventricular cavity enlargement   Normal tomographic perfusion series with inferior diaphragmatic attenuation. 10/07/2021 Holter monitor: Heart rate  beats per minute averaging 66 bpm. Rare ventricular ectopy. Occasional supraventricular ectopy. Two 3 beat runs of SVT, fastest 152 beats per minute.  Intermittent 1st degree AV block and 1 episode of second-degree AV block Mobitz type 1. Longest RR 1.9 seconds    11/05/2021 MRA of the chest: Mildly ectatic ascending aorta maximum diameter 40 mm. Remainder the aorta normal in caliber. 6/28/2022 CT of the chest without contrast: Mild ectasia of the ascending aorta at 4.1 cm unchanged from previous. INTERVAL HISTORY:        Patient is here for preoperative cardiovascular examination for right knee replacement. He denies cardiac symptoms. Patient denies chest discomfort or shortness of breath. Patient has no palpitations. Patient denies symptoms of dizziness, lightheadedness or near-syncope/syncope. Patient denies leg edema. Patient denies symptoms of orthopnea or paroxysmal nocturnal dyspnea. His blood pressure is well controlled and he brought a chart of some home blood pressures along which were also excellent. He has a mild ascending aortic aneurysm which has been stable at 4.0 to 4.1 cm. His last CT scan was 6/28/2022    His cholesterol is borderline acceptable. DISCUSSION/PLAN:          · Patient may undergo right knee replacement  · Patient is low to medium cardiac risk  · After patient has recovered from his knee surgery, he is to give us a call and we will arrange for a repeat CT scan of the chest for monitoring his aortic aneurysm  · Patient to return to see me in 6 months.     Lab Studies:    Lab Results   Component Value Date    CHOLESTEROL 175 07/19/2023    CHOLESTEROL 196 02/24/2023    CHOLESTEROL 170 11/02/2021     Lab Results   Component Value Date    TRIG 94 07/19/2023    TRIG 69 02/24/2023    TRIG 70 11/02/2021     Lab Results   Component Value Date    HDL 54 07/19/2023    HDL 60 02/24/2023    HDL 56 11/02/2021     Lab Results   Component Value Date    LDLCALC 102 (H) 07/19/2023    LDLCALC 122 (H) 02/24/2023    LDLCALC 100 11/02/2021       Lab Results   Component Value Date    HGBA1C 5.9 (H) 06/26/2023      Lab Results   Component Value Date    EGFR 98 03/29/2023 EGFR 104 12/20/2022    EGFR 104 12/07/2022    SODIUM 141 12/20/2022    SODIUM 139 12/07/2022    SODIUM 140 09/28/2022    K 3.5 12/20/2022    K 3.7 12/07/2022    K 4.1 09/28/2022     (H) 12/20/2022     12/07/2022     (H) 09/28/2022    CO2 27 12/20/2022    CO2 30 12/07/2022    CO2 27 09/28/2022    ANIONGAP 7 07/31/2015    ANIONGAP 9 02/24/2015    ANIONGAP 9 02/06/2015    BUN 14 03/29/2023    BUN 14 12/20/2022    BUN 19 12/07/2022    CREATININE 0.67 03/29/2023    CREATININE 0.59 (L) 12/20/2022    CREATININE 0.58 (L) 12/07/2022     Lab Results   Component Value Date    WBC 12.80 (H) 12/20/2022    WBC 8.57 12/07/2022    WBC 8.92 09/28/2022    HGB 12.5 12/20/2022    HGB 14.2 12/07/2022    HGB 14.0 09/28/2022    HCT 37.7 12/20/2022    HCT 44.3 12/07/2022    HCT 45.4 09/28/2022     (H) 12/20/2022     (H) 12/07/2022     (H) 09/28/2022    MCH 33.0 12/20/2022    MCH 32.3 12/07/2022    MCH 32.0 09/28/2022    MCHC 33.2 12/20/2022    MCHC 32.1 12/07/2022    MCHC 30.8 (L) 09/28/2022     12/20/2022     12/19/2022     12/07/2022      Lab Results   Component Value Date    GLUCOSE 106 07/31/2015    GLUCOSE 119 02/24/2015    GLUCOSE 105 02/06/2015    CALCIUM 8.9 12/20/2022    CALCIUM 9.6 12/07/2022    CALCIUM 9.1 09/28/2022    AST 22 12/07/2022    AST 18 09/28/2022    AST 19 11/02/2021    ALT 29 12/07/2022    ALT 26 09/28/2022    ALT 27 11/02/2021    ALKPHOS 57 12/07/2022    ALKPHOS 60 09/28/2022    ALKPHOS 57 11/02/2021    PROT 7.2 07/31/2015    PROT 6.7 12/19/2014    BILITOT 0.60 07/31/2015    BILITOT 0.49 12/19/2014    MG 2.3 02/17/2022     Lab Results   Component Value Date    FREET4 1.4 07/31/2015     No results found for this visit on 07/21/23.       Current Outpatient Medications:   •  acetaminophen (TYLENOL) 500 mg tablet, Take 2 tablets (1,000 mg total) by mouth every 8 (eight) hours as needed for moderate pain, mild pain or fever, Disp: , Rfl: 0  •  alendronate (FOSAMAX) 35 mg tablet, Take 1 tablet (35 mg total) by mouth every 7 days, Disp: 12 tablet, Rfl: 1  •  desloratadine (CLARINEX) 5 MG tablet, Take 1 tablet (5 mg total) by mouth daily, Disp: 90 tablet, Rfl: 3  •  doxycycline (ADOXA) 50 MG tablet, TAKE 1 TABLET BY ORAL ROUTE EVERY DAY WITH MEAL X3 MONTHS THEN [facility], Disp: , Rfl:   •  fluticasone (FLONASE) 50 mcg/act nasal spray, 2 sprays into each nostril daily, Disp: 16 g, Rfl: 3  •  levothyroxine 100 mcg tablet, daily, Disp: , Rfl:   •  metroNIDAZOLE (METROGEL) 0.75 % gel, Apply topically 2 (two) times a day, Disp: , Rfl:   •  mupirocin (BACTROBAN) 2 % ointment, apply to NOSTRILS twice a day - BEGIN 5 DAYS PRIOR TO SURGERY, Disp: , Rfl:   •  pantoprazole (PROTONIX) 40 mg tablet, Take 1 tablet (40 mg total) by mouth daily, Disp: 30 tablet, Rfl: 2  •  RESTASIS MULTIDOSE 0.05 % ophthalmic emulsion, INSTILL 1 DROP INTO BOTH EYES TWICE A DAY, Disp: , Rfl: 3  •  tamsulosin (FLOMAX) 0.4 mg, Take 0.4 mg by mouth 2 (two) times a day, Disp: , Rfl:   •  tazarotene (AVAGE) 0.1 % cream, Apply topically daily at bedtime, Disp: , Rfl:   Allergies   Allergen Reactions   • Amoxicillin-Pot Clavulanate Rash, Vomiting and Nausea Only       Past Medical History:   Diagnosis Date   • Anxiety    • Arthritis    • Bleeding tendency (HCC)    • BPH (benign prostatic hyperplasia)    • Broken bones     left leg   • Cancer (HCC)    • Cataract    • Chronic neck pain    • Epilepsy (HCC)    • Hypothyroidism    • Neoplasm of unspecified behavior of bone, soft tissue, and skin    • Osteomyelitis (HCC)    • PONV (postoperative nausea and vomiting)    • Prostate cancer (HCC)    • Skin cancer    • Squamous carcinoma 12/01/2014 12/2014   • Thyroid condition    • Thyroid disease      Social History     Socioeconomic History   • Marital status: Legally      Spouse name: Not on file   • Number of children: Not on file   • Years of education: Not on file   • Highest education level: Not on file Occupational History   • Occupation: Teacher   • Occupation: retired   Tobacco Use   • Smoking status: Former     Types: Cigarettes     Quit date:      Years since quittin.5   • Smokeless tobacco: Never   Vaping Use   • Vaping Use: Never used   Substance and Sexual Activity   • Alcohol use: Yes     Comment: being a social drinker   • Drug use: Not Currently     Types: Marijuana     Comment: Medical marijuana   • Sexual activity: Yes     Comment: no known std risk factors   Other Topics Concern   • Not on file   Social History Narrative    Daily coffee consumption (3 cups/day)     Social Determinants of Health     Financial Resource Strain: Low Risk  (10/14/2022)    Overall Financial Resource Strain (CARDIA)    • Difficulty of Paying Living Expenses: Not hard at all   Food Insecurity: Not on file   Transportation Needs: No Transportation Needs (10/14/2022)    PRAPARE - Transportation    • Lack of Transportation (Medical): No    • Lack of Transportation (Non-Medical):  No   Physical Activity: Not on file   Stress: Not on file   Social Connections: Not on file   Intimate Partner Violence: Not on file   Housing Stability: Not on file      Family History   Problem Relation Age of Onset   • Bone cancer Mother    • Other Father         Accident   • Dementia Other    • Cancer Other      Past Surgical History:   Procedure Laterality Date   • ARTHRODESIS      H/o Arthrodesis Cervical to C2;  last assessed [id number]   • CERVICAL FUSION     • COLONOSCOPY  2021   • COLONOSCOPY     • KNEE SURGERY Right 2017   • LEG SURGERY Left     Hardware placed in lower leg bone(s)   • LEG SURGERY Left 2006, -Left leg vascular   • MT LAMINECTOMY BX/EXC ISPI DAKOTAH XDRL LUMBAR N/A 2022    Procedure: L2/3 decompressive laminectomy and biopsy (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;  Surgeon: Jacinda Huff MD;  Location: [facility];  Service: Neurosurgery   • THYROID SURGERY Right 2013   • [address] BIOPSY  07/13/2021       PREVIOUS WEIGHTS:   Wt Readings from Last 10 Encounters:   07/21/23 85.2 kg (187 lb 12.8 oz)   07/12/23 87.2 kg (192 lb 3.2 oz)   07/03/23 86.6 kg (191 lb)   04/13/23 83.2 kg (183 lb 6.4 oz)   04/06/23 83.9 kg (185 lb)   02/16/23 83.3 kg (183 lb 9.6 oz)   01/03/23 83.9 kg (185 lb)   12/19/22 83.9 kg (185 lb)   11/22/22 84.1 kg (185 lb 8 oz)   11/16/22 85.1 kg (187 lb 9.6 oz)        Review of Systems:  Review of Systems   Constitutional: Negative for activity change. Respiratory: Negative for cough, choking, chest tightness, shortness of breath, wheezing and stridor. Cardiovascular: Negative for chest pain, palpitations and leg swelling. Musculoskeletal: Negative for gait problem. Skin: Negative for color change. Neurological: Negative for dizziness, tremors, syncope, weakness, light-headedness and headaches. Psychiatric/Behavioral: Negative for agitation and confusion. Physical Exam:  /66 (BP Location: Right arm, Patient Position: Sitting, Cuff Size: Large)   Pulse 66   Ht 6' 1" (1.854 m)   Wt 85.2 kg (187 lb 12.8 oz)   BMI 24.78 kg/m²     Physical Exam  Vitals reviewed. Constitutional:       General: He is not in acute distress. Appearance: He is well-developed. HENT:      Head: Normocephalic and atraumatic. Neck:      Thyroid: No thyromegaly. Vascular: No carotid bruit or JVD. Trachea: No tracheal deviation. Cardiovascular:      Rate and Rhythm: Normal rate and regular rhythm. Pulses: Normal pulses. Heart sounds: Normal heart sounds. No murmur heard. No friction rub. No gallop. Pulmonary:      Effort: Pulmonary effort is normal. No respiratory distress. Breath sounds: Normal breath sounds. No wheezing, rhonchi or rales. Chest:      Chest wall: No tenderness. Musculoskeletal:      Cervical back: Normal range of motion and neck supple. Right lower leg: No edema.       Left lower leg: No edema. Comments: Dorsal kyphosis of the thoracic and cervical spine   Skin:     General: Skin is warm and dry. Neurological:      General: No focal deficit present. Mental Status: He is alert and oriented to person, place, and time. Psychiatric:         Mood and Affect: Mood normal.         Behavior: Behavior normal.         Thought Content: Thought content normal.         Judgment: Judgment normal.         ======================================================  Imaging:   I have personally reviewed pertinent reports. I spent 30 minutes on the patient's office visit. This time was spent on the day of the visit. I had direct contact with the patient in the office on the day of the visit. Greater than 50% of the total time was spent obtaining a history, examining patient, answering all patient questions, arranging and discussing plan of care with patient as well as directly providing instructions. Additional time then spent on orders and office chart. Portions of the record may have been created with voice recognition software. Occasional wrong word or "sound a like" substitutions may have occurred due to the inherent limitations of voice recognition software. Read the chart carefully and recognize, using context, where substitutions have occurred.     SIGNATURES:   Monique Lanza MD

## 2023-07-24 NOTE — TELEPHONE ENCOUNTER
I'm not too sure what patient can take in the meantime, best to wait to resume vitamins following the surgery.  Thank you

## 2023-08-28 ENCOUNTER — HOSPITAL ENCOUNTER (OUTPATIENT)
Dept: MRI IMAGING | Facility: HOSPITAL | Age: 68
Discharge: HOME | End: 2023-08-28
Attending: STUDENT IN AN ORGANIZED HEALTH CARE EDUCATION/TRAINING PROGRAM
Payer: COMMERCIAL

## 2023-08-28 DIAGNOSIS — D36.10 SCHWANNOMA: ICD-10-CM

## 2023-08-28 PROCEDURE — A9585 GADOBUTROL INJECTION: HCPCS | Performed by: STUDENT IN AN ORGANIZED HEALTH CARE EDUCATION/TRAINING PROGRAM

## 2023-08-28 PROCEDURE — G1004 CDSM NDSC: HCPCS

## 2023-08-28 PROCEDURE — 72158 MRI LUMBAR SPINE W/O & W/DYE: CPT

## 2023-08-28 RX ORDER — GADOBUTROL 604.72 MG/ML
8 INJECTION INTRAVENOUS
Status: COMPLETED | OUTPATIENT
Start: 2023-08-28 | End: 2023-08-28

## 2023-08-28 RX ADMIN — GADOBUTROL 8 ML: 604.72 INJECTION INTRAVENOUS at 17:41

## 2023-09-29 ENCOUNTER — APPOINTMENT (OUTPATIENT)
Dept: LAB | Facility: CLINIC | Age: 68
End: 2023-09-29
Payer: COMMERCIAL

## 2023-09-29 DIAGNOSIS — C61 MALIGNANT NEOPLASM OF PROSTATE (HCC): ICD-10-CM

## 2023-09-29 LAB — PSA SERPL-MCNC: 3.89 NG/ML (ref 0–4)

## 2023-09-29 PROCEDURE — 84153 ASSAY OF PSA TOTAL: CPT

## 2023-09-29 PROCEDURE — 36415 COLL VENOUS BLD VENIPUNCTURE: CPT

## 2023-10-04 ENCOUNTER — CLINICAL SUPPORT (OUTPATIENT)
Dept: RADIATION ONCOLOGY | Facility: HOSPITAL | Age: 68
End: 2023-10-04
Attending: STUDENT IN AN ORGANIZED HEALTH CARE EDUCATION/TRAINING PROGRAM
Payer: COMMERCIAL

## 2023-10-04 VITALS
OXYGEN SATURATION: 98 % | RESPIRATION RATE: 14 BRPM | SYSTOLIC BLOOD PRESSURE: 130 MMHG | DIASTOLIC BLOOD PRESSURE: 85 MMHG | HEART RATE: 76 BPM | TEMPERATURE: 98 F

## 2023-10-04 DIAGNOSIS — D36.10 SCHWANNOMA: Primary | ICD-10-CM

## 2023-10-04 PROCEDURE — 99211 OFF/OP EST MAY X REQ PHY/QHP: CPT | Performed by: STUDENT IN AN ORGANIZED HEALTH CARE EDUCATION/TRAINING PROGRAM

## 2023-10-04 PROCEDURE — 99213 OFFICE O/P EST LOW 20 MIN: CPT | Performed by: STUDENT IN AN ORGANIZED HEALTH CARE EDUCATION/TRAINING PROGRAM

## 2023-10-04 PROCEDURE — G0463 HOSPITAL OUTPT CLINIC VISIT: HCPCS | Performed by: STUDENT IN AN ORGANIZED HEALTH CARE EDUCATION/TRAINING PROGRAM

## 2023-10-04 NOTE — PROGRESS NOTES
Xuan Jesus 1955 is a 70 y.o. male with a symptomatic schwannoma at L3/4 s/p decompressive laminectomy and STR. MRI surveillance demonstrated interval enlargement. On 6/5/23 he completed a course of SBRT to a dose of 2500 cGy in 5 fractions. He had telephone outreach on 7/11/23 and presents today for follow up.      7/25/23 right total knee replacement @ [facility]    8/28/23 MRI lumbar spine w wo contrast  Unchanged 2.5 cm schwannoma centered in left L3-L4 foramen extending into left intradural spinal canal and extra extraforaminally along the course of left L3 exiting nerve status post subtotal resection. Multilevel degenerative changes of lumbar spine with varying degrees of canal stenosis (multilevel mild, worse at L3-L4) and foraminal narrowing (moderate-to-severe bilateral 5 S1), as detailed above. 9/8/23 [facility] Physiatry  follow-up of low back pain, left posterior thigh that is chronic in nature in terms of the LE.  follow up in 3 months  - depending on response to meds/physical therapy and results of test, interventional care which may include:  - lumbar TF SHELDON (correlate symptoms with imaging)  - lumbar facet interventions  If fails to progress,  - consider spine surgery consultation    Upcoming:  10/5/23 Rehab  10/16/23 MRI @ [facility]- ordered by Urology  10/23/23 [facility] orthopedics  12/8/23 [facility] physiatry      Follow up visit     Oncology History Overview Note   with a symptomatic schwannoma at L3/4 s/p decompressive laminectomy and STR. MRI surveillance demonstrated interval enlargement. On 6/5/23 he completed a course of SBRT to a dose of 2500 cGy in 5 fractions.  He had telephone outreach on 7/11/23 and presents today for follow up.      7/25/23 right total knee replacement @ [facility]    8/28/23 MRI lumbar spine w wo contrast  Unchanged 2.5 cm schwannoma centered in left L3-L4 foramen extending into left intradural spinal canal and extra extraforaminally along the course of left L3 exiting nerve status post subtotal resection. Multilevel degenerative changes of lumbar spine with varying degrees of canal stenosis (multilevel mild, worse at L3-L4) and foraminal narrowing (moderate-to-severe bilateral 5 S1), as detailed above. 9/8/23 [facility] Physiatry  follow-up of low back pain, left posterior thigh that is chronic in nature in terms of the LE.  follow up in 3 months  - depending on response to meds/physical therapy and results of test, interventional care which may include:  - lumbar TF SHELDON (correlate symptoms with imaging)  - lumbar facet interventions  If fails to progress,  - consider spine surgery consultation    Upcoming:  10/5/23 Rehab  10/16/23 MRI @ [facility]- ordered by Urology  10/23/23 [facility] orthopedics  12/8/23 [facility] physiatry     Prostate cancer ([address])   2021 Initial Diagnosis    Prostate cancer ([address])      Biopsy    - Prostatic adenocarcinoma, acinar type, [place] score 3+3=6 (Grade Group 1) involving 2 cores   - Percentage of [place] pattern 4: N/A   - Percentage of [place] pattern 5: N/A   - Perineural invasion: Not identified   - Periprostatic fat invasion: Not identified   - Lymph-vascular invasion: Not identified   - Seminal vesicle/ejaculatory duct invasion: Not identified   - Additional pathologic findings: None   - Best representative block if additional studies are needed: B2 and B6      A. Right prostate (random biopsy): - Benign prostatic tissue. B. Left prostate (random biopsy):      - Small foci of prostatic adenocarcinoma, [place] score 3+3=6 (Grade Group 1), involving less than 5% of two (2) cores. C.  Right mid prostate (biopsy): - Benign prostatic tissue.       7/13/2021 -  Cancer Staged    Staging form: Prostate, AJCC 8th Edition  - Clinical stage from 7/13/2021: Stage I (cT1a, cN0, cM0, PSA: 3.1, Grade Group: 1) - Signed by Kate Keller MD on 1/25/2022  Stage prefix: Initial diagnosis  Prostate specific antigen (PSA) range: Less than 10  [place] primary pattern: 3  [place] secondary pattern: 3  [place]w score: 6  Histologic grading system: 5 grade system  Number of biopsy cores examined: 2  Number of biopsy cores positive: 2       Schwannoma   2015 Surgery    "C1-C2 lateral mass fixation and sublaminar Gallie cable fixation with demineralized bone matrix and cadaveric bone arthrodesis". Performed by Dr. Carlton Linton, 2/20/15.        12/19/2022 Surgery    A-B. Spine, "Left L3 nerve sheath tumor," Resection:  - Schwannoma     2/20/2023 Surgery    Patient is s/p L2/3 decompressive laminectomy and biopsy (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 [12/19/22 DKO]     4/27/2023 Initial Diagnosis    Schwannoma     5/24/2023 - 6/5/2023 Radiation    Treatments:  Course: C1 SBRT    Plan ID Energy Fractions Dose per Fraction (cGy) Dose Correction (cGy) Total Dose Delivered (cGy) Elapsed Days   SBRT Spine 6X-FFF 5 / 5 500 0 2,500 12      Treatment Dates:  5/24/2023 - 6/5/2023. Review of Systems:  Review of Systems   Constitutional: Positive for activity change (recent knee replacement), appetite change (not good. ) and fatigue. HENT: Negative. Eyes: Negative. Respiratory: Negative. Cardiovascular: Positive for leg swelling. Gastrointestinal: Negative. Endocrine: Negative. Genitourinary: Positive for frequency and urgency. Musculoskeletal: Positive for back pain (Not noticed as much during knee surgery, but has resumed. Constant pain. Difficulty sleeping.) and gait problem. Knee replacement. In PT. Back pain unchanged since RT. Skin: Negative. Allergic/Immunologic: Negative. Neurological: Positive for weakness, numbness (left leg and lower back) and headaches (intermittent). Psychiatric/Behavioral: Positive for dysphoric mood (exhausted and "worn out". Declined cancer counselors.) and sleep disturbance. Clinical Trial: no    IPSS Questionnaire (AUA-7):   Over the past month…    1)  How often have you had a sensation of not emptying your bladder completely after you finish urinating? 3 - About half the time   2)  How often have you had to urinate again less than two hours after you finished urinating? 4 - More than half the time   3)  How often have you found you stopped and started again several times when you urinated? 0 - Not at all   4) How difficult have you found it to postpone urination? 4 - More than half the time   5) How often have you had a weak urinary stream?  1 - Less than 1 time in 5   6) How often have you had to push or strain to begin urination? 1 - Less than 1 time in 5   7) How many times did you most typically get up to urinate from the time you went to bed until the time you got up in the morning?   2 - 2 times   Total Score:  15       Teaching    Health Maintenance   Topic Date Due   • COVID-19 Vaccine (4 - Booster for Moderna series) 02/09/2022   • PT PLAN OF CARE  02/09/2023   • Influenza Vaccine (1) 09/01/2023   • Medicare Annual Wellness Visit (AWV)  10/14/2023   • Depression Remission PHQ  11/03/2023   • Fall Risk  01/10/2024   • Colorectal Cancer Screening  01/22/2024   • BMI: Adult  07/21/2024   • Hepatitis C Screening  Completed   • Pneumococcal Vaccine: 65+ Years  Completed   • HIB Vaccine  Aged Out   • IPV Vaccine  Aged Out   • Hepatitis A Vaccine  Aged Out   • Meningococcal ACWY Vaccine  Aged Out   • HPV Vaccine  Aged Out     Patient Active Problem List   Diagnosis   • Allergic rhinitis   • Spinal stenosis   • Degeneration of intervertebral disc of cervical region   • Hypothyroidism   • Muscle pain, myofacial   • Neuropathy   • Nontoxic multinodular goiter   • Occipital neuralgia   • Venous insufficiency   • Chondromalacia patellae   • Chronic osteomyelitis of lower leg (HCC)   • Generalized osteoarthritis   • Anxiety   • Reactive depression   • BPH without obstruction/lower urinary tract symptoms   • Post traumatic stress disorder (PTSD)   • Vitamin D deficiency   • Varicose veins of bilateral lower extremities with other complications   • Osteoporosis without current pathological fracture   • Bilateral carotid artery stenosis < 50%   • PAC (premature atrial contraction)   • PVC (premature ventricular contraction)   • Primary hypercholesterolemia   • Macrocytosis without anemia   • Chronic neck pain   • Thoracic aortic aneurysm without rupture (HCC)   • Nonrheumatic mitral valve regurgitation   • Bilateral enlargement of atria   • Coronary artery calcification   • Prostate cancer [facility])   • Preoperative cardiovascular examination   • Mass of right parotid gland   • History of osteomyelitis   • Renal cysts, acquired, bilateral   • Primary hypertension   • Lumbar spine tumor   • Schwannoma     Past Medical History:   Diagnosis Date   • Anxiety    • Arthritis    • Bleeding tendency (HCC)    • BPH (benign prostatic hyperplasia)    • Broken bones     left leg   • Cancer (HCC)    • Cataract    • Chronic neck pain    • Epilepsy ([address])    • Hypothyroidism    • Neoplasm of unspecified behavior of bone, soft tissue, and skin    • Osteomyelitis (HCC)    • PONV (postoperative nausea and vomiting)    • Prostate cancer ([address])    • Skin cancer    • Squamous carcinoma 12/01/2014 12/2014   • Thyroid condition    • Thyroid disease      Past Surgical History:   Procedure Laterality Date   • ARTHRODESIS      H/o Arthrodesis Cervical to C2;  last assessed [id number]   • CERVICAL FUSION     • COLONOSCOPY  01/22/2021   • COLONOSCOPY     • KNEE SURGERY Right 12/22/2017   • LEG SURGERY Left 1999    Hardware placed in lower leg bone(s)   • LEG SURGERY Left 01/01/2006 2006, 2012-Left leg vascular   • MT LAMINECTOMY BX/EXC ISPI DAKOTAH XDRL LUMBAR N/A 12/19/2022    Procedure: L2/3 decompressive laminectomy and biopsy (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;  Surgeon: Asaf Sanchez MD;  [facility];  Service: Neurosurgery   • THYROID SURGERY Right 2013   • [address] BIOPSY  07/13/2021     Family History   Problem Relation Age of Onset   • Bone cancer Mother    • Other Father         Accident   • Dementia Other    • Cancer Other      Social History     Socioeconomic History   • Marital status: Legally      Spouse name: Not on file   • Number of children: Not on file   • Years of education: Not on file   • Highest education level: Not on file   Occupational History   • Occupation: Teacher   • Occupation: retired   Tobacco Use   • Smoking status: Former     Types: Cigarettes     Quit date:      Years since quittin.7   • Smokeless tobacco: Never   Vaping Use   • Vaping Use: Never used   Substance and Sexual Activity   • Alcohol use: Yes     Comment: being a social drinker   • Drug use: Not Currently     Types: Marijuana     Comment: Medical marijuana   • Sexual activity: Yes     Comment: no known std risk factors   Other Topics Concern   • Not on file   Social History Narrative    Daily coffee consumption (3 cups/day)     Social Determinants of Health     Financial Resource Strain: Low Risk  (10/14/2022)    Overall Financial Resource Strain (CARDIA)    • Difficulty of Paying Living Expenses: Not hard at all   Food Insecurity: Not on file   Transportation Needs: No Transportation Needs (10/14/2022)    PRAPARE - Transportation    • Lack of Transportation (Medical): No    • Lack of Transportation (Non-Medical):  No   Physical Activity: Not on file   Stress: Not on file   Social Connections: Not on file   Intimate Partner Violence: Not on file   Housing Stability: Not on file       Current Outpatient Medications:   •  acetaminophen (TYLENOL) 500 mg tablet, Take 2 tablets (1,000 mg total) by mouth every 8 (eight) hours as needed for moderate pain, mild pain or fever, Disp: , Rfl: 0  •  alendronate (FOSAMAX) 35 mg tablet, Take 1 tablet (35 mg total) by mouth every 7 days, Disp: 12 tablet, Rfl: 1  •  desloratadine (CLARINEX) 5 MG tablet, Take 1 tablet (5 mg total) by mouth daily, Disp: 90 tablet, Rfl: 3  •  doxycycline (ADOXA) 50 MG tablet, TAKE 1 TABLET BY ORAL ROUTE EVERY DAY WITH MEAL X3 MONTHS THEN [facility], Disp: , Rfl:   •  fluticasone (FLONASE) 50 mcg/act nasal spray, 2 sprays into each nostril daily, Disp: 16 g, Rfl: 3  •  levothyroxine 100 mcg tablet, daily, Disp: , Rfl:   •  metroNIDAZOLE (METROGEL) 0.75 % gel, Apply topically 2 (two) times a day, Disp: , Rfl:   •  pantoprazole (PROTONIX) 40 mg tablet, Take 1 tablet (40 mg total) by mouth daily, Disp: 30 tablet, Rfl: 2  •  RESTASIS MULTIDOSE 0.05 % ophthalmic emulsion, INSTILL 1 DROP INTO BOTH EYES TWICE A DAY, Disp: , Rfl: 3  •  tamsulosin (FLOMAX) 0.4 mg, Take 0.4 mg by mouth 2 (two) times a day, Disp: , Rfl:   •  tazarotene (AVAGE) 0.1 % cream, Apply topically daily at bedtime, Disp: , Rfl:   •  mupirocin (BACTROBAN) 2 % ointment, apply to NOSTRILS twice a day - BEGIN 5 DAYS PRIOR TO SURGERY, Disp: , Rfl:   Allergies   Allergen Reactions   • Amoxicillin-Pot Clavulanate Rash, Vomiting and Nausea Only     Vitals:    10/04/23 1235   BP: 130/85   Pulse: 76   Resp: 14   Temp: 98 °F (36.7 °C)   SpO2: 98%      Pain Score:   4

## 2023-10-05 NOTE — PROGRESS NOTES
Follow-up - Radiation Oncology   Brittanie Begun 1955 70 y.o. male [id number]      History of Present Illness   Cancer Staging   Prostate cancer [facility])  Staging form: Prostate, AJCC 8th Edition  - Clinical stage from 7/13/2021: Stage I (cT1a, cN0, cM0, PSA: 3.1, Grade Group: 1) - Signed by Xochitl Babcock MD on 1/25/2022  Stage prefix: Initial diagnosis  Prostate specific antigen (PSA) range: Less than 10  [place] primary pattern: 3  [place]ette secondary pattern: 3  [place] score: 6  Histologic grading system: 5 grade system  Number of biopsy cores examined: 2  Number of biopsy cores positive: 2      Mr. Mariam Ibanez is a 70year old man with a symptomatic schwannoma at L3/4 s/p decompressive laminectomy and STR. MRI surveillance demonstrated interval enlargement. On 6/5/23 he completed a course of SBRT to a dose of 2500 cGy in 5 fractions. He returns today for telephone follow-up.      Interval History:  The patient was last seen in clinic on 7/11/23 via telemedicine shortly after completion of SBRT. Since then he has been doing fair overall. He underwent right TKA and during that time was off his feet; once he began walking more he started to develop worsening pain in the left leg again with associated weakness/numbness.      MRI Lumbar spine (8.28.23) demonstrated:   Unchanged 2.5 cm schwannoma centered in left L3-L4 foramen extending into left intradural spinal canal and extra extraforaminally along the course of left L3 exiting nerve status post subtotal resection.           Historical Information   Oncology History   Prostate cancer ([address] [facility])   2021 Initial Diagnosis    Prostate cancer ([address] [facility])      Biopsy    - Prostatic adenocarcinoma, acinar type, [place]ette score 3+3=6 (Grade Group 1) involving 2 cores   - Percentage of [place] pattern 4: N/A   - Percentage of [place] pattern 5: N/A   - Perineural invasion: Not identified   - Periprostatic fat invasion: Not identified   - Lymph-vascular invasion: Not identified - Seminal vesicle/ejaculatory duct invasion: Not identified   - Additional pathologic findings: None   - Best representative block if additional studies are needed: B2 and B6      A. Right prostate (random biopsy): - Benign prostatic tissue. B. Left prostate (random biopsy):      - Small foci of prostatic adenocarcinoma, [place] score 3+3=6 (Grade Group 1), involving less than 5% of two (2) cores. C.  Right mid prostate (biopsy): - Benign prostatic tissue. 7/13/2021 -  Cancer Staged    Staging form: Prostate, AJCC 8th Edition  - Clinical stage from 7/13/2021: Stage I (cT1a, cN0, cM0, PSA: 3.1, Grade Group: 1) - Signed by Deena Martinez MD on 1/25/2022  Stage prefix: Initial diagnosis  Prostate specific antigen (PSA) range: Less than 10  [place] primary pattern: 3  [place] secondary pattern: 3  [place] score: 6  Histologic grading system: 5 grade system  Number of biopsy cores examined: 2  Number of biopsy cores positive: 2       Schwannoma   2015 Surgery    "C1-C2 lateral mass fixation and sublaminar Gallie cable fixation with demineralized bone matrix and cadaveric bone arthrodesis". Performed by Dr. Yury Flaherty, 2/20/15.        12/19/2022 Surgery    A-B. Spine, "Left L3 nerve sheath tumor," Resection:  - Schwannoma     2/20/2023 Surgery    Patient is s/p L2/3 decompressive laminectomy and biopsy (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 [12/19/22 DKO]     4/27/2023 Initial Diagnosis    Schwannoma     5/24/2023 - 6/5/2023 Radiation    Treatments:  Course: C1 SBRT    Plan ID Energy Fractions Dose per Fraction (cGy) Dose Correction (cGy) Total Dose Delivered (cGy) Elapsed Days   SBRT Spine 6X-FFF 5 / 5 500 0 2,500 12      Treatment Dates:  5/24/2023 - 6/5/2023.           Past Medical History:   Diagnosis Date   • Anxiety    • Arthritis    • Bleeding tendency (HCC)    • BPH (benign prostatic hyperplasia)    • Broken bones     left leg   • Cancer [facility])    • Cataract    • Chronic neck pain    • Epilepsy ([address])    • Hypothyroidism    • Neoplasm of unspecified behavior of bone, soft tissue, and skin    • Osteomyelitis ([address])    • PONV (postoperative nausea and vomiting)    • Prostate cancer ([address])    • Skin cancer    • Squamous carcinoma 2014   • Thyroid condition    • Thyroid disease      Past Surgical History:   Procedure Laterality Date   • ARTHRODESIS      H/o Arthrodesis Cervical to C2;  last assessed [id number]   • CERVICAL FUSION     • COLONOSCOPY  2021   • COLONOSCOPY     • KNEE SURGERY Right 2017   • LEG SURGERY Left     Hardware placed in lower leg bone(s)   • LEG SURGERY Left 2006, -Left leg vascular   • [place] LAMINECTOMY BX/EXC ISPI DAKOTAH XDRL LUMBAR N/A 2022    Procedure: L2/3 decompressive laminectomy and biopsy (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;  Surgeon: Kostas Rosales MD;  Location[facility];  Service: Neurosurgery   • THYROID SURGERY Right    • [address]  2021       Social History   Social History     Substance and Sexual Activity   Alcohol Use Yes    Comment: being a social drinker     Social History     Substance and Sexual Activity   Drug Use Not Currently   • Types: Marijuana    Comment: Medical marijuana     Social History     Tobacco Use   Smoking Status Former   • Types: Cigarettes   • Quit date:    • Years since quittin.7   Smokeless Tobacco Never         Meds/Allergies     Current Outpatient Medications:   •  acetaminophen (TYLENOL) 500 mg tablet, Take 2 tablets (1,000 mg total) by mouth every 8 (eight) hours as needed for moderate pain, mild pain or fever, Disp: , Rfl: 0  •  alendronate (FOSAMAX) 35 mg tablet, Take 1 tablet (35 mg total) by mouth every 7 days, Disp: 12 tablet, Rfl: 1  •  desloratadine (CLARINEX) 5 MG tablet, Take 1 tablet (5 mg total) by mouth daily, Disp: 90 tablet, Rfl: 3  •  doxycycline (ADOXA) 50 MG tablet, TAKE 1 TABLET BY ORAL ROUTE EVERY DAY WITH MEAL X3 MONTHS THEN MWF, Disp: , Rfl:   •  fluticasone (FLONASE) 50 mcg/act nasal spray, 2 sprays into each nostril daily, Disp: 16 g, Rfl: 3  •  levothyroxine 100 mcg tablet, daily, Disp: , Rfl:   •  metroNIDAZOLE (METROGEL) 0.75 % gel, Apply topically 2 (two) times a day, Disp: , Rfl:   •  pantoprazole (PROTONIX) 40 mg tablet, Take 1 tablet (40 mg total) by mouth daily, Disp: 30 tablet, Rfl: 2  •  RESTASIS MULTIDOSE 0.05 % ophthalmic emulsion, INSTILL 1 DROP INTO BOTH EYES TWICE A DAY, Disp: , Rfl: 3  •  tamsulosin (FLOMAX) 0.4 mg, Take 0.4 mg by mouth 2 (two) times a day, Disp: , Rfl:   •  tazarotene (AVAGE) 0.1 % cream, Apply topically daily at bedtime, Disp: , Rfl:   •  mupirocin (BACTROBAN) 2 % ointment, apply to NOSTRILS twice a day - BEGIN 5 DAYS PRIOR TO SURGERY, Disp: , Rfl:   Allergies   Allergen Reactions   • Amoxicillin-Pot Clavulanate Rash, Vomiting and Nausea Only     Review of Systems   Constitutional: Positive for activity change (recent knee replacement), appetite change (not good. ) and fatigue. HENT: Negative. Eyes: Negative. Respiratory: Negative. Cardiovascular: Positive for leg swelling. Gastrointestinal: Negative. Endocrine: Negative. Genitourinary: Positive for frequency and urgency. Musculoskeletal: Positive for back pain (Not noticed as much during knee surgery, but has resumed. Constant pain. Difficulty sleeping.) and gait problem. Knee replacement. In PT. Back pain unchanged since RT. Skin: Negative. Allergic/Immunologic: Negative. Neurological: Positive for weakness, numbness (left leg and lower back) and headaches (intermittent). Psychiatric/Behavioral: Positive for dysphoric mood (exhausted and "worn out".  Declined cancer counselors.) and sleep disturbance.        OBJECTIVE:   /85   Pulse 76   Temp 98 °F (36.7 °C)   Resp 14   SpO2 98%   Pain Assessment:  4  ECOG/Zubrod/WHO: 1 - Symptomatic but completely ambulatory    Physical Exam   Well appearing. NAD. No increased work of breathing. Extremities warm and well perfused. Difficulty with ambulation due to left leg pain/weakness. RESULTS    Lab Results:   Recent Results (from the past 672 hour(s))   PSA Total, Diagnostic    Collection Time: 09/29/23 11:37 AM   Result Value Ref Range    PSA, Diagnostic 3.89 0.00 - 4.00 ng/mL       Imaging Studies:No results found. Assessment/Plan:  Orders Placed This Encounter   Procedures   • MRI lumbar spine w wo contrast      We reviewed the patient's repeat post-SBRT MRI Lumbar spine. While this was read as stable on the MRI report, on my review it shows slight enlargement. Nonetheless there is some slight internal salt/pepper change characteristic of necrosis. We reviewed that post SBRT small vessel ablation/obliteration can take up to a year and I have recommended repeat MRI Lumbar spine in 6 months. At that time we will discuss whether or not to return to neurosurgery for possible additional intervention. Roxanne Higgins MD  10/5/2023,11:15 AM    Portions of the record may have been created with voice recognition software.  Occasional wrong word or "sound a like" substitutions may have occurred due to the inherent limitations of voice recognition software.  Read the chart carefully and recognize, using context, where substitutions have occurred.

## 2023-10-10 ENCOUNTER — RA CDI HCC (OUTPATIENT)
Dept: OTHER | Facility: HOSPITAL | Age: 68
End: 2023-10-10

## 2023-10-10 NOTE — PROGRESS NOTES
[address] [facility] coding opportunities       Chart reviewed, no opportunity found: [id number]        Patients Insurance     Medicare Insurance: [employer]

## 2023-10-16 ENCOUNTER — OFFICE VISIT (OUTPATIENT)
Dept: FAMILY MEDICINE CLINIC | Facility: CLINIC | Age: 68
End: 2023-10-16
Payer: COMMERCIAL

## 2023-10-16 VITALS
OXYGEN SATURATION: 98 % | TEMPERATURE: 97.7 F | SYSTOLIC BLOOD PRESSURE: 122 MMHG | DIASTOLIC BLOOD PRESSURE: 76 MMHG | HEART RATE: 64 BPM | WEIGHT: 190.6 LBS | HEIGHT: 73 IN | BODY MASS INDEX: 25.26 KG/M2

## 2023-10-16 DIAGNOSIS — E78.00 PRIMARY HYPERCHOLESTEROLEMIA: ICD-10-CM

## 2023-10-16 DIAGNOSIS — R73.03 PREDIABETES: ICD-10-CM

## 2023-10-16 DIAGNOSIS — M81.0 OSTEOPOROSIS WITHOUT CURRENT PATHOLOGICAL FRACTURE, UNSPECIFIED OSTEOPOROSIS TYPE: ICD-10-CM

## 2023-10-16 DIAGNOSIS — Z23 ENCOUNTER FOR VACCINATION: ICD-10-CM

## 2023-10-16 DIAGNOSIS — E55.9 VITAMIN D DEFICIENCY: ICD-10-CM

## 2023-10-16 DIAGNOSIS — E03.9 ACQUIRED HYPOTHYROIDISM: Primary | ICD-10-CM

## 2023-10-16 DIAGNOSIS — Z00.00 MEDICARE ANNUAL WELLNESS VISIT, SUBSEQUENT: ICD-10-CM

## 2023-10-16 DIAGNOSIS — I10 PRIMARY HYPERTENSION: ICD-10-CM

## 2023-10-16 DIAGNOSIS — J30.1 ALLERGIC RHINITIS DUE TO POLLEN, UNSPECIFIED SEASONALITY: ICD-10-CM

## 2023-10-16 PROBLEM — Z01.810 PREOPERATIVE CARDIOVASCULAR EXAMINATION: Status: RESOLVED | Noted: 2021-11-25 | Resolved: 2023-10-16

## 2023-10-16 PROCEDURE — G0008 ADMIN INFLUENZA VIRUS VAC: HCPCS

## 2023-10-16 PROCEDURE — 99214 OFFICE O/P EST MOD 30 MIN: CPT | Performed by: FAMILY MEDICINE

## 2023-10-16 PROCEDURE — 90662 IIV NO PRSV INCREASED AG IM: CPT

## 2023-10-16 PROCEDURE — G0439 PPPS, SUBSEQ VISIT: HCPCS | Performed by: FAMILY MEDICINE

## 2023-10-16 RX ORDER — FLUTICASONE PROPIONATE 50 MCG
2 SPRAY, SUSPENSION (ML) NASAL DAILY
Qty: 48 G | Refills: 3 | Status: SHIPPED | OUTPATIENT
Start: 2023-10-16

## 2023-10-16 NOTE — PROGRESS NOTES
Assessment and Plan:     Problem List Items Addressed This Visit          Endocrine    Hypothyroidism - Primary    Relevant Orders    TSH, 3rd generation       Respiratory    Allergic rhinitis    Relevant Medications    fluticasone (FLONASE) 50 mcg/act nasal spray       Cardiovascular and Mediastinum    Primary hypertension    Relevant Orders    CBC and differential    Comprehensive metabolic panel       Musculoskeletal and Integument    Osteoporosis without current pathological fracture    Relevant Orders    DXA bone density spine hip and pelvis       Other    Vitamin D deficiency    Relevant Orders    Vitamin D 25 hydroxy    Primary hypercholesterolemia    Relevant Orders    Lipid Panel with Direct LDL reflex     Other Visit Diagnoses       Prediabetes        Relevant Orders    HEMOGLOBIN A1C W/ EAG ESTIMATION    Encounter for vaccination        Relevant Orders    influenza vaccine, high-dose, PF 0.7 mL (FLUZONE HIGH-DOSE) (Completed)    Medicare annual wellness visit, subsequent                 - Blood pressure well controlled at this time without any medication  - Continue diet and lifestyle modifications in the management of prediabetes and dyslipidemia  - Patient currently on Fosamax for osteoporosis but states that he often forgets to take the medication and is enquiring about alternative injectable agents. Discussed that Prolia may be an option. Will obtain repeat DXA scan as last one was in 2021.   - Continue levothyroxine 100 mcg daily for hypothyroidism   - Allergic rhinitis stable on flonase nasal spray and antihistamine  Preventive health issues were discussed with patient, and age appropriate screening tests were ordered as noted in patient's After Visit Summary. Personalized health advice and appropriate referrals for health education or preventive services given if needed, as noted in patient's After Visit Summary.      History of Present Illness:       AMRIT     Starr Garcia is a very pleasant 70 year old male who presents today for a Medicare Wellness visit. Patient underwent right knee replacement in July and reports that he has been feeling fatigued ever since the surgery but has been doing physical therapy since that time. He continues to follow with Radiation Oncology for his Schwannoma and underwent Stereotactic body radiation therapy in June. He had a repeat MRI of the lumbar spine which showed slight enlargement and continues to have left lower extremity numbness, tingling and weakness. He is to get a repeat MRI of the lumbar spine in April. Patient Care Team:  Alexa Song MD as PCP - General (Family Medicine)  BOWEN Bob MD Adriane Gainer, DO (Urology)  Raul Lassiter MD (Otolaryngology)  Emmett Alex MD (Physical Medicine and Rehabilitation)  Karen Esparza MD (Radiation Oncology)     Review of Systems:     Review of Systems   Constitutional:  Positive for fatigue. HENT: Negative. Eyes: Negative. Respiratory: Negative. Cardiovascular: Negative. Gastrointestinal: Negative. Musculoskeletal:  Positive for arthralgias and back pain. Skin: Negative. Neurological:         Numbness and tingling left lower extremity   Psychiatric/Behavioral: Negative.           Problem List:     Patient Active Problem List   Diagnosis    Allergic rhinitis    Spinal stenosis    Degeneration of intervertebral disc of cervical region    Hypothyroidism    Muscle pain, myofacial    Neuropathy    Nontoxic multinodular goiter    Occipital neuralgia    Venous insufficiency    Chondromalacia patellae    Chronic osteomyelitis of lower leg (HCC)    Generalized osteoarthritis    Anxiety    Reactive depression    BPH without obstruction/lower urinary tract symptoms    Post traumatic stress disorder (PTSD)    Vitamin D deficiency    Varicose veins of bilateral lower extremities with other complications    Osteoporosis without current pathological fracture Bilateral carotid artery stenosis < 50%    PAC (premature atrial contraction)    PVC (premature ventricular contraction)    Primary hypercholesterolemia    Macrocytosis without anemia    Chronic neck pain    Thoracic aortic aneurysm without rupture (HCC)    Nonrheumatic mitral valve regurgitation    Bilateral enlargement of atria    Coronary artery calcification    Prostate cancer (HCC)    Mass of right parotid gland    History of osteomyelitis    Renal cysts, acquired, bilateral    Primary hypertension    Lumbar spine tumor    Schwannoma      Past Medical and Surgical History:     Past Medical History:   Diagnosis Date    Anxiety     Arthritis     Bleeding tendency (HCC)     BPH (benign prostatic hyperplasia)     Broken bones     left leg    Cancer (HCC)     Cataract     Chronic neck pain     Epilepsy ([address])     Hypothyroidism     Neoplasm of unspecified behavior of bone, soft tissue, and skin     Osteomyelitis (HCC)     PONV (postoperative nausea and vomiting)     Prostate cancer ([address])     Skin cancer     Squamous carcinoma 12/01/2014 12/2014    Thyroid condition     Thyroid disease      Past Surgical History:   Procedure Laterality Date    ARTHRODESIS      H/o Arthrodesis Cervical to C2;  last assessed [id number]    CERVICAL FUSION      COLONOSCOPY  01/22/2021    COLONOSCOPY      KNEE SURGERY Right 12/22/2017    LEG SURGERY Left 1999    Hardware placed in lower leg bone(s)    LEG SURGERY Left 01/01/2006 2006, 2012-Left leg vascular    OH LAMINECTOMY BX/EXC ISPI DAKOTAH XDRL LUMBAR N/A 12/19/2022    Procedure: L2/3 decompressive laminectomy and biopsy (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;  Surgeon: Lora Hathaway MD;  Location: [facility];  Service: Neurosurgery    THYROID SURGERY Right 2013    US GUIDED PROSTATE BIOPSY  07/13/2021      Family History:     Family History   Problem Relation Age of Onset    Bone cancer Mother     Other Father         Accident    Dementia Other     Cancer Other Social History:     Social History     Socioeconomic History    Marital status: Legally      Spouse name: None    Number of children: None    Years of education: None    Highest education level: None   Occupational History    Occupation: Teacher    Occupation: retired   Tobacco Use    Smoking status: Former     Types: Cigarettes     Quit date: 26     Years since quittin.8    Smokeless tobacco: Never   Vaping Use    Vaping Use: Never used   Substance and Sexual Activity    Alcohol use: Yes     Comment: being a social drinker    Drug use: Not Currently     Types: Marijuana     Comment: Medical marijuana    Sexual activity: Yes     Comment: no known std risk factors   Other Topics Concern    None   Social History Narrative    Daily coffee consumption (3 cups/day)     Social Determinants of Health     Financial Resource Strain: Low Risk  (10/15/2023)    Overall Financial Resource Strain (CARDIA)     Difficulty of Paying Living Expenses: Not hard at all   Food Insecurity: Not on file   Transportation Needs: No Transportation Needs (10/15/2023)    PRAPARE - Transportation     Lack of Transportation (Medical): No     Lack of Transportation (Non-Medical):  No   Physical Activity: Not on file   Stress: Not on file   Social Connections: Not on file   Intimate Partner Violence: Not on file   Housing Stability: Not on file      Medications and Allergies:     Current Outpatient Medications   Medication Sig Dispense Refill    acetaminophen (TYLENOL) 500 mg tablet Take 2 tablets (1,000 mg total) by mouth every 8 (eight) hours as needed for moderate pain, mild pain or fever  0    alendronate (FOSAMAX) 35 mg tablet Take 1 tablet (35 mg total) by mouth every 7 days 12 tablet 1    desloratadine (CLARINEX) 5 MG tablet Take 1 tablet (5 mg total) by mouth daily 90 tablet 3    doxycycline (ADOXA) 50 MG tablet TAKE 1 TABLET BY ORAL ROUTE EVERY DAY WITH MEAL X3 MONTHS THEN MWF      fluticasone (FLONASE) 50 mcg/act nasal feels that their physical health rating is same. Patient is satisfied with their life. Eyesight was rated as same. Hearing was rated as same. Patient feels that their emotional and mental health rating is same. Patients states they are never, rarely angry. Patient states they are sometimes unusually tired/fatigued. Pain experienced in the last 7 days has been some. Patient's pain rating has been 5/10. Patient states that he has experienced weight loss or gain in last 6 months. Fall Risk Screening: In the past year, patient has experienced: no history of falling in past year      Home Safety:  Patient does not have trouble with stairs inside or outside of their home. Patient has working smoke alarms and has working carbon monoxide detector. Home safety hazards include: none. Nutrition:   Current diet is Regular. Medications:   Patient is not currently taking any over-the-counter supplements. Patient is able to manage medications. Activities of Daily Living (ADLs)/Instrumental Activities of Daily Living (IADLs):   Walk and transfer into and out of bed and chair?: Yes  Dress and groom yourself?: Yes    Bathe or shower yourself?: Yes    Feed yourself? Yes  Do your laundry/housekeeping?: Yes  Manage your money, pay your bills and track your expenses?: Yes  Make your own meals?: Yes    Do your own shopping?: Yes    Previous Hospitalizations:   Any hospitalizations or ED visits within the last 12 months?: Yes    How many hospitalizations have you had in the last year?: 1-2    Advance Care Planning:   Living will: Yes    Durable POA for healthcare:  Yes    Advanced directive: Yes      PREVENTIVE SCREENINGS      Cardiovascular Screening:    General: Screening Not Indicated and History Lipid Disorder      Diabetes Screening:     General: Screening Current      Colorectal Cancer Screening:     General: Screening Current      Prostate Cancer Screening:    General: History Prostate Cancer      Osteoporosis Screening:    General: Screening Not Indicated and History Osteoporosis      Abdominal Aortic Aneurysm (AAA) Screening:    Risk factors include: age between 70-75 yo and tobacco use        Lung Cancer Screening:     General: Screening Not Indicated      Hepatitis C Screening:    General: Screening Current    Screening, Brief Intervention, and Referral to Treatment (SBIRT)    Screening  Typical number of drinks in a day: 0  Typical number of drinks in a week: 0  Interpretation: Low risk drinking behavior. AUDIT-C Screenin) How often did you have a drink containing alcohol in the past year? monthly or less  2) How many drinks did you have on a typical day when you were drinking in the past year? 0  3) How often did you have 6 or more drinks on one occasion in the past year? never    AUDIT-C Score: 1  Interpretation: Score 0-3 (male): Negative screen for alcohol misuse    Single Item Drug Screening:  How often have you used an illegal drug (including marijuana) or a prescription medication for non-medical reasons in the past year? never    Single Item Drug Screen Score: 0  Interpretation: Negative screen for possible drug use disorder    No results found. Physical Exam:     /76 (BP Location: Left arm, Patient Position: Sitting, Cuff Size: Adult)   Pulse 64   Temp 97.7 °F (36.5 °C) (Temporal)   Ht 6' 1" (1.854 m)   Wt 86.5 kg (190 lb 9.6 oz)   SpO2 98%   BMI 25.15 kg/m²     Physical Exam  Constitutional:       Appearance: He is not ill-appearing. HENT:      Head: Normocephalic and atraumatic. Eyes:      General:         Right eye: No discharge. Left eye: No discharge. Extraocular Movements: Extraocular movements intact. Pupils: Pupils are equal, round, and reactive to light. Cardiovascular:      Rate and Rhythm: Normal rate. Pulses: Normal pulses. Pulmonary:      Effort: Pulmonary effort is normal. No respiratory distress. Breath sounds: No wheezing. Abdominal:      General: Bowel sounds are normal. There is no distension. Palpations: Abdomen is soft. Tenderness: There is no abdominal tenderness. Musculoskeletal:      Cervical back: Normal range of motion. Right lower le+ Edema present. Left lower leg: No edema. Lymphadenopathy:      Cervical: No cervical adenopathy. Neurological:      General: No focal deficit present. Mental Status: He is alert. Psychiatric:         Mood and Affect: Mood normal.         Behavior: Behavior normal.        BMI Counseling: Body mass index is 25.15 kg/m². The BMI is above normal. Nutrition recommendations include 3-5 servings of fruits/vegetables daily, moderation in carbohydrate intake, increasing intake of lean protein, and reducing intake of saturated fat and trans fat. Exercise recommendations include moderate aerobic physical activity for 150 minutes/week.      Jessica James MD

## 2023-10-16 NOTE — PATIENT INSTRUCTIONS
Medicare Preventive Visit Patient Instructions  Thank you for completing your Welcome to Medicare Visit or Medicare Annual Wellness Visit today. Your next wellness visit will be due in one year (10/16/2024). The screening/preventive services that you may require over the next 5-10 years are detailed below. Some tests may not apply to you based off risk factors and/or age. Screening tests ordered at today's visit but not completed yet may show as past due. Also, please note that scanned in results may not display below. Preventive Screenings:  Service Recommendations Previous Testing/Comments   Colorectal Cancer Screening  Colonoscopy    Fecal Occult Blood Test (FOBT)/Fecal Immunochemical Test (FIT)  Fecal DNA/Cologuard Test  Flexible Sigmoidoscopy Age: 43-73 years old   Colonoscopy: every 10 years (May be performed more frequently if at higher risk)  OR  FOBT/FIT: every 1 year  OR  Cologuard: every 3 years  OR  Sigmoidoscopy: every 5 years  Screening may be recommended earlier than age 39 if at higher risk for colorectal cancer. Also, an individualized decision between you and your healthcare provider will decide whether screening between the ages of 77-80 would be appropriate. Colonoscopy: 01/22/2021  FOBT/FIT: Not on file  Cologuard: Not on file  Sigmoidoscopy: Not on file          Prostate Cancer Screening Individualized decision between patient and health care provider in men between ages of 53-66   Medicare will cover every 12 months beginning on the day after your 50th birthday PSA: 3.89 ng/mL           Hepatitis C Screening Once for adults born between 1945 and 1965  More frequently in patients at high risk for Hepatitis C Hep C Antibody: 10/08/2019        Diabetes Screening 1-2 times per year if you're at risk for diabetes or have pre-diabetes Fasting glucose: 118 mg/dL (9/28/2022)  A1C: 5.9 % (6/26/2023)      Cholesterol Screening Once every 5 years if you don't have a lipid disorder.  May order more often based on risk factors. Lipid panel: 07/19/2023         Other Preventive Screenings Covered by Medicare:  Abdominal Aortic Aneurysm (AAA) Screening: covered once if your at risk. You're considered to be at risk if you have a family history of AAA or a male between the age of 70-76 who smoking at least 100 cigarettes in your lifetime. Lung Cancer Screening: covers low dose CT scan once per year if you meet all of the following conditions: (1) Age 48-67; (2) No signs or symptoms of lung cancer; (3) Current smoker or have quit smoking within the last 15 years; (4) You have a tobacco smoking history of at least 20 pack years (packs per day x number of years you smoked); (5) You get a written order from a healthcare provider. Glaucoma Screening: covered annually if you're considered high risk: (1) You have diabetes OR (2) Family history of glaucoma OR (3)  aged 48 and older OR (3)  American aged 72 and older  Osteoporosis Screening: covered every 2 years if you meet one of the following conditions: (1) Have a vertebral abnormality; (2) On glucocorticoid therapy for more than 3 months; (3) Have primary hyperparathyroidism; (4) On osteoporosis medications and need to assess response to drug therapy. HIV Screening: covered annually if you're between the age of 14-79. Also covered annually if you are younger than 13 and older than 72 with risk factors for HIV infection. For pregnant patients, it is covered up to 3 times per pregnancy.     Immunizations:  Immunization Recommendations   Influenza Vaccine Annual influenza vaccination during flu season is recommended for all persons aged >= 6 months who do not have contraindications   Pneumococcal Vaccine   * Pneumococcal conjugate vaccine = PCV13 (Prevnar 13), PCV15 (Vaxneuvance), PCV20 (Prevnar 20)  * Pneumococcal polysaccharide vaccine = PPSV23 (Pneumovax) Adults 79-73 yo with certain risk factors or if 69+ yo  If never received any pneumonia vaccine: recommend Prevnar 21 (PCV20)  Give PCV20 if previously received 1 dose of PCV13 or PPSV23   Hepatitis B Vaccine 3 dose series if at intermediate or high risk (ex: diabetes, end stage renal disease, liver disease)   Respiratory syncytial virus (RSV) Vaccine - COVERED BY MEDICARE PART D  * RSVPreF3 (Arexvy) CDC recommends that adults 61years of age and older may receive a single dose of RSV vaccine using shared clinical decision-making (SCDM)   Tetanus (Td) Vaccine - COST NOT COVERED BY MEDICARE PART B Following completion of primary series, a booster dose should be given every 10 years to maintain immunity against tetanus. Td may also be given as tetanus wound prophylaxis. Tdap Vaccine - COST NOT COVERED BY MEDICARE PART B Recommended at least once for all adults. For pregnant patients, recommended with each pregnancy. Shingles Vaccine (Shingrix) - COST NOT COVERED BY MEDICARE PART B  2 shot series recommended in those 19 years and older who have or will have weakened immune systems or those 50 years and older     Health Maintenance Due:      Topic Date Due   • Colorectal Cancer Screening  01/22/2024   • Hepatitis C Screening  Completed     Immunizations Due:      Topic Date Due   • COVID-19 Vaccine (4 - Booster for Moderna series) 02/09/2022   • Influenza Vaccine (1) 09/01/2023     Advance Directives   What are advance directives? Advance directives are legal documents that state your wishes and plans for medical care. These plans are made ahead of time in case you lose your ability to make decisions for yourself. Advance directives can apply to any medical decision, such as the treatments you want, and if you want to donate organs. What are the types of advance directives? There are many types of advance directives, and each state has rules about how to use them. You may choose a combination of any of the following:  Living will: This is a written record of the treatment you want.  You can also choose which treatments you do not want, which to limit, and which to stop at a certain time. This includes surgery, medicine, IV fluid, and tube feedings. Durable power of  for [facility]): This is a written record that states who you want to make healthcare choices for you when you are unable to make them for yourself. This person, called a proxy, is usually a family member or a friend. You may choose more than 1 proxy. Do not resuscitate (DNR) order:  A DNR order is used in case your heart stops beating or you stop breathing. It is a request not to have certain forms of treatment, such as CPR. A DNR order may be included in other types of advance directives. Medical directive: This covers the care that you want if you are in a coma, near death, or unable to make decisions for yourself. You can list the treatments you want for each condition. Treatment may include pain medicine, surgery, blood transfusions, dialysis, IV or tube feedings, and a ventilator (breathing machine). Values history: This document has questions about your views, beliefs, and how you feel and think about life. This information can help others choose the care that you would choose. Why are advance directives important? An advance directive helps you control your care. Although spoken wishes may be used, it is better to have your wishes written down. Spoken wishes can be misunderstood, or not followed. Treatments may be given even if you do not want them. An advance directive may make it easier for your family to make difficult choices about your care. Weight Management   Why it is important to manage your weight:  Being overweight increases your risk of health conditions such as heart disease, high blood pressure, type 2 diabetes, and certain types of cancer. It can also increase your risk for osteoarthritis, sleep apnea, and other respiratory problems. Aim for a slow, steady weight loss.  Even a small amount of weight loss can lower your risk of health problems. How to lose weight safely:  A safe and healthy way to lose weight is to eat fewer calories and get regular exercise. You can lose up about 1 pound a week by decreasing the number of calories you eat by 500 calories each day. Healthy meal plan for weight management:  A healthy meal plan includes a variety of foods, contains fewer calories, and helps you stay healthy. A healthy meal plan includes the following:  Eat whole-grain foods more often. A healthy meal plan should contain fiber. Fiber is the part of grains, fruits, and vegetables that is not broken down by your body. Whole-grain foods are healthy and provide extra fiber in your diet. Some examples of whole-grain foods are whole-wheat breads and pastas, oatmeal, brown rice, and bulgur. Eat a variety of vegetables every day. Include dark, leafy greens such as spinach, kale, aspen greens, and mustard greens. Eat yellow and orange vegetables such as carrots, sweet potatoes, and winter squash. Eat a variety of fruits every day. Choose fresh or canned fruit (canned in its own juice or light syrup) instead of juice. Fruit juice has very little or no fiber. Eat low-fat dairy foods. Drink fat-free (skim) milk or 1% milk. Eat fat-free yogurt and low-fat cottage cheese. Try low-fat cheeses such as mozzarella and other reduced-fat cheeses. Choose meat and other protein foods that are low in fat. Choose beans or other legumes such as split peas or lentils. Choose fish, skinless poultry (chicken or turkey), or lean cuts of red meat (beef or pork). Before you cook meat or poultry, cut off any visible fat. Use less fat and oil. Try baking foods instead of frying them. Add less fat, such as margarine, sour cream, regular salad dressing and mayonnaise to foods. Eat fewer high-fat foods. Some examples of high-fat foods include french fries, doughnuts, ice cream, and cakes. Eat fewer sweets.   Limit foods and drinks that are high in sugar. This includes candy, cookies, regular soda, and sweetened drinks. Exercise:  Exercise at least 30 minutes per day on most days of the week. Some examples of exercise include walking, biking, dancing, and swimming. You can also fit in more physical activity by taking the stairs instead of the elevator or parking farther away from stores. Ask your healthcare provider about the best exercise plan for you. © Copyright [employer] 2018 Information is for End User's use only and may not be sold, redistributed or otherwise used for commercial purposes.  All illustrations and images included in CareNotes® are the copyrighted property of [employer]., Inc. or  [employer]

## 2023-11-16 ENCOUNTER — APPOINTMENT (OUTPATIENT)
Dept: LAB | Facility: CLINIC | Age: 68
End: 2023-11-16
Payer: COMMERCIAL

## 2023-11-16 DIAGNOSIS — R73.03 PREDIABETES: ICD-10-CM

## 2023-11-16 DIAGNOSIS — I10 PRIMARY HYPERTENSION: ICD-10-CM

## 2023-11-16 DIAGNOSIS — E03.9 ACQUIRED HYPOTHYROIDISM: ICD-10-CM

## 2023-11-16 DIAGNOSIS — E78.00 PRIMARY HYPERCHOLESTEROLEMIA: ICD-10-CM

## 2023-11-16 DIAGNOSIS — D36.10 SCHWANNOMA: ICD-10-CM

## 2023-11-16 DIAGNOSIS — E55.9 VITAMIN D DEFICIENCY: ICD-10-CM

## 2023-11-16 LAB
25(OH)D3 SERPL-MCNC: 36.4 NG/ML (ref 30–100)
ALBUMIN SERPL BCP-MCNC: 4.3 G/DL (ref 3.5–5)
ALP SERPL-CCNC: 57 U/L (ref 34–104)
ALT SERPL W P-5'-P-CCNC: 14 U/L (ref 7–52)
ANION GAP SERPL CALCULATED.3IONS-SCNC: 7 MMOL/L
AST SERPL W P-5'-P-CCNC: 18 U/L (ref 13–39)
BASOPHILS # BLD AUTO: 0.03 THOUSANDS/ÂΜL (ref 0–0.1)
BASOPHILS NFR BLD AUTO: 0 % (ref 0–1)
BILIRUB SERPL-MCNC: 0.55 MG/DL (ref 0.2–1)
BUN SERPL-MCNC: 17 MG/DL (ref 5–25)
CALCIUM SERPL-MCNC: 9.6 MG/DL (ref 8.4–10.2)
CHLORIDE SERPL-SCNC: 107 MMOL/L (ref 96–108)
CHOLEST SERPL-MCNC: 174 MG/DL
CO2 SERPL-SCNC: 30 MMOL/L (ref 21–32)
CREAT SERPL-MCNC: 0.6 MG/DL (ref 0.6–1.3)
EOSINOPHIL # BLD AUTO: 0.22 THOUSAND/ÂΜL (ref 0–0.61)
EOSINOPHIL NFR BLD AUTO: 3 % (ref 0–6)
ERYTHROCYTE [DISTWIDTH] IN BLOOD BY AUTOMATED COUNT: 14 % (ref 11.6–15.1)
EST. AVERAGE GLUCOSE BLD GHB EST-MCNC: 137 MG/DL
GFR SERPL CREATININE-BSD FRML MDRD: 102 ML/MIN/1.73SQ M
GLUCOSE P FAST SERPL-MCNC: 101 MG/DL (ref 65–99)
HBA1C MFR BLD: 6.4 %
HCT VFR BLD AUTO: 44.4 % (ref 36.5–49.3)
HDLC SERPL-MCNC: 44 MG/DL
HGB BLD-MCNC: 14.4 G/DL (ref 12–17)
IMM GRANULOCYTES # BLD AUTO: 0.04 THOUSAND/UL (ref 0–0.2)
IMM GRANULOCYTES NFR BLD AUTO: 1 % (ref 0–2)
LDLC SERPL CALC-MCNC: 110 MG/DL (ref 0–100)
LYMPHOCYTES # BLD AUTO: 2.15 THOUSANDS/ÂΜL (ref 0.6–4.47)
LYMPHOCYTES NFR BLD AUTO: 32 % (ref 14–44)
MCH RBC QN AUTO: 32.1 PG (ref 26.8–34.3)
MCHC RBC AUTO-ENTMCNC: 32.4 G/DL (ref 31.4–37.4)
MCV RBC AUTO: 99 FL (ref 82–98)
MONOCYTES # BLD AUTO: 0.45 THOUSAND/ÂΜL (ref 0.17–1.22)
MONOCYTES NFR BLD AUTO: 7 % (ref 4–12)
NEUTROPHILS # BLD AUTO: 3.94 THOUSANDS/ÂΜL (ref 1.85–7.62)
NEUTS SEG NFR BLD AUTO: 57 % (ref 43–75)
NRBC BLD AUTO-RTO: 0 /100 WBCS
PLATELET # BLD AUTO: 191 THOUSANDS/UL (ref 149–390)
PMV BLD AUTO: 10.8 FL (ref 8.9–12.7)
POTASSIUM SERPL-SCNC: 4.3 MMOL/L (ref 3.5–5.3)
PROT SERPL-MCNC: 6.6 G/DL (ref 6.4–8.4)
RBC # BLD AUTO: 4.48 MILLION/UL (ref 3.88–5.62)
SODIUM SERPL-SCNC: 144 MMOL/L (ref 135–147)
TRIGL SERPL-MCNC: 101 MG/DL
TSH SERPL DL<=0.05 MIU/L-ACNC: 1.2 UIU/ML (ref 0.45–4.5)
WBC # BLD AUTO: 6.83 THOUSAND/UL (ref 4.31–10.16)

## 2023-11-16 PROCEDURE — 80053 COMPREHEN METABOLIC PANEL: CPT

## 2023-11-16 PROCEDURE — 85025 COMPLETE CBC W/AUTO DIFF WBC: CPT

## 2023-11-16 PROCEDURE — 80061 LIPID PANEL: CPT

## 2023-11-16 PROCEDURE — 82306 VITAMIN D 25 HYDROXY: CPT

## 2023-11-16 PROCEDURE — 83036 HEMOGLOBIN GLYCOSYLATED A1C: CPT

## 2023-11-16 PROCEDURE — 84443 ASSAY THYROID STIM HORMONE: CPT

## 2023-11-16 PROCEDURE — 36415 COLL VENOUS BLD VENIPUNCTURE: CPT

## 2023-11-28 ENCOUNTER — TELEPHONE (OUTPATIENT)
Dept: FAMILY MEDICINE CLINIC | Facility: CLINIC | Age: 68
End: 2023-11-28

## 2023-11-28 NOTE — TELEPHONE ENCOUNTER
Patient called back is interested in starting statin therapy per his most recent lab results. He called cardiology for a follow up but they do not have any available appointment until late January. He would like to start something ahead of this appointment. Please advise, pharmacy [facility] [place].

## 2023-12-01 DIAGNOSIS — E78.5 DYSLIPIDEMIA: Primary | ICD-10-CM

## 2023-12-01 RX ORDER — ROSUVASTATIN CALCIUM 5 MG/1
5 TABLET, COATED ORAL DAILY
Qty: 90 TABLET | Refills: 3 | Status: SHIPPED | OUTPATIENT
Start: 2023-12-01

## 2023-12-01 RX ORDER — ROSUVASTATIN CALCIUM 5 MG/1
5 TABLET, COATED ORAL DAILY
Qty: 90 TABLET | Refills: 3 | Status: SHIPPED | OUTPATIENT
Start: 2023-12-01 | End: 2023-12-01 | Stop reason: SDUPTHER

## 2023-12-20 ENCOUNTER — HOSPITAL ENCOUNTER (OUTPATIENT)
Dept: BONE DENSITY | Facility: CLINIC | Age: 68
Discharge: HOME | End: 2023-12-20
Payer: COMMERCIAL

## 2023-12-20 DIAGNOSIS — M81.0 OSTEOPOROSIS WITHOUT CURRENT PATHOLOGICAL FRACTURE, UNSPECIFIED OSTEOPOROSIS TYPE: ICD-10-CM

## 2023-12-20 PROCEDURE — 77080 DXA BONE DENSITY AXIAL: CPT

## 2024-01-01 NOTE — PROGRESS NOTES
PSA ORDER PLACED Qian Carrera is a 41 year old female presenting to the walk-in clinic today for foul odored urine for over a week, also having some lower back pain     Pt has suprapubic catheter. Afebrile, no chills    Swabs/Specimens collected during rooming process:  Urine    Patient would like communication of their results via:   [employer]

## 2024-01-03 ENCOUNTER — APPOINTMENT (OUTPATIENT)
Dept: LAB | Facility: CLINIC | Age: 69
End: 2024-01-03
Payer: COMMERCIAL

## 2024-01-03 ENCOUNTER — OFFICE VISIT (OUTPATIENT)
Dept: FAMILY MEDICINE CLINIC | Facility: CLINIC | Age: 69
End: 2024-01-03
Payer: COMMERCIAL

## 2024-01-03 VITALS
DIASTOLIC BLOOD PRESSURE: 70 MMHG | WEIGHT: 189.8 LBS | SYSTOLIC BLOOD PRESSURE: 128 MMHG | HEIGHT: 73 IN | BODY MASS INDEX: 25.15 KG/M2 | OXYGEN SATURATION: 97 % | HEART RATE: 67 BPM | TEMPERATURE: 97.5 F

## 2024-01-03 DIAGNOSIS — M86.662 CHRONIC OSTEOMYELITIS OF LEFT LOWER LEG (HCC): ICD-10-CM

## 2024-01-03 DIAGNOSIS — C61 PROSTATE CANCER (HCC): ICD-10-CM

## 2024-01-03 DIAGNOSIS — Z80.42 FAMILY HISTORY OF MALIGNANT NEOPLASM OF PROSTATE: ICD-10-CM

## 2024-01-03 DIAGNOSIS — M81.0 OSTEOPOROSIS WITHOUT CURRENT PATHOLOGICAL FRACTURE, UNSPECIFIED OSTEOPOROSIS TYPE: ICD-10-CM

## 2024-01-03 DIAGNOSIS — G40.909 NONINTRACTABLE EPILEPSY WITHOUT STATUS EPILEPTICUS, UNSPECIFIED EPILEPSY TYPE (HCC): Primary | ICD-10-CM

## 2024-01-03 DIAGNOSIS — R25.2 MUSCLE CRAMPS: ICD-10-CM

## 2024-01-03 DIAGNOSIS — I71.21 ANEURYSM OF ASCENDING AORTA WITHOUT RUPTURE (HCC): ICD-10-CM

## 2024-01-03 LAB — PSA SERPL-MCNC: 3.29 NG/ML (ref 0–4)

## 2024-01-03 PROCEDURE — 84153 ASSAY OF PSA TOTAL: CPT

## 2024-01-03 PROCEDURE — 99214 OFFICE O/P EST MOD 30 MIN: CPT | Performed by: FAMILY MEDICINE

## 2024-01-03 PROCEDURE — 36415 COLL VENOUS BLD VENIPUNCTURE: CPT

## 2024-01-03 NOTE — PROGRESS NOTES
Assessment/Plan:    No problem-specific Assessment & Plan notes found for this encounter.       Diagnoses and all orders for this visit:    Nonintractable epilepsy without status epilepticus, unspecified epilepsy type (HCC)  Comments:  Previously on Dilatin, has been seizure free for decades    Chronic osteomyelitis of left lower leg ([facility])  Comments:  Patient underwent surgery in the past for this    Aneurysm of ascending aorta without rupture ([facility])  Comments:  Currently following with cardiology    Prostate cancer ([facility])  Comments:  Follows with Urology    Osteoporosis without current pathological fracture, unspecified osteoporosis type  Comments:  Most recent DXA scan reviewed; patient previously on Fosamax but stopped taking it. Intersted in Prolia injections; order placed  Orders:  -     denosumab (PROLIA) 60 mg/mL; Inject 1 mL (60 mg total) under the skin once for 1 dose    Muscle cramps  Comments:  May be related to crestor, advised to start taking every other day and report back with a status update          Subjective:      Patient ID: Haider Prather is a 68 y.o. male.    HPI    Haider Prather is a very pleasant 68 year old male who presents today for a follow up. Patient does report that he has been experiencing some leg cramps recently, he is unsure if it coincided with starting crestor. He also states that he has stopped taking fosamax and is interested in taking Prolia injections instead. He continues to follow with Orthopedics and is due to have a lumbar transforaminal epidural steroid injection.    The following portions of the patient's history were reviewed and updated as appropriate: allergies, current medications, past family history, past medical history, past social history, past surgical history, and problem list.    Review of Systems   Constitutional: Negative.    HENT: Negative.     Eyes: Negative.    Respiratory: Negative.     Cardiovascular: Negative.    Gastrointestinal: Negative.   "  Musculoskeletal:  Positive for arthralgias, back pain and myalgias.   Skin: Negative.    Neurological: Negative.    Psychiatric/Behavioral: Negative.           Objective:      /70 (BP Location: Left arm, Patient Position: Sitting, Cuff Size: Standard)   Pulse 67   Temp 97.5 °F (36.4 °C) (Temporal)   Ht 6' 1\" (1.854 m)   Wt 86.1 kg (189 lb 12.8 oz)   SpO2 97%   BMI 25.04 kg/m²          Physical Exam  Constitutional:       General: He is not in acute distress.     Appearance: He is not ill-appearing.   HENT:      Head: Normocephalic and atraumatic.   Cardiovascular:      Rate and Rhythm: Normal rate.   Pulmonary:      Effort: No respiratory distress.   Neurological:      General: No focal deficit present.      Mental Status: He is alert.   Psychiatric:         Mood and Affect: Mood normal.         Behavior: Behavior normal.           "

## 2024-01-09 ENCOUNTER — HOSPITAL ENCOUNTER (OUTPATIENT)
Dept: ULTRASOUND IMAGING | Facility: HOSPITAL | Age: 69
Discharge: HOME | End: 2024-01-09
Attending: UROLOGY
Payer: COMMERCIAL

## 2024-01-09 DIAGNOSIS — R31.1 BENIGN ESSENTIAL MICROSCOPIC HEMATURIA: ICD-10-CM

## 2024-01-09 PROCEDURE — 76775 US EXAM ABDO BACK WALL LIM: CPT

## 2024-01-11 ENCOUNTER — TELEPHONE (OUTPATIENT)
Age: 69
End: 2024-01-11

## 2024-01-11 NOTE — TELEPHONE ENCOUNTER
Call from pharmacy inquiring if fax was received for pre authorization regarding    denosumab (PROLIA) 60 mg/mL [[id number]]  ENDED    Order Details  Dose: 60 mg   Phone call disconnected before getting a call back number

## 2024-01-11 NOTE — TELEPHONE ENCOUNTER
Patient reports his pharmacy reached out to him twice to inform him that the office has 48 hours to complete the PA for his medication. Patient would like a call back to discuss.

## 2024-01-12 ENCOUNTER — TELEPHONE (OUTPATIENT)
Age: 69
End: 2024-01-12

## 2024-01-24 ENCOUNTER — OFFICE VISIT (OUTPATIENT)
Dept: CARDIOLOGY CLINIC | Facility: CLINIC | Age: 69
End: 2024-01-24
Payer: COMMERCIAL

## 2024-01-24 ENCOUNTER — OFFICE VISIT (OUTPATIENT)
Dept: FAMILY MEDICINE CLINIC | Facility: CLINIC | Age: 69
End: 2024-01-24
Payer: COMMERCIAL

## 2024-01-24 VITALS
DIASTOLIC BLOOD PRESSURE: 56 MMHG | TEMPERATURE: 97.1 F | WEIGHT: 192.8 LBS | HEIGHT: 72 IN | HEART RATE: 65 BPM | SYSTOLIC BLOOD PRESSURE: 100 MMHG | BODY MASS INDEX: 26.11 KG/M2 | OXYGEN SATURATION: 95 %

## 2024-01-24 VITALS
DIASTOLIC BLOOD PRESSURE: 66 MMHG | WEIGHT: 190.8 LBS | SYSTOLIC BLOOD PRESSURE: 114 MMHG | HEIGHT: 73 IN | BODY MASS INDEX: 25.29 KG/M2 | HEART RATE: 57 BPM

## 2024-01-24 DIAGNOSIS — N28.1 KIDNEY CYSTS: ICD-10-CM

## 2024-01-24 DIAGNOSIS — I25.84 CORONARY ARTERY CALCIFICATION: Primary | ICD-10-CM

## 2024-01-24 DIAGNOSIS — I10 PRIMARY HYPERTENSION: ICD-10-CM

## 2024-01-24 DIAGNOSIS — K76.0 HEPATIC STEATOSIS: Primary | ICD-10-CM

## 2024-01-24 DIAGNOSIS — I65.23 BILATERAL CAROTID ARTERY STENOSIS: ICD-10-CM

## 2024-01-24 DIAGNOSIS — E78.00 PRIMARY HYPERCHOLESTEROLEMIA: ICD-10-CM

## 2024-01-24 DIAGNOSIS — I25.10 CORONARY ARTERY CALCIFICATION: Primary | ICD-10-CM

## 2024-01-24 DIAGNOSIS — I71.21 ANEURYSM OF ASCENDING AORTA WITHOUT RUPTURE (HCC): ICD-10-CM

## 2024-01-24 PROCEDURE — 99214 OFFICE O/P EST MOD 30 MIN: CPT | Performed by: FAMILY MEDICINE

## 2024-01-24 PROCEDURE — 99214 OFFICE O/P EST MOD 30 MIN: CPT | Performed by: INTERNAL MEDICINE

## 2024-01-24 PROCEDURE — 93000 ELECTROCARDIOGRAM COMPLETE: CPT | Performed by: INTERNAL MEDICINE

## 2024-01-24 RX ORDER — PERFLUOROHEXYLOCTANE 1 MG/MG
1 SOLUTION OPHTHALMIC EVERY 6 HOURS
COMMUNITY
Start: 2024-01-09

## 2024-01-24 NOTE — PROGRESS NOTES
[facility]  [address] [place] [address]  Phone#  [phone]   Fax#  [phone]  *-*-*-*-*-*-*-*-*-*-*-*-*-*-*-*-*-*-*-*-*-*-*-*-*-*-*-*-*-*-*-*-*-*-*-*-*-*-*-*-*-*-*-*-*-*-*-*-*-*-*-*-*-*                                   Cardiology Follow Up      ENCOUNTER DATE: 24 9:32 AM  PATIENT NAME: Haider Prather   : 1955    MRN: [id number]  AGE:69 y.o.      SEX: male  ENCOUNTER PROVIDER:Arron Acuña MD     PRIMARY CARE PHYSICIAN: Margarita Bar MD    ACTIVE DIAGNOSIS THIS VISIT  1. Coronary artery calcification  POCT ECG      2. Primary hypercholesterolemia        3. Primary hypertension        4. Aneurysm of ascending aorta without rupture (HCC)  CT chest without contrast      5. Bilateral carotid artery stenosis < 50%          ACTIVE PROBLEM LIST  Patient Active Problem List   Diagnosis    Allergic rhinitis    Spinal stenosis    Degeneration of intervertebral disc of cervical region    Hypothyroidism    Muscle pain, myofacial    Neuropathy    Nontoxic multinodular goiter    Occipital neuralgia    Venous insufficiency    Chondromalacia patellae    Chronic osteomyelitis of lower leg (HCC)    Generalized osteoarthritis    Anxiety    Reactive depression    BPH without obstruction/lower urinary tract symptoms    Post traumatic stress disorder (PTSD)    Vitamin D deficiency    Varicose veins of bilateral lower extremities with other complications    Osteoporosis without current pathological fracture    Bilateral carotid artery stenosis < 50%    PAC (premature atrial contraction)    PVC (premature ventricular contraction)    Primary hypercholesterolemia    Macrocytosis without anemia    Chronic neck pain    Thoracic aortic aneurysm without rupture (HCC)    Nonrheumatic mitral valve regurgitation    Bilateral enlargement of atria    Coronary artery calcification    Prostate cancer (HCC)    Mass of right parotid gland    History of osteomyelitis    Renal cysts, acquired,  bilateral    Primary hypertension    Lumbar spine tumor    Schwannoma    Nonintractable epilepsy without status epilepticus, unspecified epilepsy type (HCC)       CARDIOLOGY SPECIALTY COMMENTS  Patient 1st seen on 2021. Recently he has been going for frequent walks for about 45 minutes at a leisurely pace.  When he comes home, he lays on the floor to cool down and relax.  He becomes nauseous but does not vomit although he often feels like he might vomit.  He becomes diaphoretic and his heart races and pounds.   He has no specific chest discomfort.         He has a history of smoking up to a pack a day for 2-3 years in the early 80s and has not smoked since.  His father may have had a heart attack.  He had peripheral vascular disease and fell down a flight of stairs and was noted to be .  He had an uncle who had CABG.  He is a retired  from [employer].        2021 echocardiogram:  Normal LV systolic and diastolic function EF 55 percent.  Mild biatrial dilatation.  Mild-to-moderate mitral regurgitation.  Normal pulmonary artery pressures.  Ascending aortic aneurysm.      2021 Carotid ultrasound demonstrated less than 50% stenosis bilaterally.       2021 CTA of the chest demonstrated enlarged ascending aorta at 41 mm.  There was evidence of coronary artery calcification.  Patient was advised not to lift more than 25 lb due to the ascending aortic aneurysm.     2021 nuclear stress test:. Ventricular ectopy as described with exercise   Low normal left ventricular systolic function, EF 52% with mild left ventricular cavity enlargement   Normal tomographic perfusion series with inferior diaphragmatic attenuation.     10/07/2021 Holter monitor: Heart rate  beats per minute averaging 66 bpm. Rare ventricular ectopy. Occasional supraventricular ectopy. Two 3 beat runs of SVT, fastest 152 beats per minute. Intermittent 1st degree AV block and 1  episode of second-degree AV block Mobitz type 1. Longest RR 1.9 seconds    11/05/2021 MRA of the chest: Mildly ectatic ascending aorta maximum diameter 40 mm. Remainder the aorta normal in caliber.    6/28/2022 CT of the chest without contrast: Mild ectasia of the ascending aorta at 4.1 cm unchanged from previous.    INTERVAL HISTORY:        Patient had a right knee replaced since his last visit.  He is making a good recovery although the physical therapy has been very difficult.  He was started on a statin by his family physician.  Ever since he began the statin, he has been having cramps in both his right and left leg when he gets up in the morning or if he gets up after sitting for a while in the chair.  He did not used to have this discomfort.  He otherwise has no complaints.  His blood pressure is excellent.  His cholesterol could be a little better but it is not majorly elevated.    Patient denies chest discomfort or shortness of breath.  Patient has no palpitations.  Patient denies symptoms of dizziness, lightheadedness or near-syncope/syncope.  Patient denies leg edema.  Patient denies symptoms of orthopnea or paroxysmal nocturnal dyspnea.      DISCUSSION/PLAN:          Stop rosuvastatin for 1 month and see if cramps go away.  Then start rosuvastatin again and see if the cramps come back.  CT of the chest without contrast for ascending aortic aneurysm  Return in 2 months to decide what would be the best approach for his cholesterol.  Patient may be able to control his cholesterol with diet and is added by    Lab Studies:    Lab Results   Component Value Date    CHOLESTEROL 174 11/16/2023    CHOLESTEROL 175 07/19/2023    CHOLESTEROL 196 02/24/2023     Lab Results   Component Value Date    TRIG 101 11/16/2023    TRIG 94 07/19/2023    TRIG 69 02/24/2023     Lab Results   Component Value Date    HDL 44 11/16/2023    HDL 54 07/19/2023    HDL 60 02/24/2023     Lab Results   Component Value Date    LDLCALC 110 (H)  11/16/2023    LDLCALC 102 (H) 07/19/2023    LDLCALC 122 (H) 02/24/2023       Lab Results   Component Value Date    HGBA1C 6.4 (H) 11/16/2023      Lab Results   Component Value Date    EGFR 102 11/16/2023    EGFR 98 03/29/2023    EGFR 104 12/20/2022    SODIUM 144 11/16/2023    SODIUM 141 12/20/2022    SODIUM 139 12/07/2022    K 4.3 11/16/2023    K 3.5 12/20/2022    K 3.7 12/07/2022     11/16/2023     (H) 12/20/2022     12/07/2022    CO2 30 11/16/2023    CO2 27 12/20/2022    CO2 30 12/07/2022    ANIONGAP 7 07/31/2015    ANIONGAP 9 02/24/2015    ANIONGAP 9 02/06/2015    BUN 17 11/16/2023    BUN 14 03/29/2023    BUN 14 12/20/2022    CREATININE 0.60 11/16/2023    CREATININE 0.67 03/29/2023    CREATININE 0.59 (L) 12/20/2022     Lab Results   Component Value Date    WBC 6.83 11/16/2023    WBC 12.80 (H) 12/20/2022    WBC 8.57 12/07/2022    HGB 14.4 11/16/2023    HGB 12.5 12/20/2022    HGB 14.2 12/07/2022    HCT 44.4 11/16/2023    HCT 37.7 12/20/2022    HCT 44.3 12/07/2022    MCV 99 (H) 11/16/2023     (H) 12/20/2022     (H) 12/07/2022    MCH 32.1 11/16/2023    MCH 33.0 12/20/2022    MCH 32.3 12/07/2022    MCHC 32.4 11/16/2023    MCHC 33.2 12/20/2022    MCHC 32.1 12/07/2022     11/16/2023     12/20/2022     12/19/2022      Lab Results   Component Value Date    GLUCOSE 106 07/31/2015    GLUCOSE 119 02/24/2015    GLUCOSE 105 02/06/2015    CALCIUM 9.6 11/16/2023    CALCIUM 8.9 12/20/2022    CALCIUM 9.6 12/07/2022    AST 18 11/16/2023    AST 22 12/07/2022    AST 18 09/28/2022    ALT 14 11/16/2023    ALT 29 12/07/2022    ALT 26 09/28/2022    ALKPHOS 57 11/16/2023    ALKPHOS 57 12/07/2022    ALKPHOS 60 09/28/2022    PROT 7.2 07/31/2015    PROT 6.7 12/19/2014    BILITOT 0.60 07/31/2015    BILITOT 0.49 12/19/2014    MG 2.3 02/17/2022     Lab Results   Component Value Date    FREET4 1.4 07/31/2015       Results for orders placed or performed in visit on 01/24/24   POCT ECG     Narrative    Sinus bradycardia with premature atrial complexes at a rate of 57 bpm.  Normal EKG.         Current Outpatient Medications:     acetaminophen (TYLENOL) 500 mg tablet, Take 2 tablets (1,000 mg total) by mouth every 8 (eight) hours as needed for moderate pain, mild pain or fever, Disp: , Rfl: 0    desloratadine (CLARINEX) 5 MG tablet, Take 1 tablet (5 mg total) by mouth daily, Disp: 90 tablet, Rfl: 3    doxycycline (ADOXA) 50 MG tablet, TAKE 1 TABLET BY ORAL ROUTE EVERY DAY WITH MEAL X3 MONTHS THEN MW, Disp: , Rfl:     fluticasone (FLONASE) 50 mcg/act nasal spray, 2 sprays into each nostril daily, Disp: 48 g, Rfl: 3    levothyroxine 100 mcg tablet, daily, Disp: , Rfl:     metroNIDAZOLE (METROGEL) 0.75 % gel, Apply topically 2 (two) times a day, Disp: , Rfl:     pantoprazole (PROTONIX) 40 mg tablet, Take 1 tablet (40 mg total) by mouth daily (Patient taking differently: Take 40 mg by mouth if needed), Disp: 30 tablet, Rfl: 2    Perfluorohexyloctane (Miebo) 1.338 GM/ML SOLN, Apply 1 drop to eye every 6 (six) hours, Disp: , Rfl:     RESTASIS MULTIDOSE 0.05 % ophthalmic emulsion, INSTILL 1 DROP INTO BOTH EYES TWICE A DAY, Disp: , Rfl: 3    rosuvastatin (CRESTOR) 5 mg tablet, Take 1 tablet (5 mg total) by mouth daily, Disp: 90 tablet, Rfl: 3    tamsulosin (FLOMAX) 0.4 mg, Take 0.4 mg by mouth 2 (two) times a day, Disp: , Rfl:     tazarotene (AVAGE) 0.1 % cream, Apply topically daily at bedtime, Disp: , Rfl:     denosumab (PROLIA) 60 mg/mL, Inject 1 mL (60 mg total) under the skin once for 1 dose, Disp: 1 mL, Rfl: 0    mupirocin (BACTROBAN) 2 % ointment, apply to NOSTRILS twice a day - BEGIN 5 DAYS PRIOR TO SURGERY, Disp: , Rfl:   Allergies   Allergen Reactions    Amoxicillin-Pot Clavulanate Rash, Vomiting and Nausea Only       Past Medical History:   Diagnosis Date    Anxiety     Arthritis     Bleeding tendency (HCC)     BPH (benign prostatic hyperplasia)     Broken bones     left leg    Cancer (HCC)      Cataract     Chronic neck pain     Epilepsy (HCC)     Hypothyroidism     Neoplasm of unspecified behavior of bone, soft tissue, and skin     Osteomyelitis (HCC)     PONV (postoperative nausea and vomiting)     Prostate cancer (HCC)     Skin cancer     Squamous carcinoma 2014    Thyroid condition     Thyroid disease      Social History     Socioeconomic History    Marital status: Legally      Spouse name: Not on file    Number of children: Not on file    Years of education: Not on file    Highest education level: Not on file   Occupational History    Occupation: Teacher    Occupation: retired   Tobacco Use    Smoking status: Former     Current packs/day: 0.00     Types: Cigarettes     Quit date:      Years since quittin.0    Smokeless tobacco: Never   Vaping Use    Vaping status: Never Used   Substance and Sexual Activity    Alcohol use: Yes     Comment: being a social drinker    Drug use: Not Currently     Types: Marijuana     Comment: Medical marijuana    Sexual activity: Yes     Comment: no known std risk factors   Other Topics Concern    Not on file   Social History Narrative    Daily coffee consumption (3 cups/day)     Social Determinants of Health     Financial Resource Strain: Low Risk  (10/15/2023)    Overall Financial Resource Strain (CARDIA)     Difficulty of Paying Living Expenses: Not hard at all   Food Insecurity: No Food Insecurity (2023)    Received from [facility]    Hunger Vital Sign     Worried About Running Out of Food in the Last Year: Never true     Ran Out of Food in the Last Year: Never true   Transportation Needs: No Transportation Needs (10/15/2023)    PRAPARE - Transportation     Lack of Transportation (Medical): No     Lack of Transportation (Non-Medical): No   Physical Activity: Not on file   Stress: Not on file   Social Connections: Not on file   Intimate Partner Violence: Not At Risk (2023)    [facility]    Humiliation, Afraid, Rape, and Kick questionnaire     Fear of Current or Ex-Partner: No     Emotionally Abused: No     Physically Abused: No     Sexually Abused: No   Housing Stability: Low Risk  (7/25/2023)    Received from [facility]    Housing Stability Vital Sign     Unable to Pay for Housing in the Last Year: No     Number of Places Lived in the Last Year: 1     Unstable Housing in the Last Year: No      Family History   Problem Relation Age of Onset    Bone cancer Mother     Other Father         Accident    Dementia Other     Cancer Other      Past Surgical History:   Procedure Laterality Date    ARTHRODESIS      H/o Arthrodesis Cervical to C2;  last assessed [id number]    CERVICAL FUSION      COLONOSCOPY  01/22/2021    COLONOSCOPY      KNEE SURGERY Right 12/22/2017    LEG SURGERY Left 1999    Hardware placed in lower leg bone(s)    LEG SURGERY Left 01/01/2006 2006, 2012-Left leg vascular    MS LAMINECTOMY BX/EXC ISPI DAKOTAH XDRL LUMBAR N/A 12/19/2022    Procedure: L2/3 decompressive laminectomy and biopsy (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;  Surgeon: Toño Contreras MD;  Location: [facility];  Service: Neurosurgery    THYROID SURGERY Right 2013    US GUIDED PROSTATE BIOPSY  07/13/2021       PREVIOUS WEIGHTS:   Wt Readings from Last 10 Encounters:   01/24/24 86.5 kg (190 lb 12.8 oz)   01/03/24 86.1 kg (189 lb 12.8 oz)   10/16/23 86.5 kg (190 lb 9.6 oz)   07/21/23 85.2 kg (187 lb 12.8 oz)   07/12/23 87.2 kg (192 lb 3.2 oz)   07/03/23 86.6 kg (191 lb)   04/13/23 83.2 kg (183 lb 6.4 oz)   04/06/23 83.9 kg (185 lb)   02/16/23 83.3 kg (183 lb 9.6 oz)   01/03/23 83.9 kg (185 lb)        Review of Systems:  Review of Systems   Constitutional:  Negative for activity change.   Respiratory:  Negative for cough, chest tightness, shortness of breath and wheezing.    Cardiovascular:  Negative for chest pain, palpitations and leg swelling.   Musculoskeletal:  Negative  "for gait problem.   Skin:  Negative for color change.   Neurological:  Negative for dizziness, tremors, syncope, weakness, light-headedness and headaches.   Psychiatric/Behavioral:  Negative for agitation and confusion.        Physical Exam:  /66 (BP Location: Left arm, Patient Position: Sitting, Cuff Size: Adult)   Pulse 57   Ht 6' 1\" (1.854 m)   Wt 86.5 kg (190 lb 12.8 oz)   BMI 25.17 kg/m²     Physical Exam  Vitals reviewed.   Constitutional:       General: He is not in acute distress.     Appearance: He is well-developed.   HENT:      Head: Normocephalic and atraumatic.   Neck:      Thyroid: No thyromegaly.      Vascular: No carotid bruit or JVD.      Trachea: No tracheal deviation.   Cardiovascular:      Rate and Rhythm: Normal rate and regular rhythm.      Pulses: Normal pulses.      Heart sounds: Normal heart sounds. No murmur heard.     No friction rub. No gallop.   Pulmonary:      Effort: Pulmonary effort is normal. No respiratory distress.      Breath sounds: Normal breath sounds. No wheezing, rhonchi or rales.   Chest:      Chest wall: No tenderness.   Musculoskeletal:      Cervical back: Normal range of motion and neck supple.      Right lower leg: No edema.      Left lower leg: No edema.   Skin:     General: Skin is warm and dry.   Neurological:      General: No focal deficit present.      Mental Status: He is alert and oriented to person, place, and time.   Psychiatric:         Mood and Affect: Mood normal.         Behavior: Behavior normal.         Thought Content: Thought content normal.         Judgment: Judgment normal.       ======================================================  Imaging:   I have personally reviewed pertinent reports.      Portions of the record may have been created with voice recognition software. Occasional wrong word or \"sound a like\" substitutions may have occurred due to the inherent limitations of voice recognition software. Read the chart carefully and " recognize, using context, where substitutions have occurred.    SIGNATURES:   Arron Acuña MD

## 2024-01-24 NOTE — PROGRESS NOTES
Assessment/Plan:    - Ultrasound kidney and bladder reviewed. Discussed that mainstay of treatment for hepatic steatosis is weight loss with dietary and lifestyle modifications. Will also obtain dedicated ultrasound of the liver for further evaluation  - Patient also noted to have a 2.3 x 2.3 x 2.5 cm mildly complicated cyst lower pole right kidney and 2.1 x 0.8 x 1.9 cm mildly complicated cyst lower pole left kidney which appear to be benign.  - Crestor discontinued temporarily due to side effects, follow up with cardiology as scheduled. Continue diet and lifestyle modifications        Diagnoses and all orders for this visit:    Hepatic steatosis  -     US right upper quadrant; Future    Kidney cysts    Primary hypercholesterolemia          Subjective:      Patient ID: Haider Prather is a 69 y.o. male.    HPI    Haider Prather is a very pleasant 69 year old male who presents today to discuss recent ultrasound results. Patient had an ultrasound of the kidney and bladder as ordered by urology which showed two cysts of the kidneys as well as fatty liver which he is concerned about. At previous office visit he was advised to take crestor every other day however reports that he was still experiencing muscle aches. He did see cardiology earlier today who advised him to stop taking the crestor for one month to see if it helps with the muscle aches.     The following portions of the patient's history were reviewed and updated as appropriate: allergies, current medications, past family history, past medical history, past social history, past surgical history, and problem list.    Review of Systems   Constitutional: Negative.    HENT: Negative.     Eyes: Negative.    Respiratory: Negative.     Cardiovascular: Negative.    Gastrointestinal: Negative.    Musculoskeletal:  Positive for myalgias.   Skin: Negative.    Neurological: Negative.    Psychiatric/Behavioral: Negative.           Objective:      /56 (BP  "Location: Left arm, Patient Position: Sitting, Cuff Size: Standard)   Pulse 65   Temp (!) 97.1 °F (36.2 °C) (Temporal)   Ht 6' 0.1\" (1.831 m)   Wt 87.5 kg (192 lb 12.8 oz)   SpO2 95%   BMI 26.08 kg/m²          Physical Exam  Constitutional:       General: He is not in acute distress.     Appearance: He is not ill-appearing.   HENT:      Head: Normocephalic and atraumatic.   Eyes:      General:         Right eye: No discharge.         Left eye: No discharge.      Extraocular Movements: Extraocular movements intact.   Cardiovascular:      Rate and Rhythm: Normal rate.   Pulmonary:      Effort: Pulmonary effort is normal. No respiratory distress.   Neurological:      General: No focal deficit present.      Mental Status: He is alert.   Psychiatric:         Mood and Affect: Mood normal.         Behavior: Behavior normal.           "

## 2024-01-30 ENCOUNTER — HOSPITAL ENCOUNTER (OUTPATIENT)
Dept: ULTRASOUND IMAGING | Facility: HOSPITAL | Age: 69
Discharge: HOME | End: 2024-01-30
Payer: COMMERCIAL

## 2024-01-30 DIAGNOSIS — K76.0 HEPATIC STEATOSIS: ICD-10-CM

## 2024-01-30 PROCEDURE — 76705 ECHO EXAM OF ABDOMEN: CPT

## 2024-02-04 ENCOUNTER — HOSPITAL ENCOUNTER (OUTPATIENT)
Dept: CT IMAGING | Facility: HOSPITAL | Age: 69
Discharge: HOME | End: 2024-02-04
Attending: INTERNAL MEDICINE
Payer: COMMERCIAL

## 2024-02-04 DIAGNOSIS — I71.21 ANEURYSM OF ASCENDING AORTA WITHOUT RUPTURE (HCC): ICD-10-CM

## 2024-02-04 PROCEDURE — G1004 CDSM NDSC: HCPCS

## 2024-02-04 PROCEDURE — 71250 CT THORAX DX C-: CPT

## 2024-02-05 ENCOUNTER — TELEPHONE (OUTPATIENT)
Dept: FAMILY MEDICINE CLINIC | Facility: CLINIC | Age: 69
End: 2024-02-05

## 2024-02-05 NOTE — TELEPHONE ENCOUNTER
----- Message from Margarita Bar MD sent at 2/4/2024  9:30 PM EST -----  Ultrasound shows mildly enlarged and fatty liver, recommend diet and lifestyle modifications discussed at previous office visit

## 2024-03-11 ENCOUNTER — OFFICE VISIT (OUTPATIENT)
Dept: CARDIOLOGY CLINIC | Facility: CLINIC | Age: 69
End: 2024-03-11
Payer: COMMERCIAL

## 2024-03-11 VITALS
SYSTOLIC BLOOD PRESSURE: 126 MMHG | HEART RATE: 60 BPM | BODY MASS INDEX: 26.27 KG/M2 | DIASTOLIC BLOOD PRESSURE: 80 MMHG | WEIGHT: 194.2 LBS

## 2024-03-11 DIAGNOSIS — I25.84 CORONARY ARTERY CALCIFICATION: ICD-10-CM

## 2024-03-11 DIAGNOSIS — I25.10 CORONARY ARTERY CALCIFICATION: ICD-10-CM

## 2024-03-11 DIAGNOSIS — I71.21 ANEURYSM OF ASCENDING AORTA WITHOUT RUPTURE (HCC): Primary | ICD-10-CM

## 2024-03-11 DIAGNOSIS — I65.23 BILATERAL CAROTID ARTERY STENOSIS: ICD-10-CM

## 2024-03-11 DIAGNOSIS — E78.5 DYSLIPIDEMIA: ICD-10-CM

## 2024-03-11 DIAGNOSIS — I10 PRIMARY HYPERTENSION: ICD-10-CM

## 2024-03-11 DIAGNOSIS — E78.00 PRIMARY HYPERCHOLESTEROLEMIA: ICD-10-CM

## 2024-03-11 PROCEDURE — 99214 OFFICE O/P EST MOD 30 MIN: CPT | Performed by: INTERNAL MEDICINE

## 2024-03-11 RX ORDER — ROSUVASTATIN CALCIUM 5 MG/1
5 TABLET, COATED ORAL DAILY
Start: 2024-03-11

## 2024-03-11 NOTE — PROGRESS NOTES
[facility]  [address] [place] [address]  Phone#  [phone]   Fax#  [phone]  *-*-*-*-*-*-*-*-*-*-*-*-*-*-*-*-*-*-*-*-*-*-*-*-*-*-*-*-*-*-*-*-*-*-*-*-*-*-*-*-*-*-*-*-*-*-*-*-*-*-*-*-*-*                                   Cardiology Follow Up      ENCOUNTER DATE: 24 10:14 AM  PATIENT NAME: Haider Prather   : 1955    MRN: [id number]  AGE:69 y.o.      SEX: male  ENCOUNTER PROVIDER:Arron Acuña MD     PRIMARY CARE PHYSICIAN: Margarita Bar MD    ACTIVE DIAGNOSIS THIS VISIT  1. Aneurysm of ascending aorta without rupture (HCC)        2. Coronary artery calcification        3. Primary hypercholesterolemia        4. Primary hypertension        5. Bilateral carotid artery stenosis < 50%        6. Dyslipidemia  rosuvastatin (CRESTOR) 5 mg tablet        ACTIVE PROBLEM LIST  Patient Active Problem List   Diagnosis    Allergic rhinitis    Spinal stenosis    Degeneration of intervertebral disc of cervical region    Hypothyroidism    Muscle pain, myofacial    Neuropathy    Nontoxic multinodular goiter    Occipital neuralgia    Venous insufficiency    Chondromalacia patellae    Chronic osteomyelitis of lower leg (HCC)    Generalized osteoarthritis    Anxiety    Reactive depression    BPH without obstruction/lower urinary tract symptoms    Post traumatic stress disorder (PTSD)    Vitamin D deficiency    Varicose veins of bilateral lower extremities with other complications    Osteoporosis without current pathological fracture    Bilateral carotid artery stenosis < 50%    PAC (premature atrial contraction)    PVC (premature ventricular contraction)    Primary hypercholesterolemia    Macrocytosis without anemia    Chronic neck pain    Thoracic aortic aneurysm without rupture (HCC)    Nonrheumatic mitral valve regurgitation    Bilateral enlargement of atria    Coronary artery calcification    Prostate cancer (HCC)    Mass of right parotid gland    History of osteomyelitis     Renal cysts, acquired, bilateral    Primary hypertension    Lumbar spine tumor    Schwannoma    Nonintractable epilepsy without status epilepticus, unspecified epilepsy type ([facility])       CARDIOLOGY SPECIALTY COMMENTS  Patient 1st seen on 2021. Recently he has been going for frequent walks for about 45 minutes at a leisurely pace.  When he comes home, he lays on the floor to cool down and relax.  He becomes nauseous but does not vomit although he often feels like he might vomit.  He becomes diaphoretic and his heart races and pounds.   He has no specific chest discomfort.         He has a history of smoking up to a pack a day for 2-3 years in the early 80s and has not smoked since.  His father may have had a heart attack.  He had peripheral vascular disease and fell down a flight of stairs and was noted to be .  He had an uncle who had CABG.  He is a retired  from [employer].        2021 echocardiogram:  Normal LV systolic and diastolic function EF 55 percent.  Mild biatrial dilatation.  Mild-to-moderate mitral regurgitation.  Normal pulmonary artery pressures.  Ascending aortic aneurysm.      2021 Carotid ultrasound demonstrated less than 50% stenosis bilaterally.       2021 CTA of the chest demonstrated enlarged ascending aorta at 41 mm.  There was evidence of coronary artery calcification.  Patient was advised not to lift more than 25 lb due to the ascending aortic aneurysm.     2021 nuclear stress test:. Ventricular ectopy as described with exercise   Low normal left ventricular systolic function, EF 52% with mild left ventricular cavity enlargement   Normal tomographic perfusion series with inferior diaphragmatic attenuation.     10/07/2021 Holter monitor: Heart rate  beats per minute averaging 66 bpm. Rare ventricular ectopy. Occasional supraventricular ectopy. Two 3 beat runs of SVT, fastest 152 beats per minute. Intermittent  1st degree AV block and 1 episode of second-degree AV block Mobitz type 1. Longest RR 1.9 seconds    11/05/2021 MRA of the chest: Mildly ectatic ascending aorta maximum diameter 40 mm. Remainder the aorta normal in caliber.    6/28/2022 CT of the chest without contrast: Mild ectasia of the ascending aorta at 4.1 cm unchanged from previous.    2/15/2024 CT of the chest without contrast: CT scan of the chest demonstrates mild enlargement of the aorta measuring 4.1 cm unchanged from previous studies.  There are also stable pulmonary nodules dating back to 2021 with the largest measuring 5 mm unchanged from prior studies.  No specific follow-up needed of the nodules.    INTERVAL HISTORY:        Patient returns who thought he was having myalgias from Crestor.  Last visit he was told to stop his Crestor for a month and then resume it.  He should note if the myalgias got any better when he stopped it and if they came back when he resumed it.  Patient states that his myalgias may have gotten a little bit better with stopping the Crestor but basically they were not significantly changed.  However, he did not do the neck step which was to resume the Crestor.    Patient had a CT of the chest for monitoring of an ascending aortic aneurysm.  There was no change in the aneurysm size.  He is restricting his weight lifting.  He is not on a beta-blocker because he is already bradycardic.    DISCUSSION/PLAN:          Recent known Crestor 5 mg daily  Return in 3 months.    Lab Studies:    Lab Results   Component Value Date    CHOLESTEROL 174 11/16/2023    CHOLESTEROL 175 07/19/2023    CHOLESTEROL 196 02/24/2023     Lab Results   Component Value Date    TRIG 101 11/16/2023    TRIG 94 07/19/2023    TRIG 69 02/24/2023     Lab Results   Component Value Date    HDL 44 11/16/2023    HDL 54 07/19/2023    HDL 60 02/24/2023     Lab Results   Component Value Date    LDLCALC 110 (H) 11/16/2023    LDLCALC 102 (H) 07/19/2023    LDLCALC 122 (H)  02/24/2023     Lab Results   Component Value Date    HGBA1C 6.4 (H) 11/16/2023      Lab Results   Component Value Date    EGFR 102 11/16/2023    EGFR 107 07/26/2023    EGFR 105 06/26/2023    SODIUM 144 11/16/2023    SODIUM 142 07/26/2023    SODIUM 144 06/26/2023    K 4.3 11/16/2023    K 3.8 07/26/2023    K 4.4 06/26/2023     11/16/2023     07/26/2023     06/26/2023    CO2 30 11/16/2023    CO2 28 07/26/2023    CO2 29 06/26/2023    ANIONGAP 7 07/31/2015    ANIONGAP 9 02/24/2015    ANIONGAP 9 02/06/2015    BUN 17 11/16/2023    BUN 13 07/26/2023    BUN 18 06/26/2023    CREATININE 0.60 11/16/2023    CREATININE 0.55 07/26/2023    CREATININE 0.59 06/26/2023     Lab Results   Component Value Date    WBC 6.83 11/16/2023    WBC 12.80 (H) 12/20/2022    WBC 8.57 12/07/2022    HGB 14.4 11/16/2023    HGB 12.5 12/20/2022    HGB 14.2 12/07/2022    HCT 44.4 11/16/2023    HCT 37.7 12/20/2022    HCT 44.3 12/07/2022    MCV 99 (H) 11/16/2023     (H) 12/20/2022     (H) 12/07/2022    MCH 32.1 11/16/2023    MCH 33.0 12/20/2022    MCH 32.3 12/07/2022    MCHC 32.4 11/16/2023    MCHC 33.2 12/20/2022    MCHC 32.1 12/07/2022     11/16/2023     12/20/2022     12/19/2022      Lab Results   Component Value Date    GLUCOSE 106 07/31/2015    GLUCOSE 119 02/24/2015    GLUCOSE 105 02/06/2015    CALCIUM 9.6 11/16/2023    CALCIUM 8.0 (L) 07/26/2023    CALCIUM 9.8 06/26/2023    AST 18 11/16/2023    AST 28 06/26/2023    AST 22 12/07/2022    ALT 14 11/16/2023    ALT 38 06/26/2023    ALT 29 12/07/2022    ALKPHOS 57 11/16/2023    ALKPHOS 45 06/26/2023    ALKPHOS 57 12/07/2022    PROT 7.2 07/31/2015    PROT 6.7 12/19/2014    BILITOT 0.60 07/31/2015    BILITOT 0.49 12/19/2014    MG 2.3 02/17/2022     Lab Results   Component Value Date    TSH 1.10 02/23/2023    TSH 0.54 03/18/2022    FREET4 1.4 07/31/2015       No results found for this visit on 03/11/24.      Current Outpatient Medications:     acetaminophen  (TYLENOL) 500 mg tablet, Take 2 tablets (1,000 mg total) by mouth every 8 (eight) hours as needed for moderate pain, mild pain or fever, Disp: , Rfl: 0    desloratadine (CLARINEX) 5 MG tablet, Take 1 tablet (5 mg total) by mouth daily, Disp: 90 tablet, Rfl: 3    doxycycline (ADOXA) 50 MG tablet, TAKE 1 TABLET BY ORAL ROUTE EVERY DAY WITH MEAL X3 MONTHS THEN MW, Disp: , Rfl:     fluticasone (FLONASE) 50 mcg/act nasal spray, 2 sprays into each nostril daily, Disp: 48 g, Rfl: 3    levothyroxine 100 mcg tablet, daily, Disp: , Rfl:     metroNIDAZOLE (METROGEL) 0.75 % gel, Apply topically 2 (two) times a day, Disp: , Rfl:     pantoprazole (PROTONIX) 40 mg tablet, Take 1 tablet (40 mg total) by mouth daily (Patient taking differently: Take 40 mg by mouth if needed), Disp: 30 tablet, Rfl: 2    Perfluorohexyloctane (Miebo) 1.338 GM/ML SOLN, Apply 1 drop to eye every 6 (six) hours, Disp: , Rfl:     rosuvastatin (CRESTOR) 5 mg tablet, Take 1 tablet (5 mg total) by mouth daily, Disp: , Rfl:     tamsulosin (FLOMAX) 0.4 mg, Take 0.4 mg by mouth 2 (two) times a day, Disp: , Rfl:     tazarotene (AVAGE) 0.1 % cream, Apply topically daily at bedtime, Disp: , Rfl:     denosumab (PROLIA) 60 mg/mL, Inject 1 mL (60 mg total) under the skin once for 1 dose, Disp: 1 mL, Rfl: 0  Allergies   Allergen Reactions    Amoxicillin-Pot Clavulanate Rash, Vomiting and Nausea Only       Past Medical History:   Diagnosis Date    Anxiety     Arthritis     Bleeding tendency (HCC)     BPH (benign prostatic hyperplasia)     Broken bones     left leg    Cancer (HCC)     Cataract     Chronic neck pain     Epilepsy (HCC)     Hypothyroidism     Neoplasm of unspecified behavior of bone, soft tissue, and skin     Osteomyelitis (HCC)     PONV (postoperative nausea and vomiting)     Prostate cancer (HCC)     Skin cancer     Squamous carcinoma 12/01/2014 12/2014    Thyroid condition     Thyroid disease      Social History     Socioeconomic History    Marital  status:      Spouse name: Not on file    Number of children: Not on file    Years of education: Not on file    Highest education level: Not on file   Occupational History    Occupation: Teacher    Occupation: retired   Tobacco Use    Smoking status: Former     Current packs/day: 0.00     Types: Cigarettes     Quit date:      Years since quittin.2    Smokeless tobacco: Never   Vaping Use    Vaping status: Never Used   Substance and Sexual Activity    Alcohol use: Yes     Comment: being a social drinker    Drug use: Not Currently     Types: Marijuana     Comment: Medical marijuana    Sexual activity: Yes     Comment: no known std risk factors   Other Topics Concern    Not on file   Social History Narrative    Daily coffee consumption (3 cups/day)     Social Determinants of Health     Financial Resource Strain: Low Risk  (10/15/2023)    Overall Financial Resource Strain (CARDIA)     Difficulty of Paying Living Expenses: Not hard at all   Food Insecurity: No Food Insecurity (2023)    Received from [facility]    Hunger Vital Sign     Worried About Running Out of Food in the Last Year: Never true     Ran Out of Food in the Last Year: Never true   Transportation Needs: No Transportation Needs (10/15/2023)    PRAPARE - Transportation     Lack of Transportation (Medical): No     Lack of Transportation (Non-Medical): No   Physical Activity: Not on file   Stress: Not on file   Social Connections: Not on file   Intimate Partner Violence: Not At Risk (2023)    Received from [facility]    Humiliation, Afraid, Rape, and Kick questionnaire     Fear of Current or Ex-Partner: No     Emotionally Abused: No     Physically Abused: No     Sexually Abused: No   Housing Stability: Low Risk  (2023)    Received from [facility]    Housing Stability Vital Sign     Unable to Pay for Housing in the Last Year: No     Number of Places Lived in the Last Year: 1      Unstable Housing in the Last Year: No      Family History   Problem Relation Age of Onset    Bone cancer Mother     Other Father         Accident    Dementia Other     Cancer Other      Past Surgical History:   Procedure Laterality Date    ARTHRODESIS      H/o Arthrodesis Cervical to C2;  last assessed [id number]    CERVICAL FUSION      COLONOSCOPY  01/22/2021    COLONOSCOPY      KNEE SURGERY Right 12/22/2017    LEG SURGERY Left 1999    Hardware placed in lower leg bone(s)    LEG SURGERY Left 01/01/2006 2006, 2012-Left leg vascular    [place] LAMINECTOMY BX/EXC ISPI DAKOTAH XDRL LUMBAR N/A 12/19/2022    Procedure: L2/3 decompressive laminectomy and biopsy (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;  Surgeon: Toño Contreras MD;  Location: [facility];  Service: Neurosurgery    THYROID SURGERY Right 2013    US GUIDED PROSTATE BIOPSY  07/13/2021       PREVIOUS WEIGHTS:   Wt Readings from Last 10 Encounters:   03/11/24 88.1 kg (194 lb 3.2 oz)   01/24/24 87.5 kg (192 lb 12.8 oz)   01/24/24 86.5 kg (190 lb 12.8 oz)   01/03/24 86.1 kg (189 lb 12.8 oz)   10/16/23 86.5 kg (190 lb 9.6 oz)   07/21/23 85.2 kg (187 lb 12.8 oz)   07/12/23 87.2 kg (192 lb 3.2 oz)   07/03/23 86.6 kg (191 lb)   04/13/23 83.2 kg (183 lb 6.4 oz)   04/06/23 83.9 kg (185 lb)        Review of Systems:  Review of Systems   Constitutional:  Negative for activity change.   Respiratory:  Negative for cough, chest tightness, shortness of breath and wheezing.    Cardiovascular:  Negative for chest pain, palpitations and leg swelling.   Musculoskeletal:  Negative for gait problem.   Skin:  Negative for color change.   Neurological:  Negative for dizziness, tremors, syncope, weakness, light-headedness and headaches.   Psychiatric/Behavioral:  Negative for agitation and confusion.        Physical Exam:  /80 (BP Location: Right arm, Patient Position: Sitting, Cuff Size: Standard)   Pulse 60   Wt 88.1 kg (194 lb 3.2 oz)   BMI 26.27 kg/m²  "    Physical Exam  Vitals reviewed.   Constitutional:       General: He is not in acute distress.     Appearance: He is well-developed.   HENT:      Head: Normocephalic and atraumatic.   Neck:      Thyroid: No thyromegaly.      Vascular: No carotid bruit or JVD.      Trachea: No tracheal deviation.   Cardiovascular:      Rate and Rhythm: Normal rate and regular rhythm.      Pulses: Normal pulses.      Heart sounds: Normal heart sounds. No murmur heard.     No friction rub. No gallop.   Pulmonary:      Effort: Pulmonary effort is normal. No respiratory distress.      Breath sounds: Normal breath sounds. No wheezing, rhonchi or rales.   Chest:      Chest wall: No tenderness.   Musculoskeletal:      Cervical back: Normal range of motion and neck supple.      Right lower leg: No edema.      Left lower leg: No edema.   Skin:     General: Skin is warm and dry.   Neurological:      General: No focal deficit present.      Mental Status: He is alert and oriented to person, place, and time.   Psychiatric:         Mood and Affect: Mood normal.         Behavior: Behavior normal.         Thought Content: Thought content normal.         Judgment: Judgment normal.     ======================================================  Imaging:   I have personally reviewed pertinent reports.      Portions of the record may have been created with voice recognition software. Occasional wrong word or \"sound a like\" substitutions may have occurred due to the inherent limitations of voice recognition software. Read the chart carefully and recognize, using context, where substitutions have occurred.    SIGNATURES:   Arron Acuña MD   "

## 2024-03-15 DIAGNOSIS — J30.1 ALLERGIC RHINITIS DUE TO POLLEN, UNSPECIFIED SEASONALITY: ICD-10-CM

## 2024-03-15 RX ORDER — DESLORATADINE 5 MG/1
5 TABLET ORAL DAILY
Qty: 90 TABLET | Refills: 1 | Status: SHIPPED | OUTPATIENT
Start: 2024-03-15

## 2024-03-22 ENCOUNTER — APPOINTMENT (OUTPATIENT)
Dept: LAB | Facility: CLINIC | Age: 69
End: 2024-03-22
Payer: COMMERCIAL

## 2024-03-22 DIAGNOSIS — N40.1 ENLARGED PROSTATE WITH URINARY OBSTRUCTION: ICD-10-CM

## 2024-03-22 DIAGNOSIS — Q04.8 LHERMITTE-DUCLOS SYNDROME  (HCC): ICD-10-CM

## 2024-03-22 DIAGNOSIS — D36.10 LHERMITTE-DUCLOS SYNDROME  (HCC): ICD-10-CM

## 2024-03-22 DIAGNOSIS — N13.8 ENLARGED PROSTATE WITH URINARY OBSTRUCTION: ICD-10-CM

## 2024-03-22 DIAGNOSIS — C61 MALIGNANT NEOPLASM OF PROSTATE (HCC): ICD-10-CM

## 2024-03-22 LAB
BUN SERPL-MCNC: 21 MG/DL (ref 5–25)
CREAT SERPL-MCNC: 0.58 MG/DL (ref 0.6–1.3)
GFR SERPL CREATININE-BSD FRML MDRD: 103 ML/MIN/1.73SQ M
PSA SERPL-MCNC: 3.71 NG/ML (ref 0–4)

## 2024-03-22 PROCEDURE — 84153 ASSAY OF PSA TOTAL: CPT

## 2024-03-22 PROCEDURE — 84520 ASSAY OF UREA NITROGEN: CPT

## 2024-03-22 PROCEDURE — 82565 ASSAY OF CREATININE: CPT

## 2024-03-22 PROCEDURE — 36415 COLL VENOUS BLD VENIPUNCTURE: CPT

## 2024-04-09 ENCOUNTER — DOCUMENTATION (OUTPATIENT)
Dept: HEMATOLOGY ONCOLOGY | Facility: CLINIC | Age: 69
End: 2024-04-09

## 2024-04-09 NOTE — PROGRESS NOTES
In-basket message received from Bess Reyez RN to add patient to the neuro [facility] on 4/10/2024. Chart reviewed and prep completed.

## 2024-04-10 ENCOUNTER — DOCUMENTATION (OUTPATIENT)
Facility: HOSPITAL | Age: 69
End: 2024-04-10

## 2024-04-10 ENCOUNTER — RADIATION ONCOLOGY FOLLOW-UP (OUTPATIENT)
Dept: RADIATION ONCOLOGY | Facility: HOSPITAL | Age: 69
End: 2024-04-10
Attending: STUDENT IN AN ORGANIZED HEALTH CARE EDUCATION/TRAINING PROGRAM
Payer: COMMERCIAL

## 2024-04-10 ENCOUNTER — RA CDI HCC (OUTPATIENT)
Dept: OTHER | Facility: HOSPITAL | Age: 69
End: 2024-04-10

## 2024-04-10 VITALS
DIASTOLIC BLOOD PRESSURE: 78 MMHG | OXYGEN SATURATION: 98 % | SYSTOLIC BLOOD PRESSURE: 162 MMHG | HEART RATE: 82 BPM | TEMPERATURE: 97.8 F | RESPIRATION RATE: 14 BRPM

## 2024-04-10 DIAGNOSIS — D36.10 SCHWANNOMA: Primary | ICD-10-CM

## 2024-04-10 PROCEDURE — G0463 HOSPITAL OUTPT CLINIC VISIT: HCPCS | Performed by: STUDENT IN AN ORGANIZED HEALTH CARE EDUCATION/TRAINING PROGRAM

## 2024-04-10 PROCEDURE — 99211 OFF/OP EST MAY X REQ PHY/QHP: CPT | Performed by: STUDENT IN AN ORGANIZED HEALTH CARE EDUCATION/TRAINING PROGRAM

## 2024-04-10 NOTE — PROGRESS NOTES
NEURO ONCOLOGY CASE REVIEW     DATE: 4/10/2024  PRESENTING DOCTOR: Dr Kiel Florence    DIAGNOSIS:  Schwannoma       Haider Prather is a 69 y.o. male who was presented at Neuro Oncology Case Review today. History of a symptomatic schwannoma at L3/4 s/p decompressive laminectomy and STR. MRI surveillance demonstrated interval enlargement. On 6/5/23 he completed a course of SBRT to a dose of 2500 cGy in 5 fractions. Patient recently seen by neurosurgery at [facility] for left-sided low back pain with radiation down left leg.    PHYSICIAN RECOMMENDED PLAN:   - Continue surveillance imaging  - Discuss referral to pain management     Upcoming Oncology Appointments:   4/10/2024 Radiation oncology, Dr Kiel Flroence  5/16/2024 Neurosurgery, Dr Contreras    Imaging Reviewed:   3/6/2024 MRI lumbar spine ([facility]):  Impression:   1. 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   2. Advanced multilevel lumbar spondylosis, most pronounced at L3-4, with moderate to severe central canal stenosis and severe right neuroforaminal narrowing.       8/28/2023 MRI lumbar spine  9/2/2022 MRI lumbar spine  10/3/2022 MRI lumbar spine    Team agreed to plan.    NCCN guidelines were readily available for review at this discussion.    The final treatment plan will be left at the discretion of the patient and the treating physician.     DISCLAIMERS:  TO THE TREATING PHYSICIAN:  This conference is a meeting of clinicians from various specialty areas who evaluate and discuss patients for whom a multidisciplinary treatment approach is being considered. Please note that the above opinion was a consensus of the conference attendees and is intended only to assist in quality care of the discussed patient.  The responsibility for follow up on the input given during the  conference, along with any final decisions regarding plan of care, is that of the patient and the patient's provider. Accordingly, appointments have only been recommended based on this information; and have NOT been scheduled unless otherwise noted.      TO THE PATIENT:  This summary is a brief record of major aspects of your cancer treatment. You may choose to can share a your copy with any of your doctors or nurses. However, this is not a detailed or comprehensive record of your care.

## 2024-04-10 NOTE — PROGRESS NOTES
Haider Prather 1955 is a 69 y.o. male   with a symptomatic schwannoma at L3/4 s/p decompressive laminectomy and STR. MRI surveillance demonstrated interval enlargement. On 23 he completed a course of SBRT to a dose of 2500 cGy in 5 fractions. He was last seen in radiation oncology 10/4/23. He presents today for 6 month follow up with repeat imaging.     24 [facility] Neurosurgery - [place]  Complaint of pain in the left low back that radiates down the back of the left leg into the hamstring in to the calf in a mixed L5-S1 distribution   Will obtain an updated MRI lumbar spine to evaluate his radicular complaints as well as his partially resected schwannoma at L2-3 ([facility] Dr. Contreras).   Will see him after his imaging is completed.     3/6/24 [facility] - MRI lumbar spine w wo contrast  Impression:   1. 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   2. Advanced multilevel lumbar spondylosis, most pronounced at L3-4, with   moderate to severe central canal stenosis and severe right neuroforaminal   narrowing.     3/8/24 XR spine cervical complete 4 or 5 vw non injury  IMPRESSION:   Posterior fusion at C1-C2. No hardware complication.   Multilevel degenerative grade 1 spondylolistheses, as described above. Spondylosis, as described above.     3/14/24 [facility] Neurosurgery    No obvious compression of the S1 nerve or the L5 nerve that could be causing pain to be in the pattern he describes  His back pain can be multifactorial including the central stenosis he has, as well as his diffuse degenerative disc disease.  Explained that he should follow up with Dr. Contreras regarding his schwannoma and I asked that he speak with Dr. Contreras for his opinion on his leg pain.     [facility] - Dr. Soares    Follow up visit     Oncology History  Overview Note   with a symptomatic schwannoma at L3/4 s/p decompressive laminectomy and STR. MRI surveillance demonstrated interval enlargement. On 23 he completed a course of SBRT to a dose of 2500 cGy in 5 fractions. He was last seen in radiation oncology 10/4/23. He presents today for 6 month follow up with repeat imaging.     24 [facility] Neurosurgery - [place]  Complaint of pain in the left low back that radiates down the back of the left leg into the hamstring in to the calf in a mixed L5-S1 distribution   Will obtain an updated MRI lumbar spine to evaluate his radicular complaints as well as his partially resected schwannoma at L2-3 ([facility] Dr. Contreras).   Will see him after his imaging is completed.     3/6/24 [facility] - MRI lumbar spine w wo contrast  Impression:   1. 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   2. Advanced multilevel lumbar spondylosis, most pronounced at L3-4, with   moderate to severe central canal stenosis and severe right neuroforaminal   narrowing.     3/8/24 XR spine cervical complete 4 or 5 vw non injury  IMPRESSION:   Posterior fusion at C1-C2. No hardware complication.   Multilevel degenerative grade 1 spondylolistheses, as described above. Spondylosis, as described above.     3/14/24 [facility] Neurosurgery    No obvious compression of the S1 nerve or the L5 nerve that could be causing pain to be in the pattern he describes  His back pain can be multifactorial including the central stenosis he has, as well as his diffuse degenerative disc disease.  Explained that he should follow up with Dr. Contreras regarding his schwannoma and I asked that he speak with Dr. Contreras for his opinion on his leg pain.     [facility] - Dr. Soares       Prostate cancer (HCC)    Initial Diagnosis    Prostate cancer  "(HCC)      Biopsy    - Prostatic adenocarcinoma, acinar type, [place] score 3+3=6 (Grade Group 1) involving 2 cores   - Percentage of [place] pattern 4: N/A   - Percentage of [place] pattern 5: N/A   - Perineural invasion: Not identified   - Periprostatic fat invasion: Not identified   - Lymph-vascular invasion: Not identified   - Seminal vesicle/ejaculatory duct invasion: Not identified   - Additional pathologic findings: None   - Best representative block if additional studies are needed: B2 and B6      A.  Right prostate (random biopsy):      - Benign prostatic tissue.     B.  Left prostate (random biopsy):      - Small foci of prostatic adenocarcinoma, [place] score 3+3=6 (Grade Group 1), involving less than 5% of two (2) cores.    C.  Right mid prostate (biopsy):     - Benign prostatic tissue.      7/13/2021 -  Cancer Staged    Staging form: Prostate, AJCC 8th Edition  - Clinical stage from 7/13/2021: Stage I (cT1a, cN0, cM0, PSA: 3.1, Grade Group: 1) - Signed by Karl Villalba MD on 1/25/2022  Stage prefix: Initial diagnosis  Prostate specific antigen (PSA) range: Less than 10  [place] primary pattern: 3  [place] secondary pattern: 3  [place] score: 6  Histologic grading system: 5 grade system  Number of biopsy cores examined: 2  Number of biopsy cores positive: 2       Schwannoma   2015 Surgery    \"C1-C2 lateral mass fixation and sublaminar Gallie cable fixation with demineralized bone matrix and cadaveric bone arthrodesis\".  Performed by Dr. Contreras, 2/20/15.        12/19/2022 Surgery    A-B. Spine, \"Left L3 nerve sheath tumor,\" Resection:  - Schwannoma     2/20/2023 Surgery    Patient is s/p L2/3 decompressive laminectomy and biopsy (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 [12/19/22 DKO]     4/27/2023 Initial Diagnosis    Schwannoma     5/24/2023 - 6/5/2023 Radiation    Treatments:  Course: C1 SBRT    Plan ID Energy Fractions Dose per Fraction (cGy) Dose Correction (cGy) Total Dose Delivered " (cGy) Elapsed Days   SBRT Spine 6X-FFF 5 / 5 500 0 2,500 12      Treatment Dates:  5/24/2023 - 6/5/2023.          Review of Systems:  Review of Systems   Constitutional: Negative.    HENT: Negative.     Eyes: Negative.    Respiratory: Negative.     Cardiovascular: Negative.    Gastrointestinal: Negative.    Genitourinary: Negative.    Musculoskeletal:  Positive for back pain (injection in Jauary helpful. Back down leg.).   Allergic/Immunologic: Negative.    Neurological:  Positive for weakness (Weakness in back of both legs with cramps. Has tried stopping statins - some relief.).       Clinical Trial: no    IPSS Questionnaire (AUA-7):  Teaching    Health Maintenance   Topic Date Due    Zoster Vaccine (1 of 2) Never done    PT PLAN OF CARE  02/09/2023    COVID-19 Vaccine (5 - 2023-24 season) 09/01/2023    Colorectal Cancer Screening  01/22/2024    Fall Risk  10/16/2024    Medicare Annual Wellness Visit (AWV)  10/16/2024    BMI: Followup Plan  10/16/2024    Depression Screening  01/24/2025    BMI: Adult  03/11/2025    Hepatitis C Screening  Completed    Pneumococcal Vaccine: 65+ Years  Completed    Influenza Vaccine  Completed    HIB Vaccine  Aged Out    IPV Vaccine  Aged Out    Hepatitis A Vaccine  Aged Out    Meningococcal ACWY Vaccine  Aged Out    HPV Vaccine  Aged Out     Patient Active Problem List   Diagnosis    Allergic rhinitis    Spinal stenosis    Degeneration of intervertebral disc of cervical region    Hypothyroidism    Muscle pain, myofacial    Neuropathy    Nontoxic multinodular goiter    Occipital neuralgia    Venous insufficiency    Chondromalacia patellae    Chronic osteomyelitis of lower leg (HCC)    Generalized osteoarthritis    Anxiety    Reactive depression    BPH without obstruction/lower urinary tract symptoms    Post traumatic stress disorder (PTSD)    Vitamin D deficiency    Varicose veins of bilateral lower extremities with other complications    Osteoporosis without current pathological  fracture    Bilateral carotid artery stenosis < 50%    PAC (premature atrial contraction)    PVC (premature ventricular contraction)    Primary hypercholesterolemia    Macrocytosis without anemia    Chronic neck pain    Thoracic aortic aneurysm without rupture (HCC)    Nonrheumatic mitral valve regurgitation    Bilateral enlargement of atria    Coronary artery calcification    Prostate cancer (HCC)    Mass of right parotid gland    History of osteomyelitis    Renal cysts, acquired, bilateral    Primary hypertension    Lumbar spine tumor    Schwannoma    Nonintractable epilepsy without status epilepticus, unspecified epilepsy type (HCC)     Past Medical History:   Diagnosis Date    Anxiety     Arthritis     Bleeding tendency (HCC)     BPH (benign prostatic hyperplasia)     Broken bones     left leg    Cancer (HCC)     Cataract     Chronic neck pain     Epilepsy (HCC)     Hypothyroidism     Neoplasm of unspecified behavior of bone, soft tissue, and skin     Osteomyelitis (HCC)     PONV (postoperative nausea and vomiting)     Prostate cancer (HCC)     Skin cancer     Squamous carcinoma 12/01/2014 12/2014    Thyroid condition     Thyroid disease      Past Surgical History:   Procedure Laterality Date    ARTHRODESIS      H/o Arthrodesis Cervical to C2;  last assessed [id number]    CERVICAL FUSION      COLONOSCOPY  01/22/2021    COLONOSCOPY      KNEE SURGERY Right 12/22/2017    LEG SURGERY Left 1999    Hardware placed in lower leg bone(s)    LEG SURGERY Left 01/01/2006 2006, 2012-Left leg vascular    DE LAMINECTOMY BX/EXC ISPI DAKOTAH XDRL LUMBAR N/A 12/19/2022    Procedure: L2/3 decompressive laminectomy and biopsy (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;  Surgeon: Toño Contreras MD;  [facility]: [facility];  Service: Neurosurgery    THYROID SURGERY Right 2013    US GUIDED PROSTATE BIOPSY  07/13/2021     Family History   Problem Relation Age of Onset    Bone cancer Mother     Other Father          Accident    Dementia Other     Cancer Other      Social History     Socioeconomic History    Marital status:      Spouse name: Not on file    Number of children: Not on file    Years of education: Not on file    Highest education level: Not on file   Occupational History    Occupation: Teacher    Occupation: retired   Tobacco Use    Smoking status: Former     Current packs/day: 0.00     Types: Cigarettes     Quit date:      Years since quittin.3    Smokeless tobacco: Never   Vaping Use    Vaping status: Never Used   Substance and Sexual Activity    Alcohol use: Yes     Comment: being a social drinker    Drug use: Not Currently     Types: Marijuana     Comment: Medical marijuana    Sexual activity: Yes     Comment: no known std risk factors   Other Topics Concern    Not on file   Social History Narrative    Daily coffee consumption (3 cups/day)     Social Determinants of Health     Financial Resource Strain: Low Risk  (10/15/2023)    Overall Financial Resource Strain (CARDIA)     Difficulty of Paying Living Expenses: Not hard at all   Food Insecurity: No Food Insecurity (2023)    Received from [facility]    Hunger Vital Sign     Worried About Running Out of Food in the Last Year: Never true     Ran Out of Food in the Last Year: Never true   Transportation Needs: No Transportation Needs (10/15/2023)    PRAPARE - Transportation     Lack of Transportation (Medical): No     Lack of Transportation (Non-Medical): No   Physical Activity: Not on file   Stress: Not on file   Social Connections: Not on file   Intimate Partner Violence: Not At Risk (2023)    Received from [facility]    Humiliation, Afraid, Rape, and Kick questionnaire     Fear of Current or Ex-Partner: No     Emotionally Abused: No     Physically Abused: No     Sexually Abused: No   Housing Stability: Low Risk  (2023)    Received from [facility]    Housing Stability Vital Sign      Unable to Pay for Housing in the Last Year: No     Number of Places Lived in the Last Year: 1     Unstable Housing in the Last Year: No       Current Outpatient Medications:     acetaminophen (TYLENOL) 500 mg tablet, Take 2 tablets (1,000 mg total) by mouth every 8 (eight) hours as needed for moderate pain, mild pain or fever, Disp: , Rfl: 0    denosumab (PROLIA) 60 mg/mL, Inject 1 mL (60 mg total) under the skin once for 1 dose, Disp: 1 mL, Rfl: 0    desloratadine (CLARINEX) 5 MG tablet, TAKE 1 TABLET DAILY, Disp: 90 tablet, Rfl: 1    doxycycline (ADOXA) 50 MG tablet, TAKE 1 TABLET BY ORAL ROUTE EVERY DAY WITH MEAL X3 MONTHS THEN [facility], Disp: , Rfl:     fluticasone (FLONASE) 50 mcg/act nasal spray, 2 sprays into each nostril daily, Disp: 48 g, Rfl: 3    levothyroxine 100 mcg tablet, daily, Disp: , Rfl:     metroNIDAZOLE (METROGEL) 0.75 % gel, Apply topically 2 (two) times a day, Disp: , Rfl:     pantoprazole (PROTONIX) 40 mg tablet, Take 1 tablet (40 mg total) by mouth daily (Patient taking differently: Take 40 mg by mouth if needed), Disp: 30 tablet, Rfl: 2    Perfluorohexyloctane (Miebo) 1.338 GM/ML SOLN, Apply 1 drop to eye every 6 (six) hours, Disp: , Rfl:     rosuvastatin (CRESTOR) 5 mg tablet, Take 1 tablet (5 mg total) by mouth daily, Disp: , Rfl:     tamsulosin (FLOMAX) 0.4 mg, Take 0.4 mg by mouth 2 (two) times a day, Disp: , Rfl:     tazarotene (AVAGE) 0.1 % cream, Apply topically daily at bedtime, Disp: , Rfl:   Allergies   Allergen Reactions    Amoxicillin-Pot Clavulanate Rash, Vomiting and Nausea Only     There were no vitals filed for this visit.   Pain Score:   3

## 2024-04-11 NOTE — PROGRESS NOTES
Follow-up - Radiation Oncology   Haider Prather 1955 69 y.o. male [id number]      History of Present Illness   Cancer Staging   Prostate cancer ([facility])  Staging form: Prostate, AJCC 8th Edition  - Clinical stage from 7/13/2021: Stage I (cT1a, cN0, cM0, PSA: 3.1, Grade Group: 1) - Signed by Karl Villalba MD on 1/25/2022  Stage prefix: Initial diagnosis  Prostate specific antigen (PSA) range: Less than 10  Emblem primary pattern: 3  Emblem secondary pattern: 3  [place] score: 6  Histologic grading system: 5 grade system  Number of biopsy cores examined: 2  Number of biopsy cores positive: 2      Mr. Haider Prather is a 68 year old man with a symptomatic schwannoma at L3/4 s/p decompressive laminectomy and STR. MRI surveillance demonstrated interval enlargement. On 6/5/23 he completed a course of SBRT to a dose of 2500 cGy in 5 fractions. He returns today for telephone follow-up.      Interval History:  The patient was last seen in clinic on 10/4/23.     MRI Lumbar spine (3/6/24) demonstrated:  1. 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   2. Advanced multilevel lumbar spondylosis, most pronounced at L3-4, with   moderate to severe central canal stenosis and severe right neuroforaminal   narrowing.     Currently the patient is doing fair overall.  He notes some improvement in his prior, severe lower back/leg pain.  He has follow-up scheduled with Dr. Contreras. He did seek another opinion at [facility] and has been receiving steroid injections into this back for pain control, which have been moderately helpful.     Historical Information   Oncology History   Prostate cancer (HCC)   2021 Initial Diagnosis    Prostate cancer ([facility])      Biopsy    - Prostatic adenocarcinoma, acinar type, [place] score 3+3=6 (Grade Group 1) involving 2 cores   - Percentage of  "[place] pattern 4: N/A   - Percentage of [place] pattern 5: N/A   - Perineural invasion: Not identified   - Periprostatic fat invasion: Not identified   - Lymph-vascular invasion: Not identified   - Seminal vesicle/ejaculatory duct invasion: Not identified   - Additional pathologic findings: None   - Best representative block if additional studies are needed: B2 and B6      A.  Right prostate (random biopsy):      - Benign prostatic tissue.     B.  Left prostate (random biopsy):      - Small foci of prostatic adenocarcinoma, [place] score 3+3=6 (Grade Group 1), involving less than 5% of two (2) cores.    C.  Right mid prostate (biopsy):     - Benign prostatic tissue.      7/13/2021 -  Cancer Staged    Staging form: Prostate, AJCC 8th Edition  - Clinical stage from 7/13/2021: Stage I (cT1a, cN0, cM0, PSA: 3.1, Grade Group: 1) - Signed by Karl Villalba MD on 1/25/2022  Stage prefix: Initial diagnosis  Prostate specific antigen (PSA) range: Less than 10  [place] primary pattern: 3  [place] secondary pattern: 3  [place] score: 6  Histologic grading system: 5 grade system  Number of biopsy cores examined: 2  Number of biopsy cores positive: 2       Schwannoma   2015 Surgery    \"C1-C2 lateral mass fixation and sublaminar Gallie cable fixation with demineralized bone matrix and cadaveric bone arthrodesis\".  Performed by Dr. Contreras, 2/20/15.        12/19/2022 Surgery    A-B. Spine, \"Left L3 nerve sheath tumor,\" Resection:  - Schwannoma     2/20/2023 Surgery    Patient is s/p L2/3 decompressive laminectomy and biopsy (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 [12/19/22 DKO]     4/27/2023 Initial Diagnosis    Schwannoma     5/24/2023 - 6/5/2023 Radiation    Treatments:  Course: C1 SBRT    Plan ID Energy Fractions Dose per Fraction (cGy) Dose Correction (cGy) Total Dose Delivered (cGy) Elapsed Days   SBRT Spine 6X-FFF 5 / 5 500 0 2,500 12      Treatment Dates:  5/24/2023 - 6/5/2023.          Past Medical History: "   Diagnosis Date    Anxiety     Arthritis     Bleeding tendency (HCC)     BPH (benign prostatic hyperplasia)     Broken bones     left leg    Cancer (HCC)     Cataract     Chronic neck pain     Epilepsy (HCC)     Hypothyroidism     Neoplasm of unspecified behavior of bone, soft tissue, and skin     Osteomyelitis (HCC)     PONV (postoperative nausea and vomiting)     Prostate cancer (HCC)     Skin cancer     Squamous carcinoma 2014    Thyroid condition     Thyroid disease      Past Surgical History:   Procedure Laterality Date    ARTHRODESIS      H/o Arthrodesis Cervical to C2;  last assessed [id number]    CERVICAL FUSION      COLONOSCOPY  2021    COLONOSCOPY      KNEE SURGERY Right 2017    LEG SURGERY Left     Hardware placed in lower leg bone(s)    LEG SURGERY Left 2006, -Left leg vascular    CO LAMINECTOMY BX/EXC ISPI DAKOTAH XDRL LUMBAR N/A 2022    Procedure: L2/3 decompressive laminectomy and biopsy (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;  Surgeon: Toño Contreras MD;  Location[facility];  Service: Neurosurgery    THYROID SURGERY Right     US GUIDED PROSTATE BIOPSY  2021       Social History   Social History     Substance and Sexual Activity   Alcohol Use Yes    Comment: being a social drinker     Social History     Substance and Sexual Activity   Drug Use Not Currently    Types: Marijuana    Comment: Medical marijuana     Social History     Tobacco Use   Smoking Status Former    Current packs/day: 0.00    Types: Cigarettes    Quit date:     Years since quittin.3   Smokeless Tobacco Never         Meds/Allergies     Current Outpatient Medications:     acetaminophen (TYLENOL) 500 mg tablet, Take 2 tablets (1,000 mg total) by mouth every 8 (eight) hours as needed for moderate pain, mild pain or fever, Disp: , Rfl: 0    desloratadine (CLARINEX) 5 MG tablet, TAKE 1 TABLET DAILY, Disp: 90 tablet, Rfl: 1    doxycycline (ADOXA) 50  MG tablet, TAKE 1 TABLET BY ORAL ROUTE EVERY DAY WITH MEAL X3 MONTHS THEN MWF, Disp: , Rfl:     fluticasone (FLONASE) 50 mcg/act nasal spray, 2 sprays into each nostril daily, Disp: 48 g, Rfl: 3    levothyroxine 100 mcg tablet, daily, Disp: , Rfl:     metroNIDAZOLE (METROGEL) 0.75 % gel, Apply topically 2 (two) times a day, Disp: , Rfl:     pantoprazole (PROTONIX) 40 mg tablet, Take 1 tablet (40 mg total) by mouth daily (Patient taking differently: Take 40 mg by mouth if needed), Disp: 30 tablet, Rfl: 2    Perfluorohexyloctane (Miebo) 1.338 GM/ML SOLN, Apply 1 drop to eye every 6 (six) hours, Disp: , Rfl:     rosuvastatin (CRESTOR) 5 mg tablet, Take 1 tablet (5 mg total) by mouth daily, Disp: , Rfl:     tamsulosin (FLOMAX) 0.4 mg, Take 0.4 mg by mouth 2 (two) times a day, Disp: , Rfl:     tazarotene (AVAGE) 0.1 % cream, Apply topically daily at bedtime, Disp: , Rfl:     denosumab (PROLIA) 60 mg/mL, Inject 1 mL (60 mg total) under the skin once for 1 dose, Disp: 1 mL, Rfl: 0  Allergies   Allergen Reactions    Amoxicillin-Pot Clavulanate Rash, Vomiting and Nausea Only     Review of Systems   Constitutional: Negative.    HENT: Negative.     Eyes: Negative.    Respiratory: Negative.     Cardiovascular: Negative.    Gastrointestinal: Negative.    Genitourinary: Negative.    Musculoskeletal:  Positive for back pain (injection in Jauary helpful. Back down leg.).   Allergic/Immunologic: Negative.    Neurological:  Positive for weakness (Weakness in back of both legs with cramps. Has tried stopping statins - some relief.).          OBJECTIVE:   /78   Pulse 82   Temp 97.8 °F (36.6 °C)   Resp 14   SpO2 98%   Pain Assessment:  4  ECOG/Zubrod/WHO: 1 - Symptomatic but completely ambulatory    Physical Exam   Well appearing. NAD.   No increased work of breathing. Extremities warm and well perfused.   Mild difficulty with ambulation.     RESULTS    Lab Results:   Recent Results (from the past 672 hour(s))   BUN     "Collection Time: 03/22/24 11:19 AM   Result Value Ref Range    BUN 21 5 - 25 mg/dL   Creatinine, serum    Collection Time: 03/22/24 11:19 AM   Result Value Ref Range    Creatinine 0.58 (L) 0.60 - 1.30 mg/dL    eGFR 103 ml/min/1.73sq m   PSA Total, Diagnostic    Collection Time: 03/22/24 11:19 AM   Result Value Ref Range    PSA, Diagnostic 3.71 0.00 - 4.00 ng/mL       Imaging Studies:No results found.        Assessment/Plan:  Orders Placed This Encounter   Procedures    MRI lumbar spine w wo contrast      We reviewed the patient's repeat post-SBRT MRI Lumbar spine.  On my review, I feel this again shows mild interval enlargement of his lumbar spine schwannoma, though this increase was felt to be very mild to minimal at multidisciplinary neuro-oncology tumor board this morning.  Based on consensus review, I have recommended repeat MRI lumbar spine in 4 months with return to clinic thereafter.      Rodrigo Florence MD  4/11/2024,1:14 PM    Portions of the record may have been created with voice recognition software.  Occasional wrong word or \"sound a like\" substitutions may have occurred due to the inherent limitations of voice recognition software.  Read the chart carefully and recognize, using context, where substitutions have occurred.      "

## 2024-04-16 ENCOUNTER — OFFICE VISIT (OUTPATIENT)
Dept: FAMILY MEDICINE CLINIC | Facility: CLINIC | Age: 69
End: 2024-04-16
Payer: COMMERCIAL

## 2024-04-16 VITALS
DIASTOLIC BLOOD PRESSURE: 62 MMHG | SYSTOLIC BLOOD PRESSURE: 119 MMHG | WEIGHT: 191.4 LBS | HEIGHT: 73 IN | HEART RATE: 55 BPM | TEMPERATURE: 97.5 F | BODY MASS INDEX: 25.37 KG/M2 | OXYGEN SATURATION: 99 %

## 2024-04-16 DIAGNOSIS — R73.03 PREDIABETES: ICD-10-CM

## 2024-04-16 DIAGNOSIS — E88.89 STEATOSIS (HCC): ICD-10-CM

## 2024-04-16 DIAGNOSIS — D49.2 LUMBAR SPINE TUMOR: ICD-10-CM

## 2024-04-16 DIAGNOSIS — M81.0 OSTEOPOROSIS WITHOUT CURRENT PATHOLOGICAL FRACTURE, UNSPECIFIED OSTEOPOROSIS TYPE: ICD-10-CM

## 2024-04-16 DIAGNOSIS — E03.9 ACQUIRED HYPOTHYROIDISM: ICD-10-CM

## 2024-04-16 DIAGNOSIS — E78.00 PRIMARY HYPERCHOLESTEROLEMIA: Primary | ICD-10-CM

## 2024-04-16 PROCEDURE — G2211 COMPLEX E/M VISIT ADD ON: HCPCS | Performed by: FAMILY MEDICINE

## 2024-04-16 PROCEDURE — 99214 OFFICE O/P EST MOD 30 MIN: CPT | Performed by: FAMILY MEDICINE

## 2024-04-16 NOTE — PROGRESS NOTES
Assessment/Plan:    No problem-specific Assessment & Plan notes found for this encounter.       Diagnoses and all orders for this visit:    Primary hypercholesterolemia  Comments:  Discussed taking crestor every other day given side effects, repeat lipid panel ordered  Orders:  -     Lipid Panel with Direct LDL reflex; Future    Prediabetes  Comments:  Continue diet and lifestyle modifications in the management of prediabetes; repeat A1C ordered  Orders:  -     Hemoglobin A1C; Future    Steatosis (HCC)  Comments:  Continue diet and lifestyle changes    Osteoporosis without current pathological fracture, unspecified osteoporosis type  Comments:  Continue Prolia injections; isidra obtain repeat CMP  Orders:  -     denosumab (PROLIA) 60 mg/mL; Inject 1 mL (60 mg total) under the skin once for 1 dose  -     Comprehensive metabolic panel; Future    Acquired hypothyroidism  Comments:  Currently maintained on levothyroxine 100mcg daily and follows with endocrinology    Lumbar spine tumor  Comments:  Following with Neurosurgery          Subjective:      Patient ID: Haider Prather is a 69 y.o. male.    HPI    Haider Prather is a very pleasant 69 year old male who presents today for a follow up. He continues to follow with Neurosurgery for his back pain and has also been seeing physiatry at [facility] for steroid injections as well. He did briefly discontinue his crestor due to muscle aches which helped with the symptoms. However now he is back to taking the medication every day. He does admit to being under some stress as he is finalizing his divorce and taking care of his cat who was recently diagnosed as having diabetes.     The following portions of the patient's history were reviewed and updated as appropriate: allergies, current medications, past family history, past medical history, past social history, past surgical history, and problem list.    Review of Systems   Constitutional: Negative.    HENT: Negative.     Eyes:  "Negative.    Respiratory: Negative.     Cardiovascular: Negative.    Gastrointestinal: Negative.    Musculoskeletal:  Positive for back pain.   Skin: Negative.    Neurological: Negative.    Psychiatric/Behavioral: Negative.           Objective:      /62 (BP Location: Left arm, Patient Position: Sitting, Cuff Size: Adult)   Pulse 55   Temp 97.5 °F (36.4 °C) (Temporal)   Ht 6' 1\" (1.854 m)   Wt 86.8 kg (191 lb 6.4 oz)   SpO2 99%   BMI 25.25 kg/m²          Physical Exam  Constitutional:       General: He is not in acute distress.  HENT:      Head: Normocephalic and atraumatic.   Eyes:      General:         Right eye: No discharge.         Left eye: No discharge.      Extraocular Movements: Extraocular movements intact.      Pupils: Pupils are equal, round, and reactive to light.   Cardiovascular:      Rate and Rhythm: Normal rate.   Pulmonary:      Effort: Pulmonary effort is normal. No respiratory distress.      Breath sounds: No wheezing.   Abdominal:      General: Bowel sounds are normal. There is no distension.      Palpations: Abdomen is soft.      Tenderness: There is no abdominal tenderness.   Neurological:      General: No focal deficit present.      Mental Status: He is alert.   Psychiatric:         Mood and Affect: Mood normal.         Behavior: Behavior normal.         "

## 2024-05-09 ENCOUNTER — TELEPHONE (OUTPATIENT)
Age: 69
End: 2024-05-09

## 2024-05-09 NOTE — TELEPHONE ENCOUNTER
Patient contacted the office this morning looking to get more information on the Prolia injection. He stated that he received the first injection sometime in January, requesting if he can get a print out from the office of the specific date, along with any additional information regarding the injection, side effects. He has been experiencing muscle spasms in the back of calves/thighs and wants to present all of this information to Dr. Contreras with Neuro when he sees him on 05/16. Please advise.

## 2024-05-10 ENCOUNTER — TELEPHONE (OUTPATIENT)
Age: 69
End: 2024-05-10

## 2024-05-10 NOTE — TELEPHONE ENCOUNTER
Patient call for date of Prolia shot. I did not see it in his chart. He also wants to stop by and  the info that comes with the prolia so he has it when he goes to his appt.

## 2024-05-10 NOTE — TELEPHONE ENCOUNTER
Can you please check when his last Prolia was? Common side effects include extremity pain, back pain, joint pain, muscle aches, headaches, dizziness, abdominal pain, rash.

## 2024-05-13 NOTE — TELEPHONE ENCOUNTER
I called the patient and we discussed the information that he needed.  He would like to put a hold on ordering the Prolia injection for July until after he discusses with Neurology.

## 2024-05-16 ENCOUNTER — OFFICE VISIT (OUTPATIENT)
Dept: NEUROSURGERY | Facility: CLINIC | Age: 69
End: 2024-05-16
Payer: COMMERCIAL

## 2024-05-16 VITALS
HEIGHT: 73 IN | BODY MASS INDEX: 25.31 KG/M2 | HEART RATE: 69 BPM | TEMPERATURE: 98.1 F | WEIGHT: 191 LBS | SYSTOLIC BLOOD PRESSURE: 138 MMHG | DIASTOLIC BLOOD PRESSURE: 68 MMHG | RESPIRATION RATE: 18 BRPM | OXYGEN SATURATION: 96 %

## 2024-05-16 DIAGNOSIS — D33.4 SCHWANNOMA OF SPINAL CORD (HCC): Primary | ICD-10-CM

## 2024-05-16 PROCEDURE — 99214 OFFICE O/P EST MOD 30 MIN: CPT | Performed by: NEUROLOGICAL SURGERY

## 2024-05-16 RX ORDER — VANCOMYCIN HYDROCHLORIDE 1 G/200ML
1000 INJECTION, SOLUTION INTRAVENOUS ONCE
OUTPATIENT
Start: 2024-05-16 | End: 2024-05-16

## 2024-05-16 RX ORDER — CHLORHEXIDINE GLUCONATE ORAL RINSE 1.2 MG/ML
15 SOLUTION DENTAL ONCE
OUTPATIENT
Start: 2024-05-16 | End: 2024-05-16

## 2024-05-16 NOTE — PROGRESS NOTES
DISCUSSION SUMMARY   This is a 69 y.o. male with a known L3 nerve root sheath tumor.  He is status post a subtotal resection followed by radiation therapy and observation.  The tumor has continued to grow in spite of these interventions.  Some of the growth may be at least in part secondary to degeneration of the inside of the schwannoma.  It is for each of these reasons I have recommended an attempted total resection.    The patient has been started on statin this and many of his complaints are reminiscent of a statin induced myopathy.  That is muscle pain and cramping associated with lack of activity which improves with activity.  This is the opposite of that which would be produced by the tumor which would be more reminiscent of neurogenic claudication-pain induced with exercise I asked him to consider the risk and benefits of continuation of statin medications.    The risks, benefits and complications of surgery were explained in detail to Haider Prather  including hemorrhage, infection, CSF leakage, wound problems, pain, weakness, numbness, dysesthesiae, paralysis, coma, and death. Also, the possibility  of further surgery being required was emphasized.     Other potential medical complications were outlined, including deep venous thrombosis, pulmonary embolism, pneumonia, urinary tract infection, myocardial infarction,  and stroke.     The need for physical therapy, occupational therapy, and rehabilitation was also mentioned. The alternatives to surgery were discussed. Haider Prather asked relevant questions and asked that we proceed with arrangements for surgery.     Return for Surgical intervention.      Diagnosis ICD-10-CM Associated Orders   1. Schwannoma of spinal cord (HCC)  D33.4 Case request operating room: Robotically assisted L2-L4 instrumented fusion and resection of L3 intradural extra medullary mass, Decompressive laminectomy L2-4 for resection of intradural extra medullary mass and placement  "of lumbar drain     Case request operating room: Robotically assisted L2-L4 instrumented fusion and resection of L3 intradural extra medullary mass, Decompressive laminectomy L2-4 for resection of intradural extra medullary mass and placement of lumbar drain           Chief Complaint   Patient presents with    Back Pain     LEFT SIDE BACK/LEG PAIN - MRI LSPINE 3/8/24 [facility] MRI LSPINE 4/4/24 [facility]           Back Pain  Pertinent negatives include no numbness or weakness.     Patient is here for routine follow-up for a schwannoma status post subtotal resection and radiation therapy  He does complain of low back pain with pain radiating to the left leg stopping at the calf  He says that he has a \"funny gait\" but has not fallen  He does have cramping in his thighs and calves which started this January and has been worsening over time.  His overall condition is worsening over time  He has continued to participate in physical therapy vigorously.      Review of Systems   Constitutional: Negative.    HENT: Negative.     Eyes: Negative.    Respiratory: Negative.     Cardiovascular: Negative.    Gastrointestinal: Negative.    Endocrine: Negative.    Genitourinary: Negative.    Musculoskeletal:  Positive for back pain (lower left sided back pain radiates into the left leg stopping at the calf), gait problem (gait is impared but no falls) and myalgias (horrible cramping in both thighs and calves, started in january 2024; mostly from a sitting position in a soft chair).   Skin: Negative.    Allergic/Immunologic: Negative.    Neurological:  Negative for weakness and numbness.   Hematological: Negative.    Psychiatric/Behavioral: Negative.     I reviewed the ROS    Vitals:    /68 (BP Location: Left arm, Patient Position: Sitting, Cuff Size: Adult)   Pulse 69   Temp 98.1 °F (36.7 °C) (Temporal)   Resp 18   Ht 6' 1\" (1.854 m)   Wt 86.6 kg (191 lb)   SpO2 96%   BMI 25.20 kg/m²     MEDICAL HISTORY  Past Medical History: "   Diagnosis Date    Anxiety     Arthritis     Bleeding tendency (HCC)     BPH (benign prostatic hyperplasia)     Broken bones     left leg    Cancer (HCC)     Cataract     Chronic neck pain     Epilepsy (HCC)     Hypothyroidism     Neoplasm of unspecified behavior of bone, soft tissue, and skin     Osteomyelitis (HCC)     PONV (postoperative nausea and vomiting)     Prostate cancer (HCC)     Skin cancer     Squamous carcinoma 2014    Thyroid condition     Thyroid disease      Past Surgical History:   Procedure Laterality Date    ARTHRODESIS      H/o Arthrodesis Cervical to C2;  last assessed [id number]    CERVICAL FUSION      COLONOSCOPY  2021    COLONOSCOPY      KNEE SURGERY Right 2017    LEG SURGERY Left     Hardware placed in lower leg bone(s)    LEG SURGERY Left 2006, -Left leg vascular    WI LAMINECTOMY BX/EXC ISPI DAKOTAH XDRL LUMBAR N/A 2022    Procedure: L2/3 decompressive laminectomy and biopsy (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;  Surgeon: Toño Contreras MD;  Location: [facility];  Service: Neurosurgery    THYROID SURGERY Right     US GUIDED PROSTATE BIOPSY  2021     Social History     Tobacco Use    Smoking status: Former     Current packs/day: 0.00     Types: Cigarettes     Quit date:      Years since quittin.4    Smokeless tobacco: Never   Vaping Use    Vaping status: Never Used   Substance Use Topics    Alcohol use: Yes     Comment: being a social drinker    Drug use: Not Currently     Types: Marijuana     Comment: Medical marijuana      Family History   Problem Relation Age of Onset    Bone cancer Mother     Other Father         Accident    Dementia Other     Cancer Other         Current Outpatient Medications:     acetaminophen (TYLENOL) 500 mg tablet, Take 2 tablets (1,000 mg total) by mouth every 8 (eight) hours as needed for moderate pain, mild pain or fever, Disp: , Rfl: 0    desloratadine (CLARINEX) 5 MG  tablet, TAKE 1 TABLET DAILY, Disp: 90 tablet, Rfl: 1    doxycycline (ADOXA) 50 MG tablet, TAKE 1 TABLET BY ORAL ROUTE EVERY DAY WITH MEAL X3 MONTHS THEN MWF, Disp: , Rfl:     fluticasone (FLONASE) 50 mcg/act nasal spray, 2 sprays into each nostril daily, Disp: 48 g, Rfl: 3    levothyroxine 100 mcg tablet, daily, Disp: , Rfl:     metroNIDAZOLE (METROGEL) 0.75 % gel, Apply topically 2 (two) times a day, Disp: , Rfl:     pantoprazole (PROTONIX) 40 mg tablet, Take 1 tablet (40 mg total) by mouth daily (Patient taking differently: Take 40 mg by mouth if needed), Disp: 30 tablet, Rfl: 2    Perfluorohexyloctane (Miebo) 1.338 GM/ML SOLN, Apply 1 drop to eye every 6 (six) hours, Disp: , Rfl:     tamsulosin (FLOMAX) 0.4 mg, Take 0.4 mg by mouth 2 (two) times a day, Disp: , Rfl:     tazarotene (AVAGE) 0.1 % cream, Apply topically daily at bedtime, Disp: , Rfl:     denosumab (PROLIA) 60 mg/mL, Inject 1 mL (60 mg total) under the skin once for 1 dose, Disp: 1 mL, Rfl: 0    rosuvastatin (CRESTOR) 5 mg tablet, Take 1 tablet (5 mg total) by mouth daily (Patient not taking: Reported on 5/16/2024), Disp: , Rfl:      Allergies   Allergen Reactions    Amoxicillin-Pot Clavulanate Rash, Vomiting and Nausea Only        The following portions of the patient's history were updated by MA and reviewed by MD: allergies, current medications, past family history, past medical history, past social history, past surgical history, and problem list.    Physical Exam  Vitals and nursing note reviewed.   Constitutional:       General: He is not in acute distress.     Appearance: Normal appearance. He is normal weight. He is not ill-appearing, toxic-appearing or diaphoretic.   HENT:      Head: Normocephalic and atraumatic.      Nose: Nose normal.   Eyes:      Extraocular Movements: Extraocular movements intact.      Pupils: Pupils are equal, round, and reactive to light.   Musculoskeletal:         General: No swelling, tenderness, deformity or signs of  injury.      Cervical back: Normal range of motion and neck supple.      Right lower leg: No edema.      Left lower leg: No edema.      Comments: Reduced range of motion with straight leg raising pain   Skin:     General: Skin is warm and dry.   Neurological:      General: No focal deficit present.      Mental Status: He is alert and oriented to person, place, and time. Mental status is at baseline.      Cranial Nerves: No cranial nerve deficit.      Sensory: No sensory deficit.      Motor: No weakness.      Coordination: Coordination normal.      Gait: Gait ( ) normal.      Deep Tendon Reflexes: Reflexes normal.   Psychiatric:         Mood and Affect: Mood normal.         Behavior: Behavior normal.         Thought Content: Thought content normal.         Judgment: Judgment normal.       RESULTS/DATA  I have personally reviewed pertinent films in PACS    MRI of the lumbosacral spine is carefully reviewed.  This demonstrates an expansion of the intra spinal portion of the mass.  The most medial aspect of the mass has expanded from 1.51 to 1.65 cm.  There is increased cystic change on the inside of the mass however the overall mass effect is worsened.

## 2024-05-29 ENCOUNTER — TELEPHONE (OUTPATIENT)
Dept: NEUROSURGERY | Facility: CLINIC | Age: 69
End: 2024-05-29

## 2024-05-29 NOTE — TELEPHONE ENCOUNTER
05/29/2024 @ 10:25am - 2nd ATTEMPT, LMOM  TO SCHEDULE SX    05/23/2024 @ 2:37pm -- LMOM TO SCHEDULE SX

## 2024-06-04 ENCOUNTER — TELEPHONE (OUTPATIENT)
Age: 69
End: 2024-06-04

## 2024-06-04 NOTE — TELEPHONE ENCOUNTER
Patient is requesting a prescription for Amoxicillin to take prior to a dental appointment due to knee surgery from 7/2023      Refill Request for Medication: Amoxicillin 500 mg tablets    Directions: Take 4 tablets by mouth prior to dental appointment.    Quantity: 4 - requesting more then 4 tablets as he may be going more then once to the dentist    Pharmacy and Location: [facility]

## 2024-06-05 ENCOUNTER — APPOINTMENT (OUTPATIENT)
Dept: LAB | Facility: CLINIC | Age: 69
End: 2024-06-05
Payer: COMMERCIAL

## 2024-06-05 DIAGNOSIS — R73.03 PREDIABETES: ICD-10-CM

## 2024-06-05 DIAGNOSIS — M81.0 OSTEOPOROSIS WITHOUT CURRENT PATHOLOGICAL FRACTURE, UNSPECIFIED OSTEOPOROSIS TYPE: ICD-10-CM

## 2024-06-05 DIAGNOSIS — C61 MALIGNANT NEOPLASM OF PROSTATE (HCC): ICD-10-CM

## 2024-06-05 DIAGNOSIS — E78.00 PRIMARY HYPERCHOLESTEROLEMIA: ICD-10-CM

## 2024-06-05 DIAGNOSIS — Z79.2 NEED FOR ANTIBIOTIC PROPHYLAXIS FOR DENTAL PROCEDURE: Primary | ICD-10-CM

## 2024-06-05 LAB
ALBUMIN SERPL BCP-MCNC: 4.5 G/DL (ref 3.5–5)
ALP SERPL-CCNC: 42 U/L (ref 34–104)
ALT SERPL W P-5'-P-CCNC: 20 U/L (ref 7–52)
ANION GAP SERPL CALCULATED.3IONS-SCNC: 6 MMOL/L (ref 4–13)
AST SERPL W P-5'-P-CCNC: 23 U/L (ref 13–39)
BILIRUB SERPL-MCNC: 0.82 MG/DL (ref 0.2–1)
BUN SERPL-MCNC: 19 MG/DL (ref 5–25)
CALCIUM SERPL-MCNC: 9.7 MG/DL (ref 8.4–10.2)
CHLORIDE SERPL-SCNC: 102 MMOL/L (ref 96–108)
CHOLEST SERPL-MCNC: 175 MG/DL
CO2 SERPL-SCNC: 33 MMOL/L (ref 21–32)
CREAT SERPL-MCNC: 0.63 MG/DL (ref 0.6–1.3)
EST. AVERAGE GLUCOSE BLD GHB EST-MCNC: 120 MG/DL
GFR SERPL CREATININE-BSD FRML MDRD: 99 ML/MIN/1.73SQ M
GLUCOSE P FAST SERPL-MCNC: 105 MG/DL (ref 65–99)
HBA1C MFR BLD: 5.8 %
HDLC SERPL-MCNC: 50 MG/DL
LDLC SERPL CALC-MCNC: 107 MG/DL (ref 0–100)
POTASSIUM SERPL-SCNC: 4.3 MMOL/L (ref 3.5–5.3)
PROT SERPL-MCNC: 6.9 G/DL (ref 6.4–8.4)
PSA SERPL-MCNC: 3.1 NG/ML (ref 0–4)
SODIUM SERPL-SCNC: 141 MMOL/L (ref 135–147)
TRIGL SERPL-MCNC: 89 MG/DL

## 2024-06-05 PROCEDURE — 83036 HEMOGLOBIN GLYCOSYLATED A1C: CPT

## 2024-06-05 PROCEDURE — 84153 ASSAY OF PSA TOTAL: CPT

## 2024-06-05 PROCEDURE — 36415 COLL VENOUS BLD VENIPUNCTURE: CPT

## 2024-06-05 PROCEDURE — 80053 COMPREHEN METABOLIC PANEL: CPT

## 2024-06-05 PROCEDURE — 80061 LIPID PANEL: CPT

## 2024-06-05 RX ORDER — AMOXICILLIN 500 MG/1
TABLET, FILM COATED ORAL
Qty: 4 TABLET | Refills: 2 | Status: SHIPPED | OUTPATIENT
Start: 2024-06-05 | End: 2024-06-05

## 2024-06-05 RX ORDER — AMOXICILLIN 500 MG/1
TABLET, FILM COATED ORAL
Qty: 4 TABLET | Refills: 2 | Status: SHIPPED | OUTPATIENT
Start: 2024-06-05 | End: 2024-06-05 | Stop reason: SDUPTHER

## 2024-06-09 NOTE — ASSESSMENT & PLAN NOTE
3/11/2024 126/80  4/16/2024 119/62  5/16/2024 138/68    Not on antihypertensive.  May benefit by low-dose losartan

## 2024-06-09 NOTE — ASSESSMENT & PLAN NOTE
Lab Results   Component Value Date    CHOLESTEROL 175 06/05/2024     Lab Results   Component Value Date    TRIG 89 06/05/2024     Lab Results   Component Value Date    HDL 50 06/05/2024     Lab Results   Component Value Date    LDLCALC 107 (H) 06/05/2024     Not taking rosuvastatin 5 mg daily ordered for patient

## 2024-06-09 NOTE — ASSESSMENT & PLAN NOTE
No clinical history of cardiac event    08/13/2021 nuclear stress test:. Ventricular ectopy as described with exercise   Low normal left ventricular systolic function, EF 52% with mild left ventricular cavity enlargement. Normal tomographic perfusion series with inferior diaphragmatic attenuation.

## 2024-06-09 NOTE — ASSESSMENT & PLAN NOTE
Ascending aortic aneurysm is unchanged on CT of the chest without contrast 2/4/2024    Blood pressure could be better controlled  Not on a beta-blocker due to bradycardia  Not taking statin previously ordered

## 2024-06-11 ENCOUNTER — OFFICE VISIT (OUTPATIENT)
Dept: CARDIOLOGY CLINIC | Facility: CLINIC | Age: 69
End: 2024-06-11
Payer: COMMERCIAL

## 2024-06-11 VITALS
DIASTOLIC BLOOD PRESSURE: 64 MMHG | SYSTOLIC BLOOD PRESSURE: 114 MMHG | HEIGHT: 73 IN | HEART RATE: 53 BPM | WEIGHT: 191.2 LBS | BODY MASS INDEX: 25.34 KG/M2

## 2024-06-11 DIAGNOSIS — I65.23 BILATERAL CAROTID ARTERY STENOSIS: ICD-10-CM

## 2024-06-11 DIAGNOSIS — I25.84 CORONARY ARTERY CALCIFICATION: ICD-10-CM

## 2024-06-11 DIAGNOSIS — I25.10 CORONARY ARTERY CALCIFICATION: ICD-10-CM

## 2024-06-11 DIAGNOSIS — I10 PRIMARY HYPERTENSION: ICD-10-CM

## 2024-06-11 DIAGNOSIS — E78.00 PRIMARY HYPERCHOLESTEROLEMIA: ICD-10-CM

## 2024-06-11 DIAGNOSIS — I71.21 ANEURYSM OF ASCENDING AORTA WITHOUT RUPTURE (HCC): Primary | ICD-10-CM

## 2024-06-11 PROCEDURE — 99214 OFFICE O/P EST MOD 30 MIN: CPT | Performed by: INTERNAL MEDICINE

## 2024-06-11 RX ORDER — EZETIMIBE 10 MG/1
10 TABLET ORAL DAILY
Qty: 30 TABLET | Refills: 5 | Status: SHIPPED | OUTPATIENT
Start: 2024-06-11

## 2024-06-11 NOTE — PROGRESS NOTES
[facility]  [address] [place] [address]  Phone#  [phone]   Fax#  [phone]  *-*-*-*-*-*-*-*-*-*-*-*-*-*-*-*-*-*-*-*-*-*-*-*-*-*-*-*-*-*-*-*-*-*-*-*-*-*-*-*-*-*-*-*-*-*-*-*-*-*-*-*-*-*                                   Cardiology Follow Up      ENCOUNTER DATE: 24 12:21 PM  PATIENT NAME: Haider Prather   : 1955    MRN: [id number]  AGE:69 y.o.      SEX: male  ENCOUNTER PROVIDER:Arron Acuña MD     PRIMARY CARE PHYSICIAN: Margarita Bar MD    CARDIOLOGY SPECIALTY COMMENTS  Patient 1st seen on 2021. Recently he has been going for frequent walks for about 45 minutes at a leisurely pace.  When he comes home, he lays on the floor to cool down and relax.  He becomes nauseous but does not vomit although he often feels like he might vomit.  He becomes diaphoretic and his heart races and pounds.   He has no specific chest discomfort.         He has a history of smoking up to a pack a day for 2-3 years in the early 80s and has not smoked since.  His father may have had a heart attack.  He had peripheral vascular disease and fell down a flight of stairs and was noted to be .  He had an uncle who had CABG.  He is a retired  from [employer].        2021 echocardiogram:  Normal LV systolic and diastolic function EF 55 percent.  Mild biatrial dilatation.  Mild-to-moderate mitral regurgitation.  Normal pulmonary artery pressures.  Ascending aortic aneurysm.      2021 Carotid ultrasound demonstrated less than 50% stenosis bilaterally.       2021 CTA of the chest demonstrated enlarged ascending aorta at 41 mm.  There was evidence of coronary artery calcification.  Patient was advised not to lift more than 25 lb due to the ascending aortic aneurysm.     2021 nuclear stress test:. Ventricular ectopy as described with exercise   Low normal left ventricular systolic function, EF 52% with mild left  ventricular cavity enlargement   Normal tomographic perfusion series with inferior diaphragmatic attenuation.     10/07/2021 Holter monitor: Heart rate  beats per minute averaging 66 bpm. Rare ventricular ectopy. Occasional supraventricular ectopy. Two 3 beat runs of SVT, fastest 152 beats per minute. Intermittent 1st degree AV block and 1 episode of second-degree AV block Mobitz type 1. Longest RR 1.9 seconds    11/05/2021 MRA of the chest: Mildly ectatic ascending aorta maximum diameter 40 mm. Remainder the aorta normal in caliber.    6/28/2022 CT of the chest without contrast: Mild ectasia of the ascending aorta at 4.1 cm unchanged from previous.    2/4/2024 CT of the chest without contrast: CT scan of the chest demonstrates mild enlargement of the aorta measuring 4.1 cm unchanged from previous studies.      ACTIVE PROBLEM LIST  Patient Active Problem List   Diagnosis    Allergic rhinitis    Spinal stenosis    Degeneration of intervertebral disc of cervical region    Hypothyroidism    Muscle pain, myofacial    Neuropathy    Nontoxic multinodular goiter    Occipital neuralgia    Venous insufficiency    Chondromalacia patellae    Chronic osteomyelitis of lower leg (HCC)    Generalized osteoarthritis    Anxiety    Reactive depression    BPH without obstruction/lower urinary tract symptoms    Post traumatic stress disorder (PTSD)    Vitamin D deficiency    Varicose veins of bilateral lower extremities with other complications    Osteoporosis without current pathological fracture    Bilateral carotid artery stenosis < 50%    PAC (premature atrial contraction)    PVC (premature ventricular contraction)    Primary hypercholesterolemia    Macrocytosis without anemia    Chronic neck pain    Thoracic aortic aneurysm without rupture (HCC)    Nonrheumatic mitral valve regurgitation    Bilateral enlargement of atria    Coronary artery calcification    Prostate cancer (HCC)    Mass of right parotid gland    History of  osteomyelitis    Renal cysts, acquired, bilateral    Primary hypertension    Lumbar spine tumor    Schwannoma    Nonintractable epilepsy without status epilepticus, unspecified epilepsy type (HCC)    Steatosis (HCC)       ACTIVE DIAGNOSIS AND PLAN    1. Aneurysm of ascending aorta without rupture (HCC)  Assessment & Plan:  Ascending aortic aneurysm is unchanged on CT of the chest without contrast 2/4/2024    Blood pressure could be better controlled  Not on a beta-blocker due to bradycardia  Not taking statin previously ordered  2. Coronary artery calcification  Assessment & Plan:  No clinical history of cardiac event    08/13/2021 nuclear stress test:. Ventricular ectopy as described with exercise   Low normal left ventricular systolic function, EF 52% with mild left ventricular cavity enlargement. Normal tomographic perfusion series with inferior diaphragmatic attenuation.   3. Primary hypercholesterolemia  Assessment & Plan:  Lab Results   Component Value Date    CHOLESTEROL 175 06/05/2024     Lab Results   Component Value Date    TRIG 89 06/05/2024     Lab Results   Component Value Date    HDL 50 06/05/2024     Lab Results   Component Value Date    LDLCALC 107 (H) 06/05/2024     Not taking rosuvastatin 5 mg daily ordered for patient  Orders:  -     ezetimibe (ZETIA) 10 mg tablet; Take 1 tablet (10 mg total) by mouth daily  -     Lipid Panel with Direct LDL reflex; Future; Expected date: 09/11/2024  4. Primary hypertension  Assessment & Plan:  3/11/2024 126/80  4/16/2024 119/62  5/16/2024 138/68    Not on antihypertensive.  May benefit by low-dose losartan  5. Bilateral carotid artery stenosis < 50%  Assessment & Plan:  06/11/2021 Carotid ultrasound demonstrated less than 50% stenosis bilaterally.      INTERVAL HISTORY:        Patient with an ascending aortic aneurysm returns for follow-up.  He is a very high anxiety level.  He asked me if he should continue to follow-up with Dr. Snowden aortic aneurysm  clinic.  I encouraged him to continue to follow-up with the clinic.  The more important he gets, the more it may help to control his anxiety.    He is not on a beta-blocker which I normally would use with his ascending aortic aneurysm but he is already bradycardic.  I would like him on a statin but he gets severe muscle aches from rosuvastatin 5 mg daily.  I agreed with him to try ezetimibe.    DISCUSSION/PLAN:          Begin ezetimibe 10 mg daily  Obtain fasting lipid profile in 3 months  Message patient concerning his fasting lipid profile  Return to see me in 6 months    No results found for this visit on 06/11/24.      Lab Studies:    Lab Results   Component Value Date    CHOLESTEROL 175 06/05/2024    CHOLESTEROL 174 11/16/2023    CHOLESTEROL 175 07/19/2023     Lab Results   Component Value Date    TRIG 89 06/05/2024    TRIG 101 11/16/2023    TRIG 94 07/19/2023     Lab Results   Component Value Date    HDL 50 06/05/2024    HDL 44 11/16/2023    HDL 54 07/19/2023     Lab Results   Component Value Date    LDLCALC 107 (H) 06/05/2024    LDLCALC 110 (H) 11/16/2023    LDLCALC 102 (H) 07/19/2023     Lab Results   Component Value Date    HGBA1C 5.8 (H) 06/05/2024    HGBA1C 6.4 (H) 11/16/2023    HGBA1C 5.9 (H) 06/26/2023      Lab Results   Component Value Date    EGFR 99 06/05/2024    EGFR 103 03/22/2024    EGFR 102 11/16/2023    SODIUM 141 06/05/2024    SODIUM 144 11/16/2023    SODIUM 142 07/26/2023    K 4.3 06/05/2024    K 4.3 11/16/2023    K 3.8 07/26/2023     06/05/2024     11/16/2023     07/26/2023    CO2 33 (H) 06/05/2024    CO2 30 11/16/2023    CO2 28 07/26/2023    ANIONGAP 7 07/31/2015    ANIONGAP 9 02/24/2015    ANIONGAP 9 02/06/2015    BUN 19 06/05/2024    BUN 21 03/22/2024    BUN 17 11/16/2023    CREATININE 0.63 06/05/2024    CREATININE 0.58 (L) 03/22/2024    CREATININE 0.60 11/16/2023    MG 2.3 02/17/2022     Lab Results   Component Value Date    WBC 6.83 11/16/2023    WBC 12.80 (H)  12/20/2022    WBC 8.57 12/07/2022    HGB 14.4 11/16/2023    HGB 9.6 (L) 07/26/2023    HGB 12.5 12/20/2022    HCT 44.4 11/16/2023    HCT 29.4 (L) 07/26/2023    HCT 37.7 12/20/2022    MCV 99 (H) 11/16/2023     (H) 12/20/2022     (H) 12/07/2022    MCH 32.1 11/16/2023    MCH 33.0 12/20/2022    MCH 32.3 12/07/2022    MCHC 32.4 11/16/2023    MCHC 33.2 12/20/2022    MCHC 32.1 12/07/2022     11/16/2023     12/20/2022     12/19/2022      Lab Results   Component Value Date    GLUCOSE 106 07/31/2015    GLUCOSE 119 02/24/2015    GLUCOSE 105 02/06/2015    CALCIUM 9.7 06/05/2024    CALCIUM 9.6 11/16/2023    CALCIUM 8.0 (L) 07/26/2023    ALB 4.5 06/05/2024    ALB 4.3 11/16/2023    ALB 4.6 06/26/2023    TP 6.9 06/05/2024    TP 6.6 11/16/2023    TP 6.6 06/26/2023    AST 23 06/05/2024    AST 18 11/16/2023    AST 28 06/26/2023    ALT 20 06/05/2024    ALT 14 11/16/2023    ALT 38 06/26/2023    ALKPHOS 42 06/05/2024    ALKPHOS 57 11/16/2023    ALKPHOS 45 06/26/2023    BILITOT 0.60 07/31/2015    BILITOT 0.49 12/19/2014       Lab Results   Component Value Date    TSH 1.10 02/23/2023    TSH 0.54 03/18/2022    TSH 1.10 02/10/2020     Lab Results   Component Value Date    FREET4 1.4 07/31/2015       Lab Results   Component Value Date    CRP <3.0 10/21/2022    CRP <3.0 04/27/2021         Current Outpatient Medications:     acetaminophen (TYLENOL) 500 mg tablet, Take 2 tablets (1,000 mg total) by mouth every 8 (eight) hours as needed for moderate pain, mild pain or fever, Disp: , Rfl: 0    desloratadine (CLARINEX) 5 MG tablet, TAKE 1 TABLET DAILY, Disp: 90 tablet, Rfl: 1    doxycycline (ADOXA) 50 MG tablet, TAKE 1 TABLET BY ORAL ROUTE EVERY DAY WITH MEAL X3 MONTHS THEN [facility], Disp: , Rfl:     ezetimibe (ZETIA) 10 mg tablet, Take 1 tablet (10 mg total) by mouth daily, Disp: 30 tablet, Rfl: 5    fluticasone (FLONASE) 50 mcg/act nasal spray, 2 sprays into each nostril daily, Disp: 48 g, Rfl: 3    levothyroxine 100  mcg tablet, daily, Disp: , Rfl:     metroNIDAZOLE (METROGEL) 0.75 % gel, Apply topically 2 (two) times a day, Disp: , Rfl:     tamsulosin (FLOMAX) 0.4 mg, Take 0.4 mg by mouth 2 (two) times a day, Disp: , Rfl:     tazarotene (AVAGE) 0.1 % cream, Apply topically daily at bedtime, Disp: , Rfl:     denosumab (PROLIA) 60 mg/mL, Inject 1 mL (60 mg total) under the skin once for 1 dose, Disp: 1 mL, Rfl: 0    pantoprazole (PROTONIX) 40 mg tablet, Take 1 tablet (40 mg total) by mouth daily (Patient not taking: Reported on 2024), Disp: 30 tablet, Rfl: 2    Perfluorohexyloctane (Miebo) 1.338 GM/ML SOLN, Apply 1 drop to eye every 6 (six) hours (Patient not taking: Reported on 2024), Disp: , Rfl:   Allergies   Allergen Reactions    Amoxicillin-Pot Clavulanate Rash, Vomiting and Nausea Only       Past Medical History:   Diagnosis Date    Anxiety     Arthritis     Bleeding tendency (HCC)     BPH (benign prostatic hyperplasia)     Broken bones     left leg    Cancer (HCC)     Cataract     Chronic neck pain     Epilepsy (HCC)     Hypothyroidism     Neoplasm of unspecified behavior of bone, soft tissue, and skin     Osteomyelitis (HCC)     PONV (postoperative nausea and vomiting)     Prostate cancer (HCC)     Skin cancer     Squamous carcinoma 2014    Thyroid condition     Thyroid disease      Social History     Socioeconomic History    Marital status:      Spouse name: Not on file    Number of children: Not on file    Years of education: Not on file    Highest education level: Not on file   Occupational History    Occupation: Teacher    Occupation: retired   Tobacco Use    Smoking status: Former     Current packs/day: 0.00     Types: Cigarettes     Quit date:      Years since quittin.4    Smokeless tobacco: Never   Vaping Use    Vaping status: Never Used   Substance and Sexual Activity    Alcohol use: Yes     Comment: being a social drinker    Drug use: Not Currently     Types: Marijuana      Comment: Medical marijuana    Sexual activity: Yes     Comment: no known std risk factors   Other Topics Concern    Not on file   Social History Narrative    Daily coffee consumption (3 cups/day)     Social Determinants of Health     Financial Resource Strain: Low Risk  (10/15/2023)    Overall Financial Resource Strain (CARDIA)     Difficulty of Paying Living Expenses: Not hard at all   Food Insecurity: No Food Insecurity (7/25/2023)    [facility], [facility]    Hunger Vital Sign     Worried About Running Out of Food in the Last Year: Never true     Ran Out of Food in the Last Year: Never true   Transportation Needs: No Transportation Needs (10/15/2023)    PRAPARE - Transportation     Lack of Transportation (Medical): No     Lack of Transportation (Non-Medical): No   Physical Activity: Not on file   Stress: Not on file   Social Connections: Not on file   Intimate Partner Violence: Not At Risk (7/25/2023)    Received from [facility], [facility]    Humiliation, Afraid, Rape, and Kick questionnaire     Fear of Current or Ex-Partner: No     Emotionally Abused: No     Physically Abused: No     Sexually Abused: No   Housing Stability: Low Risk  (7/25/2023)    Received from [facility], [facility]    Housing Stability Vital Sign     Unable to Pay for Housing in the Last Year: No     Number of Places Lived in the Last Year: 1     Unstable Housing in the Last Year: No      Family History   Problem Relation Age of Onset    Bone cancer Mother     Other Father         Accident    Dementia Other     Cancer Other      Past Surgical History:   Procedure Laterality Date    ARTHRODESIS      H/o Arthrodesis Cervical to C2;  last assessed [id number]    CERVICAL FUSION      COLONOSCOPY  01/22/2021    COLONOSCOPY      KNEE SURGERY Right 12/22/2017    LEG SURGERY Left 1999    Hardware placed in lower leg bone(s)     "LEG SURGERY Left 01/01/2006 2006, 2012-Left leg vascular    UT LAMINECTOMY BX/EXC ISPI DAKOTAH XDRL LUMBAR N/A 12/19/2022    Procedure: L2/3 decompressive laminectomy and biopsy (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;  Surgeon: Toño Contreras MD;  Location: [facility];  Service: Neurosurgery    THYROID SURGERY Right 2013    US GUIDED PROSTATE BIOPSY  07/13/2021       PREVIOUS WEIGHTS:   Wt Readings from Last 10 Encounters:   06/11/24 86.7 kg (191 lb 3.2 oz)   05/16/24 86.6 kg (191 lb)   04/16/24 86.8 kg (191 lb 6.4 oz)   03/11/24 88.1 kg (194 lb 3.2 oz)   01/24/24 87.5 kg (192 lb 12.8 oz)   01/24/24 86.5 kg (190 lb 12.8 oz)   01/03/24 86.1 kg (189 lb 12.8 oz)   10/16/23 86.5 kg (190 lb 9.6 oz)   07/21/23 85.2 kg (187 lb 12.8 oz)   07/12/23 87.2 kg (192 lb 3.2 oz)        Review of Systems:  Review of Systems   Constitutional:  Negative for activity change.   Respiratory:  Negative for cough, chest tightness, shortness of breath and wheezing.    Cardiovascular:  Negative for chest pain, palpitations and leg swelling.   Musculoskeletal:  Negative for gait problem.   Skin:  Negative for color change.   Neurological:  Negative for dizziness, tremors, syncope, weakness, light-headedness and headaches.   Psychiatric/Behavioral:  Negative for agitation and confusion.        Physical Exam:  /64   Pulse (!) 53   Ht 6' 1\" (1.854 m)   Wt 86.7 kg (191 lb 3.2 oz)   BMI 25.23 kg/m²     Physical Exam  Vitals reviewed.   Constitutional:       General: He is not in acute distress.     Appearance: He is well-developed.   HENT:      Head: Normocephalic and atraumatic.   Neck:      Thyroid: No thyromegaly.      Vascular: No carotid bruit or JVD.      Trachea: No tracheal deviation.   Cardiovascular:      Rate and Rhythm: Normal rate and regular rhythm.      Pulses: Normal pulses.      Heart sounds: Normal heart sounds. No murmur heard.     No friction rub. No gallop.   Pulmonary:      Effort: Pulmonary effort " "is normal. No respiratory distress.      Breath sounds: Normal breath sounds. No wheezing, rhonchi or rales.   Chest:      Chest wall: No tenderness.   Musculoskeletal:      Cervical back: Normal range of motion and neck supple.      Right lower leg: No edema.      Left lower leg: No edema.   Skin:     General: Skin is warm and dry.   Neurological:      General: No focal deficit present.      Mental Status: He is alert and oriented to person, place, and time.   Psychiatric:         Mood and Affect: Mood normal.         Behavior: Behavior normal.         Thought Content: Thought content normal.         Judgment: Judgment normal.         ======================================================  Imaging:   I have personally reviewed pertinent reports.      Portions of the record may have been created with voice recognition software. Occasional wrong word or \"sound a like\" substitutions may have occurred due to the inherent limitations of voice recognition software. Read the chart carefully and recognize, using context, where substitutions have occurred.    SIGNATURES:   Arron Acuña MD   "

## 2024-06-12 ENCOUNTER — TELEPHONE (OUTPATIENT)
Age: 69
End: 2024-06-12

## 2024-06-12 ENCOUNTER — TELEPHONE (OUTPATIENT)
Dept: NEUROSURGERY | Facility: CLINIC | Age: 69
End: 2024-06-12

## 2024-06-12 NOTE — TELEPHONE ENCOUNTER
06/12/2024 @ 9:15am - PT IS SEEING DR BRAN IN AUGUST AND OUR OFFICE WILL F/U THEN IF PT WANTS TO PROCEED W/SX

## 2024-06-12 NOTE — TELEPHONE ENCOUNTER
I spoke with the patient and he will call the pharmacy to see if he still has refills.  He may need to speak directly to me if he calls back.

## 2024-06-12 NOTE — TELEPHONE ENCOUNTER
Patient called to request a refill for their AMOXICILLIN 500 MG  advised a refill was requested on 6/5/2024     PT REALIZED HE MAY HAVE A REFILL ON HIS BOTTLE AT THE PHARMACY WHICH HE WILL TRY TO CALL THE PHARMACY TO SEE IF THE RX IS STILL ACTIVE. IF NOT HE WILL CALL BACK IN FOR A RX TO BE SENT TO [facility].

## 2024-06-26 ENCOUNTER — HOSPITAL ENCOUNTER (OUTPATIENT)
Facility: MEDICAL CENTER | Age: 69
Discharge: HOME | End: 2024-06-26
Payer: COMMERCIAL

## 2024-06-26 DIAGNOSIS — C61 MALIGNANT NEOPLASM OF PROSTATE (HCC): ICD-10-CM

## 2024-06-26 PROCEDURE — A9585 GADOBUTROL INJECTION: HCPCS | Performed by: UROLOGY

## 2024-06-26 PROCEDURE — 76377 3D RENDER W/INTRP POSTPROCES: CPT

## 2024-06-26 PROCEDURE — 72197 MRI PELVIS W/O & W/DYE: CPT

## 2024-06-26 RX ORDER — GADOBUTROL 604.72 MG/ML
8 INJECTION INTRAVENOUS
Status: COMPLETED | OUTPATIENT
Start: 2024-06-26 | End: 2024-06-26

## 2024-06-26 RX ADMIN — GADOBUTROL 8 ML: 604.72 INJECTION INTRAVENOUS at 12:22

## 2024-07-01 ENCOUNTER — OFFICE VISIT (OUTPATIENT)
Dept: CARDIAC SURGERY | Facility: CLINIC | Age: 69
End: 2024-07-01
Payer: COMMERCIAL

## 2024-07-01 ENCOUNTER — APPOINTMENT (OUTPATIENT)
Dept: LAB | Facility: CLINIC | Age: 69
End: 2024-07-01
Payer: COMMERCIAL

## 2024-07-01 ENCOUNTER — TELEPHONE (OUTPATIENT)
Dept: FAMILY MEDICINE CLINIC | Facility: CLINIC | Age: 69
End: 2024-07-01

## 2024-07-01 VITALS
HEIGHT: 73 IN | BODY MASS INDEX: 25.06 KG/M2 | HEART RATE: 58 BPM | OXYGEN SATURATION: 97 % | SYSTOLIC BLOOD PRESSURE: 114 MMHG | WEIGHT: 189.1 LBS | DIASTOLIC BLOOD PRESSURE: 59 MMHG

## 2024-07-01 DIAGNOSIS — I71.21 ANEURYSM OF ASCENDING AORTA WITHOUT RUPTURE (HCC): Primary | ICD-10-CM

## 2024-07-01 DIAGNOSIS — C61 MALIGNANT NEOPLASM OF PROSTATE (HCC): ICD-10-CM

## 2024-07-01 LAB
ANION GAP SERPL CALCULATED.3IONS-SCNC: 12 MMOL/L (ref 4–13)
BUN SERPL-MCNC: 15 MG/DL (ref 5–25)
CALCIUM SERPL-MCNC: 9.9 MG/DL (ref 8.4–10.2)
CHLORIDE SERPL-SCNC: 103 MMOL/L (ref 96–108)
CO2 SERPL-SCNC: 28 MMOL/L (ref 21–32)
CREAT SERPL-MCNC: 0.55 MG/DL (ref 0.6–1.3)
GFR SERPL CREATININE-BSD FRML MDRD: 105 ML/MIN/1.73SQ M
GLUCOSE SERPL-MCNC: 115 MG/DL (ref 65–140)
POTASSIUM SERPL-SCNC: 4 MMOL/L (ref 3.5–5.3)
SODIUM SERPL-SCNC: 143 MMOL/L (ref 135–147)

## 2024-07-01 PROCEDURE — 80048 BASIC METABOLIC PNL TOTAL CA: CPT

## 2024-07-01 PROCEDURE — 36415 COLL VENOUS BLD VENIPUNCTURE: CPT

## 2024-07-01 PROCEDURE — 99214 OFFICE O/P EST MOD 30 MIN: CPT | Performed by: THORACIC SURGERY (CARDIOTHORACIC VASCULAR SURGERY)

## 2024-07-01 RX ORDER — AMOXICILLIN 500 MG/1
500 TABLET, FILM COATED ORAL AS NEEDED
COMMUNITY
Start: 2024-06-12

## 2024-07-01 NOTE — TELEPHONE ENCOUNTER
Patient came in stating he does not want to have the Prolia ordered again as he will be requesting something different at his upcoming appointment

## 2024-07-01 NOTE — PROGRESS NOTES
Aortic Surveillance - Cardiothoracic Surgery   Haider Prather 69 y.o. male MRN: [id number]    History of Present Illness: Haider Prather is a 69 y.o. year old male who presents today for ongoing surveillance of previously identified ascending aortic aneurysm.  They were most recently evaluated in our office with CT imaging in July 2022. He was instructed to follow up with a non-contrast CT chest in 2 years, and presents today to review results.     In review, patient has a PMHx anxiety, arthritis, BPH, epilepsy, hypothyroid, squamous cell skin cancer. He sees Dr. Acuña for cardiology follow up, and has been working with him with lipid lowering medication. He has tried Crestor but developed myalgias. He stopped that, and was started on Zetia last month, but had myalgias after about 2 days, so he stopped that as well. Upon interview today, he denies chest pain, shortness of breath, LE edema, numbness/tingling/paresthesias, new or worsening back pain, syncope, palpitations. His blood pressures are generally well controlled. He is active and independent with ADL's, refrains from sustained straining/heavy lifting. He is a former smoker, quit 40+ years ago. He does not drink alcohol.         Past Medical History:  Past Medical History:   Diagnosis Date    Anxiety     Arthritis     Bleeding tendency (HCC)     BPH (benign prostatic hyperplasia)     Broken bones     left leg    Cancer (HCC)     Cataract     Chronic neck pain     Epilepsy (HCC)     Hypothyroidism     Neoplasm of unspecified behavior of bone, soft tissue, and skin     Osteomyelitis (HCC)     PONV (postoperative nausea and vomiting)     Prostate cancer (HCC)     Skin cancer     Squamous carcinoma 12/01/2014 12/2014    Thyroid condition     Thyroid disease          Past Surgical History:   Past Surgical History:   Procedure Laterality Date    ARTHRODESIS      H/o Arthrodesis Cervical to C2;  last assessed [id number]    CERVICAL FUSION      COLONOSCOPY   2021    COLONOSCOPY      KNEE SURGERY Right 2017    LEG SURGERY Left     Hardware placed in lower leg bone(s)    LEG SURGERY Left 2006, -Left leg vascular    ID LAMINECTOMY BX/EXC ISPI DAKOTAH XDRL LUMBAR N/A 2022    Procedure: L2/3 decompressive laminectomy and biopsy (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;  Surgeon: Toño Contreras MD;  Location: [facility];  Service: Neurosurgery    THYROID SURGERY Right 2013    US GUIDED PROSTATE BIOPSY  2021         Family History:  Family History   Problem Relation Age of Onset    Bone cancer Mother     Other Father         Accident    Dementia Other     Cancer Other          Social History:    Social History     Substance and Sexual Activity   Alcohol Use Yes    Comment: being a social drinker     Social History     Substance and Sexual Activity   Drug Use Not Currently    Types: Marijuana    Comment: Medical marijuana     Social History     Tobacco Use   Smoking Status Former    Current packs/day: 0.00    Types: Cigarettes    Quit date:     Years since quittin.5   Smokeless Tobacco Never       Home Medications:   Prior to Admission medications    Medication Sig Start Date End Date Taking? Authorizing Provider   acetaminophen (TYLENOL) 500 mg tablet Take 2 tablets (1,000 mg total) by mouth every 8 (eight) hours as needed for moderate pain, mild pain or fever  Patient taking differently: Take 1,000 mg by mouth every 8 (eight) hours as needed for moderate pain, mild pain or fever As needed 22  Yes Margarita Estrada PA-C   amoxicillin (AMOXIL) 500 MG tablet Take 500 mg by mouth if needed As needed 24  Yes Historical Provider, MD   doxycycline (ADOXA) 50 MG tablet TAKE 1 TABLET BY ORAL ROUTE EVERY DAY WITH MEAL X3 MONTHS THEN MWF 20  Yes Historical Provider, MD   fluticasone (FLONASE) 50 mcg/act nasal spray 2 sprays into each nostril daily 10/16/23  Yes Margarita Bar MD   levothyroxine 100 mcg  tablet daily 2/9/18  Yes Historical Provider, MD   metroNIDAZOLE (METROGEL) 0.75 % gel Apply topically 2 (two) times a day 8/6/20  Yes Historical Provider, MD   Perfluorohexyloctane (Miebo) 1.338 GM/ML SOLN Apply 1 drop to eye every 6 (six) hours 1/9/24  Yes Historical Provider, MD   tamsulosin (FLOMAX) 0.4 mg Take 0.4 mg by mouth 2 (two) times a day 10/8/21  Yes Historical Provider, MD   tazarotene (AVAGE) 0.1 % cream Apply topically daily at bedtime 10/13/22  Yes Historical Provider, MD   denosumab (PROLIA) 60 mg/mL Inject 1 mL (60 mg total) under the skin once for 1 dose  Patient not taking: Reported on 7/1/2024 4/16/24 4/16/24  Margarita Bar MD   desloratadine (CLARINEX) 5 MG tablet TAKE 1 TABLET DAILY  Patient not taking: Reported on 7/1/2024 3/15/24   Margarita Bar MD   ezetimibe (ZETIA) 10 mg tablet Take 1 tablet (10 mg total) by mouth daily  Patient not taking: Reported on 7/1/2024 6/11/24   Arron Acuña MD   pantoprazole (PROTONIX) 40 mg tablet Take 1 tablet (40 mg total) by mouth daily  Patient not taking: Reported on 6/11/2024 6/9/23 7/1/24  Rodrigo Florence MD       Allergies:  Allergies   Allergen Reactions    Amoxicillin-Pot Clavulanate Rash, Vomiting and Nausea Only       Review of Systems:     Review of Systems   Constitutional:  Negative for chills and fever.   HENT:  Negative for ear pain and sore throat.    Eyes:  Negative for pain and visual disturbance.   Respiratory:  Negative for cough and shortness of breath.    Cardiovascular:  Negative for chest pain and palpitations.   Gastrointestinal:  Negative for abdominal pain and vomiting.   Genitourinary:  Negative for dysuria and hematuria.   Musculoskeletal:  Positive for myalgias. Negative for arthralgias and back pain.   Skin:  Negative for color change and rash.   Neurological:  Negative for seizures and syncope.   All other systems reviewed and are negative.      Vital Signs:     Vitals:    07/01/24 1341   BP: 114/59   BP Location:  "Left arm   Patient Position: Sitting   Cuff Size: Standard   Pulse: 58   SpO2: 97%   Weight: 85.8 kg (189 lb 1.6 oz)   Height: 6' 1\" (1.854 m)       Physical Exam:     Physical Exam  Vitals reviewed.   Constitutional:       Appearance: Normal appearance.   HENT:      Head: Normocephalic and atraumatic.   Eyes:      Pupils: Pupils are equal, round, and reactive to light.   Neck:      Vascular: No carotid bruit or JVD.   Cardiovascular:      Rate and Rhythm: Normal rate and regular rhythm.      Pulses:           Carotid pulses are 2+ on the right side and 2+ on the left side.       Radial pulses are 2+ on the right side and 2+ on the left side.        Dorsalis pedis pulses are 2+ on the right side and 2+ on the left side.      Heart sounds: Normal heart sounds. No murmur heard.     No friction rub. No gallop.   Pulmonary:      Effort: Pulmonary effort is normal.      Breath sounds: Normal breath sounds. No wheezing, rhonchi or rales.   Abdominal:      Palpations: Abdomen is soft.      Tenderness: There is no abdominal tenderness.   Musculoskeletal:      Cervical back: Normal range of motion.   Skin:     General: Skin is warm and dry.      Capillary Refill: Capillary refill takes less than 2 seconds.   Neurological:      General: No focal deficit present.      Mental Status: He is alert.      Sensory: Sensation is intact.   Psychiatric:         Attention and Perception: Attention normal.         Mood and Affect: Mood normal.         Behavior: Behavior normal. Behavior is cooperative.         Lab Results:               Invalid input(s): \"LABGLOM\"      Lab Results   Component Value Date    HGBA1C 5.8 (H) 06/05/2024     Lab Results   Component Value Date    CKTOTAL 172 02/17/2022    CKMB 2.4 02/17/2022    CKMBINDEX 1.4 02/17/2022       Imaging Studies:     CT Chest: 2/4/24    IMPRESSION:     1. Fusiform ectasia of the ascending thoracic aorta measuring to 4.1 cm. Unchanged.  Follow-up CT can be obtained in 1 year.     2. " Stable pulmonary nodules dating back to 2021, largest of which measures up to 5 mm at the right lung apex, no specific follow-up recommended.    Echocardiogram: 6/2/21  LEFT VENTRICLE: Left ventricle is normal in size and function. Left ventricular ejection fraction is estimated at 55%. Normal wall thickness. Normal diastolic function. There are no obvious high-grade regional wall motion abnormalities.     RIGHT VENTRICLE: Right ventricle is normal in size and function.     LEFT ATRIUM: Left atrium is mildly dilated.     ATRIAL SEPTUM: The interatrial septum appears to be grossly normal without evidence of shunting by color-flow Doppler.     RIGHT ATRIUM: Right atrium is mildly dilated.     MITRAL VALVE: Mitral valve opens well. No evidence of mitral stenosis. Mild-to-moderate mitral regurgitation.     AORTIC VALVE: Aortic valve opens well. Aortic valve is trileaflet. No evidence of aortic stenosis. Trace aortic regurgitation.     TRICUSPID VALVE: Tricuspid valve opens well. Trace tricuspid regurgitation. Pulmonary artery systolic pressures are estimated at 15-20 mm Hg     PULMONIC VALVE: Pulmonic valve opens well. No evidence of pulmonic stenosis. Mild pulmonic regurgitation.     PERICARDIUM: There is no evidence of significant pericardial effusion.     AORTA: Aortic root is mildly dilated 3.5 cm.     SYSTEMIC VEINS: IVC: IVC is normal size with greater than 50% respirophasic collapse.    I have personally reviewed pertinent reports.      Assessment:  Patient Active Problem List    Diagnosis Date Noted    Steatosis (HCC) 04/16/2024    Nonintractable epilepsy without status epilepticus, unspecified epilepsy type (HCC) 01/03/2024    Schwannoma 04/27/2023    Lumbar spine tumor 10/15/2022    Primary hypertension 04/27/2022    Renal cysts, acquired, bilateral 02/23/2022    History of osteomyelitis 01/18/2022    Mass of right parotid gland 01/07/2022    Thoracic aortic aneurysm without rupture (HCC) 06/22/2021     Nonrheumatic mitral valve regurgitation 06/22/2021    Bilateral enlargement of atria 06/22/2021    Coronary artery calcification 06/22/2021    Osteoporosis without current pathological fracture 05/19/2021    Bilateral carotid artery stenosis < 50% 05/19/2021    PAC (premature atrial contraction) 05/19/2021    PVC (premature ventricular contraction) 05/19/2021    Primary hypercholesterolemia 05/19/2021    Macrocytosis without anemia 05/19/2021    Chronic neck pain 05/19/2021    Prostate cancer (HCC) 2021    Varicose veins of bilateral lower extremities with other complications 06/11/2020    Vitamin D deficiency 08/27/2019    Post traumatic stress disorder (PTSD)     BPH without obstruction/lower urinary tract symptoms 05/03/2019    Anxiety 12/18/2018    Reactive depression 12/18/2018    Chondromalacia patellae 08/24/2018    Chronic osteomyelitis of lower leg (HCC) 08/24/2018    Generalized osteoarthritis 08/24/2018    Neuropathy 01/20/2016    Muscle pain, myofacial 10/16/2015    Occipital neuralgia 07/14/2015    Spinal stenosis 12/15/2014    Venous insufficiency 03/03/2014    Degeneration of intervertebral disc of cervical region 07/29/2013    Allergic rhinitis 05/06/2013    Hypothyroidism 05/06/2013    Nontoxic multinodular goiter 01/07/2013     Ascending aortic aneurysm; Ongoing ascending aortic replacement workup    Plan:     CT chest, without contrast performed prior to the visit today was reviewed.  Radiographic findings of ascending aorta aneurysm without significant change (41 mm at it's greatest diameter), were confirmed and shared with the patient today.    Surveillance Plan: The aneurysm size remains unchanged, and does not meet surgical indications for intervention. Therefore follow-up monitoring will be the treatment plan.  Arrangements have been made for future surveillance to be completed with CT chest, without contrast in 2 Years.    Haider Prather was comfortable with our recommendations, and their  questions were answered to their satisfaction.  Thank you for allowing us to participate in the care of this patient.     Aortic Aneurysm Instructions were provided to the patient as follows:    1. No heavy sustained lifting which would require excessive straining  2. Maintain a controlled blood pressure with a goal of 120/80.  3. Follow up in [facility] Clinic as recommended with radiology follow up as instructed  4. Report to the ER or call 911 immediately with the following signs / symptoms: sudden onset of back pain, chest pain or shortness of breath.    The patient recently had a screening colonoscopy in January 2021.  Therefore GI referral is not indicated at this time.     SIGNATURE: Jing Browning PA-C  DATE: 07/01/24  TIME: 2:14 PM

## 2024-07-11 ENCOUNTER — TELEPHONE (OUTPATIENT)
Age: 69
End: 2024-07-11

## 2024-07-11 NOTE — TELEPHONE ENCOUNTER
PT CALLED AND ASKED TO SCHED FU APPT WITH DKO TO FURTHER DISCUSS QUESTIONS IN REGARD TO POTENTIAL UPCOMING SURGERY, BASED ON PREVIOUS CONSULT 5/15/24 DKO.    WITH NM, ARRANGED APPT 7/16 830A DKO TO GO OVER THESE QUESTIONS AND CONCERNS.    CONFIRMED DATE, TIME, AND LOCATION

## 2024-07-16 ENCOUNTER — OFFICE VISIT (OUTPATIENT)
Dept: FAMILY MEDICINE CLINIC | Facility: CLINIC | Age: 69
End: 2024-07-16
Payer: COMMERCIAL

## 2024-07-16 ENCOUNTER — OFFICE VISIT (OUTPATIENT)
Dept: NEUROSURGERY | Facility: CLINIC | Age: 69
End: 2024-07-16
Payer: COMMERCIAL

## 2024-07-16 VITALS
WEIGHT: 190.2 LBS | HEART RATE: 87 BPM | OXYGEN SATURATION: 99 % | HEIGHT: 73 IN | SYSTOLIC BLOOD PRESSURE: 136 MMHG | BODY MASS INDEX: 25.21 KG/M2 | DIASTOLIC BLOOD PRESSURE: 88 MMHG

## 2024-07-16 VITALS
RESPIRATION RATE: 18 BRPM | TEMPERATURE: 97.9 F | OXYGEN SATURATION: 97 % | SYSTOLIC BLOOD PRESSURE: 126 MMHG | DIASTOLIC BLOOD PRESSURE: 64 MMHG | HEIGHT: 73 IN | BODY MASS INDEX: 25.05 KG/M2 | HEART RATE: 66 BPM | WEIGHT: 189 LBS

## 2024-07-16 DIAGNOSIS — D36.10 SCHWANNOMA: ICD-10-CM

## 2024-07-16 DIAGNOSIS — E78.00 PRIMARY HYPERCHOLESTEROLEMIA: ICD-10-CM

## 2024-07-16 DIAGNOSIS — R73.03 PREDIABETES: ICD-10-CM

## 2024-07-16 DIAGNOSIS — D33.4 SCHWANNOMA OF SPINAL CORD (HCC): Primary | ICD-10-CM

## 2024-07-16 DIAGNOSIS — E03.9 ACQUIRED HYPOTHYROIDISM: ICD-10-CM

## 2024-07-16 DIAGNOSIS — I10 PRIMARY HYPERTENSION: Primary | ICD-10-CM

## 2024-07-16 PROCEDURE — 99214 OFFICE O/P EST MOD 30 MIN: CPT | Performed by: FAMILY MEDICINE

## 2024-07-16 PROCEDURE — G2211 COMPLEX E/M VISIT ADD ON: HCPCS | Performed by: FAMILY MEDICINE

## 2024-07-16 PROCEDURE — 99213 OFFICE O/P EST LOW 20 MIN: CPT | Performed by: NEUROLOGICAL SURGERY

## 2024-07-16 RX ORDER — PRAVASTATIN SODIUM 10 MG
10 TABLET ORAL
Qty: 30 TABLET | Refills: 5 | Status: SHIPPED | OUTPATIENT
Start: 2024-07-16

## 2024-07-16 NOTE — PROGRESS NOTES
"DISCUSSION SUMMARY   This is a 69 y.o. male who asked a myriad of questions regarding the upcoming procedure.  I tried to answer each of these questions through the use of models and diagrams.  He took these diagrams with him and had previous ones I had produced..  He acknowledged feeling reassured    Return for surgical intervention.      Diagnosis ICD-10-CM Associated Orders   1. Schwannoma of spinal cord (HCC)  D33.4            Chief Complaint   Patient presents with    Follow-up     TO DISCUSS FURTHER QUESTIONS REGARDING UPCOMING SURGERY        HPIVery anxious and concerned about a myriad of questions regarding the surgery  No changes in his symptoms    Review of Systems   Constitutional: Negative.    HENT: Negative.     Eyes: Negative.    Respiratory: Negative.     Cardiovascular: Negative.    Gastrointestinal: Negative.    Endocrine: Negative.    Genitourinary: Negative.    Musculoskeletal:  Positive for back pain (minimal LBP). Negative for gait problem.   Skin: Negative.    Allergic/Immunologic: Negative.    Neurological:  Negative for weakness and numbness.   Hematological: Negative.    Psychiatric/Behavioral: Negative.     I reviewed the ROS    Vitals:    /64 (BP Location: Left arm, Patient Position: Sitting, Cuff Size: Adult)   Pulse 66   Temp 97.9 °F (36.6 °C) (Temporal)   Resp 18   Ht 6' 1\" (1.854 m)   Wt 85.7 kg (189 lb)   SpO2 97%   BMI 24.94 kg/m²     MEDICAL HISTORY  Past Medical History:   Diagnosis Date    Anxiety     Arthritis     Bleeding tendency (HCC)     BPH (benign prostatic hyperplasia)     Broken bones     left leg    Cancer (HCC)     Cataract     Chronic neck pain     Disease of thyroid gland 11/12    Epilepsy (HCC)     Headache(784.0)     Hypothyroidism     Neoplasm of unspecified behavior of bone, soft tissue, and skin     Osteomyelitis (HCC)     PONV (postoperative nausea and vomiting)     Prostate cancer (HCC)     Skin cancer     Squamous carcinoma 12/01/2014 12/2014 "    Thyroid condition     Thyroid disease      Past Surgical History:   Procedure Laterality Date    APPENDECTOMY      ARTHRODESIS      H/o Arthrodesis Cervical to C2;  last assessed [id number]    CERVICAL FUSION      COLONOSCOPY  2021    COLONOSCOPY      EYE SURGERY      JOINT REPLACEMENT      KNEE SURGERY Right 2017    LEG SURGERY Left     Hardware placed in lower leg bone(s)    LEG SURGERY Left 2006, -Left leg vascular    TX LAMINECTOMY BX/EXC ISPI DAKOTAH XDRL LUMBAR N/A 2022    Procedure: L2/3 decompressive laminectomy and biopsy (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;  Surgeon: Toño Contreras MD;  Location: [facility];  Service: Neurosurgery    THYROID SURGERY Right     US GUIDED PROSTATE BIOPSY  2021     Social History     Tobacco Use    Smoking status: Former     Current packs/day: 0.00     Average packs/day: 0.5 packs/day for 2.0 years (1.0 ttl pk-yrs)     Types: Cigarettes     Quit date:      Years since quittin.6    Smokeless tobacco: Never   Vaping Use    Vaping status: Never Used   Substance Use Topics    Alcohol use: Yes     Comment: being a social drinker    Drug use: Not Currently     Types: Marijuana     Comment: Medical marijuana      Family History   Problem Relation Age of Onset    Bone cancer Mother     Other Father         Accident    Dementia Other     Cancer Other         Current Outpatient Medications:     acetaminophen (TYLENOL) 500 mg tablet, Take 2 tablets (1,000 mg total) by mouth every 8 (eight) hours as needed for moderate pain, mild pain or fever (Patient taking differently: Take 1,000 mg by mouth every 8 (eight) hours as needed for moderate pain, mild pain or fever As needed), Disp: , Rfl: 0    desloratadine (CLARINEX) 5 MG tablet, TAKE 1 TABLET DAILY, Disp: 90 tablet, Rfl: 1    doxycycline (ADOXA) 50 MG tablet, TAKE 1 TABLET BY ORAL ROUTE EVERY DAY WITH MEAL X3 MONTHS THEN MW, Disp: , Rfl:     fluticasone  (FLONASE) 50 mcg/act nasal spray, 2 sprays into each nostril daily, Disp: 48 g, Rfl: 3    levothyroxine 100 mcg tablet, daily, Disp: , Rfl:     metroNIDAZOLE (METROGEL) 0.75 % gel, Apply topically 2 (two) times a day, Disp: , Rfl:     Perfluorohexyloctane (Miebo) 1.338 GM/ML SOLN, Apply 1 drop to eye every 6 (six) hours, Disp: , Rfl:     tamsulosin (FLOMAX) 0.4 mg, Take 0.4 mg by mouth 2 (two) times a day, Disp: , Rfl:     tazarotene (AVAGE) 0.1 % cream, Apply topically daily at bedtime, Disp: , Rfl:     amoxicillin (AMOXIL) 500 MG tablet, Take 500 mg by mouth if needed As needed, Disp: , Rfl:     pravastatin (PRAVACHOL) 10 mg tablet, Take 1 tablet (10 mg total) by mouth daily at bedtime, Disp: 30 tablet, Rfl: 5     Allergies   Allergen Reactions    Amoxicillin-Pot Clavulanate Rash, Vomiting and Nausea Only        The following portions of the patient's history were updated by MA and reviewed by MD: allergies, current medications, past family history, past medical history, past social history, past surgical history, and problem list.    Physical Exam    RESULTS/DATA  I have personally reviewed pertinent films in PACS  MRI and previous studies represented to him     Total time 45 minutes

## 2024-07-16 NOTE — PROGRESS NOTES
Ambulatory Visit  Name: Haider Prather      : 1955      MRN: [id number]  Encounter Provider: Margarita Bar MD  Encounter Date: 2024   Encounter department: [facility]    Assessment & Plan   1. Primary hypertension  Comments:  Well controlled without any medication  2. Primary hypercholesterolemia  Comments:  Unable to tolerate crestor and Zetia; discussed starting a trial of Pravastatin 10mg daily  Orders:  -     pravastatin (PRAVACHOL) 10 mg tablet; Take 1 tablet (10 mg total) by mouth daily at bedtime  3. Prediabetes  Comments:  Greatly improved with diet and lifestyle modifications  4. Acquired hypothyroidism  Comments:  Currently maintained on levothyroxine 100 mcg daily  5. Schwannoma  Comments:  Follows with Neurosurgery and Radiation Oncology       History of Present Illness     Haider Prather is a very pleasant 69 year old male who presents today for a follow up. He went to see Neurosurgery earlier today to discuss concerns regarding the proposed resection of schwannoma. At this time patient is still hesitant about the surgery but would like to see radiation oncology prior to making a decision. He would like to discuss starting a different statin as he was unable to tolerate low dose crestor and zetia.  He did have a question regarding his glucose levels on recent lab work ordered by Urology.     Review of Systems   Constitutional: Negative.    HENT: Negative.     Eyes: Negative.    Respiratory: Negative.     Cardiovascular: Negative.    Gastrointestinal: Negative.    Musculoskeletal:  Positive for back pain.   Skin: Negative.    Neurological: Negative.    Psychiatric/Behavioral: Negative.       Current Outpatient Medications on File Prior to Visit   Medication Sig Dispense Refill    acetaminophen (TYLENOL) 500 mg tablet Take 2 tablets (1,000 mg total) by mouth every 8 (eight) hours as needed for moderate pain, mild pain or fever (Patient taking differently: Take 1,000 mg by  "mouth every 8 (eight) hours as needed for moderate pain, mild pain or fever As needed)  0    amoxicillin (AMOXIL) 500 MG tablet Take 500 mg by mouth if needed As needed      desloratadine (CLARINEX) 5 MG tablet TAKE 1 TABLET DAILY 90 tablet 1    doxycycline (ADOXA) 50 MG tablet TAKE 1 TABLET BY ORAL ROUTE EVERY DAY WITH MEAL X3 MONTHS THEN MWF      fluticasone (FLONASE) 50 mcg/act nasal spray 2 sprays into each nostril daily 48 g 3    levothyroxine 100 mcg tablet daily      metroNIDAZOLE (METROGEL) 0.75 % gel Apply topically 2 (two) times a day      Perfluorohexyloctane (Miebo) 1.338 GM/ML SOLN Apply 1 drop to eye every 6 (six) hours      tamsulosin (FLOMAX) 0.4 mg Take 0.4 mg by mouth 2 (two) times a day      tazarotene (AVAGE) 0.1 % cream Apply topically daily at bedtime      [DISCONTINUED] denosumab (PROLIA) 60 mg/mL Inject 1 mL (60 mg total) under the skin once for 1 dose (Patient not taking: Reported on 2024) 1 mL 0    [DISCONTINUED] ezetimibe (ZETIA) 10 mg tablet Take 1 tablet (10 mg total) by mouth daily (Patient not taking: Reported on 2024) 30 tablet 5     No current facility-administered medications on file prior to visit.      Social History     Tobacco Use    Smoking status: Former     Current packs/day: 0.00     Average packs/day: 0.5 packs/day for 2.0 years (1.0 ttl pk-yrs)     Types: Cigarettes     Quit date:      Years since quittin.5    Smokeless tobacco: Never   Vaping Use    Vaping status: Never Used   Substance and Sexual Activity    Alcohol use: Yes     Comment: being a social drinker    Drug use: Not Currently     Types: Marijuana     Comment: Medical marijuana    Sexual activity: Not Currently     Partners: Female     Comment: no known std risk factors     Objective     /88 (BP Location: Left arm, Patient Position: Sitting, Cuff Size: Adult)   Pulse 87   Ht 6' 1\" (1.854 m)   Wt 86.3 kg (190 lb 3.2 oz)   SpO2 99%   BMI 25.09 kg/m²     Physical Exam  Constitutional:  "      General: He is not in acute distress.     Appearance: He is not ill-appearing.   HENT:      Head: Normocephalic and atraumatic.   Eyes:      General:         Right eye: No discharge.         Left eye: No discharge.      Extraocular Movements: Extraocular movements intact.   Cardiovascular:      Rate and Rhythm: Normal rate.   Pulmonary:      Effort: No respiratory distress.   Neurological:      General: No focal deficit present.      Mental Status: He is alert.   Psychiatric:         Mood and Affect: Mood normal.         Behavior: Behavior normal.       Administrative Statements

## 2024-08-10 ENCOUNTER — HOSPITAL ENCOUNTER (OUTPATIENT)
Facility: MEDICAL CENTER | Age: 69
Discharge: HOME | End: 2024-08-10
Payer: COMMERCIAL

## 2024-08-10 DIAGNOSIS — D36.10 SCHWANNOMA: ICD-10-CM

## 2024-08-10 PROCEDURE — A9585 GADOBUTROL INJECTION: HCPCS | Performed by: STUDENT IN AN ORGANIZED HEALTH CARE EDUCATION/TRAINING PROGRAM

## 2024-08-10 PROCEDURE — 72158 MRI LUMBAR SPINE W/O & W/DYE: CPT

## 2024-08-10 RX ORDER — GADOBUTROL 604.72 MG/ML
8 INJECTION INTRAVENOUS
Status: COMPLETED | OUTPATIENT
Start: 2024-08-10 | End: 2024-08-10

## 2024-08-10 RX ADMIN — GADOBUTROL 8 ML: 604.72 INJECTION INTRAVENOUS at 15:24

## 2024-08-14 ENCOUNTER — OFFICE VISIT (OUTPATIENT)
Dept: RADIATION ONCOLOGY | Facility: HOSPITAL | Age: 69
End: 2024-08-14
Attending: STUDENT IN AN ORGANIZED HEALTH CARE EDUCATION/TRAINING PROGRAM
Payer: COMMERCIAL

## 2024-08-14 VITALS
OXYGEN SATURATION: 97 % | HEIGHT: 73 IN | TEMPERATURE: 97.3 F | WEIGHT: 190 LBS | DIASTOLIC BLOOD PRESSURE: 74 MMHG | HEART RATE: 63 BPM | RESPIRATION RATE: 16 BRPM | SYSTOLIC BLOOD PRESSURE: 116 MMHG | BODY MASS INDEX: 25.18 KG/M2

## 2024-08-14 DIAGNOSIS — D36.10 SCHWANNOMA: ICD-10-CM

## 2024-08-14 DIAGNOSIS — D49.2 LUMBAR SPINE TUMOR: Primary | ICD-10-CM

## 2024-08-14 PROCEDURE — G0463 HOSPITAL OUTPT CLINIC VISIT: HCPCS | Performed by: STUDENT IN AN ORGANIZED HEALTH CARE EDUCATION/TRAINING PROGRAM

## 2024-08-14 PROCEDURE — 99211 OFF/OP EST MAY X REQ PHY/QHP: CPT | Performed by: STUDENT IN AN ORGANIZED HEALTH CARE EDUCATION/TRAINING PROGRAM

## 2024-08-14 PROCEDURE — 99213 OFFICE O/P EST LOW 20 MIN: CPT | Performed by: STUDENT IN AN ORGANIZED HEALTH CARE EDUCATION/TRAINING PROGRAM

## 2024-08-14 PROCEDURE — G2211 COMPLEX E/M VISIT ADD ON: HCPCS | Performed by: STUDENT IN AN ORGANIZED HEALTH CARE EDUCATION/TRAINING PROGRAM

## 2024-08-14 NOTE — PROGRESS NOTES
Haider Prather 1955 is a 69 y.o. male  with a symptomatic schwannoma at L3/4 s/p decompressive laminectomy and STR. MRI surveillance demonstrated interval enlargement. On 23 he completed a course of SBRT to a dose of 2500 cGy in 5 fractions.  The pt was last seen in radiation on 04/10/24. The pt returns today for follow up.    24 - Ben Barnett  Tumor has continued to grow in spite of interventions - may be in part secondary to degeneration of inside of schwannoma  Total resection recommended    24 - Ben Barnett  Answered questions in regards to upcoming procedure      08/10/24 - MRI lumbar spine w wo contrast  IMPRESSION:  1. 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 The mass is stable from the most   recent prior 2 MRIs (2024 and 2023) but has enlarged from the baseline  MRI from 9/3/2022. Continued clinical and imaging surveillance advised..  2. Advanced multilevel spondylotic changes elsewhere are similar.      Upcomin24 - Ben Barnett - surgery    Follow up visit     Oncology History   Prostate cancer (HCC)    Initial Diagnosis    Prostate cancer (HCC)      Biopsy    - Prostatic adenocarcinoma, acinar type, [place] score 3+3=6 (Grade Group 1) involving 2 cores   - Percentage of [place] pattern 4: N/A   - Percentage of [place] pattern 5: N/A   - Perineural invasion: Not identified   - Periprostatic fat invasion: Not identified   - Lymph-vascular invasion: Not identified   - Seminal vesicle/ejaculatory duct invasion: Not identified   - Additional pathologic findings: None   - Best representative block if additional studies are needed: B2 and B6      A.  Right prostate (random biopsy):      - Benign prostatic tissue.     B.  Left prostate (random biopsy):      -  "Small foci of prostatic adenocarcinoma, [place] score 3+3=6 (Grade Group 1), involving less than 5% of two (2) cores.    C.  Right mid prostate (biopsy):     - Benign prostatic tissue.      7/13/2021 -  Cancer Staged    Staging form: Prostate, AJCC 8th Edition  - Clinical stage from 7/13/2021: Stage I (cT1a, cN0, cM0, PSA: 3.1, Grade Group: 1) - Signed by Karl Villalba MD on 1/25/2022  Stage prefix: Initial diagnosis  Prostate specific antigen (PSA) range: Less than 10  [place] primary pattern: 3  [facility] secondary pattern: 3  [place] score: 6  Histologic grading system: 5 grade system  Number of biopsy cores examined: 2  Number of biopsy cores positive: 2       Schwannoma   2015 Surgery    \"C1-C2 lateral mass fixation and sublaminar Gallie cable fixation with demineralized bone matrix and cadaveric bone arthrodesis\".  Performed by Dr. Contreras, 2/20/15.        12/19/2022 Surgery    A-B. Spine, \"Left L3 nerve sheath tumor,\" Resection:  - Schwannoma     2/20/2023 Surgery    Patient is s/p L2/3 decompressive laminectomy and biopsy (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 [12/19/22 DKO]     4/27/2023 Initial Diagnosis    Schwannoma     5/24/2023 - 6/5/2023 Radiation    Treatments:  Course: C1 SBRT    Plan ID Energy Fractions Dose per Fraction (cGy) Dose Correction (cGy) Total Dose Delivered (cGy) Elapsed Days   SBRT Spine 6X-FFF 5 / 5 500 0 2,500 12      Treatment Dates:  5/24/2023 - 6/5/2023.          Review of Systems:  Review of Systems   Constitutional: Negative.    HENT: Negative.     Eyes: Negative.    Respiratory: Negative.     Cardiovascular: Negative.    Gastrointestinal: Negative.    Endocrine: Negative.    Genitourinary: Negative.    Musculoskeletal:  Positive for arthralgias and back pain.   Skin: Negative.    Allergic/Immunologic: Negative.    Neurological: Negative.    Hematological: Negative.    Psychiatric/Behavioral: Negative.           Health Maintenance   Topic Date Due    Zoster " Vaccine (1 of 2) Never done    RSV Vaccine Age 60+ Years (1 - 1-dose 60+ series) Never done    PT PLAN OF CARE  02/09/2023    COVID-19 Vaccine (5 - 2023-24 season) 09/01/2023    Colorectal Cancer Screening  01/22/2024    Influenza Vaccine (1) 09/01/2024    Fall Risk  10/16/2024    Medicare Annual Wellness Visit (AWV)  10/16/2024    BMI: Followup Plan  10/16/2024    Depression Screening  01/24/2025    BMI: Adult  07/16/2025    Hepatitis C Screening  Completed    Pneumococcal Vaccine: 65+ Years  Completed    RSV Vaccine age 0-20 Months  Aged Out    HIB Vaccine  Aged Out    IPV Vaccine  Aged Out    Hepatitis A Vaccine  Aged Out    Meningococcal ACWY Vaccine  Aged Out    HPV Vaccine  Aged Out     Patient Active Problem List   Diagnosis    Allergic rhinitis    Spinal stenosis    Degeneration of intervertebral disc of cervical region    Hypothyroidism    Muscle pain, myofacial    Neuropathy    Nontoxic multinodular goiter    Occipital neuralgia    Venous insufficiency    Chondromalacia patellae    Chronic osteomyelitis of lower leg (HCC)    Generalized osteoarthritis    Anxiety    Reactive depression    BPH without obstruction/lower urinary tract symptoms    Post traumatic stress disorder (PTSD)    Vitamin D deficiency    Varicose veins of bilateral lower extremities with other complications    Osteoporosis without current pathological fracture    Bilateral carotid artery stenosis < 50%    PAC (premature atrial contraction)    PVC (premature ventricular contraction)    Primary hypercholesterolemia    Macrocytosis without anemia    Chronic neck pain    Thoracic aortic aneurysm without rupture (HCC)    Nonrheumatic mitral valve regurgitation    Bilateral enlargement of atria    Coronary artery calcification    Prostate cancer (HCC)    Mass of right parotid gland    History of osteomyelitis    Renal cysts, acquired, bilateral    Primary hypertension    Lumbar spine tumor    Schwannoma    Nonintractable epilepsy without  status epilepticus, unspecified epilepsy type (HCC)    Steatosis (HCC)     Past Medical History:   Diagnosis Date    Anxiety     Arthritis     Bleeding tendency (HCC)     BPH (benign prostatic hyperplasia)     Broken bones     left leg    Cancer (HCC)     Cataract     Chronic neck pain     Disease of thyroid gland 11/12    Epilepsy (HCC)     Headache(784.0)     Hypothyroidism     Neoplasm of unspecified behavior of bone, soft tissue, and skin     Osteomyelitis (HCC)     PONV (postoperative nausea and vomiting)     Prostate cancer (HCC)     Skin cancer     Squamous carcinoma 12/01/2014 12/2014    Thyroid condition     Thyroid disease      Past Surgical History:   Procedure Laterality Date    APPENDECTOMY      ARTHRODESIS      H/o Arthrodesis Cervical to C2;  last assessed [id number]    CERVICAL FUSION      COLONOSCOPY  01/22/2021    COLONOSCOPY      EYE SURGERY      JOINT REPLACEMENT      KNEE SURGERY Right 12/22/2017    LEG SURGERY Left 1999    Hardware placed in lower leg bone(s)    LEG SURGERY Left 01/01/2006 2006, 2012-Left leg vascular    ID LAMINECTOMY BX/EXC ISPI DAKOTAH XDRL LUMBAR N/A 12/19/2022    Procedure: L2/3 decompressive laminectomy and biopsy (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;  Surgeon: Toño Contreras MD;  [facility];  Service: Neurosurgery    THYROID SURGERY Right 2013    US GUIDED PROSTATE BIOPSY  07/13/2021     Family History   Problem Relation Age of Onset    Bone cancer Mother     Other Father         Accident    Dementia Other     Cancer Other      Social History     Socioeconomic History    Marital status:      Spouse name: Not on file    Number of children: Not on file    Years of education: Not on file    Highest education level: Not on file   Occupational History    Occupation: Teacher    Occupation: retired   Tobacco Use    Smoking status: Former     Current packs/day: 0.00     Average packs/day: 0.5 packs/day for 2.0 years (1.0 ttl pk-yrs)      Types: Cigarettes     Quit date:      Years since quittin.6    Smokeless tobacco: Never   Vaping Use    Vaping status: Never Used   Substance and Sexual Activity    Alcohol use: Yes     Comment: being a social drinker    Drug use: Not Currently     Types: Marijuana     Comment: Medical marijuana    Sexual activity: Not Currently     Partners: Female     Comment: no known std risk factors   Other Topics Concern    Not on file   Social History Narrative    Daily coffee consumption (3 cups/day)     Social Determinants of Health     Financial Resource Strain: Low Risk  (10/15/2023)    Overall Financial Resource Strain (CARDIA)     Difficulty of Paying Living Expenses: Not hard at all   Food Insecurity: No Food Insecurity (2023)    [facility], [facility]    Hunger Vital Sign     Worried About Running Out of Food in the Last Year: Never true     Ran Out of Food in the Last Year: Never true   Transportation Needs: No Transportation Needs (10/15/2023)    PRAPARE - Transportation     Lack of Transportation (Medical): No     Lack of Transportation (Non-Medical): No   Physical Activity: Not on file   Stress: Not on file   Social Connections: Not on file   Intimate Partner Violence: Not At Risk (2023)    Received from [facility], [facility]    Humiliation, Afraid, Rape, and Kick questionnaire     Fear of Current or Ex-Partner: No     Emotionally Abused: No     Physically Abused: No     Sexually Abused: No   Housing Stability: Low Risk  (2023)    Received from [facility], [facility]    Housing Stability Vital Sign     Unable to Pay for Housing in the Last Year: No     Number of Places Lived in the Last Year: 1     Unstable Housing in the Last Year: No       Current Outpatient Medications:     acetaminophen (TYLENOL) 500 mg tablet, Take 2 tablets (1,000 mg total) by mouth every 8  (eight) hours as needed for moderate pain, mild pain or fever (Patient taking differently: Take 1,000 mg by mouth every 8 (eight) hours as needed for moderate pain, mild pain or fever As needed), Disp: , Rfl: 0    amoxicillin (AMOXIL) 500 MG tablet, Take 500 mg by mouth if needed As needed, Disp: , Rfl:     desloratadine (CLARINEX) 5 MG tablet, TAKE 1 TABLET DAILY, Disp: 90 tablet, Rfl: 1    doxycycline (ADOXA) 50 MG tablet, TAKE 1 TABLET BY ORAL ROUTE EVERY DAY WITH MEAL X3 MONTHS THEN MW, Disp: , Rfl:     fluticasone (FLONASE) 50 mcg/act nasal spray, 2 sprays into each nostril daily, Disp: 48 g, Rfl: 3    levothyroxine 100 mcg tablet, daily, Disp: , Rfl:     metroNIDAZOLE (METROGEL) 0.75 % gel, Apply topically 2 (two) times a day, Disp: , Rfl:     Perfluorohexyloctane (Miebo) 1.338 GM/ML SOLN, Apply 1 drop to eye every 6 (six) hours, Disp: , Rfl:     pravastatin (PRAVACHOL) 10 mg tablet, Take 1 tablet (10 mg total) by mouth daily at bedtime, Disp: 30 tablet, Rfl: 5    tamsulosin (FLOMAX) 0.4 mg, Take 0.4 mg by mouth 2 (two) times a day, Disp: , Rfl:     tazarotene (AVAGE) 0.1 % cream, Apply topically daily at bedtime, Disp: , Rfl:   Allergies   Allergen Reactions    Amoxicillin-Pot Clavulanate Rash, Vomiting and Nausea Only     There were no vitals filed for this visit.

## 2024-08-15 ENCOUNTER — DOCUMENTATION (OUTPATIENT)
Dept: HEMATOLOGY ONCOLOGY | Facility: CLINIC | Age: 69
End: 2024-08-15

## 2024-08-15 NOTE — PROGRESS NOTES
In-basket message received from Dr. Florence to add patient to the neuro MDCC on 8/28/2024. Chart reviewed and prep completed.

## 2024-08-16 NOTE — PROGRESS NOTES
Follow-up - Radiation Oncology   Haider Prather 1955 69 y.o. male [id number]      History of Present Illness   Cancer Staging   Prostate cancer (HCC)  Staging form: Prostate, AJCC 8th Edition  - Clinical stage from 7/13/2021: Stage I (cT1a, cN0, cM0, PSA: 3.1, Grade Group: 1) - Signed by Karl Villalba MD on 1/25/2022  Stage prefix: Initial diagnosis  Prostate specific antigen (PSA) range: Less than 10  [place] primary pattern: 3  [place] secondary pattern: 3  [place] score: 6  Histologic grading system: 5 grade system  Number of biopsy cores examined: 2  Number of biopsy cores positive: 2      Mr. Haider Prather is a 69 year old man with a symptomatic schwannoma at L3/4 s/p decompressive laminectomy and STR. MRI surveillance demonstrated interval enlargement. On 6/5/23 he completed a course of SBRT to a dose of 2500 cGy in 5 fractions. He returns today for follow-up.      Interval History:  The patient was last seen in clinic on 4/10/24.     MRI Lumbar spine (8/10/24) demonstrated:  1. 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 The mass is stable from the most recent prior 2 MRIs (March 6, 2024 and August 28, 2023) but has enlarged from the baseline 2002 MRI from 9/3/2022. Continued clinical and imaging surveillance advised..  2. Advanced multilevel spondylotic changes elsewhere are similar.    Currently the patient is doing well overall.  Where the patient had had initial worsening of pain and some enlargement on MRI, more recently his pain has subsided.  He has followed with Dr. Contreras and is considering undergoing further resection.  At this time he remains very uncertain as to whether he would like to proceed with this surgery and has sought multiple second opinions.    Historical Information   Oncology History   Prostate cancer  "([facility])   2021 Initial Diagnosis    Prostate cancer ([facility])      Biopsy    - Prostatic adenocarcinoma, acinar type, [place] score 3+3=6 (Grade Group 1) involving 2 cores   - Percentage of [place] pattern 4: N/A   - Percentage of [place] pattern 5: N/A   - Perineural invasion: Not identified   - Periprostatic fat invasion: Not identified   - Lymph-vascular invasion: Not identified   - Seminal vesicle/ejaculatory duct invasion: Not identified   - Additional pathologic findings: None   - Best representative block if additional studies are needed: B2 and B6      A.  Right prostate (random biopsy):      - Benign prostatic tissue.     B.  Left prostate (random biopsy):      - Small foci of prostatic adenocarcinoma, [place] score 3+3=6 (Grade Group 1), involving less than 5% of two (2) cores.    C.  Right mid prostate (biopsy):     - Benign prostatic tissue.      7/13/2021 -  Cancer Staged    Staging form: Prostate, AJCC 8th Edition  - Clinical stage from 7/13/2021: Stage I (cT1a, cN0, cM0, PSA: 3.1, Grade Group: 1) - Signed by Karl Villalba MD on 1/25/2022  Stage prefix: Initial diagnosis  Prostate specific antigen (PSA) range: Less than 10  [place] primary pattern: 3  [place] secondary pattern: 3  [place] score: 6  Histologic grading system: 5 grade system  Number of biopsy cores examined: 2  Number of biopsy cores positive: 2       Schwannoma   2015 Surgery    \"C1-C2 lateral mass fixation and sublaminar Gallie cable fixation with demineralized bone matrix and cadaveric bone arthrodesis\".  Performed by Dr. Contreras, 2/20/15.        12/19/2022 Surgery    A-B. Spine, \"Left L3 nerve sheath tumor,\" Resection:  - Schwannoma     2/20/2023 Surgery    Patient is s/p L2/3 decompressive laminectomy and biopsy (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 [12/19/22 DKO]     4/27/2023 Initial Diagnosis    Schwannoma     5/24/2023 - 6/5/2023 Radiation    Treatments:  Course: C1 SBRT    Plan ID Energy Fractions Dose per Fraction " (cGy) Dose Correction (cGy) Total Dose Delivered (cGy) Elapsed Days   SBRT Spine 6X-FFF 5 / 5 500 0 2,500 12      Treatment Dates:  5/24/2023 - 6/5/2023.          Past Medical History:   Diagnosis Date    Anxiety     Arthritis     Bleeding tendency (HCC)     BPH (benign prostatic hyperplasia)     Broken bones     left leg    Cancer (HCC)     Cataract     Chronic neck pain     Disease of thyroid gland 11/12    Epilepsy (HCC)     Headache(784.0)     Hypothyroidism     Neoplasm of unspecified behavior of bone, soft tissue, and skin     Osteomyelitis (HCC)     PONV (postoperative nausea and vomiting)     Prostate cancer (HCC)     Skin cancer     Squamous carcinoma 12/01/2014 12/2014    Thyroid condition     Thyroid disease      Past Surgical History:   Procedure Laterality Date    APPENDECTOMY      ARTHRODESIS      H/o Arthrodesis Cervical to C2;  last assessed [id number]    CERVICAL FUSION      COLONOSCOPY  01/22/2021    COLONOSCOPY      EYE SURGERY      JOINT REPLACEMENT      KNEE SURGERY Right 12/22/2017    LEG SURGERY Left 1999    Hardware placed in lower leg bone(s)    LEG SURGERY Left 01/01/2006 2006, 2012-Left leg vascular    SD LAMINECTOMY BX/EXC ISPI DAKOTAH XDRL LUMBAR N/A 12/19/2022    Procedure: L2/3 decompressive laminectomy and biopsy (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       Social History   Social History     Substance and Sexual Activity   Alcohol Use Yes    Comment: being a social drinker     Social History     Substance and Sexual Activity   Drug Use Not Currently    Types: Marijuana    Comment: Medical marijuana     Social History     Tobacco Use   Smoking Status Former    Current packs/day: 0.00    Average packs/day: 0.5 packs/day for 2.0 years (1.0 ttl pk-yrs)    Types: Cigarettes    Quit  date:     Years since quittin.6   Smokeless Tobacco Never         Meds/Allergies     Current Outpatient Medications:     acetaminophen (TYLENOL) 500 mg tablet, Take 2 tablets (1,000 mg total) by mouth every 8 (eight) hours as needed for moderate pain, mild pain or fever (Patient taking differently: Take 1,000 mg by mouth every 8 (eight) hours as needed for moderate pain, mild pain or fever As needed), Disp: , Rfl: 0    amoxicillin (AMOXIL) 500 MG tablet, Take 500 mg by mouth if needed As needed, Disp: , Rfl:     desloratadine (CLARINEX) 5 MG tablet, TAKE 1 TABLET DAILY, Disp: 90 tablet, Rfl: 1    doxycycline (ADOXA) 50 MG tablet, TAKE 1 TABLET BY ORAL ROUTE EVERY DAY WITH MEAL X3 MONTHS THEN [facility], Disp: , Rfl:     fluticasone (FLONASE) 50 mcg/act nasal spray, 2 sprays into each nostril daily, Disp: 48 g, Rfl: 3    levothyroxine 100 mcg tablet, daily, Disp: , Rfl:     metroNIDAZOLE (METROGEL) 0.75 % gel, Apply topically 2 (two) times a day, Disp: , Rfl:     Perfluorohexyloctane (Miebo) 1.338 GM/ML SOLN, Apply 1 drop to eye every 6 (six) hours, Disp: , Rfl:     pravastatin (PRAVACHOL) 10 mg tablet, Take 1 tablet (10 mg total) by mouth daily at bedtime, Disp: 30 tablet, Rfl: 5    tamsulosin (FLOMAX) 0.4 mg, Take 0.4 mg by mouth 2 (two) times a day, Disp: , Rfl:     tazarotene (AVAGE) 0.1 % cream, Apply topically daily at bedtime, Disp: , Rfl:   Allergies   Allergen Reactions    Amoxicillin-Pot Clavulanate Rash, Vomiting and Nausea Only     Review of Systems   Review of Systems   Constitutional: Negative.    HENT: Negative.     Eyes: Negative.    Respiratory: Negative.     Cardiovascular: Negative.    Gastrointestinal: Negative.    Endocrine: Negative.    Genitourinary: Negative.    Musculoskeletal:  Positive for arthralgias and back pain.   Skin: Negative.    Allergic/Immunologic: Negative.    Neurological: Negative.    Hematological: Negative.    Psychiatric/Behavioral: Negative.            OBJECTIVE:   BP  "116/74 (BP Location: Left arm, Patient Position: Sitting, Cuff Size: Standard)   Pulse 63   Temp (!) 97.3 °F (36.3 °C) (Temporal)   Resp 16   Ht 6' 1\" (1.854 m)   Wt 86.2 kg (190 lb)   SpO2 97%   BMI 25.07 kg/m²   Pain Assessment:  4  ECOG/[place]/WHO: 1 - Symptomatic but completely ambulatory    Physical Exam   Well appearing. NAD.   No increased work of breathing. Extremities warm and well perfused.   Mild difficulty with ambulation.     RESULTS    Lab Results:   No results found for this or any previous visit (from the past 672 hour(s)).      Imaging Studies:No results found.        Assessment/Plan:  No orders of the defined types were placed in this encounter.     We reviewed the patient's repeat MRI Lumbar spine.  On my review, I feel this again shows mild interval decrease in his lumbar spine schwannoma, though this could potentially be due to technique differences on the 2 MRIs as they were performed on different machines and with different slice thicknesses.  I will plan to review at multidisciplinary neuro-oncology tumor board for consensus recommendation.  I will plan to call the patient following neuro-oncology tumor board review but at this time we will tentatively plan for repeat MRI lumbar spine in 6 months with return to clinic thereafter.  I will also message Dr. Contreras for his input as well.     Rodrigo Florence MD  8/16/2024,7:54 AM    Total time spent reviewing EMR, seeing patient in clinic visit, documenting visit, placing treatment orders, and communicating with other medical providers on date of visit: 25 minutes      Portions of the record may have been created with voice recognition software.  Occasional wrong word or \"sound a like\" substitutions may have occurred due to the inherent limitations of voice recognition software.  Read the chart carefully and recognize, using context, where substitutions have occurred.      "

## 2024-08-28 ENCOUNTER — DOCUMENTATION (OUTPATIENT)
Dept: HEMATOLOGY ONCOLOGY | Facility: CLINIC | Age: 69
End: 2024-08-28

## 2024-08-28 NOTE — PROGRESS NOTES
NEURO ONCOLOGY MULTIDISCIPLINARY CANCER CONFERENCE    DATE: 8/28/2024    PRESENTING DOCTOR: Dr Kiel Florence    DIAGNOSIS: Schwannoma        Haider Prather is a 69 y.o. male who was presented at Neuro Oncology Cancer Conference today. History of symptomatic schwannoma at L3/4 s/p decompressive laminectomy and STR. MRI surveillance demonstrated interval enlargement. He completed a course of SBRT to a dose of 2500 cGy in 5 fractions on 6/5/2023. He was presented today to review recent imaging and discuss continued observation versus re-resection.     PHYSICIAN RECOMMENDED PLAN:   - Continue observation    Pathology reviewed: NA    Imaging Reviewed:   8/28/2023 MRI lumbar spine  3/6/2024 MRI lumbar spine ([facility])  8/10/2024 MRI lumbar spine    Upcoming Appointments:   Future Appointments   Date Time Provider Department Center   9/6/2024 11:30 AM  GI ROOM [address] [place] [address]   10/16/2024 10:20 AM Margarita Bar MD [facility]   12/12/2024 10:40 AM Arron Acuña MD [facility]        Team agreed to plan.    NCCN guidelines were readily available for review at this discussion.    The final treatment plan will be left at the discretion of the patient and the treating physician.     DISCLAIMERS:  TO THE TREATING PHYSICIAN:  This conference is a meeting of clinicians from various specialty areas who evaluate and discuss patients for whom a multidisciplinary treatment approach is being considered. Please note that the above opinion was a consensus of the conference attendees and is intended only to assist in quality care of the discussed patient.  The responsibility for follow up on the input given during the conference, along with any final decisions regarding plan of care, is that of the patient and the patient's provider. Accordingly, appointments have only been recommended based on this information; and have NOT been scheduled unless otherwise noted.      TO THE PATIENT:  This summary is  a brief record of major aspects of your cancer treatment. You may choose to can share a your copy with any of your doctors or nurses. However, this is not a detailed or comprehensive record of your care.

## 2024-09-03 ENCOUNTER — TELEPHONE (OUTPATIENT)
Dept: NEUROSURGERY | Facility: CLINIC | Age: 69
End: 2024-09-03

## 2024-09-03 NOTE — TELEPHONE ENCOUNTER
09/03/2024-Called pt, LMOM to see if still interested in scheduling sx w/DKO.  CT Valerie done on 08/10/2024      05/29/2024- 2nd Attempt: AMELIA to schedule sx  Pt called back and advised he is going to [facility] for 2nd opinion on 6/07/2024.    05/23/2024-Called ptAMELIA to schedule sx

## 2024-09-04 ENCOUNTER — TRANSCRIBE ORDERS (OUTPATIENT)
Dept: LAB | Facility: CLINIC | Age: 69
End: 2024-09-04

## 2024-09-04 ENCOUNTER — APPOINTMENT (OUTPATIENT)
Dept: LAB | Facility: CLINIC | Age: 69
End: 2024-09-04
Payer: COMMERCIAL

## 2024-09-04 DIAGNOSIS — C61 MALIGNANT NEOPLASM OF PROSTATE (HCC): ICD-10-CM

## 2024-09-04 DIAGNOSIS — C61 MALIGNANT NEOPLASM OF PROSTATE (HCC): Primary | ICD-10-CM

## 2024-09-04 DIAGNOSIS — E78.00 PRIMARY HYPERCHOLESTEROLEMIA: ICD-10-CM

## 2024-09-04 LAB — PSA SERPL-MCNC: 3.56 NG/ML (ref 0–4)

## 2024-09-04 PROCEDURE — 36415 COLL VENOUS BLD VENIPUNCTURE: CPT

## 2024-09-04 PROCEDURE — 84153 ASSAY OF PSA TOTAL: CPT

## 2024-09-06 ENCOUNTER — HOSPITAL ENCOUNTER (OUTPATIENT)
Dept: GASTROENTEROLOGY | Facility: HOSPITAL | Age: 69
Setting detail: OUTPATIENT SURGERY
End: 2024-09-06
Attending: COLON & RECTAL SURGERY
Payer: COMMERCIAL

## 2024-09-06 ENCOUNTER — ANESTHESIA EVENT (OUTPATIENT)
Dept: GASTROENTEROLOGY | Facility: HOSPITAL | Age: 69
End: 2024-09-06
Payer: COMMERCIAL

## 2024-09-06 ENCOUNTER — ANESTHESIA (OUTPATIENT)
Dept: GASTROENTEROLOGY | Facility: HOSPITAL | Age: 69
End: 2024-09-06
Payer: COMMERCIAL

## 2024-09-06 VITALS
SYSTOLIC BLOOD PRESSURE: 123 MMHG | WEIGHT: 190 LBS | HEIGHT: 73 IN | OXYGEN SATURATION: 96 % | TEMPERATURE: 98.4 F | BODY MASS INDEX: 25.18 KG/M2 | HEART RATE: 63 BPM | RESPIRATION RATE: 16 BRPM | DIASTOLIC BLOOD PRESSURE: 69 MMHG

## 2024-09-06 DIAGNOSIS — Z12.11 ENCOUNTER FOR SCREENING FOR MALIGNANT NEOPLASM OF COLON: ICD-10-CM

## 2024-09-06 DIAGNOSIS — Z86.010 PERSONAL HISTORY OF COLONIC POLYPS: ICD-10-CM

## 2024-09-06 PROCEDURE — 88305 TISSUE EXAM BY PATHOLOGIST: CPT | Performed by: STUDENT IN AN ORGANIZED HEALTH CARE EDUCATION/TRAINING PROGRAM

## 2024-09-06 RX ORDER — SODIUM CHLORIDE, SODIUM LACTATE, POTASSIUM CHLORIDE, CALCIUM CHLORIDE 600; 310; 30; 20 MG/100ML; MG/100ML; MG/100ML; MG/100ML
100 INJECTION, SOLUTION INTRAVENOUS CONTINUOUS
Status: CANCELLED | OUTPATIENT
Start: 2024-09-06

## 2024-09-06 RX ORDER — PROPOFOL 10 MG/ML
INJECTION, EMULSION INTRAVENOUS AS NEEDED
Status: DISCONTINUED | OUTPATIENT
Start: 2024-09-06 | End: 2024-09-06

## 2024-09-06 RX ORDER — SODIUM CHLORIDE, SODIUM LACTATE, POTASSIUM CHLORIDE, CALCIUM CHLORIDE 600; 310; 30; 20 MG/100ML; MG/100ML; MG/100ML; MG/100ML
100 INJECTION, SOLUTION INTRAVENOUS CONTINUOUS
Status: DISCONTINUED | OUTPATIENT
Start: 2024-09-06 | End: 2024-09-10 | Stop reason: HOSPADM

## 2024-09-06 RX ADMIN — PROPOFOL 100 MG: 10 INJECTION, EMULSION INTRAVENOUS at 10:39

## 2024-09-06 RX ADMIN — SODIUM CHLORIDE, SODIUM LACTATE, POTASSIUM CHLORIDE, AND CALCIUM CHLORIDE: .6; .31; .03; .02 INJECTION, SOLUTION INTRAVENOUS at 10:49

## 2024-09-06 RX ADMIN — SODIUM CHLORIDE, SODIUM LACTATE, POTASSIUM CHLORIDE, AND CALCIUM CHLORIDE 100 ML/HR: .6; .31; .03; .02 INJECTION, SOLUTION INTRAVENOUS at 10:01

## 2024-09-06 RX ADMIN — PROPOFOL 100 MG: 10 INJECTION, EMULSION INTRAVENOUS at 10:35

## 2024-09-06 RX ADMIN — PROPOFOL 100 MG: 10 INJECTION, EMULSION INTRAVENOUS at 10:32

## 2024-09-06 NOTE — INTERVAL H&P NOTE
H&P reviewed. After examining the patient I find no changes in the patients condition since the H&P had been written.    Vitals:    09/06/24 0948   BP: 131/72   Pulse: 68   Resp: 12   Temp: 97.8 °F (36.6 °C)   SpO2: 97%

## 2024-09-06 NOTE — ANESTHESIA PREPROCEDURE EVALUATION
Procedure:  COLONOSCOPY    Relevant Problems   CARDIO   (+) Coronary artery calcification   (+) Nonrheumatic mitral valve regurgitation   (+) PAC (premature atrial contraction)   (+) PVC (premature ventricular contraction)   (+) Primary hypercholesterolemia   (+) Primary hypertension   (+) Thoracic aortic aneurysm without rupture (HCC)      ENDO   (+) Hypothyroidism      /RENAL   (+) BPH without obstruction/lower urinary tract symptoms   (+) Prostate cancer (HCC)   (+) Renal cysts, acquired, bilateral      MUSCULOSKELETAL   (+) Chondromalacia patellae   (+) Degeneration of intervertebral disc of cervical region   (+) Generalized osteoarthritis      NEURO/PSYCH   (+) Anxiety   (+) Chronic neck pain   (+) Nonintractable epilepsy without status epilepticus, unspecified epilepsy type (HCC)   (+) Occipital neuralgia   (+) Post traumatic stress disorder (PTSD)   (+) Reactive depression      Neurology/Sleep   (+) Neuropathy      Oncology   (+) Lumbar spine tumor      Orthopedic/Musculoskeletal   (+) Spinal stenosis      Cardiovascular/Peripheral Vascular   (+) Bilateral enlargement of atria      Other   (+) Chronic osteomyelitis of lower leg (HCC)   (+) Mass of right parotid gland   (+) Schwannoma   (+) Steatosis (HCC)      Stress Test 8/13/21:    STRESS SUMMARY: Duration of exercise was 9 min. The patient exercised to protocol stage 3. Maximal work rate was 10.1 METs. Maximal heart rate during stress was 133 bpm ( 87 % of maximal predicted heart rate). The rate-pressure product for  the peak heart rate and blood pressure was 72962. There was no chest pain during stress. The stress test was terminated due to achievement of maximal (symptom limited) exercise and achievement of target heart rate. Pre oxygen saturation:  98 %. Peak oxygen saturation: 97 %. With exercise, there were no electrocardiographic changes of ischemia. No ST or T-wave changes occurred. There were occasional premature ventricular contractions with  occasional ventricular couplets.     ISOTOPE ADMINISTRATION:  Radiopharmaceutical was administered 8 min into the stress protocol. There was 1 min of exercise after the injection.     MYOCARDIAL PERFUSION IMAGING:  Tomographic perfusion series at rest demonstrated significantly decreased activity in the inferior wall. Image acquisition following graded treadmill exercise was performed in both the supine and prone positions. The supine images were not  significantly changed from the rest images. The prone images demonstrated significant improvement in the inferior wall defect or reverse reperfusion. These findings are most consistent with diaphragmatic attenuation.     GATED SPECT:  Low normal left ventricular systolic function, EF 52%. Mild left ventricular cavity enlargement. No regional wall motion abnormalities were identified.     SUMMARY:  -  Stress results: Duration of exercise was 9 min. Target heart rate was achieved. There was no chest pain during stress.     IMPRESSIONS: 1. Excellent exercise tolerance  2. Normal resting blood pressure  3. Normal resting EKG  4. Ventricular ectopy as described with exercise  5. Graded treadmill exercise test negative for symptoms of exertional angina pectoris and negative for electrocardiographic changes of ischemia.  6. Low normal left ventricular systolic function, EF 52% with mild left ventricular cavity enlargement  7. Normal tomographic perfusion series with inferior diaphragmatic attenuation.     Prepared and signed by     Arron Acuña MD  Signed 08/13/2021 18:04:15    ECHO 6/2/21:  SUMMARY:  1. This is a technically adequate study  2. Left ventricle is normal in size and function. Left ventricular ejection fraction is estimated at 55%  3. Normal diastolic function  4. Mild biatrial dilatation  5. Mild-to-moderate mitral regurgitation  6. Trace tricuspid regurgitation with pulmonary artery systolic pressure is estimated at 15-20 mm Hg  7. Aortic root is mildly  dilated 3.5 cm  8. There is no study for comparison  Physical Exam    Airway    Mallampati score: II  TM Distance: >3 FB  Neck ROM: full     Dental   No notable dental hx     Cardiovascular      Pulmonary      Other Findings        Anesthesia Plan  ASA Score- 3     Anesthesia Type- IV sedation with anesthesia with ASA Monitors.         Additional Monitors:     Airway Plan:            Plan Factors-Exercise tolerance (METS): >4 METS.    Chart reviewed. EKG reviewed.   Patient summary reviewed.    Patient is not a current smoker.  Patient did not smoke on day of surgery.            Induction- intravenous.    Postoperative Plan-     Perioperative Resuscitation Plan - Level 1 - Full Code.       Informed Consent- Anesthetic plan and risks discussed with patient.  I personally reviewed this patient with the CRNA. Discussed and agreed on the Anesthesia Plan with the CRNA..

## 2024-09-06 NOTE — ANESTHESIA POSTPROCEDURE EVALUATION
Post-Op Assessment Note    CV Status:  Stable  Pain Score: 0    Pain management: adequate       Mental Status:  Alert and awake   Hydration Status:  Euvolemic   PONV Controlled:  Controlled   Airway Patency:  Patent     Post Op Vitals Reviewed: Yes    No anethesia notable event occurred.    Staff: CRNA               BP   106/54   Temp   98   Pulse  54   Resp   16   SpO2   97

## 2024-09-06 NOTE — DISCHARGE INSTRUCTIONS
"[facility]  [place] [address]  [place] [address]  Phone: [phone]    DISCHARGE INSTRUCTIONS:    1.  ___ Complete Exam - Normal    2.  ___ Exam normal, but entire colon not seen. We will discuss this with you.    3.  ___ Polyp(s) removed by \"burning\" - NO pathology report will follow    4.  _1__ Polyp(s) removed by excision. Pathology report will be available in 4-5 days   Someone from our office will call you with results.    5.  ___ Exam prompted biopsies. Pathology report will be available in 4-5 days   Someone from our office will call you with results.    6.  ___ Exam demonstrated findings that need treatment. Prescriptions will be   Given to you. Return to our office in ____ weeks. Please call for appt.    7.  ___ Original office visit or colonoscopy findings necessitate an office visit.   Please call to set up a new appointment    8.  ___ Medication  __________________________________________        WHEN LEAVING THE HOSPITAL:    - Go straight home and rest today    - No driving or drinking alcohol for 24 hours    - Resume regular diet and medications unless otherwise instructed. Coumadin and Plavix are blood thinners. You can resume these medications on __________.    IF YOU ARE HAVING ANY FEVER, BLEEDING OR PERSISTENT PAIN IN THE ABDOMEN, PLEASE CALL OUR OFFICE ANY DAY OR TIME  [phone]            Colonoscopy   WHAT YOU NEED TO KNOW:   A colonoscopy is a procedure to examine the inside of your colon (intestine) with a scope. Polyps or tissue growths may have been removed during your colonoscopy. It is normal to feel bloated and to have some abdominal discomfort. You should be passing gas. If you have hemorrhoids or you had polyps removed, you may have a small amount of bleeding.        DISCHARGE INSTRUCTIONS:   Seek care immediately if:   You have sudden, severe abdominal pain.     You have problems swallowing.     You have a large amount of black, " sticky bowel movements or blood in your bowel movements.     You have sudden trouble breathing.     You feel weak, lightheaded, or faint or your heart beats faster than normal for you.     Contact your healthcare provider if:   You have a fever and chills.      You have nausea or are vomiting.      Your abdomen is bloated or feels full and hard.     You have abdominal pain.   You have black, sticky bowel movements or blood in your bowel movements.  You have not had a bowel movement for 3 days after your procedure.  You have rash or hives.  You have questions or concerns about your procedure.    Activity:   Do not lift, strain, or run for 24 hours after your procedure.     Rest after your procedure. You have been given medicine to relax you. Do not drive or make important decisions until the day after your procedure. Return to your normal activity as directed.     Relieve gas and discomfort from bloating by lying on your right side with a heating pad on your abdomen. You may need to take short walks to help the gas move out. Eat small meals until bloating is relieved.  Follow up with your healthcare provider as directed: Write down your questions so you remember to ask them during your visits.     If you take a “blood thinner”, please review the specific instructions from your endoscopist about when you should resume it. These can be found in the “Recommendation” and “Your Medication list” sections of this After Visit Summary.

## 2024-09-10 ENCOUNTER — TELEPHONE (OUTPATIENT)
Age: 69
End: 2024-09-10

## 2024-09-10 NOTE — TELEPHONE ENCOUNTER
Pt called regarding your VM,his message to us is-    Pt had a yearly follow up with [facility], Dr Florence was going to advise pt if he could wait for surgery and would be consulting with other surgeons and get back to pt in early September 2024.   That is why he is waiting to schedule surgery at this time, once contacted by Dr Florence he will advise the surgery needs and return our call.

## 2024-09-11 DIAGNOSIS — J30.1 ALLERGIC RHINITIS DUE TO POLLEN, UNSPECIFIED SEASONALITY: ICD-10-CM

## 2024-09-11 PROCEDURE — 88305 TISSUE EXAM BY PATHOLOGIST: CPT | Performed by: STUDENT IN AN ORGANIZED HEALTH CARE EDUCATION/TRAINING PROGRAM

## 2024-09-11 RX ORDER — DESLORATADINE 5 MG/1
5 TABLET ORAL DAILY
Qty: 90 TABLET | Refills: 1 | Status: SHIPPED | OUTPATIENT
Start: 2024-09-11

## 2024-10-14 ENCOUNTER — APPOINTMENT (OUTPATIENT)
Dept: LAB | Facility: CLINIC | Age: 69
End: 2024-10-14
Payer: COMMERCIAL

## 2024-10-14 LAB
CHOLEST SERPL-MCNC: 178 MG/DL
HDLC SERPL-MCNC: 46 MG/DL
LDLC SERPL CALC-MCNC: 112 MG/DL (ref 0–100)
TRIGL SERPL-MCNC: 102 MG/DL

## 2024-10-14 PROCEDURE — 80061 LIPID PANEL: CPT

## 2024-10-16 ENCOUNTER — OFFICE VISIT (OUTPATIENT)
Dept: FAMILY MEDICINE CLINIC | Facility: CLINIC | Age: 69
End: 2024-10-16
Payer: COMMERCIAL

## 2024-10-16 VITALS
OXYGEN SATURATION: 97 % | BODY MASS INDEX: 24.81 KG/M2 | HEART RATE: 65 BPM | HEIGHT: 73 IN | DIASTOLIC BLOOD PRESSURE: 64 MMHG | SYSTOLIC BLOOD PRESSURE: 128 MMHG | WEIGHT: 187.2 LBS | TEMPERATURE: 97.7 F

## 2024-10-16 DIAGNOSIS — J30.1 ALLERGIC RHINITIS DUE TO POLLEN, UNSPECIFIED SEASONALITY: ICD-10-CM

## 2024-10-16 DIAGNOSIS — I10 PRIMARY HYPERTENSION: ICD-10-CM

## 2024-10-16 DIAGNOSIS — Z23 FLU VACCINE NEED: ICD-10-CM

## 2024-10-16 DIAGNOSIS — Z00.00 MEDICARE ANNUAL WELLNESS VISIT, SUBSEQUENT: Primary | ICD-10-CM

## 2024-10-16 DIAGNOSIS — E78.00 PRIMARY HYPERCHOLESTEROLEMIA: ICD-10-CM

## 2024-10-16 DIAGNOSIS — E03.9 ACQUIRED HYPOTHYROIDISM: ICD-10-CM

## 2024-10-16 PROCEDURE — 99214 OFFICE O/P EST MOD 30 MIN: CPT | Performed by: FAMILY MEDICINE

## 2024-10-16 PROCEDURE — G0008 ADMIN INFLUENZA VIRUS VAC: HCPCS | Performed by: FAMILY MEDICINE

## 2024-10-16 PROCEDURE — 90662 IIV NO PRSV INCREASED AG IM: CPT | Performed by: FAMILY MEDICINE

## 2024-10-16 PROCEDURE — G0439 PPPS, SUBSEQ VISIT: HCPCS | Performed by: FAMILY MEDICINE

## 2024-10-16 RX ORDER — FLUTICASONE PROPIONATE 50 MCG
2 SPRAY, SUSPENSION (ML) NASAL DAILY
Qty: 48 G | Refills: 3 | Status: SHIPPED | OUTPATIENT
Start: 2024-10-16

## 2024-10-16 RX ORDER — PRAVASTATIN SODIUM 10 MG
10 TABLET ORAL
Qty: 100 TABLET | Refills: 3 | Status: CANCELLED | OUTPATIENT
Start: 2024-10-16

## 2024-10-16 RX ORDER — AZELASTINE 1 MG/ML
1 SPRAY, METERED NASAL 2 TIMES DAILY
Qty: 30 ML | Refills: 0 | Status: SHIPPED | OUTPATIENT
Start: 2024-10-16

## 2024-10-16 NOTE — ASSESSMENT & PLAN NOTE
Continue flonase and antihistamine; add azelastine nasal spray. Would also recommend the use of a neti pot  Orders:    fluticasone (FLONASE) 50 mcg/act nasal spray; 2 sprays into each nostril daily    azelastine (ASTELIN) 0.1 % nasal spray; 1 spray into each nostril 2 (two) times a day Use in each nostril as directed

## 2024-10-16 NOTE — ASSESSMENT & PLAN NOTE
Unable to tolerate rosuvastatin, pravastatin and zetia. Patient has an intermediate ascvd risk score; discussed obtaining CT coronary calcium score to determine in statin is necessary and patient is agreeable.     Orders:    CT coronary calcium score; Future

## 2024-10-16 NOTE — PATIENT INSTRUCTIONS
Medicare Preventive Visit Patient Instructions  Thank you for completing your Welcome to Medicare Visit or Medicare Annual Wellness Visit today. Your next wellness visit will be due in one year (10/17/2025).  The screening/preventive services that you may require over the next 5-10 years are detailed below. Some tests may not apply to you based off risk factors and/or age. Screening tests ordered at today's visit but not completed yet may show as past due. Also, please note that scanned in results may not display below.  Preventive Screenings:  Service Recommendations Previous Testing/Comments   Colorectal Cancer Screening  Colonoscopy    Fecal Occult Blood Test (FOBT)/Fecal Immunochemical Test (FIT)  Fecal DNA/Cologuard Test  Flexible Sigmoidoscopy Age: 45-75 years old   Colonoscopy: every 10 years (May be performed more frequently if at higher risk)  OR  FOBT/FIT: every 1 year  OR  Cologuard: every 3 years  OR  Sigmoidoscopy: every 5 years  Screening may be recommended earlier than age 45 if at higher risk for colorectal cancer. Also, an individualized decision between you and your healthcare provider will decide whether screening between the ages of 76-85 would be appropriate. Colonoscopy: 09/06/2024  FOBT/FIT: Not on file  Cologuard: Not on file  Sigmoidoscopy: Not on file          Prostate Cancer Screening Individualized decision between patient and health care provider in men between ages of 55-69   Medicare will cover every 12 months beginning on the day after your 50th birthday PSA: 3.557 ng/mL           Hepatitis C Screening Once for adults born between 1945 and 1965  More frequently in patients at high risk for Hepatitis C Hep C Antibody: 10/08/2019        Diabetes Screening 1-2 times per year if you're at risk for diabetes or have pre-diabetes Fasting glucose: 105 mg/dL (6/5/2024)  A1C: 5.8 % (6/5/2024)      Cholesterol Screening Once every 5 years if you don't have a lipid disorder. May order more often  based on risk factors. Lipid panel: 10/14/2024         Other Preventive Screenings Covered by Medicare:  Abdominal Aortic Aneurysm (AAA) Screening: covered once if your at risk. You're considered to be at risk if you have a family history of AAA or a male between the age of 65-75 who smoking at least 100 cigarettes in your lifetime.  Lung Cancer Screening: covers low dose CT scan once per year if you meet all of the following conditions: (1) Age 55-77; (2) No signs or symptoms of lung cancer; (3) Current smoker or have quit smoking within the last 15 years; (4) You have a tobacco smoking history of at least 20 pack years (packs per day x number of years you smoked); (5) You get a written order from a healthcare provider.  Glaucoma Screening: covered annually if you're considered high risk: (1) You have diabetes OR (2) Family history of glaucoma OR (3)  aged 50 and older OR (4)  American aged 65 and older  Osteoporosis Screening: covered every 2 years if you meet one of the following conditions: (1) Have a vertebral abnormality; (2) On glucocorticoid therapy for more than 3 months; (3) Have primary hyperparathyroidism; (4) On osteoporosis medications and need to assess response to drug therapy.  HIV Screening: covered annually if you're between the age of 15-65. Also covered annually if you are younger than 15 and older than 65 with risk factors for HIV infection. For pregnant patients, it is covered up to 3 times per pregnancy.    Immunizations:  Immunization Recommendations   Influenza Vaccine Annual influenza vaccination during flu season is recommended for all persons aged >= 6 months who do not have contraindications   Pneumococcal Vaccine   * Pneumococcal conjugate vaccine = PCV13 (Prevnar 13), PCV15 (Vaxneuvance), PCV20 (Prevnar 20)  * Pneumococcal polysaccharide vaccine = PPSV23 (Pneumovax) Adults 19-65 yo with certain risk factors or if 65+ yo  If never received any pneumonia vaccine:  recommend Prevnar 20 (PCV20)  Give PCV20 if previously received 1 dose of PCV13 or PPSV23   Hepatitis B Vaccine 3 dose series if at intermediate or high risk (ex: diabetes, end stage renal disease, liver disease)   Respiratory syncytial virus (RSV) Vaccine - COVERED BY MEDICARE PART D  * RSVPreF3 (Arexvy) CDC recommends that adults 60 years of age and older may receive a single dose of RSV vaccine using shared clinical decision-making (SCDM)   Tetanus (Td) Vaccine - COST NOT COVERED BY MEDICARE PART B Following completion of primary series, a booster dose should be given every 10 years to maintain immunity against tetanus. Td may also be given as tetanus wound prophylaxis.   Tdap Vaccine - COST NOT COVERED BY MEDICARE PART B Recommended at least once for all adults. For pregnant patients, recommended with each pregnancy.   Shingles Vaccine (Shingrix) - COST NOT COVERED BY MEDICARE PART B  2 shot series recommended in those 19 years and older who have or will have weakened immune systems or those 50 years and older     Health Maintenance Due:      Topic Date Due   • Colorectal Cancer Screening  09/05/2029   • Hepatitis C Screening  Completed     Immunizations Due:      Topic Date Due   • Influenza Vaccine (1) 09/01/2024   • COVID-19 Vaccine (5 - 2023-24 season) 09/01/2024     Advance Directives   What are advance directives?  Advance directives are legal documents that state your wishes and plans for medical care. These plans are made ahead of time in case you lose your ability to make decisions for yourself. Advance directives can apply to any medical decision, such as the treatments you want, and if you want to donate organs.   What are the types of advance directives?  There are many types of advance directives, and each state has rules about how to use them. You may choose a combination of any of the following:  Living will:  This is a written record of the treatment you want. You can also choose which treatments  you do not want, which to limit, and which to stop at a certain time. This includes surgery, medicine, IV fluid, and tube feedings.   Durable power of  for healthcare (DPAHC):  This is a written record that states who you want to make healthcare choices for you when you are unable to make them for yourself. This person, called a proxy, is usually a family member or a friend. You may choose more than 1 proxy.  Do not resuscitate (DNR) order:  A DNR order is used in case your heart stops beating or you stop breathing. It is a request not to have certain forms of treatment, such as CPR. A DNR order may be included in other types of advance directives.  Medical directive:  This covers the care that you want if you are in a coma, near death, or unable to make decisions for yourself. You can list the treatments you want for each condition. Treatment may include pain medicine, surgery, blood transfusions, dialysis, IV or tube feedings, and a ventilator (breathing machine).  Values history:  This document has questions about your views, beliefs, and how you feel and think about life. This information can help others choose the care that you would choose.  Why are advance directives important?  An advance directive helps you control your care. Although spoken wishes may be used, it is better to have your wishes written down. Spoken wishes can be misunderstood, or not followed. Treatments may be given even if you do not want them. An advance directive may make it easier for your family to make difficult choices about your care.       © Copyright [employer] 2018 Information is for End User's use only and may not be sold, redistributed or otherwise used for commercial purposes. All illustrations and images included in CareNotes® are the copyrighted property of [employer] or [employer]

## 2024-10-16 NOTE — PROGRESS NOTES
Ambulatory Visit  Name: Haider Prather      : 1955      MRN: [id number]  Encounter Provider: Margarita Bar MD  Encounter Date: 10/16/2024   Encounter department: [facility]    Assessment & Plan  Medicare annual wellness visit, subsequent  Preventive health issues were discussed with patient, and age appropriate screening tests were ordered as noted in patient's After Visit Summary.   Personalized health advice and appropriate referrals for health education or preventive services given if needed, as noted in patient's After Visit Summary.       Primary hypertension  Well controlled without medication       Primary hypercholesterolemia  Unable to tolerate rosuvastatin, pravastatin and zetia. Patient has an intermediate ascvd risk score; discussed obtaining CT coronary calcium score to determine in statin is necessary and patient is agreeable.     Orders:    CT coronary calcium score; Future    Acquired hypothyroidism  Continue levothyroxine 100mcg daily        Allergic rhinitis due to pollen, unspecified seasonality  Continue flonase and antihistamine; add azelastine nasal spray. Would also recommend the use of a neti pot  Orders:    fluticasone (FLONASE) 50 mcg/act nasal spray; 2 sprays into each nostril daily    azelastine (ASTELIN) 0.1 % nasal spray; 1 spray into each nostril 2 (two) times a day Use in each nostril as directed    Flu vaccine need    Orders:    influenza vaccine, high-dose, PF 0.5 mL (Fluzone High Dose)           History of Present Illness     Haider Prather is a very pleasant 69 year old male who presents today for a Medicare Wellness visit. He still remains undecided about undergoing surgery for removal of his Schwannoma. He did try Pravastatin for his cholesterol but found that it gave him muscle aches. He is wondering if he even needs to be on the medication or if he can manage on his own. He states that he is due to dental work in March and is enquiring about taking  antibiotic prophylaxis given the fact that the has a history of knee replacement. He also reports that he has been having a lot of congestion and post nasal drip recently. He has been using flonase nasal spray but it has not been helping.     Patient Care Team:  Margarita Bar MD as PCP - General (Family Medicine)  MD Vinod Hwang DO (Urology)  Roseanne Moncada MD (Otolaryngology)  Melvin Hammonds MD (Physical Medicine and Rehabilitation)  Rodrigo Florence MD (Radiation Oncology)    Review of Systems   Constitutional: Negative.    HENT:  Positive for congestion, postnasal drip and rhinorrhea.    Eyes: Negative.    Respiratory: Negative.     Cardiovascular: Negative.    Gastrointestinal: Negative.    Musculoskeletal:  Positive for arthralgias and back pain.   Skin: Negative.    Neurological: Negative.    Psychiatric/Behavioral: Negative.       Medical History Reviewed by provider this encounter:       Annual Wellness Visit Questionnaire   Haider is here for his Subsequent Wellness visit.     Health Risk Assessment:   Patient rates overall health as good. Patient feels that their physical health rating is same. Patient is satisfied with their life. Eyesight was rated as same. Hearing was rated as same. Patient feels that their emotional and mental health rating is same. Patients states they are never, rarely angry. Patient states they are sometimes unusually tired/fatigued. Pain experienced in the last 7 days has been some. Patient's pain rating has been 7/10. Patient states that he has experienced no weight loss or gain in last 6 months.     Fall Risk Screening:   In the past year, patient has experienced: no history of falling in past year      Home Safety:  Patient does not have trouble with stairs inside or outside of their home. Patient has working smoke alarms and has working carbon monoxide detector. Home safety hazards include: none.     Nutrition:   Current diet is Low  Cholesterol, Low Saturated Fat, Low Carb, No Added Salt and Limited junk food.     Medications:   Patient is currently taking over-the-counter supplements. OTC medications include: see medication list. Patient is able to manage medications.     Activities of Daily Living (ADLs)/Instrumental Activities of Daily Living (IADLs):   Walk and transfer into and out of bed and chair?: Yes  Dress and groom yourself?: Yes    Bathe or shower yourself?: Yes    Feed yourself? Yes  Do your laundry/housekeeping?: Yes  Manage your money, pay your bills and track your expenses?: Yes  Make your own meals?: Yes    Do your own shopping?: Yes    Previous Hospitalizations:   Any hospitalizations or ED visits within the last 12 months?: No      Advance Care Planning:   Living will: Yes    Durable POA for healthcare: Yes    Advanced directive: Yes      PREVENTIVE SCREENINGS      Cardiovascular Screening:    General: Screening Not Indicated and History Lipid Disorder      Diabetes Screening:     General: Screening Current      Colorectal Cancer Screening:     General: Screening Current      Prostate Cancer Screening:    General: History Prostate Cancer      Osteoporosis Screening:    General: Screening Not Indicated and History Osteoporosis      Abdominal Aortic Aneurysm (AAA) Screening:    Risk factors include: age between 65-74 yo and tobacco use        Lung Cancer Screening:     General: Screening Not Indicated      Hepatitis C Screening:    General: Screening Current    Screening, Brief Intervention, and Referral to Treatment (SBIRT)    Screening  Typical number of drinks in a day: 0  Typical number of drinks in a week: 0  Interpretation: Low risk drinking behavior.    AUDIT-C Screenin) How often did you have a drink containing alcohol in the past year? monthly or less  2) How many drinks did you have on a typical day when you were drinking in the past year? 1 to 2  3) How often did you have 6 or more drinks on one occasion in the  "past year? never    AUDIT-C Score: 1  Interpretation: Score 0-3 (male): Negative screen for alcohol misuse    Single Item Drug Screening:  How often have you used an illegal drug (including marijuana) or a prescription medication for non-medical reasons in the past year? never    Single Item Drug Screen Score: 0  Interpretation: Negative screen for possible drug use disorder    Social Determinants of Health     Financial Resource Strain: Low Risk  (10/15/2023)    Overall Financial Resource Strain (CARDIA)     Difficulty of Paying Living Expenses: Not hard at all   Food Insecurity: No Food Insecurity (10/9/2024)    Hunger Vital Sign     Worried About Running Out of Food in the Last Year: Never true     Ran Out of Food in the Last Year: Never true   Transportation Needs: No Transportation Needs (10/9/2024)    PRAPARE - Transportation     Lack of Transportation (Medical): No     Lack of Transportation (Non-Medical): No   Housing Stability: Low Risk  (10/9/2024)    Housing Stability Vital Sign     Unable to Pay for Housing in the Last Year: No     Number of Times Moved in the Last Year: 0     Homeless in the Last Year: No   Utilities: Not At Risk (10/9/2024)    [facility] Utilities     Threatened with loss of utilities: No     No results found.    Objective     /64 (BP Location: Left arm, Patient Position: Sitting, Cuff Size: Standard)   Pulse 65   Temp 97.7 °F (36.5 °C) (Temporal)   Ht 6' 1\" (1.854 m)   Wt 84.9 kg (187 lb 3.2 oz)   SpO2 97%   BMI 24.70 kg/m²     Physical Exam  Constitutional:       General: He is not in acute distress.     Appearance: He is not ill-appearing.   HENT:      Head: Normocephalic and atraumatic.   Eyes:      General:         Right eye: No discharge.         Left eye: No discharge.      Extraocular Movements: Extraocular movements intact.   Cardiovascular:      Rate and Rhythm: Normal rate.   Pulmonary:      Effort: Pulmonary effort is normal. No respiratory distress.      Breath " sounds: No wheezing.   Abdominal:      General: Bowel sounds are normal. There is no distension.      Palpations: Abdomen is soft.      Tenderness: There is no abdominal tenderness.   Musculoskeletal:      Right lower leg: No edema.      Left lower leg: No edema.   Neurological:      General: No focal deficit present.      Mental Status: He is alert.   Psychiatric:         Mood and Affect: Mood normal.         Behavior: Behavior normal.

## 2024-10-22 ENCOUNTER — TELEPHONE (OUTPATIENT)
Age: 69
End: 2024-10-22

## 2024-10-22 NOTE — TELEPHONE ENCOUNTER
I see that he had all of the three pneumonia shots, please advise he even had the last pcv 20 on 07/22/2022

## 2024-10-22 NOTE — TELEPHONE ENCOUNTER
Last visit 10/16/2024  Next appt 02/12/2025    Patient stated that once your office notifies him that the covid vaccines are in, he would also like to schedule a pneumonia vaccine for the same day. Please advise.

## 2024-10-22 NOTE — TELEPHONE ENCOUNTER
Yes patient does not need any more Pneumonia vaccinations. Please schedule nurse visit for Covid vaccine

## 2024-10-22 NOTE — TELEPHONE ENCOUNTER
Called patient and left message that he can schedule a covid vaccine as a nurse visit. He does not need the pneumonia vaccine. Dr Bar reviewed and said he is up to date with all the pneumonia vaccines.

## 2024-10-23 ENCOUNTER — CLINICAL SUPPORT (OUTPATIENT)
Dept: FAMILY MEDICINE CLINIC | Facility: CLINIC | Age: 69
End: 2024-10-23
Payer: COMMERCIAL

## 2024-10-23 DIAGNOSIS — Z23 NEED FOR COVID-19 VACCINE: Primary | ICD-10-CM

## 2024-10-23 PROCEDURE — 90480 ADMN SARSCOV2 VAC 1/ONLY CMP: CPT

## 2024-10-23 PROCEDURE — 91320 SARSCV2 VAC 30MCG TRS-SUC IM: CPT

## 2024-10-24 ENCOUNTER — TELEPHONE (OUTPATIENT)
Age: 69
End: 2024-10-24

## 2024-10-24 DIAGNOSIS — D36.10 SCHWANNOMA: Primary | ICD-10-CM

## 2024-10-26 ENCOUNTER — HOSPITAL ENCOUNTER (OUTPATIENT)
Dept: CT IMAGING | Facility: HOSPITAL | Age: 69
Discharge: HOME | End: 2024-10-26
Attending: FAMILY MEDICINE
Payer: COMMERCIAL

## 2024-10-26 DIAGNOSIS — E78.00 PRIMARY HYPERCHOLESTEROLEMIA: ICD-10-CM

## 2024-10-26 PROCEDURE — 75571 CT HRT W/O DYE W/CA TEST: CPT

## 2024-10-28 ENCOUNTER — TELEPHONE (OUTPATIENT)
Dept: CARDIOLOGY CLINIC | Facility: CLINIC | Age: 69
End: 2024-10-28

## 2024-10-28 NOTE — TELEPHONE ENCOUNTER
----- Message from Arron Acuña MD sent at 10/27/2024  4:48 PM EDT -----  Please call patient and find out if he is taking rosuvastatin (Crestor) or pravastatin (Pravachol) and what dose is he taking.  His chart says he is taking pravastatin 10 mg daily but my note says he is taking rosuvastatin 5 mg daily tell him that his LDL is a little high it is not terrible.

## 2024-10-28 NOTE — TELEPHONE ENCOUNTER
Phone call to patient per Dr. Acuña  find out if he is taking rosuvastatin (Crestor) or pravastatin (Pravachol) and what dose is he taking.  His chart says he is taking pravastatin 10 mg daily but my note says he is taking rosuvastatin 5 mg daily tell him that his LDL is a little high it is not terrible.     Patient states he has not been taking the rosuvastatin or pravastatin or ezetimibe.  Was experiencing a lot of cramping with all these medications.  He was not sure if the cramping was from the meds or from his lumbar issues.  PCP has been working with patient and suggested he hold his statins and ordered a CT coronary Calcium score which he did on Saturday.  Results are pending.    Patient has follow up appointment scheduled with Dr. Acuña 12/12/24

## 2024-11-04 ENCOUNTER — TELEPHONE (OUTPATIENT)
Dept: FAMILY MEDICINE CLINIC | Facility: CLINIC | Age: 69
End: 2024-11-04

## 2024-11-04 DIAGNOSIS — E78.00 PRIMARY HYPERCHOLESTEROLEMIA: ICD-10-CM

## 2024-11-04 RX ORDER — PRAVASTATIN SODIUM 10 MG
10 TABLET ORAL
Qty: 100 TABLET | Refills: 1 | Status: SHIPPED | OUTPATIENT
Start: 2024-11-04

## 2024-11-04 NOTE — TELEPHONE ENCOUNTER
Pt returning callback for results. Relayed providers message and pt had additional question on which medication he should start with. Warm transferred pt to [facility] to discuss further.

## 2024-11-04 NOTE — TELEPHONE ENCOUNTER
Patient calls to ask if the pravastatin prescription can be written for 100 tabs. Patient is asking for a new prescription. Medication should be sent to [facility]. It appears that the patient was prescribed pravastatin in July, 2024 but did not ever renew the script. I was able to send a refill request to the refill team for the pravastatin

## 2024-11-04 NOTE — TELEPHONE ENCOUNTER
----- Message from Margarita Bar MD sent at 11/3/2024  6:34 PM EST -----  CT results reviewed; patient noted to have calcium deposits in the arteries and would like benefit from being on a stain. If agreeable we can start on simvastatin or pravastatin which may be more tolerable.

## 2024-12-05 DIAGNOSIS — J30.1 ALLERGIC RHINITIS DUE TO POLLEN, UNSPECIFIED SEASONALITY: ICD-10-CM

## 2024-12-05 PROBLEM — R00.1 BRADYCARDIA: Status: ACTIVE | Noted: 2024-12-05

## 2024-12-05 PROBLEM — R93.1 AGATSTON CORONARY ARTERY CALCIUM SCORE BETWEEN 200 AND 399: Status: ACTIVE | Noted: 2021-06-22

## 2024-12-05 RX ORDER — AZELASTINE 1 MG/ML
1 SPRAY, METERED NASAL 2 TIMES DAILY
Qty: 90 ML | Refills: 1 | Status: SHIPPED | OUTPATIENT
Start: 2024-12-05

## 2024-12-05 NOTE — TELEPHONE ENCOUNTER
Reason for call:   [x] Refill   [] Prior Auth  [] Other:     Office:   [x] PCP/Provider - [place]  [] Specialty/Provider -     Medication: Azelastine 0.1% nasal spray     Dose/Frequency: 1 spray in each nostril bid     Quantity: 3 bottles    Pharmacy: [facility] - [address] [phone]     Does the patient have enough for 3 days?   [x] Yes   [] No - Send as HP to POD

## 2024-12-05 NOTE — ASSESSMENT & PLAN NOTE
11/05/2021 MRA of the chest: Mildly ectatic ascending aorta maximum diameter 40 mm. Remainder the aorta normal in caliber.    2/4/2024 CT of the chest without contrast: CT scan of the chest demonstrates mild enlargement of the aorta measuring 4.1 cm unchanged from previous studies.      Advised patient to start a statin or at least ezetimibe  Not on a beta-blocker due to bradycardia  Consider better blood pressure control with blood pressure less than 120 systolic

## 2024-12-05 NOTE — ASSESSMENT & PLAN NOTE
10/26/2024 CT coronary calcium score: Total calcium score 207,  and left circumflex 76.  LMCA and RCA 0.  Patient's 10-year CHD event risk is 9.6% and coronary age is 69y/o.  Patient's CAD risk is a little less than average.

## 2024-12-05 NOTE — ASSESSMENT & PLAN NOTE
3/11/2024 126/80  4/16/2024 119/62  5/16/2024 138/68  8/14/2024 116/74   9/6/2024 123/69   10/16/2024 128/64    Not on antihypertensive.  May benefit by low-dose losartan  Or bystolic 2.5 mg daily

## 2024-12-05 NOTE — ASSESSMENT & PLAN NOTE
10/07/2021 Holter monitor: Heart rate  beats per minute averaging 66 bpm. Rare ventricular ectopy. Occasional supraventricular ectopy. Two 3 beat runs of SVT, fastest 152 beats per minute. Intermittent 1st degree AV block and 1 episode of second-degree AV block Mobitz type 1. Longest RR 1.9 seconds

## 2024-12-10 ENCOUNTER — APPOINTMENT (OUTPATIENT)
Dept: LAB | Facility: CLINIC | Age: 69
End: 2024-12-10
Payer: COMMERCIAL

## 2024-12-10 ENCOUNTER — TRANSCRIBE ORDERS (OUTPATIENT)
Dept: LAB | Facility: CLINIC | Age: 69
End: 2024-12-10

## 2024-12-10 DIAGNOSIS — C61 MALIGNANT NEOPLASM OF PROSTATE (HCC): ICD-10-CM

## 2024-12-10 DIAGNOSIS — C61 MALIGNANT NEOPLASM OF PROSTATE (HCC): Primary | ICD-10-CM

## 2024-12-10 LAB — PSA SERPL-MCNC: 3.7 NG/ML (ref 0–4)

## 2024-12-10 PROCEDURE — 36415 COLL VENOUS BLD VENIPUNCTURE: CPT

## 2024-12-10 PROCEDURE — 84153 ASSAY OF PSA TOTAL: CPT

## 2024-12-12 ENCOUNTER — OFFICE VISIT (OUTPATIENT)
Dept: CARDIOLOGY CLINIC | Facility: CLINIC | Age: 69
End: 2024-12-12
Payer: COMMERCIAL

## 2024-12-12 VITALS
DIASTOLIC BLOOD PRESSURE: 62 MMHG | HEART RATE: 64 BPM | SYSTOLIC BLOOD PRESSURE: 118 MMHG | HEIGHT: 73 IN | WEIGHT: 191.8 LBS | BODY MASS INDEX: 25.42 KG/M2

## 2024-12-12 DIAGNOSIS — R93.1 AGATSTON CORONARY ARTERY CALCIUM SCORE BETWEEN 200 AND 399: Primary | ICD-10-CM

## 2024-12-12 DIAGNOSIS — E78.00 PRIMARY HYPERCHOLESTEROLEMIA: ICD-10-CM

## 2024-12-12 DIAGNOSIS — I65.23 BILATERAL CAROTID ARTERY STENOSIS: ICD-10-CM

## 2024-12-12 DIAGNOSIS — I10 PRIMARY HYPERTENSION: ICD-10-CM

## 2024-12-12 DIAGNOSIS — I71.21 ANEURYSM OF ASCENDING AORTA WITHOUT RUPTURE (HCC): ICD-10-CM

## 2024-12-12 DIAGNOSIS — R00.1 BRADYCARDIA: ICD-10-CM

## 2024-12-12 PROCEDURE — 99214 OFFICE O/P EST MOD 30 MIN: CPT | Performed by: INTERNAL MEDICINE

## 2024-12-12 RX ORDER — PRAVASTATIN SODIUM 40 MG
40 TABLET ORAL DAILY
Qty: 90 TABLET | Refills: 5 | Status: SHIPPED | OUTPATIENT
Start: 2024-12-12

## 2024-12-12 RX ORDER — PRAVASTATIN SODIUM 10 MG
10 TABLET ORAL
Start: 2024-12-12 | End: 2024-12-12 | Stop reason: SDUPTHER

## 2024-12-12 NOTE — PROGRESS NOTES
[facility]  [address] [place] [address]  Phone#  [phone]   Fax#  [phone]  *-*-*-*-*-*-*-*-*-*-*-*-*-*-*-*-*-*-*-*-*-*-*-*-*-*-*-*-*-*-*-*-*-*-*-*-*-*-*-*-*-*-*-*-*-*-*-*-*-*-*-*-*-*                                   Cardiology Follow Up      ENCOUNTER DATE: 24 11:36 AM  PATIENT NAME: Haider Prather   : 1955    MRN: [id number]  AGE:69 y.o.      SEX: male  ENCOUNTER PROVIDER:Arron Acuña MD     PRIMARY CARE PHYSICIAN: Margarita Bar MD    CARDIOLOGY SPECIALTY COMMENTS  Patient 1st seen on 2021. Recently he has been going for frequent walks for about 45 minutes at a leisurely pace.  When he comes home, he lays on the floor to cool down and relax.  He becomes nauseous but does not vomit although he often feels like he might vomit.  He becomes diaphoretic and his heart races and pounds.   He has no specific chest discomfort.         He has a history of smoking up to a pack a day for 2-3 years in the early 80s and has not smoked since.  His father may have had a heart attack.  He had peripheral vascular disease and fell down a flight of stairs and was noted to be .  He had an uncle who had CABG.  He is a retired  from [employer].        2021 echocardiogram:  Normal LV systolic and diastolic function EF 55 percent.  Mild biatrial dilatation.  Mild-to-moderate mitral regurgitation.  Normal pulmonary artery pressures.  Ascending aortic aneurysm.      2021 Carotid ultrasound demonstrated less than 50% stenosis bilaterally.       2021 CTA of the chest demonstrated enlarged ascending aorta at 41 mm.  There was evidence of coronary artery calcification.  Patient was advised not to lift more than 25 lb due to the ascending aortic aneurysm.     2021 nuclear stress test:. Ventricular ectopy as described with exercise   Low normal left ventricular systolic function, EF 52% with mild left  ventricular cavity enlargement   Normal tomographic perfusion series with inferior diaphragmatic attenuation.     10/07/2021 Holter monitor: Heart rate  beats per minute averaging 66 bpm. Rare ventricular ectopy. Occasional supraventricular ectopy. Two 3 beat runs of SVT, fastest 152 beats per minute. Intermittent 1st degree AV block and 1 episode of second-degree AV block Mobitz type 1. Longest RR 1.9 seconds    11/05/2021 MRA of the chest: Mildly ectatic ascending aorta maximum diameter 40 mm. Remainder the aorta normal in caliber.    6/28/2022 CT of the chest without contrast: Mild ectasia of the ascending aorta at 4.1 cm unchanged from previous.    2/4/2024 CT of the chest without contrast: CT scan of the chest demonstrates mild enlargement of the aorta measuring 4.1 cm unchanged from previous studies.      10/26/2024 CT coronary calcium score: Total calcium score 207,  and left circumflex 76.  LMCA and RCA 0.  Patient's 10-year CHD event risk is 9.6% and coronary age is 67y/o.    ACTIVE PROBLEM LIST  Patient Active Problem List   Diagnosis    Allergic rhinitis    Spinal stenosis    Degeneration of intervertebral disc of cervical region    Hypothyroidism    Muscle pain, myofacial    Neuropathy    Nontoxic multinodular goiter    Occipital neuralgia    Venous insufficiency    Chondromalacia patellae    Chronic osteomyelitis of lower leg (HCC)    Generalized osteoarthritis    Anxiety    Reactive depression    BPH without obstruction/lower urinary tract symptoms    Post traumatic stress disorder (PTSD)    Vitamin D deficiency    Varicose veins of bilateral lower extremities with other complications    Osteoporosis without current pathological fracture    Bilateral carotid artery stenosis < 50%    PAC (premature atrial contraction)    PVC (premature ventricular contraction)    Primary hypercholesterolemia    Macrocytosis without anemia    Chronic neck pain    Thoracic aortic aneurysm without rupture  (HCC)    Nonrheumatic mitral valve regurgitation    Bilateral enlargement of atria    Agatston coronary artery calcium score (207) between 200 and 399    Prostate cancer (HCC)    Mass of right parotid gland    History of osteomyelitis    Renal cysts, acquired, bilateral    Primary hypertension    Lumbar spine tumor    Schwannoma    Nonintractable epilepsy without status epilepticus, unspecified epilepsy type (HCC)    Steatosis (HCC)    Bradycardia       ACTIVE DIAGNOSIS AND PLAN    1. Agatston coronary artery calcium score (207) between 200 and 399  Assessment & Plan:  10/26/2024 CT coronary calcium score: Total calcium score 207,  and left circumflex 76.  LMCA and RCA 0.  Patient's 10-year CHD event risk is 9.6% and coronary age is 67y/o.  Patient's CAD risk is a little less than average.  2. Aneurysm of ascending aorta without rupture (HCC)  Assessment & Plan:  11/05/2021 MRA of the chest: Mildly ectatic ascending aorta maximum diameter 40 mm. Remainder the aorta normal in caliber.    2/4/2024 CT of the chest without contrast: CT scan of the chest demonstrates mild enlargement of the aorta measuring 4.1 cm unchanged from previous studies.      Advised patient to start a statin or at least ezetimibe  Not on a beta-blocker due to bradycardia  Consider better blood pressure control with blood pressure less than 120 systolic  3. Primary hypertension  Assessment & Plan:  3/11/2024 126/80  4/16/2024 119/62  5/16/2024 138/68  8/14/2024 116/74   9/6/2024 123/69   10/16/2024 128/64    Not on antihypertensive.  May benefit by low-dose losartan  Or bystolic 2.5 mg daily  4. Bradycardia  Assessment & Plan:  10/07/2021 Holter monitor: Heart rate  beats per minute averaging 66 bpm. Rare ventricular ectopy. Occasional supraventricular ectopy. Two 3 beat runs of SVT, fastest 152 beats per minute. Intermittent 1st degree AV block and 1 episode of second-degree AV block Mobitz type 1. Longest RR 1.9 seconds  5.  Bilateral carotid artery stenosis < 50%  Assessment & Plan:  06/11/2021 Carotid ultrasound demonstrated less than 50% stenosis bilaterally.   Orders:  -     VAS carotid complete study; Future; Expected date: 12/12/2024  6. Primary hypercholesterolemia  Comments:  Unable to tolerate crestor and Zetia; discussed starting a trial of Pravastatin 10mg daily  Orders:  -     pravastatin (PRAVACHOL) 40 mg tablet; Take 1 tablet (40 mg total) by mouth daily     INTERVAL HISTORY:        Patient presently has no cardiac complaints.  He may have some mild myalgias from his pravastatin 10 mg daily but it also could be from his spinal stenosis, the patient is not sure.    Since his last visit, his PCP ordered a CT calcium score which demonstrated a total calcium score of 207 giving him a 9.6% risk of a coronary event in the next 10 years.  I explained to him the physiology involved in developing calcium in the coronary arteries.  I recommended that he try taking a higher pravastatin dose and increasing it to 40 mg daily and see if there was any change in his peripheral myalgias.    He also complains of fatigue.  His TSH level is normal.  I told him that with a normal TSH level, fatigue could be related to not enough sleep, depression or sleep apnea.    On examination, I heard a faint bruit in his left carotid by the ER.    DISCUSSION/PLAN:          Increase pravastatin to 40 mg daily  Obtain carotid ultrasound duplex complete  Place message on my chart regarding the carotid ultrasound once it is completed  Return in 6 months    No results found for this visit on 12/12/24.      Lab Studies:    Lab Results   Component Value Date    CHOLESTEROL 178 10/14/2024    CHOLESTEROL 175 06/05/2024    CHOLESTEROL 174 11/16/2023     Lab Results   Component Value Date    TRIG 102 10/14/2024    TRIG 89 06/05/2024    TRIG 101 11/16/2023     Lab Results   Component Value Date    HDL 46 10/14/2024    HDL 50 06/05/2024    HDL 44 11/16/2023     Lab  Results   Component Value Date    LDLCALC 112 (H) 10/14/2024    LDLCALC 107 (H) 06/05/2024    LDLCALC 110 (H) 11/16/2023           Lab Results   Component Value Date    HGBA1C 5.8 (H) 06/05/2024    HGBA1C 6.4 (H) 11/16/2023    HGBA1C 5.9 (H) 06/26/2023      Lab Results   Component Value Date    EGFR 105 07/01/2024    EGFR 99 06/05/2024    EGFR 103 03/22/2024    SODIUM 143 07/01/2024    SODIUM 141 06/05/2024    SODIUM 144 11/16/2023    K 4.0 07/01/2024    K 4.3 06/05/2024    K 4.3 11/16/2023     07/01/2024     06/05/2024     11/16/2023    CO2 28 07/01/2024    CO2 33 (H) 06/05/2024    CO2 30 11/16/2023    ANIONGAP 7 07/31/2015    ANIONGAP 9 02/24/2015    ANIONGAP 9 02/06/2015    BUN 15 07/01/2024    BUN 19 06/05/2024    BUN 21 03/22/2024    CREATININE 0.55 (L) 07/01/2024    CREATININE 0.63 06/05/2024    CREATININE 0.58 (L) 03/22/2024    MG 2.3 02/17/2022     Lab Results   Component Value Date    WBC 6.83 11/16/2023    WBC 12.80 (H) 12/20/2022    WBC 8.57 12/07/2022    HGB 14.4 11/16/2023    HGB 9.6 (L) 07/26/2023    HGB 12.5 12/20/2022    HCT 44.4 11/16/2023    HCT 29.4 (L) 07/26/2023    HCT 37.7 12/20/2022    MCV 99 (H) 11/16/2023     (H) 12/20/2022     (H) 12/07/2022    MCH 32.1 11/16/2023    MCH 33.0 12/20/2022    MCH 32.3 12/07/2022    MCHC 32.4 11/16/2023    MCHC 33.2 12/20/2022    MCHC 32.1 12/07/2022     11/16/2023     12/20/2022     12/19/2022      Lab Results   Component Value Date    GLUCOSE 106 07/31/2015    GLUCOSE 119 02/24/2015    GLUCOSE 105 02/06/2015    CALCIUM 9.9 07/01/2024    CALCIUM 9.7 06/05/2024    CALCIUM 9.6 11/16/2023    ALB 4.5 06/05/2024    ALB 4.3 11/16/2023    ALB 4.6 06/26/2023    TP 6.9 06/05/2024    TP 6.6 11/16/2023    TP 6.6 06/26/2023    AST 23 06/05/2024    AST 18 11/16/2023    AST 28 06/26/2023    ALT 20 06/05/2024    ALT 14 11/16/2023    ALT 38 06/26/2023    ALKPHOS 42 06/05/2024    ALKPHOS 57 11/16/2023    ALKPHOS 45  06/26/2023    BILITOT 0.60 07/31/2015    BILITOT 0.49 12/19/2014       Lab Results   Component Value Date    TSH 1.33 12/02/2024    TSH 1.10 02/23/2023    TSH 0.54 03/18/2022       Lab Results   Component Value Date    FREET4 1.4 07/31/2015       Lab Results   Component Value Date    CRP <3.0 10/21/2022    CRP <3.0 04/27/2021           Current Outpatient Medications:     acetaminophen (TYLENOL) 500 mg tablet, Take 2 tablets (1,000 mg total) by mouth every 8 (eight) hours as needed for moderate pain, mild pain or fever (Patient taking differently: Take 1,000 mg by mouth every 8 (eight) hours as needed for moderate pain, mild pain or fever As needed), Disp: , Rfl: 0    azelastine (ASTELIN) 0.1 % nasal spray, 1 spray into each nostril 2 (two) times a day Use in each nostril as directed, Disp: 90 mL, Rfl: 1    desloratadine (CLARINEX) 5 MG tablet, TAKE 1 TABLET DAILY, Disp: 90 tablet, Rfl: 1    doxycycline (ADOXA) 50 MG tablet, TAKE 1 TABLET BY ORAL ROUTE EVERY DAY WITH MEAL X3 MONTHS THEN MW, Disp: , Rfl:     fluticasone (FLONASE) 50 mcg/act nasal spray, 2 sprays into each nostril daily, Disp: 48 g, Rfl: 3    levothyroxine 100 mcg tablet, daily, Disp: , Rfl:     metroNIDAZOLE (METROGEL) 0.75 % gel, Apply topically 2 (two) times a day, Disp: , Rfl:     Perfluorohexyloctane (Miebo) 1.338 GM/ML SOLN, Apply 1 drop to eye every 6 (six) hours, Disp: , Rfl:     pravastatin (PRAVACHOL) 40 mg tablet, Take 1 tablet (40 mg total) by mouth daily, Disp: 90 tablet, Rfl: 5    tamsulosin (FLOMAX) 0.4 mg, Take 0.4 mg by mouth 2 (two) times a day, Disp: , Rfl:     tazarotene (AVAGE) 0.1 % cream, Apply topically daily at bedtime, Disp: , Rfl:   No Known Allergies    Past Medical History:   Diagnosis Date    Anxiety     Arthritis     Bleeding tendency (HCC)     BPH (benign prostatic hyperplasia)     Broken bones     left leg    Cancer (HCC)     Cataract     Chronic neck pain     Disease of thyroid gland 11/12    Epilepsy (HCC)      Headache(784.0)     Hypothyroidism     Neoplasm of unspecified behavior of bone, soft tissue, and skin     Osteomyelitis (HCC)     PONV (postoperative nausea and vomiting)     Prostate cancer (HCC)     Skin cancer     Squamous carcinoma 2014    Thyroid condition     Thyroid disease      Social History     Socioeconomic History    Marital status:      Spouse name: Not on file    Number of children: Not on file    Years of education: Not on file    Highest education level: Not on file   Occupational History    Occupation: Teacher    Occupation: retired   Tobacco Use    Smoking status: Former     Current packs/day: 0.00     Average packs/day: 0.5 packs/day for 2.0 years (1.0 ttl pk-yrs)     Types: Cigarettes     Quit date:      Years since quittin.9    Smokeless tobacco: Never   Vaping Use    Vaping status: Never Used   Substance and Sexual Activity    Alcohol use: Yes     Comment: being a social drinker    Drug use: Not Currently     Types: Marijuana     Comment: Medical marijuana    Sexual activity: Not Currently     Partners: Female     Comment: no known std risk factors   Other Topics Concern    Not on file   Social History Narrative    Daily coffee consumption (3 cups/day)     Social Drivers of Health     Financial Resource Strain: Low Risk  (10/15/2023)    Overall Financial Resource Strain (CARDIA)     Difficulty of Paying Living Expenses: Not hard at all   Food Insecurity: No Food Insecurity (10/9/2024)    Nursing - Inadequate Food Risk Classification     Worried About Running Out of Food in the Last Year: Never true     Ran Out of Food in the Last Year: Never true     Ran Out of Food in the Last Year: Not on file   Transportation Needs: No Transportation Needs (10/9/2024)    PRAPARE - Transportation     Lack of Transportation (Medical): No     Lack of Transportation (Non-Medical): No   Physical Activity: Not on file   Stress: Not on file   Social Connections: Not on file    Intimate Partner Violence: Not At Risk (7/25/2023)    [facility]    Humiliation, Afraid, Rape, and Kick questionnaire     Fear of Current or Ex-Partner: No     Emotionally Abused: No     Physically Abused: No     Sexually Abused: No   Housing Stability: Low Risk  (10/9/2024)    Housing Stability Vital Sign     Unable to Pay for Housing in the Last Year: No     Number of Times Moved in the Last Year: 0     Homeless in the Last Year: No      Family History   Problem Relation Age of Onset    Bone cancer Mother     Other Father         Accident    Dementia Other     Cancer Other      Past Surgical History:   Procedure Laterality Date    APPENDECTOMY      ARTHRODESIS      H/o Arthrodesis Cervical to C2;  last assessed [id number]    CERVICAL FUSION      COLONOSCOPY  01/22/2021    COLONOSCOPY      EYE SURGERY      JOINT REPLACEMENT      KNEE SURGERY Right 12/22/2017    LEG SURGERY Left 1999    Hardware placed in lower leg bone(s)    LEG SURGERY Left 01/01/2006 2006, 2012-Left leg vascular    VA LAMINECTOMY BX/EXC ISPI DAKOTAH XDRL LUMBAR N/A 12/19/2022    Procedure: L2/3 decompressive laminectomy and biopsy (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       PREVIOUS WEIGHTS:   Wt Readings from Last 10 Encounters:   12/12/24 87 kg (191 lb 12.8 oz)   10/16/24 84.9 kg (187 lb 3.2 oz)   09/06/24 86.2 kg (190 lb)   08/14/24 86.2 kg (190 lb)   07/16/24 86.3 kg (190 lb 3.2 oz)   07/16/24 85.7 kg (189 lb)   07/01/24 85.8 kg (189 lb 1.6 oz)   06/11/24 86.7 kg (191 lb 3.2 oz)   05/16/24 86.6 kg (191 lb)   04/16/24 86.8 kg (191 lb 6.4 oz)        Review of Systems:  Review of Systems   Constitutional:  Negative for activity change.   Respiratory:  Negative for cough, chest  "tightness, shortness of breath and wheezing.    Cardiovascular:  Negative for chest pain, palpitations and leg swelling.   Musculoskeletal:  Negative for gait problem.   Skin:  Negative for color change.   Neurological:  Negative for dizziness, tremors, syncope, weakness, light-headedness and headaches.   Psychiatric/Behavioral:  Negative for agitation and confusion.        Physical Exam:  /62 (BP Location: Left arm, Patient Position: Sitting, Cuff Size: Large)   Pulse 64   Ht 6' 1\" (1.854 m)   Wt 87 kg (191 lb 12.8 oz)   BMI 25.30 kg/m²     Physical Exam  Vitals reviewed.   Constitutional:       General: He is not in acute distress.     Appearance: He is well-developed.   HENT:      Head: Normocephalic and atraumatic.   Neck:      Thyroid: No thyromegaly.      Vascular: Carotid bruit (Faint left carotid bruit the carotid bifurcation) present. No JVD.      Trachea: No tracheal deviation.   Cardiovascular:      Rate and Rhythm: Normal rate and regular rhythm.      Pulses: Normal pulses.      Heart sounds: Normal heart sounds. No murmur heard.     No friction rub. No gallop.   Pulmonary:      Effort: Pulmonary effort is normal. No respiratory distress.      Breath sounds: Normal breath sounds. No wheezing, rhonchi or rales.   Chest:      Chest wall: No tenderness.   Musculoskeletal:      Cervical back: Normal range of motion and neck supple.      Right lower leg: No edema.      Left lower leg: No edema.   Skin:     General: Skin is warm and dry.   Neurological:      General: No focal deficit present.      Mental Status: He is alert and oriented to person, place, and time.   Psychiatric:         Mood and Affect: Mood normal.         Behavior: Behavior normal.         Thought Content: Thought content normal.         Judgment: Judgment normal.           ======================================================  Imaging:   Results Review Statement: No pertinent imaging studies reviewed.    Portions of the record " "may have been created with voice recognition software. Occasional wrong word or \"sound a like\" substitutions may have occurred due to the inherent limitations of voice recognition software. Read the chart carefully and recognize, using context, where substitutions have occurred.    SIGNATURES:   Arron Acuña MD   "

## 2024-12-20 ENCOUNTER — HOSPITAL ENCOUNTER (OUTPATIENT)
Dept: NON INVASIVE DIAGNOSTICS | Facility: HOSPITAL | Age: 69
Discharge: HOME | End: 2024-12-20
Attending: INTERNAL MEDICINE
Payer: COMMERCIAL

## 2024-12-20 DIAGNOSIS — I65.23 BILATERAL CAROTID ARTERY STENOSIS: ICD-10-CM

## 2024-12-20 PROCEDURE — 93880 EXTRACRANIAL BILAT STUDY: CPT | Performed by: SURGERY

## 2024-12-20 PROCEDURE — 93880 EXTRACRANIAL BILAT STUDY: CPT

## 2024-12-29 ENCOUNTER — RESULTS FOLLOW-UP (OUTPATIENT)
Dept: CARDIOLOGY CLINIC | Facility: CLINIC | Age: 69
End: 2024-12-29

## 2025-02-03 ENCOUNTER — HOSPITAL ENCOUNTER (OUTPATIENT)
Facility: MEDICAL CENTER | Age: 70
Discharge: HOME | End: 2025-02-03
Payer: COMMERCIAL

## 2025-02-03 DIAGNOSIS — D36.10 SCHWANNOMA: ICD-10-CM

## 2025-02-03 PROCEDURE — A9585 GADOBUTROL INJECTION: HCPCS | Performed by: STUDENT IN AN ORGANIZED HEALTH CARE EDUCATION/TRAINING PROGRAM

## 2025-02-03 PROCEDURE — 72158 MRI LUMBAR SPINE W/O & W/DYE: CPT

## 2025-02-03 RX ORDER — GADOBUTROL 604.72 MG/ML
8 INJECTION INTRAVENOUS
Status: DISCONTINUED | OUTPATIENT
Start: 2025-02-03 | End: 2025-02-03

## 2025-02-03 RX ORDER — GADOBUTROL 604.72 MG/ML
8 INJECTION INTRAVENOUS
Status: COMPLETED | OUTPATIENT
Start: 2025-02-03 | End: 2025-02-03

## 2025-02-03 RX ADMIN — GADOBUTROL 8 ML: 604.72 INJECTION INTRAVENOUS at 11:03

## 2025-02-12 ENCOUNTER — RA CDI HCC (OUTPATIENT)
Dept: OTHER | Facility: HOSPITAL | Age: 70
End: 2025-02-12

## 2025-02-12 NOTE — PROGRESS NOTES
HCC coding opportunities          Chart Reviewed number of suggestions sent to Provider: 1   M86.669    Patients Insurance     Medicare Insurance: [employer]

## 2025-02-18 ENCOUNTER — OFFICE VISIT (OUTPATIENT)
Dept: FAMILY MEDICINE CLINIC | Facility: CLINIC | Age: 70
End: 2025-02-18
Payer: COMMERCIAL

## 2025-02-18 VITALS
SYSTOLIC BLOOD PRESSURE: 128 MMHG | HEART RATE: 74 BPM | WEIGHT: 193 LBS | DIASTOLIC BLOOD PRESSURE: 68 MMHG | TEMPERATURE: 98 F | BODY MASS INDEX: 25.58 KG/M2 | HEIGHT: 73 IN | OXYGEN SATURATION: 97 %

## 2025-02-18 DIAGNOSIS — E88.89 STEATOSIS (HCC): ICD-10-CM

## 2025-02-18 DIAGNOSIS — E55.9 VITAMIN D DEFICIENCY: ICD-10-CM

## 2025-02-18 DIAGNOSIS — M54.2 NECK PAIN: ICD-10-CM

## 2025-02-18 DIAGNOSIS — G40.909 NONINTRACTABLE EPILEPSY WITHOUT STATUS EPILEPTICUS, UNSPECIFIED EPILEPSY TYPE (HCC): ICD-10-CM

## 2025-02-18 DIAGNOSIS — F41.9 ANXIETY: ICD-10-CM

## 2025-02-18 DIAGNOSIS — R73.03 PREDIABETES: ICD-10-CM

## 2025-02-18 DIAGNOSIS — E03.9 ACQUIRED HYPOTHYROIDISM: ICD-10-CM

## 2025-02-18 DIAGNOSIS — I10 PRIMARY HYPERTENSION: Primary | ICD-10-CM

## 2025-02-18 DIAGNOSIS — J30.1 ALLERGIC RHINITIS DUE TO POLLEN, UNSPECIFIED SEASONALITY: ICD-10-CM

## 2025-02-18 DIAGNOSIS — C61 PROSTATE CANCER (HCC): ICD-10-CM

## 2025-02-18 DIAGNOSIS — E78.00 PRIMARY HYPERCHOLESTEROLEMIA: ICD-10-CM

## 2025-02-18 DIAGNOSIS — F32.9 REACTIVE DEPRESSION: ICD-10-CM

## 2025-02-18 PROCEDURE — 99214 OFFICE O/P EST MOD 30 MIN: CPT | Performed by: FAMILY MEDICINE

## 2025-02-18 PROCEDURE — G2211 COMPLEX E/M VISIT ADD ON: HCPCS | Performed by: FAMILY MEDICINE

## 2025-02-18 RX ORDER — FLUTICASONE PROPIONATE 50 MCG
2 SPRAY, SUSPENSION (ML) NASAL DAILY
Qty: 48 G | Refills: 3 | Status: SHIPPED | OUTPATIENT
Start: 2025-02-18

## 2025-02-18 NOTE — ASSESSMENT & PLAN NOTE
Currently on pravastatin 40 mg daily, repeat lipid panel ordered  Orders:    Lipid Panel with Direct LDL reflex; Future    Comprehensive metabolic panel; Future

## 2025-02-18 NOTE — ASSESSMENT & PLAN NOTE
Continue flonase and antihistamine; add azelastine nasal spray. Would also recommend the use of a neti pot   Orders:    fluticasone (FLONASE) 50 mcg/act nasal spray; 2 sprays into each nostril daily

## 2025-02-18 NOTE — ASSESSMENT & PLAN NOTE
Currently on vitamin D 4000 units daily, repeat vitamin D levels ordered  Orders:    Vitamin D 25 hydroxy; Future

## 2025-02-18 NOTE — PROGRESS NOTES
Name: Haider Prather      : 1955      MRN: [id number]  Encounter Provider: Margarita Bar MD  Encounter Date: 2025   Encounter department: [facility]  :  Assessment & Plan  Primary hypertension  At goal without any medication         Prediabetes  Continue diet and lifestyle modifications  Orders:    Hemoglobin A1C; Future    Primary hypercholesterolemia  Currently on pravastatin 40 mg daily, repeat lipid panel ordered  Orders:    Lipid Panel with Direct LDL reflex; Future    Comprehensive metabolic panel; Future    Nonintractable epilepsy without status epilepticus, unspecified epilepsy type (HCC)  Patient previously on Dilantin from [id number] before being weaned off the medication.  Patient has been seizure-free since that time       Reactive depression  Stable off medication  Depression Screening Follow-up Plan: Patient's depression screening was positive with a PHQ-9 score of 5.            Anxiety  Stable off medication       Acquired hypothyroidism  Continue levothyroxine 100mcg daily, most recent TSH within normal limits  Follows with endocrinology           Steatosis (HCC)  Continue diet lifestyle modifications       Prostate cancer (HCC)  Most recent PSA within normal limits, continue to follow-up with urology       Allergic rhinitis due to pollen, unspecified seasonality  Continue flonase and antihistamine; add azelastine nasal spray. Would also recommend the use of a neti pot   Orders:    fluticasone (FLONASE) 50 mcg/act nasal spray; 2 sprays into each nostril daily    Vitamin D deficiency  Currently on vitamin D 4000 units daily, repeat vitamin D levels ordered  Orders:    Vitamin D 25 hydroxy; Future    Neck pain  Patient may use OTC Tylenol/NSAIDs, patient is also been given neck exercises to perform at home.  He will call if no improvement              History of Present Illness   HPI  Haider Prather is a very pleasant 70-year-old male who presents today for follow-up.   "Unfortunately patient did slipped on ice over the weekend and hurt his neck.  He denies hitting his head or any loss of consciousness.  He does have a history of neck surgery and would like for his neck to be examined.  He continues to follow with specialties including cardiology, endocrinology, urology and neurosurgery.  He states that his pravastatin was increased to 40 mg by cardiology and would like to see if that is making a difference to his cholesterol levels.  He is also requesting a refill on some of his other medications.    Review of Systems   Constitutional: Negative.    HENT: Negative.     Eyes: Negative.    Respiratory: Negative.     Cardiovascular: Negative.    Gastrointestinal: Negative.    Musculoskeletal:  Positive for arthralgias and neck pain.   Skin: Negative.    Allergic/Immunologic: Positive for environmental allergies.   Neurological: Negative.    Psychiatric/Behavioral: Negative.         Objective   /68 (BP Location: Left arm, Patient Position: Sitting, Cuff Size: Standard)   Pulse 74   Temp 98 °F (36.7 °C) (Temporal)   Ht 6' 1\" (1.854 m)   Wt 87.5 kg (193 lb)   SpO2 97%   BMI 25.46 kg/m²      Physical Exam  Constitutional:       General: He is not in acute distress.     Appearance: He is not ill-appearing.   HENT:      Head: Normocephalic and atraumatic.   Eyes:      General:         Right eye: No discharge.         Left eye: No discharge.      Extraocular Movements: Extraocular movements intact.   Neck:      Comments: Negative Spurling test  Cardiovascular:      Rate and Rhythm: Normal rate.   Pulmonary:      Effort: Pulmonary effort is normal. No respiratory distress.      Breath sounds: No wheezing.   Abdominal:      General: Bowel sounds are normal. There is no distension.      Palpations: Abdomen is soft.      Tenderness: There is no abdominal tenderness.   Musculoskeletal:      Cervical back: No rigidity. Muscular tenderness present. Decreased range of motion. "   Neurological:      General: No focal deficit present.      Mental Status: He is alert.   Psychiatric:         Mood and Affect: Mood normal.         Behavior: Behavior normal.           Depression Screening Follow-up Plan: Patient's depression screening was positive with a PHQ-9 score of 5. Patient assessed for underlying major depression. They have no active suicidal ideations. Brief counseling provided and recommend additional follow-up/re-evaluation next office visit.

## 2025-02-18 NOTE — ASSESSMENT & PLAN NOTE
Stable off medication  Depression Screening Follow-up Plan: Patient's depression screening was positive with a PHQ-9 score of 5.

## 2025-02-18 NOTE — PATIENT INSTRUCTIONS
Patient Education     Neck Pain Exercises   About this topic   The neck or cervical spine has 7 spinal bones that run from the base of your skull to the upper back. These spinal bones have discs in between them. Discs act as shock absorbers. Ligaments are strong bands of tissue that hold the bones together. Many muscles surround and attach on these bones. Nerves come off of the spinal cord and exit out of small spaces in between the spinal bones. You can have neck pain if any of these are injured or damaged. Exercises may help to make this problem better.  General   Before starting with a program, ask your doctor if you are healthy enough to do these exercises. Your doctor may have you work with a  or physical therapist to make a safe exercise program to meet your needs. You should not do the exercises if they cause sharp pains, if you feel dizzy, or if you have vision changes.  Stretching Exercises   Stretching exercises keep your muscles flexible. They also stop them from getting tight. Start by doing each of these stretches 2 to 3 times. In order for your body to make changes, you will need to hold these stretches for 20 to 30 seconds. Try to do the stretches 2 to 3 times each day. Do all exercises slowly.  Passive neck stretches:  Put your left hand on top of your head. Your other arm can be at your side or behind your back. Pull your head toward your left shoulder until you feel a gentle stretch on the right side of your neck. Repeat on the other side using your other hand.  Also, try this stretch by pulling in a diagonal direction. With your left hand on top of your head, pull your head down towards the direction of your left knee. You should feel this stretch toward the back on the right side of your neck. Repeat on the other side.  Active neck stretches:  Neck front-to-back motion ? Look down to the floor until you feel a stretch in the back of your neck. Hold. Next, look up to the ceiling until you  feel a stretch in the front of your neck. Hold.  Neck side-to-side motion ? Tilt your head to the side and bring your ear to your shoulder until you feel a stretch on the other side of your neck. Hold. Next, tilt your head to the other side until you feel a stretch. Hold.  Neck turning ? Turn only your head and look over your left shoulder until you feel stretching in the right side of your neck. Hold. Now turn only your head and look over your right shoulder until you feel a stretch in the left side of your neck. Hold.  Scalene stretches ? Grasp your head with the hand opposite the side you want to stretch. Pull your head to the side until you feel a stretch. Now, slowly turn your head so your chin is pointed upwards.  Chin tucks ? Stand straight or lie down on your back. Tuck your chin in and lengthen the back of your neck. Return to the starting position and repeat. It may help to stand up against a wall during this exercise. Try gently pushing your chin with two fingers while trying to flatten your neck against the wall. If you do this exercise lying down, try using a small rolled up washcloth under your neck. Push down into the washcloth when tucking in your chin.  Strengthening Exercises   Strengthening exercises keep your muscles firm and strong. Start by repeating each exercise 2 to 3 times. Work up to doing each exercise 10 times. Try to do the exercises 2 to 3 times each day. Hold each exercise for 3 to 5 seconds. Do all exercises slowly.  Shoulder blade squeezes ? Pinch your shoulder blades together on your upper back and hold 3 to 5 seconds. Relax.             What will the results be?   Less pain and stiffness  Better range of motion  Increased strength  Help you heal faster after an injury or surgery  Increase blood flow to a body part  Help you feel better and more relaxed  Give you more energy  More toned looking muscles  Better posture  Easier to do daily activities  Helpful tips   Stay active and  work out to keep your muscles strong and flexible.  Be sure you do not hold your breath when exercising. This can raise your blood pressure. If you tend to hold your breath, try counting out loud when exercising. If any exercise bothers you, stop right away.  Try swinging your arms at an easy pace for a few minutes to warm up your muscles. Do this again after exercising.  Doing exercises before a meal may be a good way to get into a routine.  Exercise may be slightly uncomfortable, but you should not have sharp pains. If you do get sharp pains, stop what you are doing. If the sharp pains continue, call your doctor.  Last Reviewed Date   2020-03-10  Consumer Information Use and Disclaimer   This generalized information is a limited summary of diagnosis, treatment, and/or medication information. It is not meant to be comprehensive and should be used as a tool to help the user understand and/or assess potential diagnostic and treatment options. It does NOT include all information about conditions, treatments, medications, side effects, or risks that may apply to a specific patient. It is not intended to be medical advice or a substitute for the medical advice, diagnosis, or treatment of a health care provider based on the health care provider's examination and assessment of a patient’s specific and unique circumstances. Patients must speak with a health care provider for complete information about their health, medical questions, and treatment options, including any risks or benefits regarding use of medications. This information does not endorse any treatments or medications as safe, effective, or approved for treating a specific patient. [employer] and its affiliates disclaim any warranty or liability relating to this information or the use thereof. The use of this information is governed by the Terms of Use, available at https://www.woltersZyncrouwer.com/en/know/clinical-effectiveness-terms   Copyright   Copyright ©  2024 [employer] and its affiliates and/or licensors. All rights reserved.

## 2025-02-18 NOTE — ASSESSMENT & PLAN NOTE
Patient previously on Dilantin from [id number] before being weaned off the medication.  Patient has been seizure-free since that time

## 2025-02-18 NOTE — ASSESSMENT & PLAN NOTE
Continue levothyroxine 100mcg daily, most recent TSH within normal limits  Follows with endocrinology

## 2025-03-05 ENCOUNTER — TELEPHONE (OUTPATIENT)
Dept: FAMILY MEDICINE CLINIC | Facility: CLINIC | Age: 70
End: 2025-03-05

## 2025-03-05 DIAGNOSIS — Z79.2 NEED FOR ANTIBIOTIC PROPHYLAXIS FOR DENTAL PROCEDURE: Primary | ICD-10-CM

## 2025-03-05 NOTE — TELEPHONE ENCOUNTER
Patient called the RX Refill Line. Message is being forwarded to the office.     Patient is requesting amoxicillin for dental work on 3/14/25 sent to [facility]     Please contact patient at [phone]

## 2025-03-06 RX ORDER — AMOXICILLIN 500 MG/1
CAPSULE ORAL
Qty: 4 CAPSULE | Refills: 0 | Status: SHIPPED | OUTPATIENT
Start: 2025-03-06 | End: 2025-03-07

## 2025-03-06 NOTE — TELEPHONE ENCOUNTER
Patient called the RX Refill Line. Message is being forwarded to the office.     Patient returned call. Patient states he does not have an allergic reaction to amoxicillin. States has taken medication recently and has not had any reaction. Patient states he would like to have the augmentin taken off of his allergy list.

## 2025-03-10 ENCOUNTER — APPOINTMENT (OUTPATIENT)
Dept: LAB | Facility: CLINIC | Age: 70
End: 2025-03-10
Payer: COMMERCIAL

## 2025-03-10 DIAGNOSIS — R97.21 INCREASING PROSTATE SPECIFIC ANTIGEN (PSA) LEVEL AFTER TREATMENT FOR MALIGNANT NEOPLASM OF PROSTATE: ICD-10-CM

## 2025-03-10 DIAGNOSIS — C61 MALIGNANT NEOPLASM OF PROSTATE (HCC): ICD-10-CM

## 2025-03-10 DIAGNOSIS — J30.1 ALLERGIC RHINITIS DUE TO POLLEN, UNSPECIFIED SEASONALITY: ICD-10-CM

## 2025-03-10 LAB — PSA SERPL-MCNC: 4.01 NG/ML (ref 0–4)

## 2025-03-10 PROCEDURE — 36415 COLL VENOUS BLD VENIPUNCTURE: CPT

## 2025-03-10 PROCEDURE — 84153 ASSAY OF PSA TOTAL: CPT

## 2025-03-10 RX ORDER — DESLORATADINE 5 MG/1
5 TABLET ORAL DAILY
Qty: 90 TABLET | Refills: 1 | Status: SHIPPED | OUTPATIENT
Start: 2025-03-10

## 2025-03-19 ENCOUNTER — OFFICE VISIT (OUTPATIENT)
Dept: RADIATION ONCOLOGY | Facility: HOSPITAL | Age: 70
End: 2025-03-19
Attending: STUDENT IN AN ORGANIZED HEALTH CARE EDUCATION/TRAINING PROGRAM
Payer: COMMERCIAL

## 2025-03-19 ENCOUNTER — DOCUMENTATION (OUTPATIENT)
Dept: HEMATOLOGY ONCOLOGY | Facility: CLINIC | Age: 70
End: 2025-03-19

## 2025-03-19 VITALS
RESPIRATION RATE: 12 BRPM | TEMPERATURE: 98.2 F | OXYGEN SATURATION: 99 % | SYSTOLIC BLOOD PRESSURE: 160 MMHG | HEART RATE: 78 BPM | DIASTOLIC BLOOD PRESSURE: 90 MMHG

## 2025-03-19 DIAGNOSIS — D36.10 SCHWANNOMA: Primary | ICD-10-CM

## 2025-03-19 PROCEDURE — G2211 COMPLEX E/M VISIT ADD ON: HCPCS | Performed by: STUDENT IN AN ORGANIZED HEALTH CARE EDUCATION/TRAINING PROGRAM

## 2025-03-19 PROCEDURE — 99214 OFFICE O/P EST MOD 30 MIN: CPT | Performed by: STUDENT IN AN ORGANIZED HEALTH CARE EDUCATION/TRAINING PROGRAM

## 2025-03-19 PROCEDURE — G0463 HOSPITAL OUTPT CLINIC VISIT: HCPCS | Performed by: STUDENT IN AN ORGANIZED HEALTH CARE EDUCATION/TRAINING PROGRAM

## 2025-03-19 PROCEDURE — 99211 OFF/OP EST MAY X REQ PHY/QHP: CPT | Performed by: STUDENT IN AN ORGANIZED HEALTH CARE EDUCATION/TRAINING PROGRAM

## 2025-03-19 NOTE — PROGRESS NOTES
In-basket message received from Dr. Florence to add patient to the neuro [facility] on 3/26/2025. Chart reviewed and prep completed.

## 2025-03-19 NOTE — PROGRESS NOTES
Haider Prather 1955 is a 70 y.o. male  with a symptomatic schwannoma at L3/4 s/p decompressive laminectomy and STR. MRI surveillance demonstrated interval enlargement. On 6/5/23 he completed a course of SBRT to a dose of 2500 cGy in 5 fractions. The pt was last seen in radiation on 08/14/24. He returns today for follow up.      2/3/25 - MRI lumbar spine w wo contrast  1. Persistent enhancing solid/cystic lesion centered within the left L3-4 subarticular/lateral recess extending to the mid central canal and foraminal/extraforaminal regions compatible with a schwannoma. The lesion appears minimally increased in size compared to the prior 4/10/2024 examination.   2. Multilevel degenerative changes of the lumbar spine similar to 4/10/2024 with no new disc herniation.    Upcoming:      Follow up visit     Oncology History Overview Note   with a symptomatic schwannoma at L3/4 s/p decompressive laminectomy and STR. MRI surveillance demonstrated interval enlargement. On 6/5/23 he completed a course of SBRT to a dose of 2500 cGy in 5 fractions. The pt was last seen in radiation on 08/14/24. He returns today for follow up.      2/3/25 - MRI lumbar spine w wo contrast  1. Persistent enhancing solid/cystic lesion centered within the left L3-4 subarticular/lateral recess extending to the mid central canal and foraminal/extraforaminal regions compatible with a schwannoma. The lesion appears minimally increased in size compared to the prior 4/10/2024 examination.   2. Multilevel degenerative changes of the lumbar spine similar to 4/10/2024 with no new disc herniation.    Upcoming:       Prostate cancer (HCC)   2021 Initial Diagnosis    Prostate cancer (HCC)      Biopsy    - Prostatic adenocarcinoma, acinar type, Houston score 3+3=6 (Grade Group 1) involving 2 cores   - Percentage of [place] pattern 4: N/A   - Percentage of [place] pattern 5: N/A   - Perineural invasion: Not identified   - Periprostatic fat invasion: Not  "identified   - Lymph-vascular invasion: Not identified   - Seminal vesicle/ejaculatory duct invasion: Not identified   - Additional pathologic findings: None   - Best representative block if additional studies are needed: B2 and B6      A.  Right prostate (random biopsy):      - Benign prostatic tissue.     B.  Left prostate (random biopsy):      - Small foci of prostatic adenocarcinoma, [place] score 3+3=6 (Grade Group 1), involving less than 5% of two (2) cores.    C.  Right mid prostate (biopsy):     - Benign prostatic tissue.      7/13/2021 -  Cancer Staged    Staging form: Prostate, AJCC 8th Edition  - Clinical stage from 7/13/2021: Stage I (cT1a, cN0, cM0, PSA: 3.1, Grade Group: 1) - Signed by Karl Villalba MD on 1/25/2022  Stage prefix: Initial diagnosis  Prostate specific antigen (PSA) range: Less than 10  Easton primary pattern: 3  Easton secondary pattern: 3  [place]on score: 6  Histologic grading system: 5 grade system  Number of biopsy cores examined: 2  Number of biopsy cores positive: 2       Schwannoma   2015 Surgery    \"C1-C2 lateral mass fixation and sublaminar Gallie cable fixation with demineralized bone matrix and cadaveric bone arthrodesis\".  Performed by Dr. Contreras, 2/20/15.        12/19/2022 Surgery    A-B. Spine, \"Left L3 nerve sheath tumor,\" Resection:  - Schwannoma     2/20/2023 Surgery    Patient is s/p L2/3 decompressive laminectomy and biopsy (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 [12/19/22 DKO]     4/27/2023 Initial Diagnosis    Schwannoma     5/24/2023 - 6/5/2023 Radiation    Treatments:  Course: C1 SBRT    Plan ID Energy Fractions Dose per Fraction (cGy) Dose Correction (cGy) Total Dose Delivered (cGy) Elapsed Days   SBRT Spine 6X-FFF 5 / 5 500 0 2,500 12      Treatment Dates:  5/24/2023 - 6/5/2023.          Review of Systems:  Review of Systems   Constitutional:  Positive for fatigue.   HENT: Negative.     Eyes: Negative.    Respiratory: Negative.   "   Cardiovascular: Negative.    Gastrointestinal: Negative.    Genitourinary: Negative.    Musculoskeletal:  Positive for arthralgias, back pain (stiffness) and neck pain (5/10 from driving).        Left calf and back of left thigh with pain - calf seizes up at times. Ice eases. Feels gait improving. Pain 2/3 in leg. Back 3/4.    Allergic/Immunologic: Negative.    Neurological:  Positive for headaches.   Hematological: Negative.    Psychiatric/Behavioral: Negative.         Clinical Trial: no    IPSS Questionnaire (AUA-7):eaching    Health Maintenance   Topic Date Due    Zoster Vaccine (1 of 2) Never done    RSV Vaccine for Pregnant Patients and Patients Age 60+ Years (1 - Risk 60-74 years 1-dose series) Never done    PT PLAN OF CARE  02/09/2023    BMI: Followup Plan  10/16/2024    COVID-19 Vaccine (6 - 2024-25 season) 12/18/2024    Fall Risk  10/16/2025    Medicare Annual Wellness Visit (AWV)  10/16/2025    Depression Screening  02/18/2026    BMI: Adult  02/18/2026    Depression Follow-up Plan  02/18/2026    Colorectal Cancer Screening  09/05/2029    Hepatitis C Screening  Completed    Pneumococcal Vaccine: 65+ Years  Completed    Influenza Vaccine  Completed    Meningococcal B Vaccine  Aged Out    RSV Vaccine age 0-20 Months  Aged Out    HIB Vaccine  Aged Out    IPV Vaccine  Aged Out    Hepatitis A Vaccine  Aged Out    Meningococcal ACWY Vaccine  Aged Out    HPV Vaccine  Aged Out     Patient Active Problem List   Diagnosis    Allergic rhinitis    Spinal stenosis    Degeneration of intervertebral disc of cervical region    Hypothyroidism    Muscle pain, myofacial    Neuropathy    Nontoxic multinodular goiter    Occipital neuralgia    Venous insufficiency    Chondromalacia patellae    Chronic osteomyelitis of lower leg (HCC)    Generalized osteoarthritis    Anxiety    Reactive depression    BPH without obstruction/lower urinary tract symptoms    Post traumatic stress disorder (PTSD)    Vitamin D deficiency    Varicose  veins of bilateral lower extremities with other complications    Osteoporosis without current pathological fracture    Bilateral carotid artery stenosis < 50%    PAC (premature atrial contraction)    PVC (premature ventricular contraction)    Primary hypercholesterolemia    Macrocytosis without anemia    Chronic neck pain    Thoracic aortic aneurysm without rupture (HCC)    Nonrheumatic mitral valve regurgitation    Bilateral enlargement of atria    Agatston coronary artery calcium score (207) between 200 and 399    Prostate cancer (HCC)    Mass of right parotid gland    History of osteomyelitis    Renal cysts, acquired, bilateral    Primary hypertension    Lumbar spine tumor    Schwannoma    Nonintractable epilepsy without status epilepticus, unspecified epilepsy type (HCC)    Steatosis (HCC)    Bradycardia     Past Medical History:   Diagnosis Date    Anxiety     Arthritis     Bleeding tendency (HCC)     BPH (benign prostatic hyperplasia)     Broken bones     left leg    Cancer (HCC)     Cataract     Chronic neck pain     Disease of thyroid gland 11/12    Epilepsy (HCC)     Headache(784.0)     Hypothyroidism     Neoplasm of unspecified behavior of bone, soft tissue, and skin     Osteomyelitis (HCC)     PONV (postoperative nausea and vomiting)     Prostate cancer (HCC)     Skin cancer     Squamous carcinoma 12/01/2014 12/2014    Thyroid condition     Thyroid disease      Past Surgical History:   Procedure Laterality Date    APPENDECTOMY      ARTHRODESIS      H/o Arthrodesis Cervical to C2;  last assessed [id number]    CERVICAL FUSION      COLONOSCOPY  01/22/2021    COLONOSCOPY      EYE SURGERY      JOINT REPLACEMENT      KNEE SURGERY Right 12/22/2017    LEG SURGERY Left 1999    Hardware placed in lower leg bone(s)    LEG SURGERY Left 01/01/2006 2006, 2012-Left leg vascular    RI LAMINECTOMY BX/EXC ISPI DAKOTAH XDRL LUMBAR N/A 12/19/2022    Procedure: L2/3 decompressive laminectomy and biopsy (subtotal resection of  L3 nerve root sheath tumor;  Surgeon: Toño Contreras MD;  Location: [facility];  Service: Neurosurgery    THYROID SURGERY Right 2013    TOTAL KNEE ARTHROPLASTY Right 2023    US GUIDED PROSTATE BIOPSY  2021     Family History   Problem Relation Age of Onset    Bone cancer Mother     Other Father         Accident    Dementia Other     Cancer Other      Social History     Socioeconomic History    Marital status:      Spouse name: Not on file    Number of children: Not on file    Years of education: Not on file    Highest education level: Not on file   Occupational History    Occupation: Teacher    Occupation: retired   Tobacco Use    Smoking status: Former     Current packs/day: 0.00     Average packs/day: 0.5 packs/day for 2.0 years (1.0 ttl pk-yrs)     Types: Cigarettes     Quit date:      Years since quittin.2    Smokeless tobacco: Never   Vaping Use    Vaping status: Never Used   Substance and Sexual Activity    Alcohol use: Yes     Comment: being a social drinker    Drug use: Not Currently     Types: Marijuana     Comment: Medical marijuana    Sexual activity: Not Currently     Partners: Female     Comment: no known std risk factors   Other Topics Concern    Not on file   Social History Narrative    Daily coffee consumption (3 cups/day)     Social Drivers of Health     Financial Resource Strain: Low Risk  (10/15/2023)    Overall Financial Resource Strain (CARDIA)     Difficulty of Paying Living Expenses: Not hard at all   Food Insecurity: No Food Insecurity (10/9/2024)    Nursing - Inadequate Food Risk Classification     Worried About Running Out of Food in the Last Year: Never true     Ran Out of Food in the Last Year: Never true     Ran Out of Food in the Last Year: Not on file   Transportation Needs: No Transportation Needs (10/9/2024)    PRAPARE - Transportation     Lack of Transportation (Medical): No     Lack of Transportation (Non-Medical): No   Physical Activity: Not on  file   Stress: Not on file   Social Connections: Not on file   Intimate Partner Violence: Not At Risk (7/25/2023)    [facility]    Humiliation, Afraid, Rape, and Kick questionnaire     Fear of Current or Ex-Partner: No     Emotionally Abused: No     Physically Abused: No     Sexually Abused: No   Housing Stability: Low Risk  (10/9/2024)    Housing Stability Vital Sign     Unable to Pay for Housing in the Last Year: No     Number of Times Moved in the Last Year: 0     Homeless in the Last Year: No       Current Outpatient Medications:     acetaminophen (TYLENOL) 500 mg tablet, Take 2 tablets (1,000 mg total) by mouth every 8 (eight) hours as needed for moderate pain, mild pain or fever, Disp: , Rfl: 0    azelastine (ASTELIN) 0.1 % nasal spray, 1 spray into each nostril 2 (two) times a day Use in each nostril as directed, Disp: 90 mL, Rfl: 1    Calcium Citrate-Vitamin D (CALCIUM + D PO), Take by mouth in the morning, Disp: , Rfl:     Cholecalciferol (VITAMIN D3) 1,000 units tablet, Take 4,000 Units by mouth daily, Disp: , Rfl:     desloratadine (CLARINEX) 5 MG tablet, TAKE 1 TABLET DAILY, Disp: 90 tablet, Rfl: 1    doxycycline (ADOXA) 50 MG tablet, TAKE 1 TABLET BY ORAL ROUTE EVERY DAY WITH MEAL X3 MONTHS THEN MW, Disp: , Rfl:     fluticasone (FLONASE) 50 mcg/act nasal spray, 2 sprays into each nostril daily, Disp: 48 g, Rfl: 3    levothyroxine 100 mcg tablet, daily, Disp: , Rfl:     metroNIDAZOLE (METROGEL) 0.75 % gel, Apply topically 2 (two) times a day, Disp: , Rfl:     Multiple Vitamin (MULTI VITAMIN DAILY PO), Take by mouth in the morning, Disp: , Rfl:     Omega-3 Fatty Acids (FISH OIL PO), Take by mouth in the morning, Disp: , Rfl:     Perfluorohexyloctane (Miebo) 1.338 GM/ML SOLN, Apply 1 drop to eye every 6 (six) hours, Disp: , Rfl:     pravastatin (PRAVACHOL) 40 mg tablet, Take 1 tablet (40 mg total) by mouth daily, Disp: 90 tablet, Rfl: 5     tamsulosin (FLOMAX) 0.4 mg, Take 0.4 mg by mouth 2 (two) times a day, Disp: , Rfl:     tazarotene (AVAGE) 0.1 % cream, Apply topically daily at bedtime, Disp: , Rfl:   No Known Allergies  Vitals:    03/19/25 1406   BP: 160/90   Pulse: 78   Resp: 12   Temp: 98.2 °F (36.8 °C)   SpO2: 99%      Pain Score: 0-No pain

## 2025-03-20 NOTE — PROGRESS NOTES
Follow-up Visit   Name: Haider Prather      : 1955      MRN: [id number]  Encounter Provider: Rodrigo Florence MD  Encounter Date: 3/19/2025   Encounter department: [facility] RADIATION ONCOLOGY  :  Assessment & Plan  Schwannoma  We reviewed the patient's repeat MRI Lumbar spine in detail in comparison to multiple prior imaging studies. On my review this shows interval increase from his most recent prior study. While this increase in limited, compared to his imaging around the time of SRT there has been sizable growth. Based on this I have recommended the patient proceed with salvage surgical resection. The patient is understandably hesitant given the risks associated and would like to have his case presented at neuro-oncology tumor board, which I agree is reasonable. I will plan to call the patient after tumor board discussion next week to communicate consensus recommendation and help to arrange for next steps in care.            History of Present Illness   Chief Complaint   Patient presents with    Follow-up   Pertinent Medical History   Mr. Haider Prather is a 70 year old man with a symptomatic schwannoma at L3/4 s/p decompressive laminectomy and STR. MRI surveillance demonstrated interval enlargement. On 23 he completed a course of SBRT to a dose of 2500 cGy in 5 fractions. He returns today for follow-up.      Interval History:  The patient was last seen in clinic on 24, at that time with plan for observation.     MRI Lumbar spine (2/3/25) demonstrated:  1. Persistent enhancing solid/cystic lesion centered within the left L3-4 subarticular/lateral recess extending to the mid central canal and foraminal/extraforaminal regions compatible with a schwannoma. The lesion appears minimally increased in size compared to the prior 4/10/2024 examination.  2. Multilevel degenerative changes of the lumbar spine similar to 4/10/2024 with no new disc herniation.    Currently he is  doing fair overall. He has recently been having lower extremity/calf pain, which he feels may be at least partially due to statin use.       Oncology History   Cancer Staging   Prostate cancer ([facility])  Staging form: Prostate, AJCC 8th Edition  - Clinical stage from 7/13/2021: Stage I (cT1a, cN0, cM0, PSA: 3.1, Grade Group: 1) - Signed by Karl Villalba MD on 1/25/2022  Stage prefix: Initial diagnosis  Prostate specific antigen (PSA) range: Less than 10  [place] primary pattern: 3  [place] secondary pattern: 3  [place] score: 6  Histologic grading system: 5 grade system  Number of biopsy cores examined: 2  Number of biopsy cores positive: 2  Oncology History   Prostate cancer ([facility])   2021 Initial Diagnosis    Prostate cancer ([facility])      Biopsy    - Prostatic adenocarcinoma, acinar type, [place] score 3+3=6 (Grade Group 1) involving 2 cores   - Percentage of [place] pattern 4: N/A   - Percentage of [place] pattern 5: N/A   - Perineural invasion: Not identified   - Periprostatic fat invasion: Not identified   - Lymph-vascular invasion: Not identified   - Seminal vesicle/ejaculatory duct invasion: Not identified   - Additional pathologic findings: None   - Best representative block if additional studies are needed: B2 and B6      A.  Right prostate (random biopsy):      - Benign prostatic tissue.     B.  Left prostate (random biopsy):      - Small foci of prostatic adenocarcinoma, [place] score 3+3=6 (Grade Group 1), involving less than 5% of two (2) cores.    C.  Right mid prostate (biopsy):     - Benign prostatic tissue.      7/13/2021 -  Cancer Staged    Staging form: Prostate, AJCC 8th Edition  - Clinical stage from 7/13/2021: Stage I (cT1a, cN0, cM0, PSA: 3.1, Grade Group: 1) - Signed by Karl Villalba MD on 1/25/2022  Stage prefix: Initial diagnosis  Prostate specific antigen (PSA) range: Less than 10  [place] primary pattern: 3  [place] secondary pattern: 3  [place] score: 6  Histologic grading system: 5  "grade system  Number of biopsy cores examined: 2  Number of biopsy cores positive: 2       Schwannoma   2015 Surgery    \"C1-C2 lateral mass fixation and sublaminar Gallie cable fixation with demineralized bone matrix and cadaveric bone arthrodesis\".  Performed by Dr. Contreras, 2/20/15.        12/19/2022 Surgery    A-B. Spine, \"Left L3 nerve sheath tumor,\" Resection:  - Schwannoma     2/20/2023 Surgery    Patient is s/p L2/3 decompressive laminectomy and biopsy (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 [12/19/22 DKO]     4/27/2023 Initial Diagnosis    Schwannoma     5/24/2023 - 6/5/2023 Radiation    Treatments:  Course: C1 SBRT    Plan ID Energy Fractions Dose per Fraction (cGy) Dose Correction (cGy) Total Dose Delivered (cGy) Elapsed Days   SBRT Spine 6X-FFF 5 / 5 500 0 2,500 12      Treatment Dates:  5/24/2023 - 6/5/2023.         Review of Systems Refer to nursing note.          Objective   /90   Pulse 78   Temp 98.2 °F (36.8 °C)   Resp 12   SpO2 99%     Pain Screening:  Pain Score: 0-No pain  ECOG ECOG Performance Status: 1 - Restricted in physically strenuous activity but ambulatory and able to carry out work of a light or sedentary nature, e.g., light house work, office work  Physical Exam   Well appearing. NAD.  No increased work of breathing.   Extremities warm and well perfused.     Administrative Statements   I have spent a total time of 30 minutes in caring for this patient on the day of the visit/encounter including Diagnostic results, Risks and benefits of tx options, Reviewing/placing orders in the medical record (including tests, medications, and/or procedures), and Obtaining or reviewing history  .  Portions of the record may have been created with voice recognition software.  Occasional wrong word or \"sound a like\" substitutions may have occurred due to the inherent limitations of voice recognition software.  Read the chart carefully and recognize, using context, where substitutions " have occurred.

## 2025-03-20 NOTE — ASSESSMENT & PLAN NOTE
We reviewed the patient's repeat MRI Lumbar spine in detail in comparison to multiple prior imaging studies. On my review this shows interval increase from his most recent prior study. While this increase in limited, compared to his imaging around the time of SRT there has been sizable growth. Based on this I have recommended the patient proceed with salvage surgical resection. The patient is understandably hesitant given the risks associated and would like to have his case presented at neuro-oncology tumor board, which I agree is reasonable. I will plan to call the patient after tumor board discussion next week to communicate consensus recommendation and help to arrange for next steps in care.

## 2025-03-26 ENCOUNTER — DOCUMENTATION (OUTPATIENT)
Dept: HEMATOLOGY ONCOLOGY | Facility: CLINIC | Age: 70
End: 2025-03-26

## 2025-03-26 DIAGNOSIS — D36.10 SCHWANNOMA: Primary | ICD-10-CM

## 2025-03-26 NOTE — PROGRESS NOTES
Placed call to the patient to review the results of neuro-oncology tumor board review of his MRI Lumbar spine. On review there was consensus that his tumor has shown increase and that resection was likely the next step in management. It was noted that this would involve a large surgery and would likely result in some neurologic injury and so giving the patient every opportunity to avoid this procedure would be recommended. We did discuss that rarely tumors can shrink after ~2 years of observation after SRT, though this is not typical.     The patient stated that he understood the recommendation was that he would likely need surgery. When given the option of observing until he reaches the 2 year dipika he felt this would be his best option. The patient will also be undergoing work up for his elevated PSA and felt this delay would allow him time to work up his elevated PSA appropriately.     I will order a repeat MRI Lumbar spine and will see him back in 3 months.     Rodrigo Florence MD  Dept of Radiation Oncology

## 2025-03-26 NOTE — PROGRESS NOTES
NEURO ONCOLOGY MULTIDISCIPLINARY CANCER CONFERENCE    DATE:   3/26/2025    PRESENTING PROVIDER: Dr Kiel Florence    DIAGNOSIS:  Schwannoma        Haider Prather is a 70 y.o. male who was presented at Neuro Oncology Cancer Conference today. History of symptomatic schwannoma at L3/4 s/p decompressive laminectomy and STR. MRI surveillance demonstrated interval enlargement. He completed a course of SBRT to a dose of 2500 cGy in 5 fractions on 6/5/2023. He was last presented on 8/28/2024, recommendation was for continued observation. He was presented today to review recent imaging and discuss continued observation versus re-resection.     PHYSICIAN RECOMMENDED PLAN:   - Discuss re-resection    Pathology reviewed: NA    Imaging reviewed:   5/10/2023 MRI lumbar spine  8/10/2024 MRI lumbar spine  2/3/2025 MRI lumbar spine      Future Appointments   Date Time Provider Department Center   6[facility]24[facility]2025 10:30 [facility] [place]   6/27/2025  9:40 AM Arron Acuña MD [facility]        Team agreed to plan.   NCCN guidelines were readily available for review at this discussion.    The final treatment plan will be left at the discretion of the patient and the treating physician.     DISCLAIMERS:  TO THE TREATING PHYSICIAN:  This conference is a meeting of clinicians from various specialty areas who evaluate and discuss patients for whom a multidisciplinary treatment approach is being considered. Please note that the above opinion was a consensus of the conference attendees and is intended only to assist in quality care of the discussed patient.  The responsibility for follow up on the input given during the conference, along with any final decisions regarding plan of care, is that of the patient and the patient's provider. Accordingly, appointments have only been recommended based on this information; and have NOT been scheduled unless otherwise noted.      TO THE PATIENT:  This summary is a brief record  of major aspects of your cancer treatment. You may choose to can share a your copy with any of your doctors or nurses. However, this is not a detailed or comprehensive record of your care.

## 2025-03-28 ENCOUNTER — OFFICE VISIT (OUTPATIENT)
Dept: FAMILY MEDICINE CLINIC | Facility: CLINIC | Age: 70
End: 2025-03-28
Payer: COMMERCIAL

## 2025-03-28 VITALS
SYSTOLIC BLOOD PRESSURE: 132 MMHG | BODY MASS INDEX: 25.31 KG/M2 | TEMPERATURE: 97.5 F | WEIGHT: 191 LBS | HEART RATE: 96 BPM | HEIGHT: 73 IN | OXYGEN SATURATION: 98 % | DIASTOLIC BLOOD PRESSURE: 74 MMHG

## 2025-03-28 DIAGNOSIS — R05.2 SUBACUTE COUGH: Primary | ICD-10-CM

## 2025-03-28 PROCEDURE — 99213 OFFICE O/P EST LOW 20 MIN: CPT | Performed by: FAMILY MEDICINE

## 2025-03-28 PROCEDURE — G2211 COMPLEX E/M VISIT ADD ON: HCPCS | Performed by: FAMILY MEDICINE

## 2025-03-28 RX ORDER — AZITHROMYCIN 250 MG/1
TABLET, FILM COATED ORAL
Qty: 6 TABLET | Refills: 0 | Status: SHIPPED | OUTPATIENT
Start: 2025-03-28 | End: 2025-04-02

## 2025-03-28 RX ORDER — BENZONATATE 100 MG/1
100 CAPSULE ORAL 3 TIMES DAILY PRN
Qty: 20 CAPSULE | Refills: 0 | Status: SHIPPED | OUTPATIENT
Start: 2025-03-28 | End: 2025-03-28

## 2025-03-28 RX ORDER — BENZONATATE 100 MG/1
100 CAPSULE ORAL 3 TIMES DAILY PRN
Qty: 20 CAPSULE | Refills: 0 | Status: SHIPPED | OUTPATIENT
Start: 2025-03-28

## 2025-03-28 RX ORDER — AZITHROMYCIN 250 MG/1
TABLET, FILM COATED ORAL
Qty: 6 TABLET | Refills: 0 | Status: SHIPPED | OUTPATIENT
Start: 2025-03-28 | End: 2025-03-28

## 2025-03-28 NOTE — PROGRESS NOTES
"Name: Haider Prather      : 1955      MRN: [id number]  Encounter Provider: Margarita Bar MD  Encounter Date: 3/28/2025   Encounter department: [place] [facility]  :  Assessment & Plan  Subacute cough  Start course of azithromycin and add Tessalon Perles 100 mg 3 times daily.  May continue with over-the-counter medications, plenty of hydration, honey etc.  Orders:    azithromycin (Zithromax) 250 mg tablet; Take 2 tablets (500 mg total) by mouth daily for 1 day, THEN 1 tablet (250 mg total) daily for 4 days.    benzonatate (TESSALON PERLES) 100 mg capsule; Take 1 capsule (100 mg total) by mouth 3 (three) times a day as needed for cough           History of Present Illness   Cough  This is a new problem. The current episode started 1 to 4 weeks ago. The problem has been unchanged. The problem occurs constantly. Associated symptoms include nasal congestion. Pertinent negatives include no fever, postnasal drip, shortness of breath or wheezing. Nothing aggravates the symptoms. He has tried rest and OTC cough suppressant for the symptoms. The treatment provided no relief. His past medical history is significant for environmental allergies.     Review of Systems   Constitutional: Negative.  Negative for fever.   HENT:  Positive for congestion. Negative for postnasal drip.    Respiratory:  Positive for cough. Negative for shortness of breath and wheezing.    Cardiovascular: Negative.    Gastrointestinal: Negative.    Genitourinary: Negative.    Musculoskeletal: Negative.    Allergic/Immunologic: Positive for environmental allergies.   Neurological: Negative.    Psychiatric/Behavioral: Negative.         Objective   /74 (BP Location: Left arm, Patient Position: Sitting, Cuff Size: Large)   Pulse 96   Temp 97.5 °F (36.4 °C) (Temporal)   Ht 6' 1\" (1.854 m)   Wt 86.6 kg (191 lb)   SpO2 98%   BMI 25.20 kg/m²      Physical Exam  Constitutional:       General: He is not in acute distress.     " Appearance: He is not ill-appearing.   HENT:      Head: Normocephalic and atraumatic.   Eyes:      General:         Right eye: No discharge.         Left eye: No discharge.      Extraocular Movements: Extraocular movements intact.   Cardiovascular:      Rate and Rhythm: Normal rate.   Pulmonary:      Effort: Pulmonary effort is normal. No respiratory distress.      Breath sounds: No wheezing.   Neurological:      General: No focal deficit present.      Mental Status: He is alert.   Psychiatric:         Mood and Affect: Mood normal.         Behavior: Behavior normal.

## 2025-03-31 ENCOUNTER — APPOINTMENT (OUTPATIENT)
Dept: LAB | Facility: CLINIC | Age: 70
End: 2025-03-31
Payer: COMMERCIAL

## 2025-03-31 DIAGNOSIS — E78.00 PRIMARY HYPERCHOLESTEROLEMIA: ICD-10-CM

## 2025-03-31 DIAGNOSIS — R73.03 PREDIABETES: ICD-10-CM

## 2025-03-31 DIAGNOSIS — E55.9 VITAMIN D DEFICIENCY: ICD-10-CM

## 2025-03-31 DIAGNOSIS — D36.10 SCHWANNOMA: ICD-10-CM

## 2025-03-31 LAB
25(OH)D3 SERPL-MCNC: 47.5 NG/ML (ref 30–100)
ALBUMIN SERPL BCG-MCNC: 4.6 G/DL (ref 3.5–5)
ALP SERPL-CCNC: 57 U/L (ref 34–104)
ALT SERPL W P-5'-P-CCNC: 22 U/L (ref 7–52)
ANION GAP SERPL CALCULATED.3IONS-SCNC: 6 MMOL/L (ref 4–13)
AST SERPL W P-5'-P-CCNC: 27 U/L (ref 13–39)
BILIRUB SERPL-MCNC: 0.75 MG/DL (ref 0.2–1)
BUN SERPL-MCNC: 16 MG/DL (ref 5–25)
CALCIUM SERPL-MCNC: 9.7 MG/DL (ref 8.4–10.2)
CHLORIDE SERPL-SCNC: 107 MMOL/L (ref 96–108)
CHOLEST SERPL-MCNC: 150 MG/DL (ref ?–200)
CO2 SERPL-SCNC: 31 MMOL/L (ref 21–32)
CREAT SERPL-MCNC: 0.65 MG/DL (ref 0.6–1.3)
EST. AVERAGE GLUCOSE BLD GHB EST-MCNC: 126 MG/DL
GFR SERPL CREATININE-BSD FRML MDRD: 97 ML/MIN/1.73SQ M
GLUCOSE P FAST SERPL-MCNC: 125 MG/DL (ref 65–99)
HBA1C MFR BLD: 6 %
HDLC SERPL-MCNC: 50 MG/DL
LDLC SERPL CALC-MCNC: 85 MG/DL (ref 0–100)
POTASSIUM SERPL-SCNC: 4.8 MMOL/L (ref 3.5–5.3)
PROT SERPL-MCNC: 6.7 G/DL (ref 6.4–8.4)
SODIUM SERPL-SCNC: 144 MMOL/L (ref 135–147)
TRIGL SERPL-MCNC: 74 MG/DL (ref ?–150)

## 2025-03-31 PROCEDURE — 36415 COLL VENOUS BLD VENIPUNCTURE: CPT

## 2025-03-31 PROCEDURE — 80061 LIPID PANEL: CPT

## 2025-03-31 PROCEDURE — 80053 COMPREHEN METABOLIC PANEL: CPT

## 2025-03-31 PROCEDURE — 83036 HEMOGLOBIN GLYCOSYLATED A1C: CPT

## 2025-03-31 PROCEDURE — 82306 VITAMIN D 25 HYDROXY: CPT

## 2025-04-01 ENCOUNTER — RESULTS FOLLOW-UP (OUTPATIENT)
Dept: FAMILY MEDICINE CLINIC | Facility: CLINIC | Age: 70
End: 2025-04-01

## 2025-04-01 ENCOUNTER — TELEPHONE (OUTPATIENT)
Dept: NEUROSURGERY | Facility: CLINIC | Age: 70
End: 2025-04-01

## 2025-04-01 NOTE — TELEPHONE ENCOUNTER
04/01/2025-Called pt, scheduled f/u apt w/DKO to discuss surgery and review 06/26 MRI L-Spine for 07/03/2025.      Toño Contreras MD   Need to see him back        ----- Message -----  From: Tamara Thomas  Sent: 3/28/2025   4:26 PM EDT  To: Tamara Thomas; Toño Contreras MD  Subject: Please Advise                                    Pt was seen on 07/16/2024 and did not schedule surgery at that time because he went for a second opinion.  He now wishes to schedule surgery. He is scheduled for MRI L-Spine 06/26/2025.  His surgical consent was sign back on 05/16/2024  Would you like to see him in office after MRI?

## 2025-06-09 ENCOUNTER — TELEPHONE (OUTPATIENT)
Age: 70
End: 2025-06-09

## 2025-06-09 NOTE — TELEPHONE ENCOUNTER
Pt called stating he is seeing Dr Bar on 6/24 and Cardiology on 6/27.    Pt was wondering if he should do a repeat lipid panel since his medication pravastatin was increased  to see what is levels are now.  He thought it might be beneficial since he was seeing cardiologist.    Last lipid panel was 3/31/2025 - unsure if insurance would cover.  If Dr does not feel necessary or if insurance wouldn't cover then he will not do it.    Please advise [phone] (mychart has not been updated as of today- did let pt know to do it)

## 2025-06-10 DIAGNOSIS — E78.00 PRIMARY HYPERCHOLESTEROLEMIA: Primary | ICD-10-CM

## 2025-06-10 NOTE — TELEPHONE ENCOUNTER
I called and left a message for patient to call his insurance before he gets this lipid panel done. Usually insurance only covers this once a year. And the last one patient had done was on 03/2025 and it was normal. Dr Bar placed the order anyway. But the patient would have to call insurance before getting it done to make sure it is covered.

## 2025-06-19 RX ORDER — CIPROFLOXACIN 500 MG/1
TABLET, FILM COATED ORAL
COMMUNITY
Start: 2025-05-14 | End: 2025-06-27 | Stop reason: ALTCHOICE

## 2025-06-19 RX ORDER — LEVOTHYROXINE SODIUM 112 MCG
TABLET ORAL
COMMUNITY
Start: 2025-05-29

## 2025-06-24 ENCOUNTER — OFFICE VISIT (OUTPATIENT)
Dept: FAMILY MEDICINE CLINIC | Facility: CLINIC | Age: 70
End: 2025-06-24
Payer: COMMERCIAL

## 2025-06-24 VITALS
HEIGHT: 73 IN | HEART RATE: 67 BPM | DIASTOLIC BLOOD PRESSURE: 78 MMHG | RESPIRATION RATE: 16 BRPM | WEIGHT: 185.8 LBS | OXYGEN SATURATION: 92 % | SYSTOLIC BLOOD PRESSURE: 110 MMHG | BODY MASS INDEX: 24.63 KG/M2 | TEMPERATURE: 98.1 F

## 2025-06-24 DIAGNOSIS — I10 PRIMARY HYPERTENSION: Primary | ICD-10-CM

## 2025-06-24 DIAGNOSIS — E78.00 PRIMARY HYPERCHOLESTEROLEMIA: ICD-10-CM

## 2025-06-24 DIAGNOSIS — J30.1 ALLERGIC RHINITIS DUE TO POLLEN, UNSPECIFIED SEASONALITY: ICD-10-CM

## 2025-06-24 DIAGNOSIS — E03.9 ACQUIRED HYPOTHYROIDISM: ICD-10-CM

## 2025-06-24 PROCEDURE — G2211 COMPLEX E/M VISIT ADD ON: HCPCS | Performed by: FAMILY MEDICINE

## 2025-06-24 PROCEDURE — 99214 OFFICE O/P EST MOD 30 MIN: CPT | Performed by: FAMILY MEDICINE

## 2025-06-24 RX ORDER — AZELASTINE 1 MG/ML
1 SPRAY, METERED NASAL 2 TIMES DAILY
Qty: 90 ML | Refills: 1 | Status: SHIPPED | OUTPATIENT
Start: 2025-06-24

## 2025-06-24 RX ORDER — FEXOFENADINE HCL 180 MG/1
180 TABLET ORAL DAILY
Qty: 90 TABLET | Refills: 0 | Status: SHIPPED | OUTPATIENT
Start: 2025-06-24

## 2025-06-24 NOTE — ASSESSMENT & PLAN NOTE
Orders:    fexofenadine (ALLEGRA) 180 MG tablet; Take 1 tablet (180 mg total) by mouth daily     Formula provided to mom and educated on infant's stomach size, WNL volume intake in , how to safely prepare formula, bottle hygiene, appropriate way to feed infant with bottle, and burping techniques. Mom verbalized understanding and no questions at this time.

## 2025-06-24 NOTE — PROGRESS NOTES
"Name: Haider Prather      : 1955      MRN: [id number]  Encounter Provider: Margarita Bar MD  Encounter Date: 2025   Encounter department: [place] [facility]  :  Assessment & Plan  Primary hypertension  Well controlled without medication       Primary hypercholesterolemia  Currently on Pravastatin 40mg daily        Acquired hypothyroidism  Stable on synthroid 112mcg daily        Allergic rhinitis due to pollen, unspecified seasonality  Start trial of allegra, consider referral to an allergist   Orders:    fexofenadine (ALLEGRA) 180 MG tablet; Take 1 tablet (180 mg total) by mouth daily    azelastine (ASTELIN) 0.1 % nasal spray; 1 spray into each nostril 2 (two) times a day Use in each nostril as directed           History of Present Illness   HPI    Haider Prather is a very pleasant 70-year-old male who presents today for follow-up. Patient reports that he has been struggling with allergies recently and reports that the clarinex is not helping and wonders what else he can take. He is worried as he has a prostate biopsy scheduled this week and also has to have a repeat MRI of the lower back. He will be meeting with radiology/oncology and Neurosurgery after the MRI to discuss possible surgery.    Review of Systems   Constitutional: Negative.    HENT: Negative.     Eyes: Negative.    Respiratory: Negative.     Cardiovascular: Negative.    Gastrointestinal: Negative.    Musculoskeletal:  Positive for back pain and neck pain.   Skin: Negative.    Allergic/Immunologic: Positive for environmental allergies.   Neurological: Negative.    Psychiatric/Behavioral: Negative.         Objective   /78 (BP Location: Left arm, Patient Position: Sitting, Cuff Size: Large)   Pulse 67   Temp 98.1 °F (36.7 °C) (Tympanic)   Resp 16   Ht 6' 1\" (1.854 m)   Wt 84.3 kg (185 lb 12.8 oz)   SpO2 92%   BMI 24.51 kg/m²      Physical Exam  Constitutional:       General: He is not in acute distress.     " Appearance: He is not ill-appearing.   HENT:      Head: Normocephalic and atraumatic.     Eyes:      General:         Right eye: No discharge.         Left eye: No discharge.      Extraocular Movements: Extraocular movements intact.       Cardiovascular:      Rate and Rhythm: Normal rate.   Pulmonary:      Effort: Pulmonary effort is normal. No respiratory distress.      Breath sounds: No wheezing.   Abdominal:      General: Bowel sounds are normal. There is no distension.      Palpations: Abdomen is soft.      Tenderness: There is no abdominal tenderness.     Neurological:      General: No focal deficit present.      Mental Status: He is alert.     Psychiatric:         Mood and Affect: Mood normal.         Behavior: Behavior normal.

## 2025-06-24 NOTE — ASSESSMENT & PLAN NOTE
Start trial of allegra, consider referral to an allergist   Orders:    fexofenadine (ALLEGRA) 180 MG tablet; Take 1 tablet (180 mg total) by mouth daily    azelastine (ASTELIN) 0.1 % nasal spray; 1 spray into each nostril 2 (two) times a day Use in each nostril as directed

## 2025-06-25 ENCOUNTER — APPOINTMENT (OUTPATIENT)
Dept: LAB | Facility: CLINIC | Age: 70
End: 2025-06-25
Payer: COMMERCIAL

## 2025-06-25 DIAGNOSIS — E78.00 PRIMARY HYPERCHOLESTEROLEMIA: ICD-10-CM

## 2025-06-25 PROBLEM — I71.21 ANEURYSM OF THE ASCENDING AORTA, WITHOUT RUPTURE (HCC): Status: ACTIVE | Noted: 2025-06-25

## 2025-06-25 LAB
CHOLEST SERPL-MCNC: 147 MG/DL (ref ?–200)
HDLC SERPL-MCNC: 50 MG/DL
LDLC SERPL CALC-MCNC: 81 MG/DL (ref 0–100)
TRIGL SERPL-MCNC: 82 MG/DL (ref ?–150)

## 2025-06-25 PROCEDURE — 80061 LIPID PANEL: CPT

## 2025-06-25 PROCEDURE — 36415 COLL VENOUS BLD VENIPUNCTURE: CPT

## 2025-06-25 NOTE — ASSESSMENT & PLAN NOTE
3/11/2024 126/80  4/16/2024 119/62  5/16/2024 138/68  8/14/2024 116/74   9/6/2024 123/69   10/16/2024 128/64  3/19/2025 160/90  3/28/2025 132/74  6/04/2025 110/78    Not on antihypertensive.

## 2025-06-25 NOTE — ASSESSMENT & PLAN NOTE
3/31/2025: Cholesterol 150, triglycerides 74, HDL 50, LDL calculated 85    On pravastatin 40 mg daily  Patient did not tolerate rosuvastatin or ezetimibe

## 2025-06-25 NOTE — ASSESSMENT & PLAN NOTE
11/05/2021 MRA of the chest: Mildly ectatic ascending aorta maximum diameter 40 mm. Remainder the aorta normal in caliber.     2/4/2024 CT of the chest without contrast: CT scan of the chest demonstrates mild enlargement of the aorta measuring 4.1 cm unchanged from previous studies.       Advised patient to start a statin or at least ezetimibe  Not on a beta-blocker due to bradycardia  Goal for blood pressure less than 120 systolic

## 2025-06-26 ENCOUNTER — HOSPITAL ENCOUNTER (OUTPATIENT)
Dept: MRI IMAGING | Facility: HOSPITAL | Age: 70
Discharge: HOME | End: 2025-06-26
Attending: STUDENT IN AN ORGANIZED HEALTH CARE EDUCATION/TRAINING PROGRAM
Payer: COMMERCIAL

## 2025-06-26 DIAGNOSIS — D36.10 SCHWANNOMA: ICD-10-CM

## 2025-06-26 PROCEDURE — A9585 GADOBUTROL INJECTION: HCPCS | Performed by: STUDENT IN AN ORGANIZED HEALTH CARE EDUCATION/TRAINING PROGRAM

## 2025-06-26 PROCEDURE — 72158 MRI LUMBAR SPINE W/O & W/DYE: CPT

## 2025-06-26 RX ORDER — GADOBUTROL 604.72 MG/ML
8 INJECTION INTRAVENOUS
Status: COMPLETED | OUTPATIENT
Start: 2025-06-26 | End: 2025-06-26

## 2025-06-26 RX ADMIN — GADOBUTROL 8 ML: 604.72 INJECTION INTRAVENOUS at 16:02

## 2025-06-27 ENCOUNTER — OFFICE VISIT (OUTPATIENT)
Dept: CARDIOLOGY CLINIC | Facility: CLINIC | Age: 70
End: 2025-06-27
Payer: COMMERCIAL

## 2025-06-27 VITALS
HEART RATE: 53 BPM | HEIGHT: 73 IN | WEIGHT: 185.6 LBS | BODY MASS INDEX: 24.6 KG/M2 | DIASTOLIC BLOOD PRESSURE: 80 MMHG | SYSTOLIC BLOOD PRESSURE: 126 MMHG

## 2025-06-27 DIAGNOSIS — R93.1 AGATSTON CORONARY ARTERY CALCIUM SCORE BETWEEN 200 AND 399: Primary | ICD-10-CM

## 2025-06-27 DIAGNOSIS — I65.23 BILATERAL CAROTID ARTERY STENOSIS: ICD-10-CM

## 2025-06-27 DIAGNOSIS — I71.21 ANEURYSM OF THE ASCENDING AORTA, WITHOUT RUPTURE (HCC): ICD-10-CM

## 2025-06-27 DIAGNOSIS — R00.1 BRADYCARDIA: ICD-10-CM

## 2025-06-27 DIAGNOSIS — E78.00 PRIMARY HYPERCHOLESTEROLEMIA: ICD-10-CM

## 2025-06-27 DIAGNOSIS — I10 PRIMARY HYPERTENSION: ICD-10-CM

## 2025-06-27 PROCEDURE — 99213 OFFICE O/P EST LOW 20 MIN: CPT | Performed by: INTERNAL MEDICINE

## 2025-06-27 PROCEDURE — 93000 ELECTROCARDIOGRAM COMPLETE: CPT | Performed by: INTERNAL MEDICINE

## 2025-06-27 RX ORDER — IBUPROFEN 200 MG
1 CAPSULE ORAL DAILY
COMMUNITY

## 2025-06-27 NOTE — PROGRESS NOTES
[facility]  [address] [place] [address]  Phone#  [phone]   Fax#  [phone]  *-*-*-*-*-*-*-*-*-*-*-*-*-*-*-*-*-*-*-*-*-*-*-*-*-*-*-*-*-*-*-*-*-*-*-*-*-*-*-*-*-*-*-*-*-*-*-*-*-*-*-*-*-*                                   Cardiology Follow Up      ENCOUNTER DATE: 25 2:18 PM  PATIENT NAME: Haider Prather   : 1955    MRN: [id number]  AGE:70 y.o.      SEX: male  ENCOUNTER PROVIDER:Arron Acuña MD     PRIMARY CARE PHYSICIAN: Margarita Bar MD    ACTIVE DIAGNOSIS AND PLAN    1. Agatston coronary artery calcium score (207) between 200 and 399  Assessment & Plan:  10/26/2024 CT coronary calcium score: Total calcium score 207,  and left circumflex 76.  LMCA and RCA 0.  Patient's 10-year CHD event risk is 9.6% and coronary age is 67y/o.  Patient's CAD risk is a little less than average.  Orders:  -     POCT ECG  2. Aneurysm of the ascending aorta, without rupture (HCC)  Assessment & Plan:  2021 MRA of the chest: Mildly ectatic ascending aorta maximum diameter 40 mm. Remainder the aorta normal in caliber.     2024 CT of the chest without contrast: CT scan of the chest demonstrates mild enlargement of the aorta measuring 4.1 cm unchanged from previous studies.       Advised patient to start a statin or at least ezetimibe  Not on a beta-blocker due to bradycardia  Goal for blood pressure less than 120 systolic  3. Primary hypertension  Assessment & Plan:  3/11/2024 126/80  2024 119/62  2024 138/68  2024 116/74   2024 123/69   10/16/2024 128/64  3/19/2025 160/90  3/28/2025 132/74  2025 110/78    Not on antihypertensive.  4. Bradycardia  Assessment & Plan:  10/07/2021 Holter monitor: Heart rate  beats per minute averaging 66 bpm. Rare ventricular ectopy. Occasional supraventricular ectopy. Two 3 beat runs of SVT, fastest 152 beats per minute. Intermittent 1st degree AV block and 1 episode of second-degree AV block Mobitz type 1.  Longest RR 1.9 seconds  5. Primary hypercholesterolemia  Assessment & Plan:  3/31/2025: Cholesterol 150, triglycerides 74, HDL 50, LDL calculated 85    On pravastatin 40 mg daily  Patient did not tolerate rosuvastatin or ezetimibe  6. Bilateral carotid artery stenosis < 50%  Assessment & Plan:  2021 Carotid ultrasound demonstrated less than 50% stenosis bilaterally.      INTERVAL HISTORY:        Patient has no complaints. He denies chest discomfort or shortness of breath.  He has no palpitations.  He denies symptoms of dizziness, lightheadedness or near-syncope/syncope.  He denies leg edema.  He denies symptoms of orthopnea or paroxysmal nocturnal dyspnea.  He is tolerating pravastatin 40 mg daily and has a good lipid profile with triglycerides of 74 and LDL calculated of 85.  His blood pressure is excellent at 126/80    He is bradycardic but is asymptomatic and tolerating it well.    DISCUSSION/PLAN:          With patient's mild ascending aortic aneurysm, I would prefer that he be on a beta-blocker but it does not appear to be a prudent decision with his bradycardia.  His only cardiac medication is pravastatin 40 mg daily which he appears to be tolerating well despite not tolerating rosuvastatin or ezetimibe in the past  Return in 6 months.    CARDIOLOGY HISTORY AND TESTING  Patient 1st seen on 2021. Recently he has been going for frequent walks for about 45 minutes at a leisurely pace.  When he comes home, he lays on the floor to cool down and relax.  He becomes nauseous but does not vomit although he often feels like he might vomit.  He becomes diaphoretic and his heart races and pounds.   He has no specific chest discomfort.         He has a history of smoking up to a pack a day for 2-3 years in the early 80s and has not smoked since.  His father may have had a heart attack.  He had peripheral vascular disease and fell down a flight of stairs and was noted to be .  He had an uncle who had  CABG.  He is a retired  from [employer].        06/02/2021 echocardiogram:  Normal LV systolic and diastolic function EF 55 percent.  Mild biatrial dilatation.  Mild-to-moderate mitral regurgitation.  Normal pulmonary artery pressures.  Ascending aortic aneurysm.      06/11/2021 Carotid ultrasound demonstrated less than 50% stenosis bilaterally.       06/13/2021 CTA of the chest demonstrated enlarged ascending aorta at 41 mm.  There was evidence of coronary artery calcification.  Patient was advised not to lift more than 25 lb due to the ascending aortic aneurysm.     08/13/2021 nuclear stress test:. Ventricular ectopy as described with exercise   Low normal left ventricular systolic function, EF 52% with mild left ventricular cavity enlargement   Normal tomographic perfusion series with inferior diaphragmatic attenuation.     10/07/2021 Holter monitor: Heart rate  beats per minute averaging 66 bpm. Rare ventricular ectopy. Occasional supraventricular ectopy. Two 3 beat runs of SVT, fastest 152 beats per minute. Intermittent 1st degree AV block and 1 episode of second-degree AV block Mobitz type 1. Longest RR 1.9 seconds    11/05/2021 MRA of the chest: Mildly ectatic ascending aorta maximum diameter 40 mm. Remainder the aorta normal in caliber.    6/28/2022 CT of the chest without contrast: Mild ectasia of the ascending aorta at 4.1 cm unchanged from previous.    2/4/2024 CT of the chest without contrast: CT scan of the chest demonstrates mild enlargement of the aorta measuring 4.1 cm unchanged from previous studies.      10/26/2024 CT coronary calcium score: Total calcium score 207,  and left circumflex 76.  LMCA and RCA 0.  Patient's 10-year CHD event risk is 9.6% and coronary age is 67y/o.    12/20/2024 Less then 50% bilateral internal carotid stenosis    ACTIVE PROBLEM LIST  Problem List[1]    TODAY'S EKG  Results for orders placed or performed in visit on  06/27/25   POCT ECG    Narrative    Sinus bradycardia at a rate of 53 bpm.  Normal ECG.         Lab Studies:    Lab Results   Component Value Date    CHOLESTEROL 147 06/25/2025    CHOLESTEROL 150 03/31/2025    CHOLESTEROL 178 10/14/2024     Lab Results   Component Value Date    TRIG 82 06/25/2025    TRIG 74 03/31/2025    TRIG 102 10/14/2024     Lab Results   Component Value Date    HDL 50 06/25/2025    HDL 50 03/31/2025    HDL 46 10/14/2024     Lab Results   Component Value Date    LDLCALC 81 06/25/2025    LDLCALC 85 03/31/2025    LDLCALC 112 (H) 10/14/2024       Lab Results   Component Value Date    HGBA1C 6.0 (H) 03/31/2025    HGBA1C 5.8 (H) 06/05/2024    HGBA1C 6.4 (H) 11/16/2023      Lab Results   Component Value Date    EGFR 104 05/08/2025    EGFR 97 03/31/2025    EGFR 105 07/01/2024    SODIUM 141 05/08/2025    SODIUM 144 03/31/2025    SODIUM 143 07/01/2024    K 4.1 05/08/2025    K 4.8 03/31/2025    K 4.0 07/01/2024     05/08/2025     03/31/2025     07/01/2024    CO2 30 05/08/2025    CO2 31 03/31/2025    CO2 28 07/01/2024    ANIONGAP 7 07/31/2015    ANIONGAP 9 02/24/2015    ANIONGAP 9 02/06/2015    BUN 16 05/08/2025    BUN 16 03/31/2025    BUN 15 07/01/2024    CREATININE 0.59 05/08/2025    CREATININE 0.65 03/31/2025    CREATININE 0.55 (L) 07/01/2024    MG 2.3 02/17/2022     Lab Results   Component Value Date    WBC 6.83 11/16/2023    WBC 12.80 (H) 12/20/2022    WBC 8.57 12/07/2022    HGB 14.4 11/16/2023    HGB 9.6 (L) 07/26/2023    HGB 12.5 12/20/2022    HCT 44.4 11/16/2023    HCT 29.4 (L) 07/26/2023    HCT 37.7 12/20/2022    MCV 99 (H) 11/16/2023     (H) 12/20/2022     (H) 12/07/2022    MCH 32.1 11/16/2023    MCH 33.0 12/20/2022    MCH 32.3 12/07/2022    MCHC 32.4 11/16/2023    MCHC 33.2 12/20/2022    MCHC 32.1 12/07/2022     11/16/2023     12/20/2022     12/19/2022      Lab Results   Component Value Date    GLUCOSE 106 07/31/2015    GLUCOSE 119 02/24/2015     GLUCOSE 105 2015    CALCIUM 9.9 2025    CALCIUM 9.7 2025    CALCIUM 9.9 2024    ALB 4.6 2025    ALB 4.5 2024    ALB 4.3 2023    TP 6.7 2025    TP 6.9 2024    TP 6.6 2023    AST 27 2025    AST 23 2024    AST 18 2023    ALT 22 2025    ALT 20 2024    ALT 14 2023    ALKPHOS 57 2025    ALKPHOS 42 2024    ALKPHOS 57 2023    BILITOT 0.60 2015    BILITOT 0.49 2014     Lab Results   Component Value Date    TSH 1.05 2025    TSH 1.44 2025    TSH 1.53 2025     Lab Results   Component Value Date    FREET4 1.4 2015     Lab Results   Component Value Date    CRP <3.0 10/21/2022    CRP <3.0 2021       Current Medications[2]  No Known Allergies    Past Medical History[3]  Social History     Socioeconomic History    Marital status:      Spouse name: Not on file    Number of children: Not on file    Years of education: Not on file    Highest education level: Not on file   Occupational History    Occupation: Teacher    Occupation: retired   Tobacco Use    Smoking status: Former     Current packs/day: 0.00     Average packs/day: 0.5 packs/day for 2.0 years (1.0 ttl pk-yrs)     Types: Cigarettes     Quit date:      Years since quittin.5    Smokeless tobacco: Never   Vaping Use    Vaping status: Never Used   Substance and Sexual Activity    Alcohol use: Yes     Comment: being a social drinker    Drug use: Not Currently     Types: Marijuana     Comment: Medical marijuana    Sexual activity: Not Currently     Partners: Female     Comment: no known std risk factors   Other Topics Concern    Not on file   Social History Narrative    Daily coffee consumption (3 cups/day)     Social Drivers of Health     Financial Resource Strain: Low Risk  (10/15/2023)    Overall Financial Resource Strain (CARDIA)     Difficulty of Paying Living Expenses: Not hard at all   Food  Insecurity: No Food Insecurity (10/9/2024)    Nursing - Inadequate Food Risk Classification     Worried About Running Out of Food in the Last Year: Never true     Ran Out of Food in the Last Year: Never true     Ran Out of Food in the Last Year: Not on file   Transportation Needs: No Transportation Needs (10/9/2024)    PRAPARE - Transportation     Lack of Transportation (Medical): No     Lack of Transportation (Non-Medical): No   Physical Activity: Not on file   Stress: Not on file   Social Connections: Not on file   Intimate Partner Violence: Not At Risk (7/25/2023)    Received from [facility]    Humiliation, Afraid, Rape, and Kick questionnaire     Fear of Current or Ex-Partner: No     Emotionally Abused: No     Physically Abused: No     Sexually Abused: No   Housing Stability: Low Risk  (10/9/2024)    Housing Stability Vital Sign     Unable to Pay for Housing in the Last Year: No     Number of Times Moved in the Last Year: 0     Homeless in the Last Year: No      Family History[4]  Past Surgical History[5]    PREVIOUS WEIGHTS:   Wt Readings from Last 10 Encounters:   06/27/25 84.2 kg (185 lb 9.6 oz)   06/24/25 84.3 kg (185 lb 12.8 oz)   03/28/25 86.6 kg (191 lb)   02/18/25 87.5 kg (193 lb)   12/12/24 87 kg (191 lb 12.8 oz)   10/16/24 84.9 kg (187 lb 3.2 oz)   09/06/24 86.2 kg (190 lb)   08/14/24 86.2 kg (190 lb)   07/16/24 86.3 kg (190 lb 3.2 oz)   07/16/24 85.7 kg (189 lb)        Review of Systems:  Review of Systems   Constitutional:  Negative for activity change.   Respiratory:  Negative for cough, chest tightness, shortness of breath and wheezing.    Cardiovascular:  Negative for chest pain, palpitations and leg swelling.   Musculoskeletal:  Negative for gait problem.   Skin:  Negative for color change.   Neurological:  Negative for dizziness, tremors, syncope, weakness, light-headedness and headaches.   Psychiatric/Behavioral:  Negative for agitation and confusion.        Physical Exam:  BP  "126/80 (BP Location: Left arm, Patient Position: Sitting, Cuff Size: Adult)   Pulse (!) 53   Ht 6' 1\" (1.854 m)   Wt 84.2 kg (185 lb 9.6 oz)   BMI 24.49 kg/m²     Physical Exam  Vitals reviewed.   Constitutional:       General: He is not in acute distress.     Appearance: He is well-developed.   HENT:      Head: Normocephalic and atraumatic.   Neck:      Thyroid: No thyromegaly.      Vascular: No carotid bruit or JVD.      Trachea: No tracheal deviation.     Cardiovascular:      Rate and Rhythm: Normal rate and regular rhythm.      Pulses: Normal pulses.      Heart sounds: Normal heart sounds. No murmur heard.     No friction rub. No gallop.   Pulmonary:      Effort: Pulmonary effort is normal. No respiratory distress.      Breath sounds: Normal breath sounds. No wheezing, rhonchi or rales.   Chest:      Chest wall: No tenderness.     Musculoskeletal:      Cervical back: Normal range of motion and neck supple.      Right lower leg: No edema.      Left lower leg: No edema.     Skin:     General: Skin is warm and dry.     Neurological:      General: No focal deficit present.      Mental Status: He is alert and oriented to person, place, and time.     Psychiatric:         Mood and Affect: Mood normal.         Behavior: Behavior normal.         Thought Content: Thought content normal.         Judgment: Judgment normal.         ======================================================  Imaging:   Results Review Statement: No pertinent imaging studies reviewed.    Portions of the record may have been created with voice recognition software. Occasional wrong word or \"sound a like\" substitutions may have occurred due to the inherent limitations of voice recognition software. Read the chart carefully and recognize, using context, where substitutions have occurred.    SIGNATURES:   Arron Acuña MD          [1]   Patient Active Problem List  Diagnosis    Allergic rhinitis    Spinal stenosis    Degeneration of " intervertebral disc of cervical region    Hypothyroidism    Muscle pain, myofacial    Neuropathy    Nontoxic multinodular goiter    Occipital neuralgia    Venous insufficiency    Chondromalacia patellae    Chronic osteomyelitis of lower leg (HCC)    Generalized osteoarthritis    Anxiety    Reactive depression    BPH without obstruction/lower urinary tract symptoms    Post traumatic stress disorder (PTSD)    Vitamin D deficiency    Varicose veins of bilateral lower extremities with other complications    Osteoporosis without current pathological fracture    Bilateral carotid artery stenosis < 50%    PAC (premature atrial contraction)    PVC (premature ventricular contraction)    Primary hypercholesterolemia    Macrocytosis without anemia    Chronic neck pain    Thoracic aortic aneurysm without rupture (HCC)    Nonrheumatic mitral valve regurgitation    Bilateral enlargement of atria    Agatston coronary artery calcium score (207) between 200 and 399    Prostate cancer (HCC)    Mass of right parotid gland    History of osteomyelitis    Renal cysts, acquired, bilateral    Primary hypertension    Lumbar spine tumor    Schwannoma    Nonintractable epilepsy without status epilepticus, unspecified epilepsy type (HCC)    Steatosis (HCC)    Bradycardia    Aneurysm of the ascending aorta, without rupture (HCC)   [2]   Current Outpatient Medications:     acetaminophen (TYLENOL) 500 mg tablet, Take 2 tablets (1,000 mg total) by mouth every 8 (eight) hours as needed for moderate pain, mild pain or fever, Disp: , Rfl: 0    azelastine (ASTELIN) 0.1 % nasal spray, 1 spray into each nostril 2 (two) times a day Use in each nostril as directed, Disp: 90 mL, Rfl: 1    calcium citrate (CALCITRATE) 950 (200 Ca) MG tablet, Take 1 tablet by mouth daily, Disp: , Rfl:     Cholecalciferol (VITAMIN D3) 1,000 units tablet, Take 4,000 Units by mouth in the morning., Disp: , Rfl:     doxycycline (ADOXA) 50 MG tablet, , Disp: , Rfl:      fexofenadine (ALLEGRA) 180 MG tablet, Take 1 tablet (180 mg total) by mouth daily, Disp: 90 tablet, Rfl: 0    fluticasone (FLONASE) 50 mcg/act nasal spray, 2 sprays into each nostril daily, Disp: 48 g, Rfl: 3    metroNIDAZOLE (METROGEL) 0.75 % gel, Apply topically in the morning and in the evening., Disp: , Rfl:     Multiple Vitamin (MULTI VITAMIN DAILY PO), Take by mouth in the morning, Disp: , Rfl:     Omega-3 Fatty Acids (FISH OIL PO), Take by mouth in the morning, Disp: , Rfl:     Perfluorohexyloctane (Miebo) 1.338 GM/ML SOLN, Apply 1 drop to eye every 6 (six) hours, Disp: , Rfl:     pravastatin (PRAVACHOL) 40 mg tablet, Take 1 tablet (40 mg total) by mouth daily, Disp: 90 tablet, Rfl: 5    Synthroid 112 MCG tablet, , Disp: , Rfl:     tamsulosin (FLOMAX) 0.4 mg, Take 0.4 mg by mouth in the morning and 0.4 mg before bedtime., Disp: , Rfl:     tazarotene (AVAGE) 0.1 % cream, Apply topically daily at bedtime, Disp: , Rfl:   [3]   Past Medical History:  Diagnosis Date    Anxiety     Arthritis     Bleeding tendency (HCC)     BPH (benign prostatic hyperplasia)     Broken bones     left leg    Cancer (HCC)     Cataract     Chronic neck pain     Disease of thyroid gland 11/12    Epilepsy (HCC)     Headache(784.0)     Hypothyroidism     Neoplasm of unspecified behavior of bone, soft tissue, and skin     Osteomyelitis (HCC)     PONV (postoperative nausea and vomiting)     Prostate cancer (HCC)     Skin cancer     Squamous carcinoma 12/01/2014 12/2014    Thyroid condition     Thyroid disease    [4]   Family History  Problem Relation Name Age of Onset    Bone cancer Mother      Other Father          Accident    Dementia Other sibling     Cancer Other     [5]   Past Surgical History:  Procedure Laterality Date    APPENDECTOMY      ARTHRODESIS      H/o Arthrodesis Cervical to C2;  last assessed [id number]    CERVICAL FUSION      COLONOSCOPY  01/22/2021    COLONOSCOPY      EYE SURGERY      JOINT REPLACEMENT      KNEE SURGERY  Right 12/22/2017    LEG SURGERY Left 1999    Hardware placed in lower leg bone(s)    LEG SURGERY Left 01/01/2006    2006, 2012-Left leg vascular    WI LAMINECTOMY BX/EXC ISPI DAKOTAH XDRL LUMBAR N/A 12/19/2022    Procedure: L2/3 decompressive laminectomy and biopsy (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

## 2025-07-01 ENCOUNTER — OFFICE VISIT (OUTPATIENT)
Dept: RADIATION ONCOLOGY | Facility: HOSPITAL | Age: 70
End: 2025-07-01
Attending: STUDENT IN AN ORGANIZED HEALTH CARE EDUCATION/TRAINING PROGRAM
Payer: COMMERCIAL

## 2025-07-01 ENCOUNTER — TELEPHONE (OUTPATIENT)
Age: 70
End: 2025-07-01

## 2025-07-01 VITALS
RESPIRATION RATE: 18 BRPM | TEMPERATURE: 98.8 F | DIASTOLIC BLOOD PRESSURE: 70 MMHG | SYSTOLIC BLOOD PRESSURE: 122 MMHG | OXYGEN SATURATION: 98 % | HEART RATE: 75 BPM

## 2025-07-01 DIAGNOSIS — D36.10 SCHWANNOMA: Primary | ICD-10-CM

## 2025-07-01 PROCEDURE — G2211 COMPLEX E/M VISIT ADD ON: HCPCS | Performed by: STUDENT IN AN ORGANIZED HEALTH CARE EDUCATION/TRAINING PROGRAM

## 2025-07-01 PROCEDURE — 99213 OFFICE O/P EST LOW 20 MIN: CPT | Performed by: STUDENT IN AN ORGANIZED HEALTH CARE EDUCATION/TRAINING PROGRAM

## 2025-07-01 PROCEDURE — G0463 HOSPITAL OUTPT CLINIC VISIT: HCPCS | Performed by: STUDENT IN AN ORGANIZED HEALTH CARE EDUCATION/TRAINING PROGRAM

## 2025-07-01 PROCEDURE — 99211 OFF/OP EST MAY X REQ PHY/QHP: CPT | Performed by: STUDENT IN AN ORGANIZED HEALTH CARE EDUCATION/TRAINING PROGRAM

## 2025-07-01 NOTE — TELEPHONE ENCOUNTER
PT CALLED TO RESCHEDULE APPT WITH KRISTI TO DISCUSS SURGERY TO AFTER 10/1/2025 MRI [facility] FOLLOWING HIS RECENT DISCUSSION WITH DR BRAN (RAD/ONC).    PT REQUESTED THIS MESSAGE BE SENT TO LONI BERRY TO ADVISE WHY HE MOVED HIS APPT AND SCAN OUT 3 MONTHS.

## 2025-07-01 NOTE — PROGRESS NOTES
Haider Prather 1955 is a 70 y.o. male with a symptomatic schwannoma at L3/4 s/p decompressive laminectomy and STR. MRI surveillance demonstrated interval enlargement. On 23 he completed a course of SBRT to a dose of 2500 cGy in 5 fractions. The pt was last seen in radiation on 3/26/25. He returns today for follow up with repeat MRI lumbar spine.     25 MRI lumbar spine  IMPRESSION:  Stable intradural/extra medullary mass at the level of the L3 vertebral body extending into the left L3-4 neural foramen most consistent with nerve sheath tumor.  Multilevel lumbar spondylitic degenerative change with mild to moderate canal stenosis and foraminal narrowing. Canal stenosis is slightly worse at the L1-2 level.  Small inferiorly extruded right-sided disc herniation at L4-5 is slightly smaller than the prior exam.    Upcomin/3/25 Neurosurgery  Oncology History   Prostate cancer (HCC)    Initial Diagnosis    Prostate cancer (HCC)      Biopsy    - Prostatic adenocarcinoma, acinar type, Harrison score 3+3=6 (Grade Group 1) involving 2 cores   - Percentage of Harrison pattern 4: N/A   - Percentage of [place] pattern 5: N/A   - Perineural invasion: Not identified   - Periprostatic fat invasion: Not identified   - Lymph-vascular invasion: Not identified   - Seminal vesicle/ejaculatory duct invasion: Not identified   - Additional pathologic findings: None   - Best representative block if additional studies are needed: B2 and B6      A.  Right prostate (random biopsy):      - Benign prostatic tissue.     B.  Left prostate (random biopsy):      - Small foci of prostatic adenocarcinoma, [place] score 3+3=6 (Grade Group 1), involving less than 5% of two (2) cores.    C.  Right mid prostate (biopsy):     - Benign prostatic tissue.      2021 -  Cancer Staged    Staging form: Prostate, AJCC 8th Edition  - Clinical stage from 2021: Stage I (cT1a, cN0, cM0, PSA: 3.1, Grade Group: 1) - Signed by Karl KABA  "MD Deejay on 1/25/2022  Stage prefix: Initial diagnosis  Prostate specific antigen (PSA) range: Less than 10  [place] primary pattern: 3  [place] secondary pattern: 3  [place] score: 6  Histologic grading system: 5 grade system  Number of biopsy cores examined: 2  Number of biopsy cores positive: 2       Schwannoma   2015 Surgery    \"C1-C2 lateral mass fixation and sublaminar Gallie cable fixation with demineralized bone matrix and cadaveric bone arthrodesis\".  Performed by Dr. Contreras, 2/20/15.        12/19/2022 Surgery    A-B. Spine, \"Left L3 nerve sheath tumor,\" Resection:  - Schwannoma     2/20/2023 Surgery    Patient is s/p L2/3 decompressive laminectomy and biopsy (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 [12/19/22 DKO]     4/27/2023 Initial Diagnosis    Schwannoma     5/24/2023 - 6/5/2023 Radiation    Treatments:  Course: C1 SBRT    Plan ID Energy Fractions Dose per Fraction (cGy) Dose Correction (cGy) Total Dose Delivered (cGy) Elapsed Days   SBRT Spine 6X-FFF 5 / 5 500 0 2,500 12      Treatment Dates:  5/24/2023 - 6/5/2023.          Review of Systems:  Review of Systems   Constitutional: Negative.    HENT: Negative.     Eyes: Negative.    Respiratory: Negative.     Cardiovascular: Negative.    Gastrointestinal: Negative.    Endocrine: Negative.    Genitourinary: Negative.    Musculoskeletal:  Positive for back pain (Unchanged) and neck pain (unchanged).   Skin: Negative.    Allergic/Immunologic: Negative.    Neurological: Negative.    Hematological: Negative.    Psychiatric/Behavioral: Negative.         Clinical Trial: no      Health Maintenance   Topic Date Due    Zoster Vaccine (1 of 2) Never done    RSV Vaccine for Pregnant Patients and Patients Age 60+ Years (1 - Risk 60-74 years 1-dose series) Never done    PT PLAN OF CARE  02/09/2023    COVID-19 Vaccine (6 - Moderna risk 2024-25 season) 04/23/2025    Influenza Vaccine (1) 09/01/2025    Medicare Annual Wellness Visit (AWV)  10/16/2025    " Depression Screening  2026    Depression Follow-up Plan  2026    Fall Risk  2026    BMI: Adult  2026    Colorectal Cancer Screening  2029    Hepatitis C Screening  Completed    Pneumococcal Vaccine: 50+ Years  Completed    Meningococcal B Vaccine  Aged Out    RSV Vaccine age 0-20 Months  Aged Out    HIB Vaccine  Aged Out    IPV Vaccine  Aged Out    Hepatitis A Vaccine  Aged Out    Meningococcal ACWY Vaccine  Aged Out    HPV Vaccine  Aged Out     Problem List[1]  Past Medical History[2]  Past Surgical History[3]  Family History[4]  Social History     Socioeconomic History    Marital status:      Spouse name: Not on file    Number of children: Not on file    Years of education: Not on file    Highest education level: Not on file   Occupational History    Occupation: Teacher    Occupation: retired   Tobacco Use    Smoking status: Former     Current packs/day: 0.00     Average packs/day: 0.5 packs/day for 2.0 years (1.0 ttl pk-yrs)     Types: Cigarettes     Quit date:      Years since quittin.5    Smokeless tobacco: Never   Vaping Use    Vaping status: Never Used   Substance and Sexual Activity    Alcohol use: Yes     Comment: being a social drinker    Drug use: Not Currently     Types: Marijuana     Comment: Medical marijuana    Sexual activity: Not Currently     Partners: Female     Comment: no known std risk factors   Other Topics Concern    Not on file   Social History Narrative    Daily coffee consumption (3 cups/day)     Social Drivers of Health     Financial Resource Strain: Low Risk  (10/15/2023)    Overall Financial Resource Strain (CARDIA)     Difficulty of Paying Living Expenses: Not hard at all   Food Insecurity: No Food Insecurity (10/9/2024)    Nursing - Inadequate Food Risk Classification     Worried About Running Out of Food in the Last Year: Never true     Ran Out of Food in the Last Year: Never true     Ran Out of Food in the Last Year: Not on file    Transportation Needs: No Transportation Needs (10/9/2024)    PRAPARE - Transportation     Lack of Transportation (Medical): No     Lack of Transportation (Non-Medical): No   Physical Activity: Not on file   Stress: Not on file   Social Connections: Not on file   Intimate Partner Violence: Not At Risk (7/25/2023)    [facility]    Humiliation, Afraid, Rape, and Kick questionnaire     Fear of Current or Ex-Partner: No     Emotionally Abused: No     Physically Abused: No     Sexually Abused: No   Housing Stability: Low Risk  (10/9/2024)    Housing Stability Vital Sign     Unable to Pay for Housing in the Last Year: No     Number of Times Moved in the Last Year: 0     Homeless in the Last Year: No     Current Medications[5]  No Known Allergies  Vitals:    07/01/25 1316   BP: 122/70   BP Location: Left arm   Patient Position: Sitting   Cuff Size: Standard   Pulse: 75   Resp: 18   Temp: 98.8 °F (37.1 °C)   TempSrc: Temporal   SpO2: 98%      Pain Score:   4       [1]   Patient Active Problem List  Diagnosis    Allergic rhinitis    Spinal stenosis    Degeneration of intervertebral disc of cervical region    Hypothyroidism    Muscle pain, myofacial    Neuropathy    Nontoxic multinodular goiter    Occipital neuralgia    Venous insufficiency    Chondromalacia patellae    Chronic osteomyelitis of lower leg (HCC)    Generalized osteoarthritis    Anxiety    Reactive depression    BPH without obstruction/lower urinary tract symptoms    Post traumatic stress disorder (PTSD)    Vitamin D deficiency    Varicose veins of bilateral lower extremities with other complications    Osteoporosis without current pathological fracture    Bilateral carotid artery stenosis < 50%    PAC (premature atrial contraction)    PVC (premature ventricular contraction)    Primary hypercholesterolemia    Macrocytosis without anemia    Chronic neck pain    Thoracic aortic aneurysm without rupture (HCC)    Nonrheumatic mitral  valve regurgitation    Bilateral enlargement of atria    Agatston coronary artery calcium score (207) between 200 and 399    Prostate cancer (HCC)    Mass of right parotid gland    History of osteomyelitis    Renal cysts, acquired, bilateral    Primary hypertension    Lumbar spine tumor    Schwannoma    Nonintractable epilepsy without status epilepticus, unspecified epilepsy type (HCC)    Steatosis (HCC)    Bradycardia    Aneurysm of the ascending aorta, without rupture (HCC)   [2]   Past Medical History:  Diagnosis Date    Anxiety     Arthritis     Bleeding tendency (HCC)     BPH (benign prostatic hyperplasia)     Broken bones     left leg    Cancer (HCC)     Cataract     Chronic neck pain     Disease of thyroid gland 11/12    Epilepsy (HCC)     Headache(784.0)     Hypothyroidism     Neoplasm of unspecified behavior of bone, soft tissue, and skin     Osteomyelitis (HCC)     PONV (postoperative nausea and vomiting)     Prostate cancer (HCC)     Skin cancer     Squamous carcinoma 12/01/2014 12/2014    Thyroid condition     Thyroid disease    [3]   Past Surgical History:  Procedure Laterality Date    APPENDECTOMY      ARTHRODESIS      H/o Arthrodesis Cervical to C2;  last assessed [id number]    CERVICAL FUSION      COLONOSCOPY  01/22/2021    COLONOSCOPY      EYE SURGERY      JOINT REPLACEMENT      KNEE SURGERY Right 12/22/2017    LEG SURGERY Left 1999    Hardware placed in lower leg bone(s)    LEG SURGERY Left 01/01/2006 2006, 2012-Left leg vascular    TX LAMINECTOMY BX/EXC ISPI DAKOTAH XDRL LUMBAR N/A 12/19/2022    Procedure: L2/3 decompressive laminectomy and biopsy (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   [4]   Family History  Problem Relation Name Age of Onset    Bone cancer Mother      Other Father           Accident    Dementia Other sibling     Cancer Other     [5]   Current Outpatient Medications:     acetaminophen (TYLENOL) 500 mg tablet, Take 2 tablets (1,000 mg total) by mouth every 8 (eight) hours as needed for moderate pain, mild pain or fever, Disp: , Rfl: 0    azelastine (ASTELIN) 0.1 % nasal spray, 1 spray into each nostril 2 (two) times a day Use in each nostril as directed, Disp: 90 mL, Rfl: 1    calcium citrate (CALCITRATE) 950 (200 Ca) MG tablet, Take 1 tablet by mouth daily, Disp: , Rfl:     Cholecalciferol (VITAMIN D3) 1,000 units tablet, Take 4,000 Units by mouth in the morning., Disp: , Rfl:     doxycycline (ADOXA) 50 MG tablet, , Disp: , Rfl:     fexofenadine (ALLEGRA) 180 MG tablet, Take 1 tablet (180 mg total) by mouth daily, Disp: 90 tablet, Rfl: 0    fluticasone (FLONASE) 50 mcg/act nasal spray, 2 sprays into each nostril daily, Disp: 48 g, Rfl: 3    metroNIDAZOLE (METROGEL) 0.75 % gel, Apply topically in the morning and in the evening., Disp: , Rfl:     Multiple Vitamin (MULTI VITAMIN DAILY PO), Take by mouth in the morning, Disp: , Rfl:     Omega-3 Fatty Acids (FISH OIL PO), Take by mouth in the morning, Disp: , Rfl:     Perfluorohexyloctane (Miebo) 1.338 GM/ML SOLN, Apply 1 drop to eye every 6 (six) hours, Disp: , Rfl:     pravastatin (PRAVACHOL) 40 mg tablet, Take 1 tablet (40 mg total) by mouth daily, Disp: 90 tablet, Rfl: 5    Synthroid 112 MCG tablet, , Disp: , Rfl:     tamsulosin (FLOMAX) 0.4 mg, Take 0.4 mg by mouth in the morning and 0.4 mg before bedtime., Disp: , Rfl:     tazarotene (AVAGE) 0.1 % cream, Apply topically daily at bedtime, Disp: , Rfl:

## 2025-07-02 NOTE — PROGRESS NOTES
Follow-up Visit   Name: Haider Prather      : 1955      MRN: [id number]  Encounter Provider: Rodrigo Florence MD  Encounter Date: 2025   Encounter department: [facility] RADIATION ONCOLOGY  :  Assessment & Plan  Schwannoma  We reviewed the patient's repeat MRI Lumbar spine, which was read as stable from prior imaging. On my review I feel his schwannoma appears to be slightly diminished in size and have recommended repeat MRI Lumbar spine in 3 months with RTC thereafter. The patient will reschedule his follow up with neurosurgery in the meantime.   Orders:    MRI lumbar spine w wo contrast; Future        History of Present Illness   Chief Complaint   Patient presents with    Follow-up     Radiation Oncology   Pertinent Medical History   Mr. Haider Prather is a 70 year old man with a symptomatic schwannoma at L3/4 s/p decompressive laminectomy and STR. MRI surveillance demonstrated interval enlargement. On 23 he completed a course of SBRT to a dose of 2500 cGy in 5 fractions. He returns today for follow-up.      Interval History:  The patient was last seen in clinic on 3/19/25.     MRI Lumbar spine (25) demonstrated:  1. Persistent enhancing solid/cystic lesion centered within the left L3-4 subarticular/lateral recess extending to the mid central canal and foraminal/extraforaminal regions compatible with a schwannoma. The lesion appears minimally increased in size compared to the prior 4/10/2024 examination.  2. Multilevel degenerative changes of the lumbar spine similar to 4/10/2024 with no new disc herniation.    Currently he is doing fair overall. He remains symptomatically stable from his most recent visit with ongoing back pain.          Oncology History   Cancer Staging   Lumbar spine tumor  Staging form: Bone - Pelvis, AJCC 8th Edition  - Clinical: No stage assigned - Unsigned    Prostate cancer (HCC)  Staging form: Prostate, AJCC 8th Edition  - Clinical stage  "from 7/13/2021: Stage I (cT1a, cN0, cM0, PSA: 3.1, Grade Group: 1) - Signed by Karl Villalba MD on 1/25/2022  Stage prefix: Initial diagnosis  Prostate specific antigen (PSA) range: Less than 10  [place] primary pattern: 3  [place] secondary pattern: 3  [place] score: 6  Histologic grading system: 5 grade system  Number of biopsy cores examined: 2  Number of biopsy cores positive: 2  Oncology History   Prostate cancer (HCC)   2021 Initial Diagnosis    Prostate cancer (HCC)      Biopsy    - Prostatic adenocarcinoma, acinar type, [place] score 3+3=6 (Grade Group 1) involving 2 cores   - Percentage of [place] pattern 4: N/A   - Percentage of [place] pattern 5: N/A   - Perineural invasion: Not identified   - Periprostatic fat invasion: Not identified   - Lymph-vascular invasion: Not identified   - Seminal vesicle/ejaculatory duct invasion: Not identified   - Additional pathologic findings: None   - Best representative block if additional studies are needed: B2 and B6      A.  Right prostate (random biopsy):      - Benign prostatic tissue.     B.  Left prostate (random biopsy):      - Small foci of prostatic adenocarcinoma, [place] score 3+3=6 (Grade Group 1), involving less than 5% of two (2) cores.    C.  Right mid prostate (biopsy):     - Benign prostatic tissue.      7/13/2021 -  Cancer Staged    Staging form: Prostate, AJCC 8th Edition  - Clinical stage from 7/13/2021: Stage I (cT1a, cN0, cM0, PSA: 3.1, Grade Group: 1) - Signed by Karl Villalba MD on 1/25/2022  Stage prefix: Initial diagnosis  Prostate specific antigen (PSA) range: Less than 10  [place] primary pattern: 3  [place] secondary pattern: 3  [place] score: 6  Histologic grading system: 5 grade system  Number of biopsy cores examined: 2  Number of biopsy cores positive: 2       Schwannoma   2015 Surgery    \"C1-C2 lateral mass fixation and sublaminar Gallie cable fixation with demineralized bone matrix and cadaveric bone arthrodesis\".  Performed " "by Dr. Contreras, 2/20/15.        12/19/2022 Surgery    A-B. Spine, \"Left L3 nerve sheath tumor,\" Resection:  - Schwannoma     2/20/2023 Surgery    Patient is s/p L2/3 decompressive laminectomy and biopsy (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 [12/19/22 DKO]     4/27/2023 Initial Diagnosis    Schwannoma     5/24/2023 - 6/5/2023 Radiation    Treatments:  Course: C1 SBRT    Plan ID Energy Fractions Dose per Fraction (cGy) Dose Correction (cGy) Total Dose Delivered (cGy) Elapsed Days   SBRT Spine 6X-FFF 5 / 5 500 0 2,500 12      Treatment Dates:  5/24/2023 - 6/5/2023.         Review of Systems Refer to nursing note.          Objective   /70 (BP Location: Left arm, Patient Position: Sitting, Cuff Size: Standard)   Pulse 75   Temp 98.8 °F (37.1 °C) (Temporal)   Resp 18   SpO2 98%     Pain Screening:  Pain Score:   4  ECOG ECOG Performance Status: 1 - Restricted in physically strenuous activity but ambulatory and able to carry out work of a light or sedentary nature, e.g., light house work, office work  Physical Exam   Well appearing. NAD.   No increased work of breathing.   Extremities warm and well perfused.     Administrative Statements   I have spent a total time of 25 minutes in caring for this patient on the day of the visit/encounter including Diagnostic results, Risks and benefits of tx options, Documenting in the medical record, Reviewing/placing orders in the medical record (including tests, medications, and/or procedures), and Obtaining or reviewing history  .  Portions of the record may have been created with voice recognition software.  Occasional wrong word or \"sound a like\" substitutions may have occurred due to the inherent limitations of voice recognition software.  Read the chart carefully and recognize, using context, where substitutions have occurred.  "

## 2025-07-02 NOTE — ASSESSMENT & PLAN NOTE
We reviewed the patient's repeat MRI Lumbar spine, which was read as stable from prior imaging. On my review I feel his schwannoma appears to be slightly diminished in size and have recommended repeat MRI Lumbar spine in 3 months with RTC thereafter. The patient will reschedule his follow up with neurosurgery in the meantime.   Orders:    MRI lumbar spine w wo contrast; Future

## 2025-07-28 ENCOUNTER — TELEPHONE (OUTPATIENT)
Dept: RADIATION ONCOLOGY | Facility: CLINIC | Age: 70
End: 2025-07-28

## 2025-07-29 ENCOUNTER — OFFICE VISIT (OUTPATIENT)
Dept: RADIATION ONCOLOGY | Facility: CLINIC | Age: 70
End: 2025-07-29
Attending: RADIOLOGY
Payer: COMMERCIAL

## 2025-07-29 VITALS
HEART RATE: 70 BPM | DIASTOLIC BLOOD PRESSURE: 85 MMHG | SYSTOLIC BLOOD PRESSURE: 148 MMHG | HEIGHT: 73 IN | OXYGEN SATURATION: 96 % | RESPIRATION RATE: 16 BRPM | TEMPERATURE: 97.5 F | WEIGHT: 186 LBS | BODY MASS INDEX: 24.65 KG/M2

## 2025-07-29 DIAGNOSIS — C61 PROSTATE CANCER (HCC): Primary | ICD-10-CM

## 2025-07-29 PROCEDURE — 99215 OFFICE O/P EST HI 40 MIN: CPT | Performed by: RADIOLOGY

## 2025-08-08 ENCOUNTER — OFFICE VISIT (OUTPATIENT)
Dept: FAMILY MEDICINE CLINIC | Facility: CLINIC | Age: 70
End: 2025-08-08
Payer: COMMERCIAL

## 2025-08-08 VITALS
HEART RATE: 70 BPM | DIASTOLIC BLOOD PRESSURE: 70 MMHG | OXYGEN SATURATION: 96 % | BODY MASS INDEX: 24.52 KG/M2 | TEMPERATURE: 97.7 F | HEIGHT: 73 IN | SYSTOLIC BLOOD PRESSURE: 122 MMHG | WEIGHT: 185 LBS

## 2025-08-08 DIAGNOSIS — K22.89 THICKENING OF ESOPHAGUS: ICD-10-CM

## 2025-08-08 DIAGNOSIS — K29.50 CHRONIC GASTRITIS WITHOUT BLEEDING, UNSPECIFIED GASTRITIS TYPE: Primary | ICD-10-CM

## 2025-08-08 PROCEDURE — G2211 COMPLEX E/M VISIT ADD ON: HCPCS | Performed by: FAMILY MEDICINE

## 2025-08-08 PROCEDURE — 99213 OFFICE O/P EST LOW 20 MIN: CPT | Performed by: FAMILY MEDICINE

## 2025-08-08 RX ORDER — PANTOPRAZOLE SODIUM 40 MG/1
40 TABLET, DELAYED RELEASE ORAL DAILY
Qty: 90 TABLET | Refills: 0 | Status: SHIPPED | OUTPATIENT
Start: 2025-08-08

## 2025-08-08 RX ORDER — DOXYCYCLINE HYCLATE 50 MG/1
CAPSULE ORAL
COMMUNITY
Start: 2025-07-08

## 2025-08-08 RX ORDER — DESLORATADINE 5 MG/1
TABLET ORAL
COMMUNITY

## 2025-08-13 ENCOUNTER — CONSULT (OUTPATIENT)
Dept: GASTROENTEROLOGY | Facility: MEDICAL CENTER | Age: 70
End: 2025-08-13
Attending: FAMILY MEDICINE
Payer: COMMERCIAL

## 2025-08-13 ENCOUNTER — TELEPHONE (OUTPATIENT)
Dept: GASTROENTEROLOGY | Facility: MEDICAL CENTER | Age: 70
End: 2025-08-13

## 2025-08-13 PROBLEM — K20.90 ESOPHAGITIS: Status: ACTIVE | Noted: 2025-08-13

## 2025-08-14 ENCOUNTER — TELEPHONE (OUTPATIENT)
Age: 70
End: 2025-08-14

## 2025-08-19 ENCOUNTER — TELEPHONE (OUTPATIENT)
Dept: RADIATION ONCOLOGY | Facility: HOSPITAL | Age: 70
End: 2025-08-19

## (undated) DEVICE — DISPOSABLE SLIM BIPOLAR FORCEPS, NON-STICK,: Brand: SPETZLER-MALIS

## (undated) DEVICE — SUPPLY FEE STD

## (undated) DEVICE — DRESSING MEPILEX AG BORDER 4 X 4 IN

## (undated) DEVICE — HEMOSTATIC MATRIX SURGIFLO 8ML W/THROMBIN

## (undated) DEVICE — INTENDED FOR TISSUE SEPARATION, AND OTHER PROCEDURES THAT REQUIRE A SHARP SURGICAL BLADE TO PUNCTURE OR CUT.: Brand: BARD-PARKER ® CARBON RIB-BACK BLADES

## (undated) DEVICE — SNAP KOVER: Brand: UNBRANDED

## (undated) DEVICE — 3M™ TEGADERM™ TRANSPARENT FILM DRESSING FRAME STYLE, 1626W, 4 IN X 4-3/4 IN (10 CM X 12 CM), 50/CT 4CT/CASE: Brand: 3M™ TEGADERM™

## (undated) DEVICE — MONITORING SPINAL IMPULSE CASE FEE

## (undated) DEVICE — INTENDED FOR TISSUE SEPARATION, AND OTHER PROCEDURES THAT REQUIRE A SHARP SURGICAL BLADE TO PUNCTURE OR CUT.: Brand: BARD-PARKER SAFETY BLADES SIZE 15, STERILE

## (undated) DEVICE — BETHLEHEM UNIVERSAL SPINE, KIT: Brand: CARDINAL HEALTH

## (undated) DEVICE — LIGHT HANDLE COVER SLEEVE DISP BLUE STELLAR

## (undated) DEVICE — GLOVE SRG BIOGEL 8

## (undated) DEVICE — PREP SURGICAL PURPREP 26ML

## (undated) DEVICE — GLOVE INDICATOR PI UNDERGLOVE SZ 8.5 BLUE

## (undated) DEVICE — BIPOLAR SEALER 23-113-1 AQM 2.3: Brand: AQUAMANTYS™

## (undated) DEVICE — DISPOSABLE EQUIPMENT COVER: Brand: SMALL TOWEL DRAPE

## (undated) DEVICE — TELFA NON-ADHERENT ABSORBENT DRESSING: Brand: TELFA

## (undated) DEVICE — ADHESIVE SKN CLSR HISTOACRYL FLEX 0.5ML LF

## (undated) DEVICE — DRAPE MICROSCOPE OPMI PENTERO

## (undated) DEVICE — TIBURON SPLIT SHEET: Brand: CONVERTORS

## (undated) DEVICE — TOOL MR8-14MH30 MR8 14CM MATCH 3MM: Brand: MIDAS REX MR8

## (undated) DEVICE — SUT MONOCRYL 2-0 SH 27 IN Y417H

## (undated) DEVICE — ANTIBACTERIAL VIOLET BRAIDED (POLYGLACTIN 910), SYNTHETIC ABSORBABLE SUTURE: Brand: COATED VICRYL

## (undated) DEVICE — PLUMEPEN PRO 10FT

## (undated) DEVICE — TRAY FOLEY 16FR URIMETER SURESTEP

## (undated) DEVICE — DRAPE SHEET X-LG

## (undated) DEVICE — ELECTRODE BLADE E-Z CLEAN 4IN -0014A

## (undated) DEVICE — SPECIMEN CONTAINER STERILE PEEL PACK

## (undated) DEVICE — NEURO PATTIES 1/2 X 3

## (undated) DEVICE — SUT MONOCRYL PLUS 4-0 PS-2 18 IN MCP496G

## (undated) DEVICE — NEURO PATTIES 1/4 X 1/4

## (undated) DEVICE — DURASTAT IS INDICATED FOR USE IN GENERAL SOFT TISSUE APPROXIMATION AND/OR LIGATION, INCLUDING: CARDIOVASCULAR, DENTAL, GENERAL SURGICAL PROCEDURES AND REPAIR OF THE DURA MATER. DURASTAT PTFE SUTURES ARE NOT INDICATED FOR USE IN MICROSURGERY, OPHTHALMIC PROCEDURES, OR PERIPHERAL NEURAL TISSUES. DURASTAT IS PROVIDED STERILE AS A SINGLE-USE DEVICE.: Brand: DURASTAT

## (undated) DEVICE — SPONGE PVP SCRUB WING STERILE